# Patient Record
Sex: MALE | Race: WHITE | Employment: UNEMPLOYED | URBAN - METROPOLITAN AREA
[De-identification: names, ages, dates, MRNs, and addresses within clinical notes are randomized per-mention and may not be internally consistent; named-entity substitution may affect disease eponyms.]

---

## 2017-09-20 PROBLEM — E78.2 MIXED HYPERLIPIDEMIA: Status: ACTIVE | Noted: 2017-09-20

## 2017-09-20 PROBLEM — K21.9 GASTROESOPHAGEAL REFLUX DISEASE WITHOUT ESOPHAGITIS: Status: ACTIVE | Noted: 2017-09-20

## 2017-09-20 PROBLEM — M15.9 PRIMARY OSTEOARTHRITIS INVOLVING MULTIPLE JOINTS: Status: ACTIVE | Noted: 2017-09-20

## 2017-09-20 PROBLEM — E03.9 ACQUIRED HYPOTHYROIDISM: Status: ACTIVE | Noted: 2017-09-20

## 2017-09-21 PROBLEM — Z86.59 H/O: DEPRESSION: Status: ACTIVE | Noted: 2017-09-21

## 2017-09-21 PROBLEM — N18.9 CKD (CHRONIC KIDNEY DISEASE): Status: ACTIVE | Noted: 2017-09-21

## 2017-09-21 PROBLEM — K80.20 CHOLELITHIASIS: Status: ACTIVE | Noted: 2017-09-21

## 2017-09-21 PROBLEM — M19.90 DJD (DEGENERATIVE JOINT DISEASE): Status: ACTIVE | Noted: 2017-09-21

## 2017-09-21 PROBLEM — E78.5 HYPERLIPIDEMIA: Status: ACTIVE | Noted: 2017-09-21

## 2017-09-21 PROBLEM — E66.9 OBESITY: Status: ACTIVE | Noted: 2017-09-21

## 2017-09-21 PROBLEM — G47.30 SLEEP APNEA: Status: ACTIVE | Noted: 2017-09-21

## 2017-09-21 PROBLEM — Z79.899 ON STATIN THERAPY: Status: ACTIVE | Noted: 2017-09-21

## 2017-09-21 PROBLEM — I12.9 HYPERTENSION WITH RENAL DISEASE: Status: ACTIVE | Noted: 2017-09-21

## 2017-09-21 PROBLEM — K57.90 DIVERTICULOSIS: Status: ACTIVE | Noted: 2017-09-21

## 2017-09-21 PROBLEM — Z86.61 HISTORY OF VIRAL MENINGITIS: Status: ACTIVE | Noted: 2017-09-21

## 2017-10-06 RX ORDER — ROSUVASTATIN CALCIUM 10 MG/1
TABLET, COATED ORAL
Qty: 90 TAB | Refills: 3 | Status: SHIPPED | OUTPATIENT
Start: 2017-10-06 | End: 2018-11-28 | Stop reason: SDUPTHER

## 2017-10-06 RX ORDER — LEVOTHYROXINE SODIUM 50 UG/1
TABLET ORAL
Qty: 90 TAB | Refills: 3 | Status: SHIPPED | OUTPATIENT
Start: 2017-10-06 | End: 2018-11-28 | Stop reason: SDUPTHER

## 2017-10-06 RX ORDER — DICLOFENAC SODIUM 75 MG/1
TABLET, DELAYED RELEASE ORAL
Qty: 180 TAB | Refills: 3 | Status: SHIPPED | OUTPATIENT
Start: 2017-10-06 | End: 2018-11-28 | Stop reason: SDUPTHER

## 2017-10-06 RX ORDER — CARVEDILOL 12.5 MG/1
TABLET ORAL
Qty: 180 TAB | Refills: 3 | Status: SHIPPED | OUTPATIENT
Start: 2017-10-06 | End: 2018-11-28 | Stop reason: SDUPTHER

## 2017-10-06 RX ORDER — METFORMIN HYDROCHLORIDE 500 MG/1
TABLET, EXTENDED RELEASE ORAL
Qty: 270 TAB | Refills: 3 | Status: SHIPPED | OUTPATIENT
Start: 2017-10-06 | End: 2017-10-17 | Stop reason: ALTCHOICE

## 2017-10-06 RX ORDER — TRIAMTERENE/HYDROCHLOROTHIAZID 37.5-25 MG
TABLET ORAL
Qty: 90 TAB | Refills: 3 | Status: SHIPPED | OUTPATIENT
Start: 2017-10-06 | End: 2018-11-28 | Stop reason: SDUPTHER

## 2017-10-06 NOTE — TELEPHONE ENCOUNTER
Requested Prescriptions     Pending Prescriptions Disp Refills    metFORMIN ER (GLUCOPHAGE XR) 500 mg tablet [Pharmacy Med Name: METFORMIN ER 500MG TABLET] 270 Tab 3     Sig: TAKE 1 TABLET BY MOUTH IN  THE MORNING AND 2 TABLETS  AT DINNER    diclofenac EC (VOLTAREN) 75 mg EC tablet [Pharmacy Med Name: DICLOFENAC SODIUM  75MG  TAB  ENTERIC COATED] 180 Tab 3     Sig: TAKE 1 TABLET BY MOUTH TWO  TIMES DAILY    rosuvastatin (CRESTOR) 10 mg tablet [Pharmacy Med Name: ROSUVASTATIN  10MG  TAB] 90 Tab 3     Sig: TAKE 1 TABLET BY MOUTH  DAILY    carvedilol (COREG) 12.5 mg tablet [Pharmacy Med Name: CARVEDILOL  12.5MG  TAB] 180 Tab 3     Sig: TAKE 1 TABLET BY MOUTH TWO  TIMES DAILY    levothyroxine (SYNTHROID) 50 mcg tablet [Pharmacy Med Name: LEVOTHYROXINE  0.05MG  TAB] 90 Tab 3     Sig: TAKE 1 TABLET BY MOUTH  DAILY    triamterene-hydroCHLOROthiazide (MAXZIDE) 37.5-25 mg per tablet [Pharmacy Med Name: TRIAMT-HCTZ 37.5-25MG TABLET] 90 Tab 3     Sig: TAKE 1 TABLET BY MOUTH  DAILY

## 2017-10-17 ENCOUNTER — OFFICE VISIT (OUTPATIENT)
Dept: INTERNAL MEDICINE CLINIC | Age: 67
End: 2017-10-17

## 2017-10-17 VITALS
HEART RATE: 61 BPM | TEMPERATURE: 98.4 F | RESPIRATION RATE: 18 BRPM | BODY MASS INDEX: 33.77 KG/M2 | SYSTOLIC BLOOD PRESSURE: 124 MMHG | WEIGHT: 254.8 LBS | OXYGEN SATURATION: 97 % | DIASTOLIC BLOOD PRESSURE: 88 MMHG | HEIGHT: 73 IN

## 2017-10-17 DIAGNOSIS — I42.9 CARDIOMYOPATHY, UNSPECIFIED TYPE (HCC): ICD-10-CM

## 2017-10-17 DIAGNOSIS — Z12.5 PROSTATE CANCER SCREENING: ICD-10-CM

## 2017-10-17 DIAGNOSIS — E11.9 TYPE 2 DIABETES MELLITUS WITHOUT COMPLICATION, WITHOUT LONG-TERM CURRENT USE OF INSULIN (HCC): ICD-10-CM

## 2017-10-17 DIAGNOSIS — T75.89XA EFFECTS OF EXPOSURE TO EXTERNAL CAUSE, INITIAL ENCOUNTER: ICD-10-CM

## 2017-10-17 DIAGNOSIS — N18.30 STAGE 3 CHRONIC KIDNEY DISEASE (HCC): ICD-10-CM

## 2017-10-17 DIAGNOSIS — Z23 ENCOUNTER FOR IMMUNIZATION: ICD-10-CM

## 2017-10-17 DIAGNOSIS — M15.9 PRIMARY OSTEOARTHRITIS INVOLVING MULTIPLE JOINTS: ICD-10-CM

## 2017-10-17 DIAGNOSIS — K21.9 GASTROESOPHAGEAL REFLUX DISEASE WITHOUT ESOPHAGITIS: ICD-10-CM

## 2017-10-17 DIAGNOSIS — E66.09 CLASS 1 OBESITY DUE TO EXCESS CALORIES WITHOUT SERIOUS COMORBIDITY WITH BODY MASS INDEX (BMI) OF 33.0 TO 33.9 IN ADULT: ICD-10-CM

## 2017-10-17 DIAGNOSIS — I12.9 HYPERTENSION WITH RENAL DISEASE: Primary | ICD-10-CM

## 2017-10-17 DIAGNOSIS — Z00.00 WELCOME TO MEDICARE PREVENTIVE VISIT: ICD-10-CM

## 2017-10-17 DIAGNOSIS — E78.2 MIXED HYPERLIPIDEMIA: ICD-10-CM

## 2017-10-17 LAB
ALBUMIN SERPL-MCNC: 4.5 G/DL (ref 3.9–5.4)
ALKALINE PHOS POC: 85 U/L (ref 38–126)
ALT SERPL-CCNC: 60 U/L (ref 9–52)
AST SERPL-CCNC: 38 U/L (ref 14–36)
BACTERIA UA POCT, BACTPOCT: NORMAL
BILIRUB UR QL STRIP: NEGATIVE
BUN BLD-MCNC: 25 MG/DL (ref 9–20)
CALCIUM BLD-MCNC: 9.5 MG/DL (ref 8.4–10.2)
CASTS UA POCT: NORMAL
CHLORIDE BLD-SCNC: 105 MMOL/L (ref 98–107)
CHOLEST SERPL-MCNC: 121 MG/DL (ref 0–200)
CK (CPK) POC: 114 U/L (ref 30–135)
CLUE CELLS, CLUEPOCT: NEGATIVE
CO2 POC: 28 MMOL/L (ref 22–32)
CREAT BLD-MCNC: 1.6 MG/DL (ref 0.8–1.5)
CRYSTALS UA POCT, CRYSPOCT: NEGATIVE
EGFR (POC): 44.5
EPITHELIAL CELLS POCT: NORMAL
GLUCOSE POC: 124 MG/DL (ref 75–110)
GLUCOSE UR-MCNC: NEGATIVE MG/DL
GRAN# POC: 4.5 K/UL (ref 2–7.8)
GRAN% POC: 68.8 % (ref 37–92)
HBA1C MFR BLD HPLC: 7 % (ref 4.5–5.7)
HCT VFR BLD CALC: 36.1 % (ref 37–51)
HDLC SERPL-MCNC: 31 MG/DL (ref 35–130)
HGB BLD-MCNC: 12.3 G/DL (ref 12–18)
KETONES P FAST UR STRIP-MCNC: NEGATIVE MG/DL
LDL CHOLESTEROL POC: 48.6 MG/DL (ref 0–130)
LY# POC: 1.7 K/UL (ref 0.6–4.1)
LY% POC: 26.5 % (ref 10–58.5)
MCH RBC QN: 31.4 PG (ref 26–32)
MCHC RBC-ENTMCNC: 34.2 G/DL (ref 30–36)
MCV RBC: 92 FL (ref 80–97)
MICROALBUMIN UR TEST STR-MCNC: NEGATIVE MG/L (ref 0–20)
MID #, POC: 0.3 K/UL (ref 0–1.8)
MID% POC: 4.7 % (ref 0.1–24)
MUCUS UA POCT, MUCPOCT: NORMAL
PH UR STRIP: 5 [PH] (ref 5–7)
PLATELET # BLD: 211 K/UL (ref 140–440)
POTASSIUM SERPL-SCNC: 4.2 MMOL/L (ref 3.6–5)
PROT SERPL-MCNC: 7.2 G/DL (ref 6.3–8.2)
PROT UR QL STRIP: NEGATIVE MG/DL
PSA SERPL-MCNC: 2 NG/ML (ref 0–4)
RBC # BLD: 3.92 M/UL (ref 4.2–6.3)
RBC UA POCT, RBCPOCT: NORMAL
SODIUM SERPL-SCNC: 146 MMOL/L (ref 137–145)
SP GR UR STRIP: 1.01 (ref 1.01–1.02)
T4 FREE SERPL-MCNC: 1.03 NG/DL (ref 0.71–1.85)
TCHOL/HDL RATIO (POC): 3.9 (ref 0–4)
TOTAL BILIRUBIN POC: 0.7 MG/DL (ref 0.2–1.3)
TRICH UA POCT, TRICHPOC: NEGATIVE
TRIGL SERPL-MCNC: 207 MG/DL (ref 0–200)
TSH BLD-ACNC: 1.43 UIU/ML (ref 0.4–4.2)
UA UROBILINOGEN AMB POC: NORMAL (ref 0.2–1)
URINALYSIS CLARITY POC: CLEAR
URINALYSIS COLOR POC: NORMAL
URINE BLOOD POC: NEGATIVE
URINE CULT COMMENT, POCT: NORMAL
URINE LEUKOCYTES POC: NEGATIVE
URINE NITRITES POC: NEGATIVE
VLDLC SERPL CALC-MCNC: 41.4 MG/DL
WBC # BLD: 6.5 K/UL (ref 4.1–10.9)
WBC UA POCT, WBCPOCT: 0
YEAST UA POCT, YEASTPOC: NEGATIVE

## 2017-10-17 NOTE — MR AVS SNAPSHOT
Visit Information Date & Time Provider Department Dept. Phone Encounter #  
 10/17/2017  9:10 AM Augusto Rutherford MD Rudy Dupree  [phone] [id number] Follow-up Instructions Return in about 3 months (around 1/17/2018). Upcoming Health Maintenance Date Due  
 EYE EXAM RETINAL OR DILATED Q1 3/1/1962 DTaP/Tdap/Td series (1 - Tdap) 3/1/1973 FOBT Q 1 YEAR AGE 50-75 3/1/2002 ZOSTER VACCINE AGE 60> 1/1/2012 GLAUCOMA SCREENING Q2Y 3/1/2017 HEMOGLOBIN A1C Q6M 4/17/2018 FOOT EXAM Q1 10/17/2018 MICROALBUMIN Q1 10/17/2018 LIPID PANEL Q1 10/17/2018 MEDICARE YEARLY EXAM 10/18/2018 Pneumococcal 65+ Low/Medium Risk (2 of 2 - PPSV23) 11/16/2020 Allergies as of 10/17/2017  Review Complete On: 10/17/2017 By: Augusto Rutherford MD  
  
 Severity Noted Reaction Type Reactions Demerol [Meperidine]  03/11/2012    Nausea and Vomiting Phenergan [Promethazine]  09/21/2017    Unknown (comments) Current Immunizations  Never Reviewed Name Date Influenza High Dose Vaccine PF  Incomplete Influenza Vaccine 11/23/2016, 11/16/2015 Influenza Vaccine Split 10/1/2011 Pneumococcal Conjugate (PCV-13) 3/2/2017 Pneumococcal Vaccine (Unspecified Type) 11/16/2015 ZZZ-RETIRED (DO NOT USE) Pneumococcal Vaccine (Unspecified Type)  Deferred (Patient Refused), 1/1/2012 Not reviewed this visit You Were Diagnosed With   
  
 Codes Comments Hypertension with renal disease    -  Primary ICD-10-CM: I12.9 ICD-9-CM: 403.90 Cardiomy[facility] unspecified [facility]     ICD-10-CM: I42.9 ICD-9-CM: 425.4 Type 2 diabetes mellitus without complication, without long-term current use of insulin (HCC)     ICD-10-CM: E11.9 ICD-9-CM: 250.00 Mixed hyperlipidemia     ICD-10-CM: E78.2 ICD-9-CM: 272.2 Gastroesophageal reflux disease without esophagitis     ICD-10-CM: K21.9 ICD-9-CM: 530.81   
 Primary osteoarthritis involving multiple joints     ICD-10-CM: M15.0 ICD-9-CM: 715.09 Stage 3 chronic kidney disease     ICD-10-CM: N18.3 ICD-9-CM: 929. 3 Class 1 obesity due to excess calories without serious comorbidity with body mass index (BMI) of 33.0 to 33.9 in adult     ICD-10-CM: E66.09, Z68.33 
ICD-9-CM: 278.00, V85.33 Prostate cancer screening     ICD-10-CM: Z12.5 ICD-9-CM: V76.44 Effects of exposure to external cause, initial encounter     ICD-10-CM: T75.89XA ICD-9-CM: 994.9 Encounter for immunization     ICD-10-CM: J47 ICD-9-CM: V03.89 Welcome to Medicare preventive visit     ICD-10-CM: Z00.00 ICD-9-CM: V70.0 Vitals BP Pulse Temp Resp Height(growth percentile) Weight(growth percentile) 124/88 (BP 1 Location: Right arm, BP Patient Position: Sitting) 61 98.4 °F (36.9 °C) (Oral) 18 6' 1\" (1.854 m) 254 lb 12.8 oz (115.6 kg) SpO2 BMI Smoking Status 97% 33.62 kg/m2 Never Smoker Vitals History BMI and BSA Data Body Mass Index Body Surface Area  
 33.62 kg/m 2 2.44 m 2 Preferred Pharmacy Pharmacy Name Phone [facility], [address] [place] [address] [address] [place] [address] Your Updated Medication List  
  
   
This list is accurate as of: 10/17/17 10:43 AM.  Always use your most recent med list.  
  
  
  
  
 aspirin 81 mg tablet Take 81 mg by mouth daily. carvedilol 12.5 mg tablet Commonly known as:  COREG  
TAKE 1 TABLET BY MOUTH TWO  TIMES DAILY  
  
 diclofenac EC 75 mg EC tablet Commonly known as:  VOLTAREN  
TAKE 1 TABLET BY MOUTH TWO  TIMES DAILY  
  
 esomeprazole 40 mg capsule Commonly known as:  Bonnita Buerger Take  by mouth daily. famotidine 40 mg tablet Commonly known as:  PEPCID Take 40 mg by mouth daily. FISH OIL 1,000 mg Cap Generic drug:  omega-3 fatty acids-vitamin e Take 1 Cap by mouth.  
  
 levothyroxine 50 mcg tablet Commonly known as:  SYNTHROID  
 TAKE 1 TABLET BY MOUTH  DAILY  
  
 lisinopril 10 mg tablet Commonly known as:  Tosha Blue Take 10 mg by mouth daily. metFORMIN 500 mg tablet Commonly known as:  GLUCOPHAGE Take 500 mg by mouth two (2) times daily (with meals). niacin  mg Tb24 Commonly known as:  Dauna Jain Take 750 mg by mouth nightly. rosuvastatin 10 mg tablet Commonly known as:  CRESTOR  
TAKE 1 TABLET BY MOUTH  DAILY  
  
 triamterene-hydroCHLOROthiazide 37.5-25 mg per tablet Commonly known as:  Sherrie Moreno TAKE 1 TABLET BY MOUTH  DAILY We Performed the Following ADMIN INFLUENZA VIRUS VAC [ [facility]] AMB POC CK (CPK) [43978 CPT(R)] AMB POC COMPLETE CBC,AUTOMATED ENTER O2467826 CPT(R)] AMB POC COMPREHENSIVE METABOLIC PANEL [15089 CPT(R)] AMB POC EKG ROUTINE W/ 12 LEADS, INTER & REP [86197 CPT(R)] AMB POC HEMOGLOBIN A1C [79723 CPT(R)] AMB POC LIPID PROFILE [03795 CPT(R)] AMB POC PSA  (MEDICARE) [ [facility]] AMB POC T4, FREE [id number] CPT(R)] AMB POC TSH [21620 CPT(R)] AMB POC URINALYSIS DIP STICK AUTO W/ MICRO  [49778 CPT(R)] AMB POC URINE, MICROALBUMIN, SEMIQUANT (1 RESULT) [49407 CPT(R)] HEPATITIS C AB [23584 CPT(R)] INFLUENZA VIRUS VACCINE, HIGH DOSE SEASONAL, PRESERVATIVE FREE [64739 CPT(R)] Follow-up Instructions Return in about 3 months (around 1/17/2018). Patient Instructions Influenza (Flu) Vaccine (Inactivated or Recombinant): What You Need to Know Why get vaccinated? Influenza (\"flu\") is a contagious disease that spreads around the [place] every winter, usually between October and May. Flu is caused by influenza viruses and is spread mainly by coughing, sneezing, and close contact. Anyone can get flu. Flu strikes suddenly and can last several days. Symptoms vary by age, but can include: · Fever/chills. · Sore throat. · Muscle aches. · Fatigue. · Cough. · Headache. · Runny or stuffy nose. Flu can also lead to pneumonia and blood infections, and cause diarrhea and seizures in children. If you have a medical condition, such as heart or lung disease, flu can make it worse. Flu is more dangerous for some people. Infants and young children, people 72years of age and older, pregnant women, and people with certain health conditions or a weakened immune system are at greatest risk. Each year thousands of people in the [facility] die from flu, and many more are hospitalized. Flu vaccine can: · Keep you from getting flu. · Make flu less severe if you do get it. · Keep you from spreading flu to your family and other people. Inactivated and recombinant flu vaccines A dose of flu vaccine is recommended every flu season. Children 6 months through 6years of age may need two doses during the same flu season. Everyone else needs only one dose each flu season. Some inactivated flu vaccines contain a very small amount of a mercury-based preservative called thimerosal. Studies have not shown thimerosal in vaccines to be harmful, but flu vaccines that do not contain thimerosal are available. There is no live flu virus in flu shots. They cannot cause the flu. There are many flu viruses, and they are always changing. Each year a new flu vaccine is made to protect against three or four viruses that are likely to cause disease in the upcoming flu season. But even when the vaccine doesn't exactly match these viruses, it may still provide some protection. Flu vaccine cannot prevent: · Flu that is caused by a virus not covered by the vaccine. · Illnesses that look like flu but are not. Some people should not get this vaccine Tell the person who is giving you the vaccine: · If you have any severe (life-threatening) allergies.  If you ever had a life-threatening allergic reaction after a dose of flu vaccine, or have a severe allergy to any part of this vaccine, you may be advised not to get vaccinated. Most, but not all, types of flu vaccine contain a small amount of egg protein. · If you ever had Guillain-Barré syndrome (also called GBS) Some people with a history of GBS should not get this vaccine. This should be discussed with your doctor. · If you are not feeling well. It is usually okay to get flu vaccine when you have a mild illness, but you might be asked to come back when you feel better. Risks of a vaccine reaction With any medicine, including vaccines, there is a chance of reactions. These are usually mild and go away on their own, but serious reactions are also possible. Most people who get a flu shot do not have any problems with it. Minor problems following a flu shot include: · Soreness, redness, or swelling where the shot was given · Hoarseness · Sore, red or itchy eyes · Cough · Fever · Aches · Headache · Itching · Fatigue If these problems occur, they usually begin soon after the shot and last 1 or 2 days. More serious problems following a flu shot can include the following: · There may be a small increased risk of Guillain-Barré Syndrome (GBS) after inactivated flu vaccine. This risk has been estimated at 1 or 2 additional cases per million people vaccinated. This is much lower than the risk of severe complications from flu, which can be prevented by flu vaccine. · Ashley Houston children who get the flu shot along with pneumococcal vaccine (PCV13) and/or DTaP vaccine at the same time might be slightly more likely to have a seizure caused by fever. Ask your doctor for more information. Tell your doctor if a child who is getting flu vaccine has ever had a seizure Problems that could happen after any injected vaccine: · People sometimes faint after a medical procedure, including vaccination.  Sitting or lying down for about 15 minutes can help prevent fainting, and injuries caused by a fall. Tell your doctor if you feel dizzy, or have vision changes or ringing in the ears. · Some people get severe pain in the shoulder and have difficulty moving the arm where a shot was given. This happens very rarely. · Any medication can cause a severe allergic reaction. Such reactions from a vaccine are very rare, estimated at about 1 in a million doses, and would happen within a few minutes to a few hours after the vaccination. As with any medicine, there is a very remote chance of a vaccine causing a serious injury or death. The safety of vaccines is always being monitored. For more information, visit: www.cdc.gov/vaccinesafety/. What if there is a serious reaction? What should I look for? · Look for anything that concerns you, such as signs of a severe allergic reaction, very high fever, or unusual behavior. Signs of a severe allergic reaction can include hives, swelling of the face and throat, difficulty breathing, a fast heartbeat, dizziness, and weakness  usually within a few minutes to a few hours after the vaccination. What should I do? · If you think it is a severe allergic reaction or other emergency that can't wait, call 9-1-1 and get the person to the nearest hospital. Otherwise, call your doctor. · Reactions should be reported to the \"Vaccine Adverse Event Reporting System\" (VAERS). Your doctor should file this report, or you can do it yourself through the VAERS website at www.vaers. hhs.gov, or by calling 8-246.267.6459. VAERS does not give medical advice. The National Vaccine Injury Compensation Program 
The National Vaccine Injury Compensation Program (VICP) is a federal program that was created to compensate people who may have been injured by certain vaccines.  
Persons who believe they may have been injured by a vaccine can learn about the program and about filing a claim by calling 4-305.231.5900 or visiting the [employer] website at www.[facility].gov/vaccinecompensation. There is a time limit to file a claim for compensation. How can I learn more? · Ask your healthcare provider. He or she can give you the vaccine package insert or suggest other sources of information. · Call your local or state health department. · Contact the Centers for Disease Control and Prevention (CDC): 
¨ Call 0-979.515.1907 (1-800-CDC-INFO) or ¨ Visit CDC's website at www.cdc.gov/flu Vaccine Information Statement Inactivated Influenza Vaccine 8/7/2015) 42 KELSEY Blackwell 431LC-75 [facility] and [employer] Centers for Disease Control and Prevention Many Vaccine Information Statements are available in Wolof and other languages. See www.immunize.org/vis. Muchas hojas de información sobre vacunas están disponibles en español y en otros idiomas. Visite www.immunize.org/vis. Care instructions adapted under license by [employer] (which disclaims liability or warranty for this information). If you have questions about a medical condition or this instruction, always ask your healthcare professional. [address] any warranty or liability for your use of this information. Introducing [facility] HEALTH [facility]! Dear Tika Lance: 
Thank you for requesting a [employer] account. Our records indicate that you already have an active [employer] account. You can access your account anytime at [email or web address] Did you know that you can access your hospital and ER discharge instructions at any time in [employer]? You can also review all of your test results from your hospital stay or ER visit. Additional Information If you have questions, please visit the Frequently Asked Questions section of the [employer] website at [email or web address] Remember, [employer] is NOT to be used for urgent needs. For medical emergencies, dial 911. Now available from your iPhone and Android! Please provide this summary of care documentation to your next provider. Your primary care clinician is listed as Malika. If you have any questions after today's visit, please call [phone].

## 2017-10-17 NOTE — PROGRESS NOTES
Jazmin Gore III is a 74 y.o. male      Chief Complaint   Patient presents with    Follow-up     3 Month F/up       1. Have you been to the ER, urgent care clinic since your last visit? Hospitalized since your last visit? No    2. Have you seen or consulted any other health care providers outside of the [address] [place] [address] since your last visit? Include any pap smears or colon screening.  No

## 2017-10-17 NOTE — PROGRESS NOTES
PROGRESS NOTES    This is a \"Welcome to [employer]" Initial Preventive Physical Examination (IPPE)    I have reviewed the patient's medical history in detail and updated the computerized patient record. He presents today for his initial welcome to Medicare screening questionnaire and exam.  He is also here in follow-up of his medical problems to include hypertension, hyperlipidemia, diabetes, DJD, GERD, hypothyroidism, cardiomyopathy, diverticulosis, and obesity. He claims to be taking his medications and trying to follow his diet as well as getting some exercise but knows he could do better with diet and exercise. He denies any chest pain shortness of breath PND orthopnea or any other cardiorespiratory complaints. There are no GI/ complaints. He denies headaches or neurologic complaints. There are no other complaints on complete review of systems. Depression risk factor summary:      Patient Health Questionnaire (PHQ-9)   Over the last 2 weeks, how often have you been bothered by any of the following problems? · Little interest or pleasure in doing things? · Not at all. [0]  · Feeling down, depressed, or hopeless? · Not at all. [0]  · Trouble falling or staying asleep, or sleeping too much? · Not at all. [0]  · Feeling tired or having little energy? · Not at all. [0]  · Poor appetite or overeating? · Not at all. [0]  · Feeling bad about yourself - or that you are a failure or have let yourself or your family down? · Not at all. [0]  · Trouble concentrating on things, such as reading the newspaper or watching television? · Not at all. [0]  · Moving or speaking so slowly that other people could have noticed? Or the opposite - being so fidgety or restless that you have been moving around a lot more than usual?  · Not at all. [0]  · Thoughts that you would be better off dead, or of hurting yourself in some way?   · Not at all. [0]    Total Score: 0/27  Depression Severity:   0-4 None, 5-9 mild, 10-14 moderate, 15-19 moderately severe, 20-27 severe. Functional Ability and Level of Safety:    Hearing Loss    Hearing is good. Activities of Daily Living   Self-care. Requires assistance with: no ADLs    Fall Risk   No fall risk factors    Abuse Screen   None    Adult Nutrition Screen   No risk factors noted. Review of Systems   ROS:    Constitutional: He denies fevers, weight loss, sweats. Eyes: No blurred or double vision. ENT: No difficulty with swallowing, taste, speech or smell. Neck: no stiffness or swelling  Respiratory: No cough wheezing or shortness of breath. Cardiovascular: Denies chest pain, palpitations, unexplained indigestion or syncope. Gastrointestinal:  No changes in bowel movements, no abdominal pain, no bloating. Genitourinary:  He denies frequency, nocturia or stranguria. Extremities: No joint pain, stiffness or swelling. Neurological:  No numbness, tingling, burring paresthesias or loss of motor strength. No syncope, dizziness or frequent headache  Lymphatic: no adenopathy noted  Hematologic: no easy bruising or bleeding gums  Skin:  No recent rashes or mole changes. Psychiatric/Behavioral:  Negative for depression. Physical Exam     Visit Vitals    /88 (BP 1 Location: Right arm, BP Patient Position: Sitting)    Pulse 61    Temp 98.4 °F (36.9 °C) (Oral)    Resp 18    Ht 6' 1\" (1.854 m)    Wt 254 lb 12.8 oz (115.6 kg)    SpO2 97%    BMI 33.62 kg/m2      Body mass index is 33.62 kg/(m^2).   Visual Acuity: Corrected vision right eye 20/20 left eye 20/20  Vitals:    10/17/17 0948   BP: 124/88   Pulse: 61   Resp: 18   Temp: 98.4 °F (36.9 °C)   TempSrc: Oral   SpO2: 97%   Weight: 254 lb 12.8 oz (115.6 kg)   Height: 6' 1\" (1.854 m)   PainSc:   0 - No pain        PHYSICAL EXAM:    General appearance - alert, well appearing, and in no distress  Mental status - alert, oriented to person, place, and time  HEENT:  Ears - bilateral TM's and external ear canals clear  Eyes - pupillary responses were normal.  Extraocular muscle function intact. Lids and conjunctiva not injected. Fundoscopic exam revealed sharp disc margins. eye movements intact  Pharynx- clear with teeth in good repair. No masses were noted  Neck - supple without thyromegaly or burit. No JVD noted  Lungs - clear to auscultation and percussion  Cardiac- normal rate, regular rhythm without murmurs. PMI not displaced. No gallop, rub or click  Abdomen - flat, soft, non-tender without palpable organomegaly or mass. No pulsatile mass was felt, and not bruit was heard. Bowel sounds were active  : Circumcised, Testes descended w/o masses  Rectal: normal sphincter tone, prostate normal, no masses, stool brown and hemacult negative  Extremities -  no clubbing cyanosis or edema  Lymphatics - no palpable lymphadenopathy, no hepatosplenomegaly  Hematologic: no petechiae or purpura  Peripheral vascular -Femoral, Dorsalis pedis and posterior tibial pulses felt without difficulty  Skin - no rash or unusual mole change noted  Neurological - Cranial nerves II-XII grossly intact. Motor strength 5/5. DTR's 2+ and symmetric. Station and gait normal  Back exam - full range of motion, no tenderness, palpable spasm or pain on motion  Musculoskeletal - no joint tenderness, deformity or swelling    EKG: Sinus rhythm with some pacemaker spikes left bundle branch block pattern unchanged. Assessment/Plan:   Education and counseling provided:  Are appropriate based on today's review and evaluation  ASSESSMENT:   1. Hypertension with renal disease    2. Cardiomyopathy, unspecified type ([address]    3. Type 2 diabetes mellitus without complication, without long-term current use of insulin ([address]    4. Mixed hyperlipidemia    5. Gastroesophageal reflux disease without esophagitis    6. Primary osteoarthritis involving multiple joints    7. Stage 3 chronic kidney disease    8.  Class 1 obesity due to excess calories without serious comorbidity with body mass index (BMI) of 33.0 to 33.9 in adult    9. Prostate cancer screening    10. Effects of exposure to external cause, initial encounter    11. Encounter for immunization    12. Welcome to Medicare preventive visit      Impression  1. Hypertension that is adequately controlled we reviewed medicines will continue the same  2. Cardiomyopathy clinically stable no changes in treatment needed  3. Diabetes reviewed prior labs with him repeat status pending. Will make adjustments if necessary based on today's lab  3. Hyperlipidemia we will see what that status is and make adjustments if necessary prior labs reviewed  5. GERD currently stable on present treatment  6. DJD seems stable  7. Chronic kidney disease stage III repeat status pending prior labs reviewed  8. Obesity weight today certainly needs to come down to 54 we discussed diet exercise weight reduction  Welcome to Medicare annual screening questionnaire and examination completed today. The results were reviewed with him and his questions were answered. Lifestyle recommendations and modifications discussed and made. Flu shot given today. Labs are pending as noted will make further recommendations based upon those. Follow stable continue same and recheck in 3 months or sooner should there be a problem. PLAN:  .  Orders Placed This Encounter    Influenza virus vaccine (FLUZONE HIGH-DOSE) 65 years and older (60417)    HEPATITIS C AB    AMB POC PSA  (MEDICARE)    AMB POC URINE, MICROALBUMIN, SEMIQUANT (1 RESULT)    AMB POC URINALYSIS DIP STICK AUTO W/ MICRO     AMB POC TSH    AMB POC HEMOGLOBIN A1C    AMB POC LIPID PROFILE    AMB POC T4, FREE    AMB POC COMPREHENSIVE METABOLIC PANEL    AMB POC COMPLETE CBC,AUTOMATED ENTER    AMB POC CK (CPK)    AMB POC EKG ROUTINE W/ 12 LEADS, INTER & REP         ATTENTION:   This medical record was transcribed using an electronic medical records system.   Although proofread, it may and can contain electronic and spelling errors. Other human spelling and other errors may be present. Corrections may be executed at a later time. Please feel free to contact us for any clarifications as needed. Follow-up Disposition:  Return in about 3 months (around 1/17/2018). Jagdish Wallis MD.    The patient verbalized an understanding of the problems and plans as explained.

## 2017-10-17 NOTE — PATIENT INSTRUCTIONS

## 2017-10-18 LAB — HCV AB S/CO SERPL IA: <0.1 S/CO RATIO (ref 0–0.9)

## 2017-10-20 NOTE — PROGRESS NOTES
Kidney blood test is stable although mildly abnormal we will continue to follow that. All of the other blood tests are okay except for glycohemoglobin is 7 needs to be at 6.5 so watching diet would help that his HDL is still low so increase in facial to 3 a day would help that along with diet exercise and weight reduction.

## 2017-10-27 NOTE — PROGRESS NOTES
Kidney blood test is stable although mildly abnormal we will continue to follow that.  All of the other blood tests are okay except for glycohemoglobin is 7 needs to be at 6.5 so watching diet would help that his HDL is still low so increase in facial to 3 a day would help that along with diet exercise and weight reduction. Informed patient regarding his lab. Discussed working on diet and exercise. Also discussed low HDL. Patient states he is guilty of not taking his fish oil and regularly as he should. Discussed resuming and take 3 daily. Also needs to increase his aerobic exercise.

## 2017-11-27 ENCOUNTER — OFFICE VISIT (OUTPATIENT)
Dept: INTERNAL MEDICINE CLINIC | Age: 67
End: 2017-11-27

## 2017-11-27 VITALS
BODY MASS INDEX: 33.43 KG/M2 | HEIGHT: 73 IN | OXYGEN SATURATION: 97 % | TEMPERATURE: 98.5 F | HEART RATE: 67 BPM | WEIGHT: 252.2 LBS | DIASTOLIC BLOOD PRESSURE: 70 MMHG | SYSTOLIC BLOOD PRESSURE: 102 MMHG | RESPIRATION RATE: 20 BRPM

## 2017-11-27 DIAGNOSIS — J20.9 ACUTE BRONCHITIS, UNSPECIFIED ORGANISM: Primary | ICD-10-CM

## 2017-11-27 RX ORDER — CEFUROXIME AXETIL 250 MG/1
250 TABLET ORAL 2 TIMES DAILY
Qty: 20 TAB | Refills: 0 | Status: SHIPPED | OUTPATIENT
Start: 2017-11-27 | End: 2017-11-27 | Stop reason: SDUPTHER

## 2017-11-27 RX ORDER — BENZONATATE 200 MG/1
200 CAPSULE ORAL
Qty: 30 CAP | Refills: 0 | Status: SHIPPED | OUTPATIENT
Start: 2017-11-27 | End: 2017-11-27 | Stop reason: SDUPTHER

## 2017-11-27 RX ORDER — BENZONATATE 200 MG/1
200 CAPSULE ORAL
Qty: 30 CAP | Refills: 0 | Status: SHIPPED | OUTPATIENT
Start: 2017-11-27 | End: 2017-12-04

## 2017-11-27 RX ORDER — CEFUROXIME AXETIL 250 MG/1
250 TABLET ORAL 2 TIMES DAILY
Qty: 20 TAB | Refills: 0 | Status: SHIPPED | OUTPATIENT
Start: 2017-11-27 | End: 2018-01-31 | Stop reason: ALTCHOICE

## 2017-11-27 NOTE — PROGRESS NOTES
Subjective:   Tj Mcghee III is a 74 y.o. male      Chief Complaint   Patient presents with    Cold Symptoms     coughing, hoarseness, chills last night        History of present illness: He presents complaining a lot of coughing and hoarseness is been going on for the past 3 or 4 days he has had some chills but no fevers. He denies any shortness of breath or any sputum production. He does not really note any head congestion is more of a sore throat hoarseness and cough. His wife is recently been sick with respiratory infection.     Patient Active Problem List   Diagnosis Code    Cardiomyopathy ([facility] I42.9    Diabetes mellitus ([facility] E11.9    Mixed hyperlipidemia E78.2    Acquired hypothyroidism E03.9    Gastroesophageal reflux disease without esophagitis K21.9    Primary osteoarthritis involving multiple joints M15.0    Cholelithiasis K80.20    Diverticulosis K57.90    History of viral meningitis Z86.61    H/O: depression Z86.59    Sleep apnea G47.30    On statin therapy Z79.899    Obesity E66.9    Hypertension with renal disease I12.9    CKD (chronic kidney disease) N18.9    Prostate cancer screening Z12.5    Exposure T75.89XA    Welcome to Medicare preventive visit Z00.00    Acute bronchitis J20.9      Past Medical History:   Diagnosis Date    Arthritis     fingers    CAD (coronary artery disease)     Cardiomyopathy ([facility]     Cholelithiasis 9/21/2017    CKD (chronic kidney disease) 9/21/2017    Diabetes mellitus ([facility]     Diverticulosis 9/21/2017    DJD (degenerative joint disease) 9/21/2017    GERD (gastroesophageal reflux disease)     H/O: depression 9/21/2017    Hiatal hernia     Hypercholesteremia     Hypertension     Hypertension with renal disease 9/21/2017    Hypothyroidism     Kidney stones     Obesity 9/21/2017    On statin therapy 9/21/2017    Sleep apnea 9/21/2017    Thyroid disease     Viral meningitis 9/1990      Allergies   Allergen Reactions    (NIASPAN) 750 mg Tb24 Take 750 mg by mouth nightly. Yes Historical Provider   omega-3 fatty acids-vitamin e (FISH OIL) 1,000 mg Cap Take 1 Cap by mouth. Yes Historical Provider   lisinopril (PRINIVIL, ZESTRIL) 10 mg tablet Take 10 mg by mouth daily. Yes Liu Lyles MD   metFORMIN (GLUCOPHAGE) 500 mg tablet Take 500 mg by mouth two (2) times daily (with meals). Yes Liu Lyles MD   aspirin 81 mg tablet Take 81 mg by mouth daily as needed. Yes Historical Provider        Review of Systems              Constitutional:  He denies fever, weight loss, sweats or fatigue. EYES: No blurred or double vision,               ENT: no nasal congestion, no headache or dizziness. No difficulty with swallowing, taste, speech or smell. Positive for sore throat  Respiratory: Cough and hoarseness without wheezing or shortness of breath. No sputum production. Cardiac:  Denies chest pain, palpitations, unexplained indigestion, syncope, edema, PND or orthopnea. GI:  No changes in bowel movements, no abdominal pain, no bloating, anorexia, nausea, vomiting or heartburn. :  No frequency or dysuria. Denies incontinence or sexual dysfunction. Extremities:  No joint pain, stiffness or swelling  Back:.no pain or soreness  Skin:  No recent rashes or mole changes. Neurological:  No numbness, tingling, burning paresthesias or loss of motor strength. No syncope, dizziness, frequent headaches or memory loss. Hematologic:  No easy bruising  Lymphatic: No lymph node enlargement    Objective:     Vitals:    11/27/17 1614   BP: 102/70   Pulse: 67   Resp: 20   Temp: 98.5 °F (36.9 °C)   TempSrc: Oral   SpO2: 97%   Weight: 252 lb 3.2 oz (114.4 kg)   Height: 6' 1\" (1.854 m)   PainSc:   0 - No pain       Body mass index is 33.27 kg/(m^2). Physical Examination:              General Appearance:  Well-developed, well-nourished, no acute distress.              HEENT:      Ears:  The TMs and ear canals were clear.  Eyes:  The pupillary responses were normal.  Extraocular muscle function intact. Lids and conjunctiva not injected. Funduscopic exam revealed sharp disc margins. Nares: Clear w/o edema or erythema  Pharynx: Erythematous posterior pharynx without exudate with teeth in good repair. No masses were noted. Neck:  Supple without thyromegaly or adenopathy. No JVD noted. No carotid                bruits. Lungs:  Clear to auscultation and percussion. Cardiac:  Regular rate and rhythm without murmur. PMI not displaced. No gallop, rub or click. Abdominal: Soft, non-tender, no hepata-spleenomegally or masses  Extremities:  No clubbing, cyanosis or edema. Skin:  No rash or unusual mole changes noted. Lymph Nodes:  None felt in the cervical, supraclavicular, axillary or inguinal region. Neurological: . DTRs 2+ and symmetric. Station and gait normal.   Hematologic:   No purpura or petechiae        Assessment/Plan:         1. Acute bronchitis, unspecified organism        Impressions/Plan:  Acute bronchitis upper respiratory infection we will treat this with Ceftin twice a day for 10 days and Tessalon for cough and recheck if not resolved. Orders Placed This Encounter    cefUROXime (CEFTIN) 250 mg tablet    benzonatate (TESSALON) 200 mg capsule       Follow-up Disposition:  Return if symptoms worsen or fail to improve. No results found for any visits on 11/27/17. Reyes Frazier MD    The patient was given after the visit summary the patient verbalized an understanding of the plans and problems as explained.

## 2017-11-27 NOTE — PROGRESS NOTES
1. Have you been to the ER, urgent care clinic since your last visit? Hospitalized since your last visit? No    2. Have you seen or consulted any other health care providers outside of the [address] [place] [address] since your last visit? Include any pap smears or colon screening. No     Chief Complaint   Patient presents with    Cold Symptoms     coughing, hoarseness, chills last night     Not fasting. Eye exam not done this year. Needs to be scheduled.

## 2017-11-27 NOTE — MR AVS SNAPSHOT
Visit Information Date & Time Provider Department Dept. Phone Encounter #  
 11/27/2017  3:30 PM Meg MicheleSweta [phone] [id number] Your Appointments 1/31/2018  9:20 AM  
FOLLOW UP 10 with MD MOISÉS Chino [facility] ([address]) Appt Note: [address] [facility] [address] [address] [address] [facility] [address] Upcoming Health Maintenance Date Due  
 EYE EXAM RETINAL OR DILATED Q1 3/1/1962 DTaP/Tdap/Td series (1 - Tdap) 3/1/1973 ZOSTER VACCINE AGE 60> 1/1/2012 GLAUCOMA SCREENING Q2Y 3/1/2017 HEMOGLOBIN A1C Q6M 4/17/2018 FOOT EXAM Q1 10/17/2018 MICROALBUMIN Q1 10/17/2018 LIPID PANEL Q1 10/17/2018 MEDICARE YEARLY EXAM 10/18/2018 COLONOSCOPY 2/17/2019 Pneumococcal 65+ Low/Medium Risk (2 of 2 - PPSV23) 11/16/2020 Allergies as of 11/27/2017  Review Complete On: 11/27/2017 By: Yareli Fitzgerald LPN Severity Noted Reaction Type Reactions Demerol [Meperidine]  03/11/2012    Nausea and Vomiting Phenergan [Promethazine]  09/21/2017    Unknown (comments) Current Immunizations  Never Reviewed Name Date Influenza High Dose Vaccine PF 10/17/2017 Influenza Vaccine 11/23/2016, 11/16/2015 Influenza Vaccine Split 10/1/2011 Pneumococcal Conjugate (PCV-13) 3/2/2017 Pneumococcal Vaccine (Unspecified Type) 11/16/2015 ZZZ-RETIRED (DO NOT USE) Pneumococcal Vaccine (Unspecified Type)  Deferred (Patient Refused), 1/1/2012 Not reviewed this visit Vitals BP Pulse Temp Resp Height(growth percentile) Weight(growth percentile) 102/70 (BP 1 Location: Left arm, BP Patient Position: Sitting) 67 98.5 °F (36.9 °C) (Oral) 20 6' 1\" (1.854 m) 252 lb 3.2 oz (114.4 kg) SpO2 BMI Smoking Status 97% 33.27 kg/m2 Never Smoker Vitals History BMI and BSA Data Body Mass Index Body Surface Area  
 33.27 kg/m 2 2.43 m 2 Preferred Pharmacy Pharmacy Name Phone Keanu Florentino [address] [place] [address] Dr [place], [address] [place] [address]. [phone] Your Updated Medication List  
  
   
This list is accurate as of: 11/27/17  4:39 PM.  Always use your most recent med list.  
  
  
  
  
 aspirin 81 mg tablet Take 81 mg by mouth daily as needed. carvedilol 12.5 mg tablet Commonly known as:  COREG  
TAKE 1 TABLET BY MOUTH TWO  TIMES DAILY  
  
 diclofenac EC 75 mg EC tablet Commonly known as:  VOLTAREN  
TAKE 1 TABLET BY MOUTH TWO  TIMES DAILY  
  
 esomeprazole 40 mg capsule Commonly known as:  Roopa Job Take  by mouth daily. famotidine 40 mg tablet Commonly known as:  PEPCID Take 40 mg by mouth daily. FISH OIL 1,000 mg Cap Generic drug:  omega-3 fatty acids-vitamin e Take 1 Cap by mouth.  
  
 levothyroxine 50 mcg tablet Commonly known as:  SYNTHROID  
TAKE 1 TABLET BY MOUTH  DAILY  
  
 lisinopril 10 mg tablet Commonly known as:  Michaelyn Halethorpe Take 10 mg by mouth daily. metFORMIN 500 mg tablet Commonly known as:  GLUCOPHAGE Take 500 mg by mouth two (2) times daily (with meals). niacin  mg Tb24 Commonly known as:  Soundra Farrier Take 750 mg by mouth nightly. rosuvastatin 10 mg tablet Commonly known as:  CRESTOR  
TAKE 1 TABLET BY MOUTH  DAILY  
  
 triamterene-hydroCHLOROthiazide 37.5-25 mg per tablet Commonly known as:  Beronica Vazquez TAKE 1 TABLET BY MOUTH  DAILY Introducing hospitals & HEALTH SERVICES! Dear Vivienne Cushing: 
Thank you for requesting a [employer] account. Our records indicate that you already have an active [employer] account. You can access your account anytime at [email or web address] Did you know that you can access your hospital and ER discharge instructions at any time in [employer]?   You can also review all of your test results from your hospital stay or ER visit. Additional Information If you have questions, please visit the Frequently Asked Questions section of the codesy website at [email or web address] Remember, codesy is NOT to be used for urgent needs. For medical emergencies, dial 911. Now available from your iPhone and Android! Please provide this summary of care documentation to your next provider. Your primary care clinician is listed as Malika. If you have any questions after today's visit, please call [phone].

## 2018-01-30 PROBLEM — N18.30 STAGE 3 CHRONIC KIDNEY DISEASE (HCC): Status: ACTIVE | Noted: 2017-09-21

## 2018-01-30 PROBLEM — E66.09 CLASS 1 OBESITY DUE TO EXCESS CALORIES WITH SERIOUS COMORBIDITY AND BODY MASS INDEX (BMI) OF 33.0 TO 33.9 IN ADULT: Status: ACTIVE | Noted: 2017-09-21

## 2018-01-31 ENCOUNTER — OFFICE VISIT (OUTPATIENT)
Dept: INTERNAL MEDICINE CLINIC | Age: 68
End: 2018-01-31

## 2018-01-31 VITALS
RESPIRATION RATE: 20 BRPM | BODY MASS INDEX: 33.37 KG/M2 | DIASTOLIC BLOOD PRESSURE: 84 MMHG | HEART RATE: 66 BPM | WEIGHT: 251.8 LBS | OXYGEN SATURATION: 97 % | HEIGHT: 73 IN | TEMPERATURE: 97.7 F | SYSTOLIC BLOOD PRESSURE: 138 MMHG

## 2018-01-31 DIAGNOSIS — E78.2 MIXED HYPERLIPIDEMIA: ICD-10-CM

## 2018-01-31 DIAGNOSIS — E11.22 CONTROLLED TYPE 2 DIABETES MELLITUS WITH STAGE 3 CHRONIC KIDNEY DISEASE, WITHOUT LONG-TERM CURRENT USE OF INSULIN (HCC): ICD-10-CM

## 2018-01-31 DIAGNOSIS — N18.30 STAGE 3 CHRONIC KIDNEY DISEASE (HCC): ICD-10-CM

## 2018-01-31 DIAGNOSIS — N18.30 CONTROLLED TYPE 2 DIABETES MELLITUS WITH STAGE 3 CHRONIC KIDNEY DISEASE, WITHOUT LONG-TERM CURRENT USE OF INSULIN (HCC): ICD-10-CM

## 2018-01-31 DIAGNOSIS — K21.9 GASTROESOPHAGEAL REFLUX DISEASE WITHOUT ESOPHAGITIS: ICD-10-CM

## 2018-01-31 DIAGNOSIS — I12.9 HYPERTENSION WITH RENAL DISEASE: Primary | ICD-10-CM

## 2018-01-31 DIAGNOSIS — M15.9 PRIMARY OSTEOARTHRITIS INVOLVING MULTIPLE JOINTS: ICD-10-CM

## 2018-01-31 DIAGNOSIS — I42.9 CARDIOMYOPATHY, UNSPECIFIED TYPE (HCC): ICD-10-CM

## 2018-01-31 DIAGNOSIS — E66.09 CLASS 1 OBESITY DUE TO EXCESS CALORIES WITH SERIOUS COMORBIDITY AND BODY MASS INDEX (BMI) OF 33.0 TO 33.9 IN ADULT: ICD-10-CM

## 2018-01-31 LAB
ALBUMIN SERPL-MCNC: 4.5 G/DL (ref 3.9–5.4)
ALKALINE PHOS POC: 66 U/L (ref 38–126)
ALT SERPL-CCNC: 53 U/L (ref 9–52)
AST SERPL-CCNC: 40 U/L (ref 14–36)
BUN BLD-MCNC: 24 MG/DL (ref 9–20)
CALCIUM BLD-MCNC: 9.4 MG/DL (ref 8.4–10.2)
CHLORIDE BLD-SCNC: 104 MMOL/L (ref 98–107)
CHOLEST SERPL-MCNC: 119 MG/DL (ref 0–200)
CO2 POC: 29 MMOL/L (ref 22–32)
CREAT BLD-MCNC: 1.4 MG/DL (ref 0.8–1.5)
EGFR (POC): 52.4
GLUCOSE POC: 157 MG/DL (ref 75–110)
HBA1C MFR BLD HPLC: 6.5 % (ref 4.5–5.7)
HDLC SERPL-MCNC: 32 MG/DL (ref 35–130)
LDL CHOLESTEROL POC: 39.4 MG/DL (ref 0–130)
POTASSIUM SERPL-SCNC: 3.8 MMOL/L (ref 3.6–5)
PROT SERPL-MCNC: 7.3 G/DL (ref 6.3–8.2)
SODIUM SERPL-SCNC: 144 MMOL/L (ref 137–145)
TCHOL/HDL RATIO (POC): 3.7 (ref 0–4)
TOTAL BILIRUBIN POC: 1.2 MG/DL (ref 0.2–1.3)
TRIGL SERPL-MCNC: 238 MG/DL (ref 0–200)
VLDLC SERPL CALC-MCNC: 47.6 MG/DL

## 2018-01-31 RX ORDER — FISH OIL/DHA/EPA 1200-144MG
3 CAPSULE ORAL DAILY
COMMUNITY

## 2018-01-31 NOTE — PROGRESS NOTES
1. Have you been to the ER, urgent care clinic since your last visit? Hospitalized since your last visit? No    2. Have you seen or consulted any other health care providers outside of the [address] [place] [address] since your last visit? Include any pap smears or colon screening. Yes, [place], Dr. Myrtle Calderon, had echocardiogram done 3 weeks ago. Chief Complaint   Patient presents with    Hypertension     3 mo. f/u    Diabetes     3 mo. f/u    Chronic Kidney Disease     3 mo. f/u    Cholesterol Problem     3 mo. f/u       Fasting    Eye exam not done in the past year. Needs to schedule appointment.

## 2018-01-31 NOTE — MR AVS SNAPSHOT
[address] [address] [phone] Patient: Kimberly Celaya 
MRN: [id number]  Visit Information Date & Time Provider Department Dept. Phone Encounter #  
 2018  9:20 AM Kane Anne MD [facility] [id number] Upcoming Health Maintenance Date Due  
 EYE EXAM RETINAL OR DILATED Q1 3/1/1962 DTaP/Tdap/Td series (1 - Tdap) 3/1/1973 ZOSTER VACCINE AGE 60> 2012 GLAUCOMA SCREENING Q2Y 3/1/2017 HEMOGLOBIN A1C Q6M 2018 MICROALBUMIN Q1 10/17/2018 MEDICARE YEARLY EXAM 10/18/2018 FOOT EXAM Q1 2019 LIPID PANEL Q1 2019 COLONOSCOPY 2019 Pneumococcal 65+ Low/Medium Risk (2 of 2 - PPSV23) 2020 Allergies as of 2018  Review Complete On: 2018 By: Karl Thomas LPN Severity Noted Reaction Type Reactions Demerol [Meperidine]  2012    Nausea and Vomiting Phenergan [Promethazine]  2017    Unknown (comments) Current Immunizations  Never Reviewed Name Date Influenza High Dose Vaccine PF 10/17/2017 Influenza Vaccine 2016, 2015 Influenza Vaccine Split 10/1/2011 Pneumococcal Conjugate (PCV-13) 3/2/2017 Pneumococcal Vaccine (Unspecified Type) 2015 ZZZ-RETIRED (DO NOT USE) Pneumococcal Vaccine (Unspecified Type)  Deferred (Patient Refused), 2012 Not reviewed this visit You Were Diagnosed With   
  
 Codes Comments Hypertension with renal disease    -  Primary ICD-10-CM: I12.9 ICD-9-CM: 403.90 Controlled type 2 diabetes mellitus with stage 3 chronic kidney disease, without long-term current use of insulin (HCC)     ICD-10-CM: E11.22, N18.3 ICD-9-CM: 250.40, 585.3 Mixed hyperlipidemia     ICD-10-CM: E78.2 ICD-9-CM: 272.2 Cardiomyopathy, unspecified type ([facility]     ICD-10-CM: I42.9 ICD-9-CM: 425.4 Gastroesophageal reflux disease without esophagitis     ICD-10-CM: K21.9 ICD-9-CM: 530.81 Primary osteoarthritis involving multiple joints     ICD-10-CM: M15.0 ICD-9-CM: 715.09 Stage 3 chronic kidney disease     ICD-10-CM: N18.3 ICD-9-CM: 327. 3 Class 1 obesity due to excess calories with serious comorbidity and body mass index (BMI) of 33.0 to 33.9 in adult     ICD-10-CM: E66.09, Z68.33 
ICD-9-CM: 278.00, V85.33 Vitals BP Pulse Temp Resp Height(growth percentile) Weight(growth percentile) 138/84 (BP 1 Location: Left arm, BP Patient Position: Sitting) 66 97.7 °F (36.5 °C) (Oral) 20 6' 1\" (1.854 m) 251 lb 12.8 oz (114.2 kg) SpO2 BMI Smoking Status 97% 33.22 kg/m2 Never Smoker Vitals History BMI and BSA Data Body Mass Index Body Surface Area  
 33.22 kg/m 2 2.43 m 2 Preferred Pharmacy Pharmacy Name Phone Eddye Carlos [address] [phone] Your Updated Medication List  
  
   
This list is accurate as of: 1/31/18 10:14 AM.  Always use your most recent med list.  
  
  
  
  
 aspirin 81 mg tablet Take 81 mg by mouth daily as needed. carvedilol 12.5 mg tablet Commonly known as:  COREG  
TAKE 1 TABLET BY MOUTH TWO  TIMES DAILY  
  
 diclofenac EC 75 mg EC tablet Commonly known as:  VOLTAREN  
TAKE 1 TABLET BY MOUTH TWO  TIMES DAILY  
  
 esomeprazole 40 mg capsule Commonly known as:  Drinda Anastasia Take  by mouth daily. famotidine 40 mg tablet Commonly known as:  PEPCID Take 40 mg by mouth daily. fish oil-dha-epa 1,200-144-216 mg Cap Take  by mouth.  
  
 levothyroxine 50 mcg tablet Commonly known as:  SYNTHROID  
TAKE 1 TABLET BY MOUTH  DAILY  
  
 lisinopril 10 mg tablet Commonly known as:  Hildy Lovings Take 10 mg by mouth daily. metFORMIN 500 mg tablet Commonly known as:  GLUCOPHAGE  
 Take 500 mg by mouth two (2) times daily (with meals). Indications: Take 1 tablet in AM and 2 tablets in PM.  
  
 niacin  mg Tb24 Commonly known as:  Jimenez Finner Take 750 mg by mouth nightly. Indications: Takes 2 tablets at HS. rosuvastatin 10 mg tablet Commonly known as:  CRESTOR  
TAKE 1 TABLET BY MOUTH  DAILY  
  
 triamterene-hydroCHLOROthiazide 37.5-25 mg per tablet Commonly known as:  Macel Shy TAKE 1 TABLET BY MOUTH  DAILY We Performed the Following AMB POC COMPREHENSIVE METABOLIC PANEL [97138 CPT(R)] AMB POC HEMOGLOBIN A1C [76815 CPT(R)] AMB POC LIPID PROFILE [57932 CPT(R)] Introducing [facility] SERVICES! Dear Janie Swenson: 
Thank you for requesting a [employer] account. Our records indicate that you already have an active [employer] account. You can access your account anytime at [email or web address] Did you know that you can access your hospital and ER discharge instructions at any time in [employer]? You can also review all of your test results from your hospital stay or ER visit. Additional Information If you have questions, please visit the Frequently Asked Questions section of the [employer] website at [email or web address] Remember, [employer] is NOT to be used for urgent needs. For medical emergencies, dial 911. Now available from your iPhone and Android! Please provide this summary of care documentation to your next provider. Your primary care clinician is listed as Malika. If you have any questions after today's visit, please call [phone].

## 2018-01-31 NOTE — PROGRESS NOTES
Chief Complaint   Patient presents with    Hypertension     3 mo. f/u    Diabetes     3 mo. f/u    Chronic Kidney Disease     3 mo. f/u    Cholesterol Problem     3 mo. f/u       SUBJECTIVE:    Katlyn Emerson III is a 74 y.o. male who returns in follow-up of his medical problems include hypertension, diabetes, hyperlipidemia, CKD stage III, cardiomyopathy nonischemic, obesity, GERD, and DJD. He is taking his medications and trying to follow his diet and get some exercise. He denies any chest pain, palpitations, shortness of breath, PND, orthopnea or other cardiorespiratory complaints. There are no GI/ complaints. He has no headaches neurologic complaints. He has no arthritic complaints and there are no other complaints on complete review of systems. Current Outpatient Prescriptions   Medication Sig Dispense Refill    fish oil-dha-epa 1,200-144-216 mg cap Take  by mouth.  diclofenac EC (VOLTAREN) 75 mg EC tablet TAKE 1 TABLET BY MOUTH TWO  TIMES DAILY 180 Tab 3    rosuvastatin (CRESTOR) 10 mg tablet TAKE 1 TABLET BY MOUTH  DAILY 90 Tab 3    carvedilol (COREG) 12.5 mg tablet TAKE 1 TABLET BY MOUTH TWO  TIMES DAILY 180 Tab 3    levothyroxine (SYNTHROID) 50 mcg tablet TAKE 1 TABLET BY MOUTH  DAILY 90 Tab 3    triamterene-hydroCHLOROthiazide (MAXZIDE) 37.5-25 mg per tablet TAKE 1 TABLET BY MOUTH  DAILY 90 Tab 3    esomeprazole (NEXIUM) 40 mg capsule Take  by mouth daily.  famotidine (PEPCID) 40 mg tablet Take 40 mg by mouth daily.  niacin ER (NIASPAN) 750 mg Tb24 Take 750 mg by mouth nightly. Indications: Takes 2 tablets at HS.  lisinopril (PRINIVIL, ZESTRIL) 10 mg tablet Take 10 mg by mouth daily.  metFORMIN (GLUCOPHAGE) 500 mg tablet Take 500 mg by mouth two (2) times daily (with meals). Indications: Take 1 tablet in AM and 2 tablets in PM.      aspirin 81 mg tablet Take 81 mg by mouth daily as needed.        Past Medical History:   Diagnosis Date    Arthritis fingers    CAD (coronary artery disease)     Cardiomyopathy ([facility]     Cholelithiasis 9/21/2017    CKD (chronic kidney disease) 9/21/2017    Diabetes mellitus ([facility]     Diverticulosis 9/21/2017    DJD (degenerative joint disease) 9/21/2017    GERD (gastroesophageal reflux disease)     H/O: depression 9/21/2017    Hiatal hernia     Hypercholesteremia     Hypertension     Hypertension with renal disease 9/21/2017    Hypothyroidism     Kidney stones     Obesity 9/21/2017    On statin therapy 9/21/2017    Sleep apnea 9/21/2017    Thyroid disease     Viral meningitis 9/1990     Past Surgical History:   Procedure Laterality Date    HX HERNIA REPAIR      HX KNEE ARTHROSCOPY      both     Allergies   Allergen Reactions    Demerol [Meperidine] Nausea and Vomiting    Phenergan [Promethazine] Unknown (comments)       REVIEW OF SYSTEMS:  General: negative for - chills or fever, or weight loss or gain  ENT: negative for - headaches, nasal congestion or tinnitus  Eyes: no blurred or visual changes  Neck: No stiffness or swollen nodes  Respiratory: negative for - cough, hemoptysis, shortness of breath or wheezing  Cardiovascular : negative for - chest pain, edema, palpitations or shortness of breath  Gastrointestinal: negative for - abdominal pain, blood in stools, heartburn or nausea/vomiting  Genito-Urinary: no dysuria, trouble voiding, or hematuria  Musculoskeletal: negative for - gait disturbance, joint pain, joint stiffness or joint swelling  Neurological: no TIA or stroke symptoms  Hematologic: no bruises, no bleeding  Lymphatic: no swollen glands  Integument: no lumps, mole changes, nail changes or rash  Endocrine:no malaise/lethargy poly uria or polydipsia or unexpected weight changes        Social History     Social History    Marital status:      Spouse name: N/A    Number of children: N/A    Years of education: N/A     Social History Main Topics    Smoking status: Never Smoker    Smokeless tobacco: Never Used    Alcohol use No    Drug use: No    Sexual activity: Yes     Partners: Female     Other Topics Concern    None     Social History Narrative     Family History   Problem Relation Age of Onset    Heart Disease Mother      AICD    Thyroid Disease Mother     Heart Disease Father      pacemaker    Hypertension Father     Cancer Father      lymphoma    Thyroid Disease Sister     Heart Disease Paternal Grandmother      angina       OBJECTIVE:     Visit Vitals    /84 (BP 1 Location: Left arm, BP Patient Position: Sitting)    Pulse 66    Temp 97.7 °F (36.5 °C) (Oral)    Resp 20    Ht 6' 1\" (1.854 m)    Wt 251 lb 12.8 oz (114.2 kg)    SpO2 97%    BMI 33.22 kg/m2     CONSTITUTIONAL:   well nourished, appears age appropriate  EYES: sclera anicteric, PERRL, EOMI  ENMT:nars clear, moist mucous membranes, pharynx clear  NECK: supple. Thyroid normal, No JVD or bruits  RESPIRATORY: Chest: clear to ascultation and percussion, normal inspiratory effort  CARDIOVASCULAR: Heart: regular rate and rhythm no murmurs, rubs or gallops, PMI not displaced, No thrills  GASTROINTESTINAL: Abdomen: non distended, soft, non tender, bowel sounds normal  HEMATOLOGIC: no purpura, petechiae or bruising  LYMPHATIC: No lymph node enlargemant  MUSCULOSKELETAL: Extremities: no edema or active synovitis, pulse 1+. No diabetic foot changes noted  INTEGUMENT: No unusual rashes or suspicious skin lesions noted. Nails appear normal.  PERIPHERAL VASCULAR: normal pulses femoral, PT and DP  NEUROLOGIC: non-focal exam, A & O X 3. Normal distal sensation and proprioception all toes both feet. PSYCHIATRIC:, appropriate affect     ASSESSMENT:   1. Hypertension with renal disease    2. Controlled type 2 diabetes mellitus with stage 3 chronic kidney disease, without long-term current use of insulin ([address]    3. Mixed hyperlipidemia    4. Cardiomyopathy, unspecified type ([address]    5.  Gastroesophageal reflux disease without esophagitis    6. Primary osteoarthritis involving multiple joints    7. Stage 3 chronic kidney disease    8. Class 1 obesity due to excess calories with serious comorbidity and body mass index (BMI) of 33.0 to 33.9 in adult      Impression  1. Hypertension that is controlled continue current medicines reviewed with him  2. Diabetes repeat status pending I reviewed his prior labs and follow stable continue same will make adjustments if needed based on today's lab  3. Hyperlipidemia prior labs reviewed repeat status pending will adjust medications if needed based upon today's lab  4. Cardiomyopathy I reviewed his echocardiograms with him starting in 2012 through his current one done this month and EF is status essentially the same and 25-30% range  5. GERD that seems to be stable  6. DJD currently stable  7. CKD stage III repeat status pending  8. Obesity we discussed diet exercise weight reduction for [facility] and importance of getting that down. I will call the lab results today and make further recommendations and adjustments if necessary. Follow-up schedule III months or sooner should there be a problem. PLAN:  .  Orders Placed This Encounter    AMB POC HEMOGLOBIN A1C    AMB POC LIPID PROFILE    AMB POC COMPREHENSIVE METABOLIC PANEL    fish oil-dha-epa 1,200-144-216 mg cap         ATTENTION:   This medical record was transcribed using an electronic medical records system. Although proofread, it may and can contain electronic and spelling errors. Other human spelling and other errors may be present. Corrections may be executed at a later time. Please feel free to contact us for any clarifications as needed. Follow-up Disposition:  Return in about 3 months (around 4/30/2018). No results found for any visits on 01/31/18. Janet Good MD    The patient verbalized understanding of the problems and plans as explained.

## 2018-01-31 NOTE — PATIENT INSTRUCTIONS
Noninsulin Medicines for Type 2 Diabetes: Care Instructions  Your Care Instructions    There are different types of noninsulin medicines for diabetes. Each works in a different way. But they all help you control your blood sugar. Some types help your body make insulin to lower your blood sugar. Others lower how much insulin your body needs. Some can slow how fast your body digests sugars. And some can remove extra glucose through your urine. · Alpha-glucosidase inhibitors. These keep starches from breaking down. This means that they lower the amount of glucose absorbed when you eat. They don't help your body make more insulin. So they will not cause low blood sugar unless you use them with other medicines for diabetes. They include acarbose and miglitol. · DPP-4 inhibitors. These help your body raise the level of insulin after you eat. They also help your body make less of a hormone that raises blood sugar. They include linagliptin, saxagliptin, and sitagliptin. · Incretin hormones (GLP-1 receptor agonists). Your body makes a protein that can raise your insulin level. It also can lower your blood sugar and make you less hungry. You can get shots of hormones that work the same way. They include exenatide and liraglutide. · Meglitinides. These help your body release insulin. They also help slow how your body digests sugars. So they can keep your blood sugar from rising too fast after you eat. They include nateglinide and repaglinide. · Metformin. This lowers how much glucose your liver makes. And it helps you respond better to insulin. It also lowers the amount of stored sugar that your liver releases when you are not eating. · SGLT2 inhibitors. These help to remove extra glucose through your urine. They may also help some people lose weight. They include canagliflozin, dapagliflozin, and empagliflozin. · Sulfonylureas. These help your body release more insulin. Some work for many hours.  They can cause low blood sugar if you don't eat as you planned. They include glipizide and glyburide. · Thiazolidinediones. These reduce the amount of blood glucose. They also help you respond better to insulin. They include pioglitazone and rosiglitazone. You may need to take more than one medicine for diabetes. Two or more medicines may work better to lower your blood sugar level than just one does. Follow-up care is a key part of your treatment and safety. Be sure to make and go to all appointments, and call your doctor if you are having problems. It's also a good idea to know your test results and keep a list of the medicines you take. How can you care for yourself at home? · Eat a healthy diet. Get some exercise each day. This may help you to reduce how much medicine you need. · Do not take other prescription or over-the-counter medicines, vitamins, herbal products, or supplements without talking to your doctor first. Some medicines for type 2 diabetes can cause problems with other medicines or supplements. · Tell your doctor if you plan to get pregnant. Some of these drugs are not safe for pregnant women. · Be safe with medicines. Take your medicines exactly as prescribed. Meglitinides and sulfonylureas can cause your blood sugar to drop very low. Call your doctor if you think you are having a problem with your medicine. · Check your blood sugar often. You can use a glucose monitor. Keeping track can help you know how certain foods, activities, and medicines affect your blood sugar. And it can help you keep your blood sugar from getting so low that it's not safe. When should you call for help? Call 911 anytime you think you may need emergency care. For example, call if:  ? · You passed out (lost consciousness). ? · You are confused or cannot think clearly. ? · Your blood sugar is very high or very low. ? Watch closely for changes in your health, and be sure to contact your doctor if:  ? · Your blood sugar stays outside the level your doctor set for you. ? · You have any problems. Where can you learn more? Go to http://rosa-jaylene.info/. Enter H153 in the search box to learn more about \"Noninsulin Medicines for Type 2 Diabetes: Care Instructions. \"  Current as of: March 13, 2017  Content Version: 11.4  © 5979-9645 [employer]. Care instructions adapted under license by [employer] (which disclaims liability or warranty for this information). If you have questions about a medical condition or this instruction, always ask your healthcare professional. [address] any warranty or liability for your use of this information.

## 2018-02-05 NOTE — PROGRESS NOTES
Blood sugars a bit up however the glycohemoglobin which is overall long-term treatment is improved so continue to watch diet there. Still an elevated triglycerides related to blood sugar but a very low HDL with a known history of heart disease. Increased amount of fish oil he is taking about 2 tablets daily along with aerobic exercise and increasing the fiber in his diet and weight reduction.

## 2018-02-06 NOTE — PROGRESS NOTES
Blood sugars a bit up however the glycohemoglobin which is overall long-term treatment is improved so continue to watch diet there. Still an elevated triglycerides related to blood sugar but a very low HDL with a known history of heart disease. Increased amount of fish oil he is taking about 2 tablets daily along with aerobic exercise and increasing the fiber in his diet and weight reduction. Patient informed. Will his fish oil to 3 tablets daily. Also discussed working on diet and exercise.

## 2018-04-24 DIAGNOSIS — I10 ESSENTIAL HYPERTENSION: Primary | ICD-10-CM

## 2018-04-24 DIAGNOSIS — K21.9 GASTROESOPHAGEAL REFLUX DISEASE WITHOUT ESOPHAGITIS: ICD-10-CM

## 2018-04-25 RX ORDER — ESOMEPRAZOLE MAGNESIUM 40 MG/1
CAPSULE, DELAYED RELEASE ORAL
Qty: 90 CAP | Status: SHIPPED | OUTPATIENT
Start: 2018-04-25 | End: 2019-08-20 | Stop reason: SDUPTHER

## 2018-04-25 RX ORDER — LISINOPRIL 10 MG/1
TABLET ORAL
Qty: 90 TAB | Refills: 3 | Status: SHIPPED | OUTPATIENT
Start: 2018-04-25 | End: 2019-03-06 | Stop reason: SDUPTHER

## 2018-05-09 ENCOUNTER — OFFICE VISIT (OUTPATIENT)
Dept: INTERNAL MEDICINE CLINIC | Age: 68
End: 2018-05-09

## 2018-05-09 VITALS
SYSTOLIC BLOOD PRESSURE: 122 MMHG | DIASTOLIC BLOOD PRESSURE: 78 MMHG | OXYGEN SATURATION: 98 % | BODY MASS INDEX: 33.8 KG/M2 | HEART RATE: 63 BPM | TEMPERATURE: 98.4 F | HEIGHT: 73 IN | WEIGHT: 255 LBS | RESPIRATION RATE: 16 BRPM

## 2018-05-09 DIAGNOSIS — N18.30 STAGE 3 CHRONIC KIDNEY DISEASE (HCC): ICD-10-CM

## 2018-05-09 DIAGNOSIS — M15.9 PRIMARY OSTEOARTHRITIS INVOLVING MULTIPLE JOINTS: ICD-10-CM

## 2018-05-09 DIAGNOSIS — E11.22 CONTROLLED TYPE 2 DIABETES MELLITUS WITH STAGE 3 CHRONIC KIDNEY DISEASE, WITHOUT LONG-TERM CURRENT USE OF INSULIN (HCC): ICD-10-CM

## 2018-05-09 DIAGNOSIS — I12.9 HYPERTENSION WITH RENAL DISEASE: Primary | ICD-10-CM

## 2018-05-09 DIAGNOSIS — N18.30 CONTROLLED TYPE 2 DIABETES MELLITUS WITH STAGE 3 CHRONIC KIDNEY DISEASE, WITHOUT LONG-TERM CURRENT USE OF INSULIN (HCC): ICD-10-CM

## 2018-05-09 DIAGNOSIS — K21.9 GASTROESOPHAGEAL REFLUX DISEASE WITHOUT ESOPHAGITIS: ICD-10-CM

## 2018-05-09 DIAGNOSIS — E66.09 CLASS 1 OBESITY DUE TO EXCESS CALORIES WITH SERIOUS COMORBIDITY AND BODY MASS INDEX (BMI) OF 33.0 TO 33.9 IN ADULT: ICD-10-CM

## 2018-05-09 DIAGNOSIS — E78.2 MIXED HYPERLIPIDEMIA: ICD-10-CM

## 2018-05-09 DIAGNOSIS — I42.9 CARDIOMYOPATHY, UNSPECIFIED TYPE (HCC): ICD-10-CM

## 2018-05-09 LAB
ALBUMIN SERPL-MCNC: 4.6 G/DL (ref 3.9–5.4)
ALKALINE PHOS POC: 87 U/L (ref 38–126)
ALT SERPL-CCNC: 58 U/L (ref 9–52)
AST SERPL-CCNC: 40 U/L (ref 14–36)
BUN BLD-MCNC: 28 MG/DL (ref 9–20)
CALCIUM BLD-MCNC: 9.1 MG/DL (ref 8.4–10.2)
CHLORIDE BLD-SCNC: 106 MMOL/L (ref 98–107)
CHOLEST SERPL-MCNC: 124 MG/DL (ref 0–200)
CK (CPK) POC: 142 U/L (ref 30–135)
CO2 POC: 27 MMOL/L (ref 22–32)
CREAT BLD-MCNC: 1.7 MG/DL (ref 0.8–1.5)
EGFR (POC): 41.1
GLUCOSE POC: 143 MG/DL (ref 75–110)
HBA1C MFR BLD HPLC: 6.7 % (ref 4.5–5.7)
HDLC SERPL-MCNC: 38 MG/DL (ref 35–130)
LDL CHOLESTEROL POC: 58.6 MG/DL (ref 0–130)
POTASSIUM SERPL-SCNC: 4 MMOL/L (ref 3.6–5)
PROT SERPL-MCNC: 7.1 G/DL (ref 6.3–8.2)
SODIUM SERPL-SCNC: 143 MMOL/L (ref 137–145)
TCHOL/HDL RATIO (POC): 3.3 (ref 0–4)
TOTAL BILIRUBIN POC: 0.8 MG/DL (ref 0.2–1.3)
TRIGL SERPL-MCNC: 137 MG/DL (ref 0–200)
VLDLC SERPL CALC-MCNC: 27.4 MG/DL

## 2018-05-09 NOTE — PATIENT INSTRUCTIONS
Noninsulin Medicines for Type 2 Diabetes: Care Instructions  Your Care Instructions    There are different types of noninsulin medicines for diabetes. Each works in a different way. But they all help you control your blood sugar. Some types help your body make insulin to lower your blood sugar. Others lower how much insulin your body needs. Some can slow how fast your body digests sugars. And some can remove extra glucose through your urine. · Alpha-glucosidase inhibitors. These keep starches from breaking down. This means that they lower the amount of glucose absorbed when you eat. They don't help your body make more insulin. So they will not cause low blood sugar unless you use them with other medicines for diabetes. They include acarbose and miglitol. · DPP-4 inhibitors. These help your body raise the level of insulin after you eat. They also help your body make less of a hormone that raises blood sugar. They include linagliptin, saxagliptin, and sitagliptin. · Incretin hormones (GLP-1 receptor agonists). Your body makes a protein that can raise your insulin level. It also can lower your blood sugar and make you less hungry. You can get shots of hormones that work the same way. They include exenatide and liraglutide. · Meglitinides. These help your body release insulin. They also help slow how your body digests sugars. So they can keep your blood sugar from rising too fast after you eat. They include nateglinide and repaglinide. · Metformin. This lowers how much glucose your liver makes. And it helps you respond better to insulin. It also lowers the amount of stored sugar that your liver releases when you are not eating. · SGLT2 inhibitors. These help to remove extra glucose through your urine. They may also help some people lose weight. They include canagliflozin, dapagliflozin, and empagliflozin. · Sulfonylureas. These help your body release more insulin. Some work for many hours.  They can cause low blood sugar if you don't eat as you planned. They include glipizide and glyburide. · Thiazolidinediones. These reduce the amount of blood glucose. They also help you respond better to insulin. They include pioglitazone and rosiglitazone. You may need to take more than one medicine for diabetes. Two or more medicines may work better to lower your blood sugar level than just one does. Follow-up care is a key part of your treatment and safety. Be sure to make and go to all appointments, and call your doctor if you are having problems. It's also a good idea to know your test results and keep a list of the medicines you take. How can you care for yourself at home? · Eat a healthy diet. Get some exercise each day. This may help you to reduce how much medicine you need. · Do not take other prescription or over-the-counter medicines, vitamins, herbal products, or supplements without talking to your doctor first. Some medicines for type 2 diabetes can cause problems with other medicines or supplements. · Tell your doctor if you plan to get pregnant. Some of these drugs are not safe for pregnant women. · Be safe with medicines. Take your medicines exactly as prescribed. Meglitinides and sulfonylureas can cause your blood sugar to drop very low. Call your doctor if you think you are having a problem with your medicine. · Check your blood sugar often. You can use a glucose monitor. Keeping track can help you know how certain foods, activities, and medicines affect your blood sugar. And it can help you keep your blood sugar from getting so low that it's not safe. When should you call for help? Call 911 anytime you think you may need emergency care. For example, call if:  ? · You passed out (lost consciousness). ? · You are confused or cannot think clearly. ? · Your blood sugar is very high or very low. ? Watch closely for changes in your health, and be sure to contact your doctor if:  ? · Your blood sugar stays outside the level your doctor set for you. ? · You have any problems. Where can you learn more? Go to http://rosa-jaylene.info/. Enter H153 in the search box to learn more about \"Noninsulin Medicines for Type 2 Diabetes: Care Instructions. \"  Current as of: March 13, 2017  Content Version: 11.4  © 4953-0937 [email or web address]. Care instructions adapted under license by [employer] (which disclaims liability or warranty for this information). If you have questions about a medical condition or this instruction, always ask your healthcare professional. [address] any warranty or liability for your use of this information.

## 2018-05-09 NOTE — PROGRESS NOTES
Chief Complaint   Patient presents with    Hypertension     3 month follow up     Diabetes    Chronic Kidney Disease    Depression    Obesity       SUBJECTIVE:    Chikis Fernandes III is a 79 y.o. male who returns in follow-up of his medical problems include hypertension, diabetes, hyperlipidemia, DJD, GERD, CKD stage III, cardiomyopathy, obesity and other medical problems. He is taking his medications and trying to follow his diet and get some exercise. He denies any chest pain, shortness breath, palpitations or cardiorespiratory complaints. He denies any GI or  complaints. He denies any headaches,dizziness other neurologic complaints. He denies any current arthritic complaints except for his knees. He has no other complaints on complete review of systems. Current Outpatient Prescriptions   Medication Sig Dispense Refill    lisinopril (PRINIVIL, ZESTRIL) 10 mg tablet TAKE 1 TABLET BY MOUTH  DAILY 90 Tab 3    esomeprazole (NEXIUM) 40 mg capsule TAKE 1 CAPSULE BY MOUTH  DAILY 90 Cap prn    fish oil-dha-epa 1,200-144-216 mg cap Take  by mouth.  diclofenac EC (VOLTAREN) 75 mg EC tablet TAKE 1 TABLET BY MOUTH TWO  TIMES DAILY 180 Tab 3    rosuvastatin (CRESTOR) 10 mg tablet TAKE 1 TABLET BY MOUTH  DAILY 90 Tab 3    carvedilol (COREG) 12.5 mg tablet TAKE 1 TABLET BY MOUTH TWO  TIMES DAILY 180 Tab 3    levothyroxine (SYNTHROID) 50 mcg tablet TAKE 1 TABLET BY MOUTH  DAILY 90 Tab 3    triamterene-hydroCHLOROthiazide (MAXZIDE) 37.5-25 mg per tablet TAKE 1 TABLET BY MOUTH  DAILY 90 Tab 3    famotidine (PEPCID) 40 mg tablet Take 40 mg by mouth daily.  niacin ER (NIASPAN) 750 mg Tb24 Take 750 mg by mouth nightly. Indications: Takes 2 tablets at HS.  metFORMIN (GLUCOPHAGE) 500 mg tablet Take 500 mg by mouth two (2) times daily (with meals). Indications: Take 1 tablet in AM and 2 tablets in PM.      aspirin 81 mg tablet Take 81 mg by mouth daily as needed.        Past Medical History: Diagnosis Date    Arthritis     fingers    CAD (coronary artery disease)     [facility]     Cholelithiasis 9/21/2017    CKD (chronic kidney disease) 9/21/2017    Diabetes mellitus ([facility]     Diverticulosis 9/21/2017    DJD (degenerative joint disease) 9/21/2017    GERD (gastroesophageal reflux disease)     H/O: depression 9/21/2017    Hiatal hernia     Hypercholesteremia     Hypertension     Hypertension with renal disease 9/21/2017    Hypothyroidism     Kidney stones     Obesity 9/21/2017    On statin therapy 9/21/2017    Sleep apnea 9/21/2017    Thyroid disease     Viral meningitis 9/1990     Past Surgical History:   Procedure Laterality Date    HX HERNIA REPAIR      HX KNEE ARTHROSCOPY      both     Allergies   Allergen Reactions    Demerol [Meperidine] Nausea and Vomiting    Phenergan [Promethazine] Unknown (comments)       REVIEW OF SYSTEMS:  General: negative for - chills or fever, or weight loss or gain  ENT: negative for - headaches, nasal congestion or tinnitus  Eyes: no blurred or visual changes  Neck: No stiffness or swollen nodes  Respiratory: negative for - cough, hemoptysis, shortness of breath or wheezing  Cardiovascular : negative for - chest pain, edema, palpitations or shortness of breath  Gastrointestinal: negative for - abdominal pain, blood in stools, heartburn or nausea/vomiting  Genito-Urinary: no dysuria, trouble voiding, or hematuria  Musculoskeletal: negative for - gait disturbance, joint stiffness or joint swelling.   Bilateral knee pain  Neurological: no TIA or stroke symptoms  Hematologic: no bruises, no bleeding  Lymphatic: no swollen glands  Integument: no lumps, mole changes, nail changes or rash  Endocrine:no malaise/lethargy poly uria or polydipsia or unexpected weight changes        Social History     Social History    Marital status:      Spouse name: N/A    Number of children: N/A    Years of education: N/A     Social History Main Topics    Smoking status: Never Smoker    Smokeless tobacco: Never Used    Alcohol use No    Drug use: No    Sexual activity: Yes     Partners: Female     Other Topics Concern    None     Social History Narrative     Family History   Problem Relation Age of Onset    Heart Disease Mother      AICD    Thyroid Disease Mother     Heart Disease Father      pacemaker    Hypertension Father     Cancer Father      lymphoma    Thyroid Disease Sister     Heart Disease Paternal Grandmother      angina       OBJECTIVE:     Visit Vitals    /78 (BP 1 Location: Left arm, BP Patient Position: Sitting)    Pulse 63    Temp 98.4 °F (36.9 °C) (Oral)    Resp 16    Ht 6' 1\" (1.854 m)    Wt 255 lb (115.7 kg)    SpO2 98%    BMI 33.64 kg/m2     CONSTITUTIONAL:   well nourished, appears age appropriate  EYES: sclera anicteric, PERRL, EOMI  ENMT:nares clear, moist mucous membranes, pharynx clear  NECK: supple. Thyroid normal, No JVD or bruits  RESPIRATORY: Chest: clear to ascultation and percussion, normal inspiratory effort  CARDIOVASCULAR: Heart: regular rate and rhythm no murmurs, rubs or gallops, PMI not displaced, No thrills  GASTROINTESTINAL: Abdomen: non distended, soft, non tender, bowel sounds normal  HEMATOLOGIC: no purpura, petechiae or bruising  LYMPHATIC: No lymph node enlargemant  MUSCULOSKELETAL: Extremities: no edema or active synovitis, pulse 1+   INTEGUMENT: No unusual rashes or suspicious skin lesions noted. Nails appear normal.  PERIPHERAL VASCULAR: normal pulses femoral, PT and DP  NEUROLOGIC: non-focal exam, A & O X 3  PSYCHIATRIC:, appropriate affect     ASSESSMENT:   1. Hypertension with renal disease    2. Controlled type 2 diabetes mellitus with stage 3 chronic kidney disease, without long-term current use of insulin ([address]    3. Mixed hyperlipidemia    4. Gastroesophageal reflux disease without esophagitis    5. Primary osteoarthritis involving multiple joints    6.  Stage 3 chronic kidney disease    7. Cardiomyopathy, unspecified type ([facility]    8. Class 1 obesity due to excess calories with serious comorbidity and body mass index (BMI) of 33.0 to 33.9 in adult      Impression  1. Hypertension that is well controlled continue current therapy reviewed with him  2. Diabetes repeat status pending reviewed prior labs make adjustments if necessary  3. Hyperlipidemia prior labs reviewed repeat status pending will adjust medicines if needed  4. GERD that is stable  5. DJD that is stable  6. CKD stage III repeat status pending  7. Cardiomyopathy currently stable  8. Obesity discussed diet exercise weight reduction for [facility] and ways to work on getting his weight down. I will call the lab make further recommendations adjustments if necessary. Follow stable continue same and I will recheck a myself again in 3 months or sooner should be a problem. Advance care planning discussed with him he thinks he has substernal follow-up will try to get us a copy of that. PLAN:  .  Orders Placed This Encounter    AMB POC LIPID PROFILE    AMB POC HEMOGLOBIN A1C    AMB POC COMPREHENSIVE METABOLIC PANEL    AMB POC CK (CPK)         ATTENTION:   This medical record was transcribed using an electronic medical records system. Although proofread, it may and can contain electronic and spelling errors. Other human spelling and other errors may be present. Corrections may be executed at a later time. Please feel free to contact us for any clarifications as needed. Follow-up Disposition:  Return in about 3 months (around 8/9/2018).     Results for orders placed or performed in visit on 05/09/18   AMB POC LIPID PROFILE   Result Value Ref Range    Cholesterol (POC)  0 - 200 mg/dL    Triglycerides (POC)  0 - 200 mg/dL    HDL Cholesterol (POC)  35 - 130 mg/dL    VLDL (POC)  MG/DL    LDL Cholesterol (POC)  0.0 - 130.0 MG/DL    TChol/HDL Ratio (POC)  0.0 - 4.0   AMB POC HEMOGLOBIN A1C   Result Value Ref Range    Hemoglobin A1c (POC)  4.5 - 5.7 %   AMB POC COMPREHENSIVE METABOLIC PANEL   Result Value Ref Range    GLUCOSE  75 - 110 mg/dL    BUN  9 - 20 mg/dL    Creatinine (POC)  0.8 - 1.5 mg/dL    Sodium (POC)  137 - 145 MMOL/L    Potassium (POC)  3.6 - 5.0 MMOL/L    CHLORIDE  98 - 107 MMOL/L    CO2  22 - 32 MMOL/L    CALCIUM  8.4 - 10.2 mg/dL    TOTAL PROTEIN  6.3 - 8.2 g/dL    ALBUMIN  3.9 - 5.4 g/dL    AST (POC)  14 - 36 U/L    ALT (POC)  9 - 52 U/L    ALKALINE PHOS  38 - 126 U/L    TOTAL BILIRUBIN  0.2 - 1.3 mg/dL    eGFR (POC)     AMB POC CK (CPK)   Result Value Ref Range    CK (CPK) (POC)  30 - 135 U/L       Brenna Leonardo MD    The patient verbalized understanding of the problems and plans as explained.

## 2018-05-09 NOTE — MR AVS SNAPSHOT
[address] [facility] [address] [address] [phone] Patient: Ian Ram 
MRN: [id number]:9/5/[id number] Visit Information Date & Time Provider Department Dept. Phone Encounter #  
 5/9/2018  9:20 AM Gm Goodman MD 20 [facility] [phone] [id number] Follow-up Instructions Return in about 3 months (around 8/9/2018). Your Appointments 8/9/2018  9:20 AM  
FOLLOW UP 10 with MD ANNE MARIE Alvarado [facility] ([facility]) Appt Note: [address] [address] [address] [facility] [address] Upcoming Health Maintenance Date Due  
 EYE EXAM RETINAL OR DILATED Q1 3/1/1962 DTaP/Tdap/Td series (1 - Tdap) 3/1/1973 ZOSTER VACCINE AGE 60> 1/1/2012 GLAUCOMA SCREENING Q2Y 3/1/2017 HEMOGLOBIN A1C Q6M 7/31/2018 Influenza Age 5 to Adult 8/1/2018 MICROALBUMIN Q1 10/17/2018 MEDICARE YEARLY EXAM 10/18/2018 FOOT EXAM Q1 1/31/2019 LIPID PANEL Q1 1/31/2019 COLONOSCOPY 2/17/2019 Pneumococcal 65+ Low/Medium Risk (2 of 2 - PPSV23) 11/16/2020 Allergies as of 5/9/2018  Review Complete On: 5/9/2018 By: Gm Goodman MD  
  
 Severity Noted Reaction Type Reactions Demerol [Meperidine]  03/11/2012    Nausea and Vomiting Phenergan [Promethazine]  09/21/2017    Unknown (comments) Current Immunizations  Never Reviewed Name Date Influenza High Dose Vaccine PF 10/17/2017 Influenza Vaccine 11/23/2016, 11/16/2015 Influenza Vaccine Split 10/1/2011 Pneumococcal Conjugate (PCV-13) 3/2/2017 Pneumococcal Vaccine (Unspecified Type) 11/16/2015 ZZZ-RETIRED (DO NOT USE) Pneumococcal Vaccine (Unspecified Type)  Deferred (Patient Refused), 1/1/2012 Not reviewed this visit You Were Diagnosed With   
  
 Codes Comments Hypertension with renal disease    -  Primary ICD-10-CM: I12.9 ICD-9-CM: 403.90 Controlled type 2 diabetes mellitus with stage 3 chronic kidney disease, without long-term current use of insulin (HCC)     ICD-10-CM: E11.22, N18.3 ICD-9-CM: 250.40, 585.3 Mixed hyperlipidemia     ICD-10-CM: E78.2 ICD-9-CM: 272.2 Gastroesophageal reflux disease without esophagitis     ICD-10-CM: K21.9 ICD-9-CM: 530.81 Primary osteoarthritis involving multiple joints     ICD-10-CM: M15.0 ICD-9-CM: 715.09 Stage 3 chronic kidney disease     ICD-10-CM: N18.3 ICD-9-CM: 479. 3 Cardiomyopathy, unspecified type ([facility]     ICD-10-CM: I42.9 ICD-9-CM: 425.4 Class 1 obesity due to excess calories with serious comorbidity and body mass index (BMI) of 33.0 to 33.9 in adult     ICD-10-CM: E66.09, Z68.33 
ICD-9-CM: 278.00, V85.33 Vitals BP Pulse Temp Resp Height(growth percentile) Weight(growth percentile) 122/78 (BP 1 Location: Left arm, BP Patient Position: Sitting) 63 98.4 °F (36.9 °C) (Oral) 16 6' 1\" (1.854 m) 155 lb 3.2 oz (70.4 kg) SpO2 BMI Smoking Status 98% 20.48 kg/m2 Never Smoker Vitals History BMI and BSA Data Body Mass Index Body Surface Area  
 20.48 kg/m 2 1.9 m 2 Preferred Pharmacy Pharmacy Name Phone 305 [facility], [address] [address] [place] [address] [address] Molly Huffman [address] Your Updated Medication List  
  
   
This list is accurate as of 5/9/18 10:24 AM.  Always use your most recent med list.  
  
  
  
  
 aspirin 81 mg tablet Take 81 mg by mouth daily as needed. carvedilol 12.5 mg tablet Commonly known as:  COREG  
TAKE 1 TABLET BY MOUTH TWO  TIMES DAILY  
  
 diclofenac EC 75 mg EC tablet Commonly known as:  VOLTAREN  
TAKE 1 TABLET BY MOUTH TWO  TIMES DAILY  
  
 esomeprazole 40 mg capsule Commonly known as:  NEXIUM  
TAKE 1 CAPSULE BY MOUTH  DAILY  
  
 famotidine 40 mg tablet Commonly known as:  PEPCID  
 Take 40 mg by mouth daily. fish oil-dha-epa 1,200-144-216 mg Cap Take  by mouth.  
  
 levothyroxine 50 mcg tablet Commonly known as:  SYNTHROID  
TAKE 1 TABLET BY MOUTH  DAILY  
  
 lisinopril 10 mg tablet Commonly known as:  PRINIVIL, ZESTRIL  
TAKE 1 TABLET BY MOUTH  DAILY  
  
 metFORMIN 500 mg tablet Commonly known as:  GLUCOPHAGE Take 500 mg by mouth two (2) times daily (with meals). Indications: Take 1 tablet in AM and 2 tablets in PM.  
  
 niacin  mg Tb24 Commonly known as:  Sherrine Barter Take 750 mg by mouth nightly. Indications: Takes 2 tablets at HS. rosuvastatin 10 mg tablet Commonly known as:  CRESTOR  
TAKE 1 TABLET BY MOUTH  DAILY  
  
 triamterene-hydroCHLOROthiazide 37.5-25 mg per tablet Commonly known as:  [place] TAKE 1 TABLET BY MOUTH  DAILY We Performed the Following AMB POC CK (CPK) [67468 CPT(R)] AMB POC COMPREHENSIVE METABOLIC PANEL [46820 CPT(R)] AMB POC HEMOGLOBIN A1C [58724 CPT(R)] AMB POC LIPID PROFILE [55658 CPT(R)] Follow-up Instructions Return in about 3 months (around 8/9/2018). Patient Instructions Noninsulin Medicines for Type 2 Diabetes: Care Instructions Your Care Instructions There are different types of noninsulin medicines for diabetes. Each works in a different way. But they all help you control your blood sugar. Some types help your body make insulin to lower your blood sugar. Others lower how much insulin your body needs. Some can slow how fast your body digests sugars. And some can remove extra glucose through your urine. · Alpha-glucosidase inhibitors. These keep starches from breaking down. This means that they lower the amount of glucose absorbed when you eat. They don't help your body make more insulin. So they will not cause low blood sugar unless you use them with other medicines for diabetes. They include acarbose and miglitol. · DPP-4 inhibitors. These help your body raise the level of insulin after you eat. They also help your body make less of a hormone that raises blood sugar. They include linagliptin, saxagliptin, and sitagliptin. · Incretin hormones (GLP-1 receptor agonists). Your body makes a protein that can raise your insulin level. It also can lower your blood sugar and make you less hungry. You can get shots of hormones that work the same way. They include exenatide and liraglutide. · Meglitinides. These help your body release insulin. They also help slow how your body digests sugars. So they can keep your blood sugar from rising too fast after you eat. They include nateglinide and repaglinide. · Metformin. This lowers how much glucose your liver makes. And it helps you respond better to insulin. It also lowers the amount of stored sugar that your liver releases when you are not eating. · SGLT2 inhibitors. These help to remove extra glucose through your urine. They may also help some people lose weight. They include canagliflozin, dapagliflozin, and empagliflozin. · Sulfonylureas. These help your body release more insulin. Some work for many hours. They can cause low blood sugar if you don't eat as you planned. They include glipizide and glyburide. · Thiazolidinediones. These reduce the amount of blood glucose. They also help you respond better to insulin. They include pioglitazone and rosiglitazone. You may need to take more than one medicine for diabetes. Two or more medicines may work better to lower your blood sugar level than just one does. Follow-up care is a key part of your treatment and safety. Be sure to make and go to all appointments, and call your doctor if you are having problems. It's also a good idea to know your test results and keep a list of the medicines you take. How can you care for yourself at home? · Eat a healthy diet. Get some exercise each day.  This may help you to reduce how much medicine you need. · Do not take other prescription or over-the-counter medicines, vitamins, herbal products, or supplements without talking to your doctor first. Some medicines for type 2 diabetes can cause problems with other medicines or supplements. · Tell your doctor if you plan to get pregnant. Some of these drugs are not safe for pregnant women. · Be safe with medicines. Take your medicines exactly as prescribed. Meglitinides and sulfonylureas can cause your blood sugar to drop very low. Call your doctor if you think you are having a problem with your medicine. · Check your blood sugar often. You can use a glucose monitor. Keeping track can help you know how certain foods, activities, and medicines affect your blood sugar. And it can help you keep your blood sugar from getting so low that it's not safe. When should you call for help? Call 911 anytime you think you may need emergency care. For example, call if: 
? · You passed out (lost consciousness). ? · You are confused or cannot think clearly. ? · Your blood sugar is very high or very low. ? Watch closely for changes in your health, and be sure to contact your doctor if: 
? · Your blood sugar stays outside the level your doctor set for you. ? · You have any problems. Where can you learn more? Go to http://rosa-jaylene.info/. Enter H153 in the search box to learn more about \"Noninsulin Medicines for Type 2 Diabetes: Care Instructions. \" Current as of: March 13, 2017 Content Version: 11.4 © 9100-6136 [employer]. Care instructions adapted under license by [employer] (which disclaims liability or warranty for this information). If you have questions about a medical condition or this instruction, always ask your healthcare professional. [place] [address] any warranty or liability for your use of this information. Introducing [facility]!    
 Dear Kendall Narvaez: 
 Thank you for requesting a ShoutEm account. Our records indicate that you already have an active ShoutEm account. You can access your account anytime at [email or web address] Did you know that you can access your hospital and ER discharge instructions at any time in ShoutEm? You can also review all of your test results from your hospital stay or ER visit. Additional Information If you have questions, please visit the Frequently Asked Questions section of the ShoutEm website at [email or web address] Remember, ShoutEm is NOT to be used for urgent needs. For medical emergencies, dial 911. Now available from your iPhone and Android! Please provide this summary of care documentation to your next provider. Your primary care clinician is listed as Malika. If you have any questions after today's visit, please call [phone].

## 2018-05-09 NOTE — PROGRESS NOTES
Chief Complaint   Patient presents with    Hypertension     3 month follow up     Diabetes    Chronic Kidney Disease    Depression    Obesity     1. Have you been to the ER, urgent care clinic since your last visit? Hospitalized since your last visit? No    2. Have you seen or consulted any other health care providers outside of the [facility] since your last visit? Include any pap smears or colon screening. No     Fasting   Last eye exam- 2016 or 2017, needs to schedule appointment.

## 2018-05-15 NOTE — PROGRESS NOTES
Kidneys are stable but the blood sugar and glycohemoglobin are higher than they were before and the HDL he is lower than goal.  It is better than before before being 32 and now being 38 the goal is 40. At this point increase aerobic exercise along with increasing fiber in diet and weight reduction along with 2 additional fish oil all would help that.

## 2018-05-22 NOTE — PROGRESS NOTES
Kidneys are stable but the blood sugar and glycohemoglobin are higher than they were before and the HDL he is lower than goal.  It is better than before before being 32 and now being 38 the goal is 40. At this point increase aerobic exercise along with increasing fiber in diet and weight reduction along with 2 additional fish oil all would help that. Patient informed. States he takes fish oil 1500 mg 3 tabs daily but not every day. States he will try to do this more regularly as well as work on diet and exercise.

## 2018-06-12 RX ORDER — NIACIN 750 MG/1
TABLET, EXTENDED RELEASE ORAL
Qty: 180 TAB | Refills: 3 | Status: SHIPPED | OUTPATIENT
Start: 2018-06-12 | End: 2019-07-17 | Stop reason: SDUPTHER

## 2018-08-08 ENCOUNTER — OFFICE VISIT (OUTPATIENT)
Dept: INTERNAL MEDICINE CLINIC | Age: 68
End: 2018-08-08

## 2018-08-08 VITALS
SYSTOLIC BLOOD PRESSURE: 128 MMHG | OXYGEN SATURATION: 96 % | BODY MASS INDEX: 34.14 KG/M2 | DIASTOLIC BLOOD PRESSURE: 80 MMHG | RESPIRATION RATE: 16 BRPM | HEART RATE: 68 BPM | WEIGHT: 257.6 LBS | HEIGHT: 73 IN

## 2018-08-08 DIAGNOSIS — E11.22 CONTROLLED TYPE 2 DIABETES MELLITUS WITH STAGE 3 CHRONIC KIDNEY DISEASE, WITHOUT LONG-TERM CURRENT USE OF INSULIN (HCC): ICD-10-CM

## 2018-08-08 DIAGNOSIS — E66.09 CLASS 1 OBESITY DUE TO EXCESS CALORIES WITH SERIOUS COMORBIDITY AND BODY MASS INDEX (BMI) OF 33.0 TO 33.9 IN ADULT: ICD-10-CM

## 2018-08-08 DIAGNOSIS — I42.9 CARDIOMYOPATHY, UNSPECIFIED TYPE (HCC): ICD-10-CM

## 2018-08-08 DIAGNOSIS — N18.30 CONTROLLED TYPE 2 DIABETES MELLITUS WITH STAGE 3 CHRONIC KIDNEY DISEASE, WITHOUT LONG-TERM CURRENT USE OF INSULIN (HCC): ICD-10-CM

## 2018-08-08 DIAGNOSIS — I12.9 HYPERTENSION WITH RENAL DISEASE: Primary | ICD-10-CM

## 2018-08-08 DIAGNOSIS — M15.9 PRIMARY OSTEOARTHRITIS INVOLVING MULTIPLE JOINTS: ICD-10-CM

## 2018-08-08 DIAGNOSIS — K21.9 GASTROESOPHAGEAL REFLUX DISEASE WITHOUT ESOPHAGITIS: ICD-10-CM

## 2018-08-08 DIAGNOSIS — E78.2 MIXED HYPERLIPIDEMIA: ICD-10-CM

## 2018-08-08 RX ORDER — METFORMIN HYDROCHLORIDE 500 MG/1
TABLET, EXTENDED RELEASE ORAL
COMMUNITY
Start: 2018-07-28 | End: 2018-11-14 | Stop reason: SDUPTHER

## 2018-08-08 NOTE — PATIENT INSTRUCTIONS

## 2018-08-08 NOTE — PROGRESS NOTES
Chief Complaint   Patient presents with    Hypertension     3 month follow up    Diabetes    Chronic Kidney Disease    Obesity     1. Have you been to the ER, urgent care clinic since your last visit? Hospitalized since your last visit? No    2. Have you seen or consulted any other health care providers outside of the [address] [place] [address] since your last visit? Include any pap smears or colon screening.  No     Fasting

## 2018-08-08 NOTE — MR AVS SNAPSHOT
[address] [facility] [address] [address] [phone] Patient: Tere Raza 
MRN: [id number] VLU:[id number] Visit Information Date & Time Provider Department Dept. Phone Encounter #  
 8/8/2018 10:40 AM Yola Shah MD [place] [address] [phone] [id number] Upcoming Health Maintenance Date Due  
 EYE EXAM RETINAL OR DILATED Q1 3/1/1962 DTaP/Tdap/Td series (1 - Tdap) 3/1/1973 ZOSTER VACCINE AGE 60> 1/1/2012 GLAUCOMA SCREENING Q2Y 3/1/2017 Influenza Age 5 to Adult 8/1/2018 MICROALBUMIN Q1 10/17/2018 MEDICARE YEARLY EXAM 10/18/2018 HEMOGLOBIN A1C Q6M 11/9/2018 FOOT EXAM Q1 1/31/2019 COLONOSCOPY 2/17/2019 LIPID PANEL Q1 5/9/2019 Pneumococcal 65+ Low/Medium Risk (2 of 2 - PPSV23) 11/16/2020 Allergies as of 8/8/2018  Review Complete On: 8/8/2018 By: Yola Shah MD  
  
 Severity Noted Reaction Type Reactions Demerol [Meperidine]  03/11/2012    Nausea and Vomiting Phenergan [Promethazine]  09/21/2017    Unknown (comments) Current Immunizations  Never Reviewed Name Date Influenza High Dose Vaccine PF 10/17/2017 Influenza Vaccine 11/23/2016, 11/16/2015 Influenza Vaccine Split 10/1/2011 Pneumococcal Conjugate (PCV-13) 3/2/2017 Pneumococcal Vaccine (Unspecified Type) 11/16/2015 ZZZ-RETIRED (DO NOT USE) Pneumococcal Vaccine (Unspecified Type)  Deferred (Patient Refused), 1/1/2012 Not reviewed this visit You Were Diagnosed With   
  
 Codes Comments Hypertension with renal disease    -  Primary ICD-10-CM: I12.9 ICD-9-CM: 403.90 Controlled type 2 diabetes mellitus with stage 3 chronic kidney disease, without long-term current use of insulin (HCC)     ICD-10-CM: E11.22, N18.3 ICD-9-CM: 250.40, 585.3 Mixed hyperlipidemia     ICD-10-CM: E78.2 ICD-9-CM: 272.2 Gastroesophageal reflux disease without esophagitis     ICD-10-CM: K21.9 ICD-9-CM: 530.81 Primary osteoarthritis involving multiple joints     ICD-10-CM: M15.0 ICD-9-CM: 715.09 Vitals BP Pulse Resp Height(growth percentile) Weight(growth percentile) SpO2  
 128/80 (BP 1 Location: Left arm, BP Patient Position: Sitting) 68 16 6' 1\" (1.854 m) 257 lb 9.6 oz (116.8 kg) 96% BMI Smoking Status 33.99 kg/m2 Never Smoker Vitals History BMI and BSA Data Body Mass Index Body Surface Area  
 33.99 kg/m 2 2.45 m 2 Preferred Pharmacy Pharmacy Name Phone 305 [facility], [address] [address] [place] [address] [address] Molly Huffman 134 Your Updated Medication List  
  
   
This list is accurate as of 8/8/18 11:50 AM.  Always use your most recent med list.  
  
  
  
  
 aspirin 81 mg tablet Take 81 mg by mouth daily as needed. carvedilol 12.5 mg tablet Commonly known as:  COREG  
TAKE 1 TABLET BY MOUTH TWO  TIMES DAILY  
  
 diclofenac EC 75 mg EC tablet Commonly known as:  VOLTAREN  
TAKE 1 TABLET BY MOUTH TWO  TIMES DAILY  
  
 esomeprazole 40 mg capsule Commonly known as:  NEXIUM  
TAKE 1 CAPSULE BY MOUTH  DAILY  
  
 famotidine 40 mg tablet Commonly known as:  PEPCID Take 40 mg by mouth daily. fish oil-dha-epa 1,200-144-216 mg Cap Take  by mouth.  
  
 levothyroxine 50 mcg tablet Commonly known as:  SYNTHROID  
TAKE 1 TABLET BY MOUTH  DAILY  
  
 lisinopril 10 mg tablet Commonly known as:  PRINIVIL, ZESTRIL  
TAKE 1 TABLET BY MOUTH  DAILY  
  
 metFORMIN  mg tablet Commonly known as:  GLUCOPHAGE XR  
  
 niacin  mg Tb24 Commonly known as:  NIASPAN  
TAKE 2 TABLETS BY MOUTH AT  BEDTIME  
  
 rosuvastatin 10 mg tablet Commonly known as:  CRESTOR  
TAKE 1 TABLET BY MOUTH  DAILY  
  
 triamterene-hydroCHLOROthiazide 37.5-25 mg per tablet Commonly known as:  Jody Ansley TAKE 1 TABLET BY MOUTH  DAILY Introducing [facility]! Dear Winsome Garcia: 
Thank you for requesting a [employer]USA account. Our records indicate that you already have an active WaterplayUSA account. You can access your account anytime at [email or web address] Online Video Did you know that you can access your hospital and ER discharge instructions at any time in WaterplayUSA? You can also review all of your test results from your hospital stay or ER visit. Additional Information If you have questions, please visit the Frequently Asked Questions section of the [employer] website at [email or web address] Online [email or web address] Remember, Water[employer]USA is NOT to be used for urgent needs. For medical emergencies, dial 911. Now available from your iPhone and Android! Please provide this summary of care documentation to your next provider. Your primary care clinician is listed as Malika. If you have any questions after today's visit, please call [phone].

## 2018-08-08 NOTE — PROGRESS NOTES
Chief Complaint   Patient presents with    Hypertension     3 month follow up    Diabetes    Chronic Kidney Disease    Obesity       SUBJECTIVE:    Tayler Toscano III is a 79 y.o. male who returns in follow-up of his medical problems include hypertension, diabetes, hyperlipidemia, DJD, GERD, CKD stage III, cardiomyopathy, obesity and other medical problems. He is taking his medications and trying to follow his diet and get some exercise. He denies any chest pain, shortness breath, palpitations or cardiorespiratory complaints. He denies any GI or  complaints. He denies any headaches,dizziness other neurologic complaints. He denies any current arthritic complaints except for his knees. He has no other complaints on complete review of systems. Current Outpatient Prescriptions   Medication Sig Dispense Refill    metFORMIN ER (GLUCOPHAGE XR) 500 mg tablet       niacin ER (NIASPAN) 750 mg Tb24 TAKE 2 TABLETS BY MOUTH AT  BEDTIME 180 Tab 3    lisinopril (PRINIVIL, ZESTRIL) 10 mg tablet TAKE 1 TABLET BY MOUTH  DAILY 90 Tab 3    esomeprazole (NEXIUM) 40 mg capsule TAKE 1 CAPSULE BY MOUTH  DAILY 90 Cap prn    fish oil-dha-epa 1,200-144-216 mg cap Take  by mouth.  diclofenac EC (VOLTAREN) 75 mg EC tablet TAKE 1 TABLET BY MOUTH TWO  TIMES DAILY 180 Tab 3    rosuvastatin (CRESTOR) 10 mg tablet TAKE 1 TABLET BY MOUTH  DAILY 90 Tab 3    carvedilol (COREG) 12.5 mg tablet TAKE 1 TABLET BY MOUTH TWO  TIMES DAILY 180 Tab 3    levothyroxine (SYNTHROID) 50 mcg tablet TAKE 1 TABLET BY MOUTH  DAILY 90 Tab 3    triamterene-hydroCHLOROthiazide (MAXZIDE) 37.5-25 mg per tablet TAKE 1 TABLET BY MOUTH  DAILY 90 Tab 3    famotidine (PEPCID) 40 mg tablet Take 40 mg by mouth daily.  aspirin 81 mg tablet Take 81 mg by mouth daily as needed.        Past Medical History:   Diagnosis Date    Arthritis     fingers    CAD (coronary artery disease)     Cardiomy[facility] [address]     Cholelithiasis 9/21/2017    CKD (chronic kidney disease) 9/21/2017    Diabetes mellitus ([facility] [address]     Diverticulosis 9/21/2017    DJD (degenerative joint disease) 9/21/2017    GERD (gastroesophageal reflux disease)     H/O: depression 9/21/2017    Hiatal hernia     Hypercholesteremia     Hypertension     Hypertension with renal disease 9/21/2017    Hypothyroidism     Kidney stones     Obesity 9/21/2017    On statin therapy 9/21/2017    Sleep apnea 9/21/2017    Thyroid disease     Viral meningitis 9/1990     Past Surgical History:   Procedure Laterality Date    HX HERNIA REPAIR      HX KNEE ARTHROSCOPY      both     Allergies   Allergen Reactions    Demerol [Meperidine] Nausea and Vomiting    Phenergan [Promethazine] Unknown (comments)       REVIEW OF SYSTEMS:  General: negative for - chills or fever, or weight loss or gain  ENT: negative for - headaches, nasal congestion or tinnitus  Eyes: no blurred or visual changes  Neck: No stiffness or swollen nodes  Respiratory: negative for - cough, hemoptysis, shortness of breath or wheezing  Cardiovascular : negative for - chest pain, edema, palpitations or shortness of breath  Gastrointestinal: negative for - abdominal pain, blood in stools, heartburn or nausea/vomiting  Genito-Urinary: no dysuria, trouble voiding, or hematuria  Musculoskeletal: negative for - gait disturbance, joint stiffness or joint swelling.   Bilateral knee pain  Neurological: no TIA or stroke symptoms  Hematologic: no bruises, no bleeding  Lymphatic: no swollen glands  Integument: no lumps, mole changes, nail changes or rash  Endocrine:no malaise/lethargy poly uria or polydipsia or unexpected weight changes        Social History     Social History    Marital status:      Spouse name: N/A    Number of children: N/A    Years of education: N/A     Social History Main Topics    Smoking status: Never Smoker    Smokeless tobacco: Never Used    Alcohol use No    Drug use: No    Sexual activity: Yes Partners: Female     Other Topics Concern    None     Social History Narrative     Family History   Problem Relation Age of Onset    Heart Disease Mother      AICD    Thyroid Disease Mother     Heart Disease Father      pacemaker    Hypertension Father     Cancer Father      lymphoma    Thyroid Disease Sister     Heart Disease Paternal Grandmother      angina       OBJECTIVE:     Visit Vitals    /80 (BP 1 Location: Left arm, BP Patient Position: Sitting)    Pulse 68    Resp 16    Ht 6' 1\" (1.854 m)    Wt 257 lb 9.6 oz (116.8 kg)    SpO2 96%    BMI 33.99 kg/m2     CONSTITUTIONAL:   well nourished, appears age appropriate  EYES: sclera anicteric, PERRL, EOMI  ENMT:nares clear, moist mucous membranes, pharynx clear  NECK: supple. Thyroid normal, No JVD or bruits  RESPIRATORY: Chest: clear to ascultation and percussion, normal inspiratory effort  CARDIOVASCULAR: Heart: regular rate and rhythm no murmurs, rubs or gallops, PMI not displaced, No thrills  GASTROINTESTINAL: Abdomen: non distended, soft, non tender, bowel sounds normal  HEMATOLOGIC: no purpura, petechiae or bruising  LYMPHATIC: No lymph node enlargemant  MUSCULOSKELETAL: Extremities: no edema or active synovitis, pulse 1+   INTEGUMENT: No unusual rashes or suspicious skin lesions noted. Nails appear normal.  PERIPHERAL VASCULAR: normal pulses femoral, PT and DP  NEUROLOGIC: non-focal exam, A & O X 3  PSYCHIATRIC:, appropriate affect     ASSESSMENT:   1. Hypertension with renal disease    2. Controlled type 2 diabetes mellitus with stage 3 chronic kidney disease, without long-term current use of insulin ([address]    3. Mixed hyperlipidemia    4. Gastroesophageal reflux disease without esophagitis    5. Primary osteoarthritis involving multiple joints    6. Cardiomyopathy, unspecified type ([address]    7. Class 1 obesity due to excess calories with serious comorbidity and body mass index (BMI) of 33.0 to 33.9 in adult      Impression  1. Hypertension that is well controlled so continue current therapy reviewed with him  2. Diabetes repeat status pending and I reviewed prior labs and I willmake adjustments if necessary  3. Hyperlipidemia prior labs reviewed and repeat status pending and I will adjust medicines if needed  4. GERD that is stable  5. DJD that is stable and he did see a recent orthopedist who recommended knee replacement but is not ready for that. 6.  CKD stage III repeat status pending  7. Cardiomyopathy currently stable  8. Obesity discussed diet exercise weight reduction for [facility] benefit and ways to work on getting his weight down. I will call with the lab results and make further recommendations adjustments if necessary. Follow stable continue same and I will recheck a myself again in 3 months or sooner should there be a problem. PLAN:  .  Orders Placed This Encounter    METABOLIC PANEL, COMPREHENSIVE    LIPID PANEL    HEMOGLOBIN A1C WITH EAG    CK    metFORMIN ER (GLUCOPHAGE XR) 500 mg tablet         ATTENTION:   This medical record was transcribed using an electronic medical records system. Although proofread, it may and can contain electronic and spelling errors. Other human spelling and other errors may be present. Corrections may be executed at a later time. Please feel free to contact us for any clarifications as needed. Follow-up Disposition:  Return in about 3 months (around 11/8/2018). No results found for any visits on 08/08/18. Colten Serrano MD    The patient verbalized understanding of the problems and plans as explained.

## 2018-08-09 LAB
ALBUMIN SERPL-MCNC: 4.6 G/DL (ref 3.6–4.8)
ALBUMIN/GLOB SERPL: 1.9 {RATIO} (ref 1.2–2.2)
ALP SERPL-CCNC: 82 IU/L (ref 39–117)
ALT SERPL-CCNC: 44 IU/L (ref 0–44)
AST SERPL-CCNC: 39 IU/L (ref 0–40)
BILIRUB SERPL-MCNC: 0.4 MG/DL (ref 0–1.2)
BUN SERPL-MCNC: 21 MG/DL (ref 8–27)
BUN/CREAT SERPL: 17 (ref 10–24)
CALCIUM SERPL-MCNC: 9.1 MG/DL (ref 8.6–10.2)
CHLORIDE SERPL-SCNC: 104 MMOL/L (ref 96–106)
CHOLEST SERPL-MCNC: 134 MG/DL (ref 100–199)
CK SERPL-CCNC: 121 U/L (ref 24–204)
CO2 SERPL-SCNC: 23 MMOL/L (ref 20–29)
CREAT SERPL-MCNC: 1.27 MG/DL (ref 0.76–1.27)
EST. AVERAGE GLUCOSE BLD GHB EST-MCNC: 160 MG/DL
GLOBULIN SER CALC-MCNC: 2.4 G/DL (ref 1.5–4.5)
GLUCOSE SERPL-MCNC: 158 MG/DL (ref 65–99)
HBA1C MFR BLD: 7.2 % (ref 4.8–5.6)
HDLC SERPL-MCNC: 32 MG/DL
LDLC SERPL CALC-MCNC: 64 MG/DL (ref 0–99)
POTASSIUM SERPL-SCNC: 4.1 MMOL/L (ref 3.5–5.2)
PROT SERPL-MCNC: 7 G/DL (ref 6–8.5)
SODIUM SERPL-SCNC: 143 MMOL/L (ref 134–144)
TRIGL SERPL-MCNC: 192 MG/DL (ref 0–149)
VLDLC SERPL CALC-MCNC: 38 MG/DL (ref 5–40)

## 2018-11-14 ENCOUNTER — OFFICE VISIT (OUTPATIENT)
Dept: INTERNAL MEDICINE CLINIC | Age: 68
End: 2018-11-14

## 2018-11-14 VITALS
RESPIRATION RATE: 16 BRPM | DIASTOLIC BLOOD PRESSURE: 72 MMHG | SYSTOLIC BLOOD PRESSURE: 122 MMHG | OXYGEN SATURATION: 96 % | HEART RATE: 71 BPM | HEIGHT: 73 IN | WEIGHT: 256 LBS | BODY MASS INDEX: 33.93 KG/M2

## 2018-11-14 DIAGNOSIS — E78.2 MIXED HYPERLIPIDEMIA: ICD-10-CM

## 2018-11-14 DIAGNOSIS — E03.9 ACQUIRED HYPOTHYROIDISM: ICD-10-CM

## 2018-11-14 DIAGNOSIS — K57.30 DIVERTICULOSIS OF LARGE INTESTINE WITHOUT HEMORRHAGE: ICD-10-CM

## 2018-11-14 DIAGNOSIS — Z23 ENCOUNTER FOR IMMUNIZATION: ICD-10-CM

## 2018-11-14 DIAGNOSIS — M15.9 PRIMARY OSTEOARTHRITIS INVOLVING MULTIPLE JOINTS: ICD-10-CM

## 2018-11-14 DIAGNOSIS — I42.9 CARDIOMYOPATHY, UNSPECIFIED TYPE (HCC): ICD-10-CM

## 2018-11-14 DIAGNOSIS — K21.9 GASTROESOPHAGEAL REFLUX DISEASE WITHOUT ESOPHAGITIS: ICD-10-CM

## 2018-11-14 DIAGNOSIS — I12.9 HYPERTENSION WITH RENAL DISEASE: Primary | ICD-10-CM

## 2018-11-14 DIAGNOSIS — E66.09 CLASS 1 OBESITY DUE TO EXCESS CALORIES WITH SERIOUS COMORBIDITY AND BODY MASS INDEX (BMI) OF 33.0 TO 33.9 IN ADULT: ICD-10-CM

## 2018-11-14 DIAGNOSIS — Z00.00 MEDICARE ANNUAL WELLNESS VISIT, INITIAL: ICD-10-CM

## 2018-11-14 DIAGNOSIS — Z12.5 PROSTATE CANCER SCREENING: ICD-10-CM

## 2018-11-14 DIAGNOSIS — N18.30 STAGE 3 CHRONIC KIDNEY DISEASE (HCC): ICD-10-CM

## 2018-11-14 DIAGNOSIS — E11.22 CONTROLLED TYPE 2 DIABETES MELLITUS WITH STAGE 3 CHRONIC KIDNEY DISEASE, WITHOUT LONG-TERM CURRENT USE OF INSULIN (HCC): ICD-10-CM

## 2018-11-14 DIAGNOSIS — N18.30 CONTROLLED TYPE 2 DIABETES MELLITUS WITH STAGE 3 CHRONIC KIDNEY DISEASE, WITHOUT LONG-TERM CURRENT USE OF INSULIN (HCC): ICD-10-CM

## 2018-11-14 LAB
BACTERIA UA POCT, BACTPOCT: NORMAL
BILIRUB UR QL STRIP: NEGATIVE
CASTS UA POCT: NORMAL
CLUE CELLS, CLUEPOCT: NEGATIVE
CRYSTALS UA POCT, CRYSPOCT: NEGATIVE
EPITHELIAL CELLS POCT: NEGATIVE
GLUCOSE UR-MCNC: NEGATIVE MG/DL
GRAN# POC: 3.9 K/UL (ref 2–7.8)
GRAN% POC: 62.5 % (ref 37–92)
HCT VFR BLD CALC: 38.4 % (ref 37–51)
HGB BLD-MCNC: 13.2 G/DL (ref 12–18)
KETONES P FAST UR STRIP-MCNC: NEGATIVE MG/DL
LY# POC: 1.9 K/UL (ref 0.6–4.1)
LY% POC: 30.9 % (ref 10–58.5)
MCH RBC QN: 31.8 PG (ref 26–32)
MCHC RBC-ENTMCNC: 34.4 G/DL (ref 30–36)
MCV RBC: 93 FL (ref 80–97)
MICROALBUMIN UR TEST STR-MCNC: NEGATIVE MG/L (ref 0–20)
MID #, POC: 0.4 K/UL (ref 0–1.8)
MID% POC: 6.6 % (ref 0.1–24)
MUCUS UA POCT, MUCPOCT: NORMAL
PH UR STRIP: 5 [PH] (ref 5–7)
PLATELET # BLD: 177 K/UL (ref 140–440)
PROT UR QL STRIP: NORMAL
RBC # BLD: 4.15 M/UL (ref 4.2–6.3)
RBC UA POCT, RBCPOCT: NORMAL
SP GR UR STRIP: 1.02 (ref 1.01–1.02)
TRICH UA POCT, TRICHPOC: NEGATIVE
UA UROBILINOGEN AMB POC: NORMAL (ref 0.2–1)
URINALYSIS CLARITY POC: CLEAR
URINALYSIS COLOR POC: NORMAL
URINE BLOOD POC: NEGATIVE
URINE CULT COMMENT, POCT: NORMAL
URINE LEUKOCYTES POC: NEGATIVE
URINE NITRITES POC: NEGATIVE
WBC # BLD: 6.2 K/UL (ref 4.1–10.9)
WBC UA POCT, WBCPOCT: 0
YEAST UA POCT, YEASTPOC: NEGATIVE

## 2018-11-14 RX ORDER — METFORMIN HYDROCHLORIDE 500 MG/1
TABLET, EXTENDED RELEASE ORAL
Qty: 90 TAB | Refills: 0 | Status: SHIPPED | OUTPATIENT
Start: 2018-11-14 | End: 2018-11-28 | Stop reason: SDUPTHER

## 2018-11-14 NOTE — PROGRESS NOTES
Chief Complaint   Patient presents with   [facility] Annual Wellness Visit     1. Have you been to the ER, urgent care clinic since your last visit? Hospitalized since your last visit? No    2. Have you seen or consulted any other health care providers outside of the [address] [place] [address] since your last visit? Include any pap smears or colon screening.  No  Visit Vitals  /72 (BP 1 Location: Left arm, BP Patient Position: Sitting)   Pulse 71   Resp 16   Ht 6' 1\" (1.854 m)   Wt 256 lb (116.1 kg)   SpO2 96%   BMI 33.78 kg/m²

## 2018-11-14 NOTE — PROGRESS NOTES
This is an Initial Medicare Annual Wellness Exam (AWV) (Performed 12 months after IPPE or effective date of Medicare Part B enrollment, Once in a lifetime)    I have reviewed the patient's medical history in detail and updated the computerized patient record. He presents today for his initial annual Medicare wellness examination screening questionnaire. Is also here in follow-up of his multiple medical problems include hypertension, diabetes, hyperlipidemia, cardiomyopathy, GERD, DJD, CKD stage III, obesity and other medical problems. He currently denies any chest pain, shortness breath, palpitations, PND, orthopnea or cardiorespiratory complaints. He denies any GI or  complaints. He denies any headaches, dizziness or neurologic complaints. He denies any current arthritic complaints and then no other complaints on complete review of systems. He claims to be taking his medication but knows he could do a better job with diet and exercise.     History     Past Medical History:   Diagnosis Date    Arthritis     fingers    CAD (coronary artery disease)     Cardiomyopathy ([facility] [address]     Cholelithiasis 9/21/2017    CKD (chronic kidney disease) 9/21/2017    Diabetes mellitus ([facility] [address]     Diverticulosis 9/21/2017    DJD (degenerative joint disease) 9/21/2017    GERD (gastroesophageal reflux disease)     H/O: depression 9/21/2017    Hiatal hernia     Hypercholesteremia     Hypertension     Hypertension with renal disease 9/21/2017    Hypothyroidism     Kidney stones     Obesity 9/21/2017    On statin therapy 9/21/2017    Sleep apnea 9/21/2017    Thyroid disease     Viral meningitis 9/1990      Past Surgical History:   Procedure Laterality Date    HX HERNIA REPAIR      HX KNEE ARTHROSCOPY      both     Current Outpatient Medications   Medication Sig Dispense Refill    metFORMIN ER (GLUCOPHAGE XR) 500 mg tablet       niacin ER (NIASPAN) 750 mg Tb24 TAKE 2 TABLETS BY MOUTH AT  BEDTIME 180 Tab 3    lisinopril (PRINIVIL, ZESTRIL) 10 mg tablet TAKE 1 TABLET BY MOUTH  DAILY 90 Tab 3    esomeprazole (NEXIUM) 40 mg capsule TAKE 1 CAPSULE BY MOUTH  DAILY 90 Cap prn    fish oil-dha-epa 1,200-144-216 mg cap Take  by mouth.  diclofenac EC (VOLTAREN) 75 mg EC tablet TAKE 1 TABLET BY MOUTH TWO  TIMES DAILY 180 Tab 3    rosuvastatin (CRESTOR) 10 mg tablet TAKE 1 TABLET BY MOUTH  DAILY 90 Tab 3    carvedilol (COREG) 12.5 mg tablet TAKE 1 TABLET BY MOUTH TWO  TIMES DAILY 180 Tab 3    levothyroxine (SYNTHROID) 50 mcg tablet TAKE 1 TABLET BY MOUTH  DAILY 90 Tab 3    triamterene-hydroCHLOROthiazide (MAXZIDE) 37.5-25 mg per tablet TAKE 1 TABLET BY MOUTH  DAILY 90 Tab 3    famotidine (PEPCID) 40 mg tablet Take 40 mg by mouth daily.  aspirin 81 mg tablet Take 81 mg by mouth daily as needed.        Allergies   Allergen Reactions    Demerol [Meperidine] Nausea and Vomiting    Phenergan [Promethazine] Unknown (comments)     Family History   Problem Relation Age of Onset    Heart Disease Mother         AICD    Thyroid Disease Mother     Heart Disease Father         pacemaker    Hypertension Father     Cancer Father         lymphoma    Thyroid Disease Sister     Heart Disease Paternal Grandmother         angina     Social History     Tobacco Use    Smoking status: Never Smoker    Smokeless tobacco: Never Used   Substance Use Topics    Alcohol use: No     Patient Active Problem List   Diagnosis Code    Cardiomyopathy ([facility] [address] I42.9    Controlled type 2 diabetes mellitus with stage 3 chronic kidney disease, without long-term current use of insulin (HCC) E11.22, N18.3    Mixed hyperlipidemia E78.2    Acquired hypothyroidism E03.9    Gastroesophageal reflux disease without esophagitis K21.9    Primary osteoarthritis involving multiple joints M15.0    Cholelithiasis K80.20    Diverticulosis K57.90    History of viral meningitis Z86.61    H/O: depression Z86.59    Sleep apnea G47.30    Class 1 obesity due to excess calories with serious comorbidity and body mass index (BMI) of 33.0 to 33.9 in adult E66.09, Z68.33    Hypertension with renal disease I12.9    Stage 3 chronic kidney disease (HCC) N18.3    Prostate cancer screening Z12.5    Medicare annual wellness visit, initial Z00.00       Depression Risk Factor Screening:     PHQ over the last two weeks 11/14/2018   Little interest or pleasure in doing things Not at all   Feeling down, depressed, irritable, or hopeless Not at all   Total Score PHQ 2 0     Alcohol Risk Factor Screening: You do not drink alcohol or very rarely. Functional Ability and Level of Safety:     Hearing Loss  Hearing is good. Activities of Daily Living  The home contains: no safety equipment. Patient does total self care    Fall Risk  Fall Risk Assessment, last 12 mths 11/14/2018   Able to walk? Yes   Fall in past 12 months? No       Abuse Screen  Patient is not abused    Cognitive Screening   Evaluation of Cognitive Function:  Has your family/caregiver stated any concerns about your memory: no  Normal      ROS:    Constitutional: He denies fevers, weight loss, sweats. Eyes: No blurred or double vision. ENT: No difficulty with swallowing, taste, speech or smell. Neck: no stiffness or swelling  Respiratory: No cough wheezing or shortness of breath. Cardiovascular: Denies chest pain, palpitations, unexplained indigestion or syncope. Gastrointestinal:  No changes in bowel movements, no abdominal pain, no bloating. Genitourinary:  He denies frequency, nocturia or stranguria. Extremities: No joint pain, stiffness or swelling. Neurological:  No numbness, tingling, burring paresthesias or loss of motor strength. No syncope, dizziness or frequent headache  Lymphatic: no adenopathy noted  Hematologic: no easy bruising or bleeding gums  Skin:  No recent rashes or mole changes. Psychiatric/Behavioral:  Negative for depression.       Vitals:    11/14/18 1050   BP: 122/72   Pulse: 71   Resp: 16   SpO2: 96%   Weight: 256 lb (116.1 kg)   Height: 6' 1\" (1.854 m)   PainSc:   0 - No pain        PHYSICAL EXAM:    General appearance - alert, well appearing, and in no distress  Mental status - alert, oriented to person, place, and time  HEENT:  Ears - bilateral TM's and external ear canals clear  Eyes - pupillary responses were normal.  Extraocular muscle function intact. Lids and conjunctiva not injected. Fundoscopic exam revealed sharp disc margins. eye movements intact  Pharynx- clear with teeth in good repair. No masses were noted  Neck - supple without thyromegaly or burit. No JVD noted  Lungs - clear to auscultation and percussion  Cardiac- normal rate, regular rhythm without murmurs. PMI not displaced. No gallop, rub or click  Abdomen - flat, soft, non-tender without palpable organomegaly or mass. No pulsatile mass was felt, and not bruit was heard. Bowel sounds were active  : Circumcised, Testes descended w/o masses  Rectal: normal sphincter tone, prostate normal, no masses, stool brown and hemacult negative  Extremities -  no clubbing cyanosis or edema  Lymphatics - no palpable lymphadenopathy, no hepatosplenomegaly  Hematologic: no petechiae or purpura  Peripheral vascular -Femoral, Dorsalis pedis and posterior tibial pulses felt without difficulty  Skin - no rash or unusual mole change noted  Neurological - Cranial nerves II-XII grossly intact. Motor strength 5/5. DTR's 2+ and symmetric. Station and gait normal  Back exam - full range of motion, no tenderness, palpable spasm or pain on motion  Musculoskeletal - no joint tenderness, deformity or swelling      Patient Care Team   Patient Care Team:  Shelly Acevedo MD as PCP - General (Internal Medicine)    Assessment/Plan   Education and counseling provided:  Are appropriate based on today's review and evaluation    ASSESSMENT:   1. Hypertension with renal disease    2.  Controlled type 2 diabetes mellitus with stage 3 chronic kidney disease, without long-term current use of insulin ([facility] [address]    3. Mixed hyperlipidemia    4. Cardiomyopathy, unspecified type ([facility] [address]    5. Gastroesophageal reflux disease without esophagitis    6. Primary osteoarthritis involving multiple joints    7. Stage 3 chronic kidney disease ([facility] [address]    8. Acquired hypothyroidism    9. Class 1 obesity due to excess calories with serious comorbidity and body mass index (BMI) of 33.0 to 33.9 in adult    10. Diverticulosis of large intestine without hemorrhage    11. Prostate cancer screening    12. Medicare annual wellness visit, initial    15. Encounter for immunization      Impression  1. Hypertension that is controlled to continue current therapy reviewed with him  2. Diabetes repeat status pending and prior labs reviewed on make adjustments if necessary. 3.  Hyperlipidemia prior labs reviewed repeat status pending I will adjust if needed. 4.  Cardiomyopathy that seems to be stable EKG today reveals no acute process. 5 GERD that is stable  6. DJD currently stable  7. CKD stage III repeat status pending  8. Hypothyroidism repeat status pending  9. Obesity we discussed diet, exercise and weight reduction for overall health benefit. 10.  Diverticulosis currently no recent symptoms  11. Flu shot given today. Medicare subsequent annual wellness examination screening questionnaire is completed today. The results were reviewed with him and his questions were answered. Lifestyle recommendations and modifications discussed and made. I will call with lab results and make further recommendations or adjustments if necessary. Follow-up as scheduled for 3 months or sooner should to be a problem.     PLAN:  .  Orders Placed This Encounter    Influenza Vaccine Inactivated (IIV)(FLUAD), Subunit, Adjuvanted, IM, (95667)    T4, FREE    METABOLIC PANEL, COMPREHENSIVE    TSH 3RD GENERATION    LIPID PANEL    CK    HEMOGLOBIN A1C WITH EAG    PROSTATE SPECIFIC AG    AMB POC COMPLETE CBC,AUTOMATED ENTER    AMB POC URINALYSIS DIP STICK AUTO W/ MICRO     AMB POC URINE, MICROALBUMIN, SEMIQUANT (1 RESULT)    AMB POC EKG ROUTINE W/ 12 LEADS, INTER & REP         ATTENTION:   This medical record was transcribed using an electronic medical records system. Although proofread, it may and can contain electronic and spelling errors. Other human spelling and other errors may be present. Corrections may be executed at a later time. Please feel free to contact us for any clarifications as needed. Follow-up Disposition:  Return in about 3 months (around 2/14/2019).       Apryl Anderson MD     Health Maintenance Due   Topic Date Due    EYE EXAM RETINAL OR DILATED Q1  03/01/1962    DTaP/Tdap/Td series (1 - Tdap) 03/01/1973    Shingrix Vaccine Age 50> (1 of 2) 03/01/2002    GLAUCOMA SCREENING Q2Y  03/01/2017    Influenza Age 5 to Adult  08/01/2018    COLONOSCOPY  02/17/2019

## 2018-11-14 NOTE — PATIENT INSTRUCTIONS
Body Mass Index: Care Instructions  Your Care Instructions    Body mass index (BMI) can help you see if your weight is raising your risk for health problems. It uses a formula to compare how much you weigh with how tall you are. · A BMI lower than 18.5 is considered underweight. · A BMI between 18.5 and 24.9 is considered healthy. · A BMI between 25 and 29.9 is considered overweight. A BMI of 30 or higher is considered obese. If your BMI is in the normal range, it means that you have a lower risk for weight-related health problems. If your BMI is in the overweight or obese range, you may be at increased risk for weight-related health problems, such as high blood pressure, heart disease, stroke, arthritis or joint pain, and diabetes. If your BMI is in the underweight range, you may be at increased risk for health problems such as fatigue, lower protection (immunity) against illness, muscle loss, bone loss, hair loss, and hormone problems. BMI is just one measure of your risk for weight-related health problems. You may be at higher risk for health problems if you are not active, you eat an unhealthy diet, or you drink too much alcohol or use tobacco products. Follow-up care is a key part of your treatment and safety. Be sure to make and go to all appointments, and call your doctor if you are having problems. It's also a good idea to know your test results and keep a list of the medicines you take. How can you care for yourself at home? · Practice healthy eating habits. This includes eating plenty of fruits, vegetables, whole grains, lean protein, and low-fat dairy. · If your doctor recommends it, get more exercise. Walking is a good choice. Bit by bit, increase the amount you walk every day. Try for at least 30 minutes on most days of the week. · Do not smoke. Smoking can increase your risk for health problems. If you need help quitting, talk to your doctor about stop-smoking programs and medicines. These can increase your chances of quitting for good. · Limit alcohol to 2 drinks a day for men and 1 drink a day for women. Too much alcohol can cause health problems. If you have a BMI higher than 25  · Your doctor may do other tests to check your risk for weight-related health problems. This may include measuring the distance around your waist. A waist measurement of more than 40 inches in men or 35 inches in women can increase the risk of weight-related health problems. · Talk with your doctor about steps you can take to stay healthy or improve your health. You may need to make lifestyle changes to lose weight and stay healthy, such as changing your diet and getting regular exercise. If you have a BMI lower than 18.5  · Your doctor may do other tests to check your risk for health problems. · Talk with your doctor about steps you can take to stay healthy or improve your health. You may need to make lifestyle changes to gain or maintain weight and stay healthy, such as getting more healthy foods in your diet and doing exercises to build muscle. Where can you learn more? Go to http://rosa-jaylene.info/. Enter S176 in the search box to learn more about \"Body Mass Index: Care Instructions. \"  Current as of: June 26, 2018  Content Version: 11.8  © 4994-4089 [employer]. Care instructions adapted under license by [employer] (which disclaims liability or warranty for this information). If you have questions about a medical condition or this instruction, always ask your healthcare professional. [address] any warranty or liability for your use of this information.

## 2018-11-15 LAB
ALBUMIN SERPL-MCNC: 4.6 G/DL (ref 3.6–4.8)
ALBUMIN/GLOB SERPL: 1.9 {RATIO} (ref 1.2–2.2)
ALP SERPL-CCNC: 80 IU/L (ref 39–117)
ALT SERPL-CCNC: 42 IU/L (ref 0–44)
AST SERPL-CCNC: 29 IU/L (ref 0–40)
BILIRUB SERPL-MCNC: 0.4 MG/DL (ref 0–1.2)
BUN SERPL-MCNC: 26 MG/DL (ref 8–27)
BUN/CREAT SERPL: 15 (ref 10–24)
CALCIUM SERPL-MCNC: 9.5 MG/DL (ref 8.6–10.2)
CHLORIDE SERPL-SCNC: 104 MMOL/L (ref 96–106)
CHOLEST SERPL-MCNC: 114 MG/DL (ref 100–199)
CK SERPL-CCNC: 114 U/L (ref 24–204)
CO2 SERPL-SCNC: 24 MMOL/L (ref 20–29)
CREAT SERPL-MCNC: 1.74 MG/DL (ref 0.76–1.27)
EST. AVERAGE GLUCOSE BLD GHB EST-MCNC: 148 MG/DL
GLOBULIN SER CALC-MCNC: 2.4 G/DL (ref 1.5–4.5)
GLUCOSE SERPL-MCNC: 143 MG/DL (ref 65–99)
HBA1C MFR BLD: 6.8 % (ref 4.8–5.6)
HDLC SERPL-MCNC: 30 MG/DL
LDLC SERPL CALC-MCNC: 49 MG/DL (ref 0–99)
POTASSIUM SERPL-SCNC: 3.9 MMOL/L (ref 3.5–5.2)
PROT SERPL-MCNC: 7 G/DL (ref 6–8.5)
PSA SERPL-MCNC: 2.8 NG/ML (ref 0–4)
SODIUM SERPL-SCNC: 143 MMOL/L (ref 134–144)
T4 FREE SERPL-MCNC: 1.53 NG/DL (ref 0.82–1.77)
TRIGL SERPL-MCNC: 174 MG/DL (ref 0–149)
VLDLC SERPL CALC-MCNC: 35 MG/DL (ref 5–40)

## 2018-11-17 LAB
SPECIMEN STATUS REPORT, ROLRST: NORMAL
TSH SERPL DL<=0.005 MIU/L-ACNC: 1.03 UIU/ML (ref 0.45–4.5)

## 2018-11-17 NOTE — PROGRESS NOTES
HDL remains low and triglycerides were up some lets try adding TriCor 145 daily and continue to watch diet. Blood sugar and glycol little up so watch diet.

## 2018-11-19 ENCOUNTER — TELEPHONE (OUTPATIENT)
Dept: INTERNAL MEDICINE CLINIC | Age: 68
End: 2018-11-19

## 2018-11-19 RX ORDER — FENOFIBRATE 145 MG/1
145 TABLET, COATED ORAL DAILY
Qty: 90 TAB | Refills: 3 | Status: SHIPPED | OUTPATIENT
Start: 2018-11-19 | End: 2019-09-23 | Stop reason: SDUPTHER

## 2018-11-19 NOTE — TELEPHONE ENCOUNTER
----- Message from Josey Bear MD sent at 11/16/2018  7:29 PM EST -----  HDL remains low and triglycerides were up some lets try adding TriCor 145 daily and continue to watch diet. Blood sugar and glycol little up so watch diet.

## 2018-11-28 RX ORDER — LEVOTHYROXINE SODIUM 50 UG/1
TABLET ORAL
Qty: 90 TAB | Refills: 3 | Status: SHIPPED | OUTPATIENT
Start: 2018-11-28 | End: 2019-09-23 | Stop reason: SDUPTHER

## 2018-11-28 RX ORDER — METFORMIN HYDROCHLORIDE 500 MG/1
TABLET, EXTENDED RELEASE ORAL
Qty: 270 TAB | Status: SHIPPED | OUTPATIENT
Start: 2018-11-28 | End: 2020-01-06 | Stop reason: SDUPTHER

## 2018-11-28 RX ORDER — DICLOFENAC SODIUM 75 MG/1
TABLET, DELAYED RELEASE ORAL
Qty: 180 TAB | Refills: 3 | Status: SHIPPED | OUTPATIENT
Start: 2018-11-28 | End: 2019-09-23 | Stop reason: SDUPTHER

## 2018-11-28 RX ORDER — TRIAMTERENE/HYDROCHLOROTHIAZID 37.5-25 MG
TABLET ORAL
Qty: 90 TAB | Refills: 3 | Status: SHIPPED | OUTPATIENT
Start: 2018-11-28 | End: 2019-03-06 | Stop reason: ALTCHOICE

## 2018-11-28 RX ORDER — CARVEDILOL 12.5 MG/1
TABLET ORAL
Qty: 180 TAB | Refills: 3 | Status: SHIPPED | OUTPATIENT
Start: 2018-11-28 | End: 2019-09-23 | Stop reason: SDUPTHER

## 2018-11-28 RX ORDER — ROSUVASTATIN CALCIUM 10 MG/1
TABLET, COATED ORAL
Qty: 90 TAB | Refills: 3 | Status: SHIPPED | OUTPATIENT
Start: 2018-11-28 | End: 2019-09-23 | Stop reason: SDUPTHER

## 2018-11-28 NOTE — TELEPHONE ENCOUNTER
RX refill request from the patient/pharmacy. Patient last seen 11- with labs, and next appt. scheduled for 02-  Requested Prescriptions     Pending Prescriptions Disp Refills    triamterene-hydroCHLOROthiazide (MAXZIDE) 37.5-25 mg per tablet [Pharmacy Med Name: TRIAMT-HCTZ 37.5-25MG TABLET] 90 Tab 3     Sig: TAKE 1 TABLET BY MOUTH  DAILY    metFORMIN ER (GLUCOPHAGE XR) 500 mg tablet [Pharmacy Med Name: METFORMIN ER 500MG TABLET] 270 Tab      Sig: TAKE 1 TABLET BY MOUTH IN  THE MORNING AND 2 TABLETS  AT DINNER    rosuvastatin (CRESTOR) 10 mg tablet [Pharmacy Med Name: ROSUVASTATIN  10MG  TAB] 90 Tab 3     Sig: TAKE 1 TABLET BY MOUTH  DAILY    levothyroxine (SYNTHROID) 50 mcg tablet [Pharmacy Med Name: LEVOTHYROXINE  0.05MG  TAB] 90 Tab 3     Sig: TAKE 1 TABLET BY MOUTH  DAILY    carvedilol (COREG) 12.5 mg tablet [Pharmacy Med Name: CARVEDILOL  12.5MG  TAB] 180 Tab 3     Sig: TAKE 1 TABLET BY MOUTH TWO  TIMES DAILY    diclofenac EC (VOLTAREN) 75 mg EC tablet [Pharmacy Med Name: DICLOFENAC SODIUM  75MG  TAB  ENTERIC COATED] 180 Tab 3     Sig: TAKE 1 TABLET BY MOUTH TWO  TIMES DAILY   .

## 2019-02-14 NOTE — PROGRESS NOTES
Chief Complaint   Patient presents with    Hypertension     3 month follow up       SUBJECTIVE:    Leslie De Leon III is a 79 y.o. male who returns in follow-up of his medical problems include hypertension, diabetes, hyperlipidemia, GERD, cardiomyopathy, DJD, obesity, history depression and other medical problems. He is taking his medications and trying to follow his diet and get some exercise. He currently denies any chest pain, shortness of breath, palpitations, PND, orthopnea or other cardiorespiratory complaints. He denies any GI or  complaints. He denies any headaches, dizziness or neurologic complaints. There are no current arthritic complaints and he has no other complaints on complete review of systems. Current Outpatient Medications   Medication Sig Dispense Refill    lisinopril (PRINIVIL, ZESTRIL) 10 mg tablet Take 1 Tab by mouth daily. 14 Tab 0    triamterene-hydroCHLOROthiazide (MAXZIDE) 37.5-25 mg per tablet TAKE 1 TABLET BY MOUTH  DAILY 90 Tab 3    metFORMIN ER (GLUCOPHAGE XR) 500 mg tablet TAKE 1 TABLET BY MOUTH IN  THE MORNING AND 2 TABLETS  AT DINNER 270 Tab prn    rosuvastatin (CRESTOR) 10 mg tablet TAKE 1 TABLET BY MOUTH  DAILY 90 Tab 3    levothyroxine (SYNTHROID) 50 mcg tablet TAKE 1 TABLET BY MOUTH  DAILY 90 Tab 3    carvedilol (COREG) 12.5 mg tablet TAKE 1 TABLET BY MOUTH TWO  TIMES DAILY 180 Tab 3    diclofenac EC (VOLTAREN) 75 mg EC tablet TAKE 1 TABLET BY MOUTH TWO  TIMES DAILY 180 Tab 3    fenofibrate nanocrystallized (TRICOR) 145 mg tablet Take 1 Tab by mouth daily. 90 Tab 3    niacin ER (NIASPAN) 750 mg Tb24 TAKE 2 TABLETS BY MOUTH AT  BEDTIME 180 Tab 3    lisinopril (PRINIVIL, ZESTRIL) 10 mg tablet TAKE 1 TABLET BY MOUTH  DAILY 90 Tab 3    esomeprazole (NEXIUM) 40 mg capsule TAKE 1 CAPSULE BY MOUTH  DAILY 90 Cap prn    fish oil-dha-epa 1,200-144-216 mg cap Take  by mouth.  famotidine (PEPCID) 40 mg tablet Take 40 mg by mouth daily.       aspirin 81 mg tablet Take 81 mg by mouth daily as needed.        Past Medical History:   Diagnosis Date    Arthritis     fingers    CAD (coronary artery disease)     Cardiomyopathy ([facility]     Cholelithiasis 9/21/2017    CKD (chronic kidney disease) 9/21/2017    Diabetes mellitus ([facility]     Diverticulosis 9/21/2017    DJD (degenerative joint disease) 9/21/2017    GERD (gastroesophageal reflux disease)     H/O: depression 9/21/2017    Hiatal hernia     Hypercholesteremia     Hypertension     Hypertension with renal disease 9/21/2017    Hypothyroidism     Kidney stones     Obesity 9/21/2017    On statin therapy 9/21/2017    Sleep apnea 9/21/2017    Thyroid disease     Viral meningitis 9/1990     Past Surgical History:   Procedure Laterality Date    HX HERNIA REPAIR      HX KNEE ARTHROSCOPY      both     Allergies   Allergen Reactions    Demerol [Meperidine] Nausea and Vomiting    Phenergan [Promethazine] Unknown (comments)       REVIEW OF SYSTEMS:  General: negative for - chills or fever, or weight loss or gain  ENT: negative for - headaches, nasal congestion or tinnitus  Eyes: no blurred or visual changes  Neck: No stiffness or swollen nodes  Respiratory: negative for - cough, hemoptysis, shortness of breath or wheezing  Cardiovascular : negative for - chest pain, edema, palpitations or shortness of breath  Gastrointestinal: negative for - abdominal pain, blood in stools, heartburn or nausea/vomiting  Genito-Urinary: no dysuria, trouble voiding, or hematuria  Musculoskeletal: negative for - gait disturbance, joint pain, joint stiffness or joint swelling  Neurological: no TIA or stroke symptoms  Hematologic: no bruises, no bleeding  Lymphatic: no swollen glands  Integument: no lumps, mole changes, nail changes or rash  Endocrine:no malaise/lethargy poly uria or polydipsia or unexpected weight changes        Social History     Socioeconomic History    Marital status:      Spouse name: Not on file    Number of children: Not on file    Years of education: Not on file    Highest education level: Not on file   Tobacco Use    Smoking status: Never Smoker    Smokeless tobacco: Never Used   Substance and Sexual Activity    Alcohol use: No    Drug use: No    Sexual activity: Yes     Partners: Female     Family History   Problem Relation Age of Onset    Heart Disease Mother         AICD    Thyroid Disease Mother     Heart Disease Father         pacemaker    Hypertension Father     Cancer Father         lymphoma    Thyroid Disease Sister     Heart Disease Paternal Grandmother         angina       OBJECTIVE:     Visit Vitals  /78 (BP 1 Location: Left arm, BP Patient Position: Sitting)   Pulse 69   Temp 98.3 °F (36.8 °C) (Oral)   Resp 14   Ht 6' 1\" (1.854 m)   Wt 248 lb (112.5 kg)   SpO2 96%   BMI 32.72 kg/m²     CONSTITUTIONAL:   well nourished, appears age appropriate  EYES: sclera anicteric, PERRL, EOMI  ENMT:nares clear, moist mucous membranes, pharynx clear  NECK: supple. Thyroid normal, No JVD or bruits  RESPIRATORY: Chest: clear to ascultation and percussion, normal inspiratory effort  CARDIOVASCULAR: Heart: regular rate and rhythm no murmurs, rubs or gallops, PMI not displaced, No thrills  GASTROINTESTINAL: Abdomen: non distended, soft, non tender, bowel sounds normal  HEMATOLOGIC: no purpura, petechiae or bruising  LYMPHATIC: No lymph node enlargemant  MUSCULOSKELETAL: Extremities: no edema or active synovitis, pulse 1+   INTEGUMENT: No unusual rashes or suspicious skin lesions noted. Nails appear normal.  No diabetic foot changes noted. PERIPHERAL VASCULAR: normal pulses femoral, PT and DP  NEUROLOGIC: non-focal exam, A & O X 3. Normal distal sensation and proprioception all toes both feet. PSYCHIATRIC:, appropriate affect     ASSESSMENT:   1. Hypertension with renal disease    2.  Controlled type 2 diabetes mellitus with stage 3 chronic kidney disease, without long-term current use of insulin ([facility]    3. Mixed hyperlipidemia    4. Gastroesophageal reflux disease without esophagitis    5. Primary osteoarthritis involving multiple joints    6. Stage 3 chronic kidney disease (HCC)    7. Cardiomyopathy, unspecified type ([facility]    8. Class 1 obesity due to excess calories with serious comorbidity and body mass index (BMI) of 33.0 to 33.9 in adult      Pression  1. Hypertension that is controlled to continue current therapy reviewed with him. 2.  Diabetes repeat status is pending and prior labs reviewed no make adjustments if necessary. 3.  Hyperlipidemia prior lab reviewed repeat status pending I will adjust if needed. Next #4 GERD that is stable  5. DJD currently stable  6. CKD stage III repeat status pending  7. Cardiomyopathy stable  8. Obesity we did discuss diet, exercise and weight reduction for overall health benefit. 2-week prescription given for lisinopril since his meld is late get it to him. That was sent to local pharmacy. I will call the lab results and make further recommendations or adjustments if necessary. Follow-up as scheduled for 3 months or sooner should there be a problem. PLAN:  .  Orders Placed This Encounter    HEMOGLOBIN A1C WITH EAG    METABOLIC PANEL, COMPREHENSIVE ([facility])    LIPID PANEL ([facility])    [facility]    lisinopril (PRINIVIL, ZESTRIL) 10 mg tablet         ATTENTION:   This medical record was transcribed using an electronic medical records system. Although proofread, it may and can contain electronic and spelling errors. Other human spelling and other errors may be present. Corrections may be executed at a later time. Please feel free to contact us for any clarifications as needed. Follow-up Disposition:  Return in about 3 months (around 5/15/2019). No results found for any visits on 02/15/19. Ada Cowart MD    The patient verbalized understanding of the problems and plans as explained.

## 2019-02-15 ENCOUNTER — OFFICE VISIT (OUTPATIENT)
Dept: INTERNAL MEDICINE CLINIC | Age: 69
End: 2019-02-15

## 2019-02-15 VITALS
WEIGHT: 248 LBS | DIASTOLIC BLOOD PRESSURE: 78 MMHG | BODY MASS INDEX: 32.87 KG/M2 | HEART RATE: 69 BPM | RESPIRATION RATE: 14 BRPM | HEIGHT: 73 IN | OXYGEN SATURATION: 96 % | TEMPERATURE: 98.3 F | SYSTOLIC BLOOD PRESSURE: 122 MMHG

## 2019-02-15 DIAGNOSIS — E66.09 CLASS 1 OBESITY DUE TO EXCESS CALORIES WITH SERIOUS COMORBIDITY AND BODY MASS INDEX (BMI) OF 33.0 TO 33.9 IN ADULT: ICD-10-CM

## 2019-02-15 DIAGNOSIS — M15.9 PRIMARY OSTEOARTHRITIS INVOLVING MULTIPLE JOINTS: ICD-10-CM

## 2019-02-15 DIAGNOSIS — E11.22 CONTROLLED TYPE 2 DIABETES MELLITUS WITH STAGE 3 CHRONIC KIDNEY DISEASE, WITHOUT LONG-TERM CURRENT USE OF INSULIN (HCC): ICD-10-CM

## 2019-02-15 DIAGNOSIS — I42.9 CARDIOMYOPATHY, UNSPECIFIED TYPE (HCC): ICD-10-CM

## 2019-02-15 DIAGNOSIS — K21.9 GASTROESOPHAGEAL REFLUX DISEASE WITHOUT ESOPHAGITIS: ICD-10-CM

## 2019-02-15 DIAGNOSIS — E78.2 MIXED HYPERLIPIDEMIA: ICD-10-CM

## 2019-02-15 DIAGNOSIS — N18.30 STAGE 3 CHRONIC KIDNEY DISEASE (HCC): ICD-10-CM

## 2019-02-15 DIAGNOSIS — N18.30 CONTROLLED TYPE 2 DIABETES MELLITUS WITH STAGE 3 CHRONIC KIDNEY DISEASE, WITHOUT LONG-TERM CURRENT USE OF INSULIN (HCC): ICD-10-CM

## 2019-02-15 DIAGNOSIS — I12.9 HYPERTENSION WITH RENAL DISEASE: Primary | ICD-10-CM

## 2019-02-15 LAB
CHOL/HDL RATIO,CHHD: 3 RATIO (ref 0–4)
CHOLEST SERPL-MCNC: 120 MG/DL (ref 0–200)
CK SERPL-CCNC: 127 U/L (ref 30–135)
HDLC SERPL-MCNC: 36 MG/DL (ref 35–130)
LDL/HDL RATIO,LDHD: 1 RATIO
LDLC SERPL CALC-MCNC: 51 MG/DL (ref 0–130)
TRIGL SERPL-MCNC: 165 MG/DL (ref 0–200)
VLDLC SERPL CALC-MCNC: 33 MG/DL

## 2019-02-15 RX ORDER — LISINOPRIL 10 MG/1
10 TABLET ORAL DAILY
Qty: 14 TAB | Refills: 0 | Status: SHIPPED | OUTPATIENT
Start: 2019-02-15 | End: 2019-03-06 | Stop reason: SDUPTHER

## 2019-02-15 NOTE — PROGRESS NOTES
Reviewed record in preparation for visit and have obtained necessary documentation. Identified pt with two pt identifiers(name and ). Chief Complaint   Patient presents with    Hypertension     3 month follow up     Visit Vitals  /78 (BP 1 Location: Left arm, BP Patient Position: Sitting)   Pulse 69   Temp 98.3 °F (36.8 °C) (Oral)   Resp 14   Ht 6' 1\" (1.854 m)   Wt 248 lb (112.5 kg)   SpO2 96%   BMI 32.72 kg/m²        Health Maintenance Due   Topic Date Due    EYE EXAM RETINAL OR DILATED  1962    DTaP/Tdap/Td series (1 - Tdap) 1973    Shingrix Vaccine Age 50> (1 of 2) 2002    GLAUCOMA SCREENING Q2Y  2017    FOOT EXAM Q1  2019    COLONOSCOPY  2019     Fasting: Yes     Eye exam:  Last year     Colonoscopy: Has not had it done yet     Shingrix: Not sure     TDAP: Not sure       Mr. Toña Hampton has a reminder for a \"due or due soon\" health maintenance. I have asked that he discuss health maintenance topic(s) due with His  primary care provider. Coordination of Care Questionnaire:  :     1) Have you been to an emergency room, urgent care clinic since your last visit? no   Hospitalized since your last visit? no             2) Have you seen or consulted any other health care providers outside of [address] [place] [address] since your last visit? no  (Include any pap smears or colon screenings in this section.)    3) Do you have an Advance Directive on file? no    4) Are you interested in receiving information on Advance Directives? NO    Patient is accompanied by self I have received verbal consent [facility] to discuss any/all medical information while they are present in the room.

## 2019-02-15 NOTE — PATIENT INSTRUCTIONS
Body Mass Index: Care Instructions  Your Care Instructions    Body mass index (BMI) can help you see if your weight is raising your risk for health problems. It uses a formula to compare how much you weigh with how tall you are. · A BMI lower than 18.5 is considered underweight. · A BMI between 18.5 and 24.9 is considered healthy. · A BMI between 25 and 29.9 is considered overweight. A BMI of 30 or higher is considered obese. If your BMI is in the normal range, it means that you have a lower risk for weight-related health problems. If your BMI is in the overweight or obese range, you may be at increased risk for weight-related health problems, such as high blood pressure, heart disease, stroke, arthritis or joint pain, and diabetes. If your BMI is in the underweight range, you may be at increased risk for health problems such as fatigue, lower protection (immunity) against illness, muscle loss, bone loss, hair loss, and hormone problems. BMI is just one measure of your risk for weight-related health problems. You may be at higher risk for health problems if you are not active, you eat an unhealthy diet, or you drink too much alcohol or use tobacco products. Follow-up care is a key part of your treatment and safety. Be sure to make and go to all appointments, and call your doctor if you are having problems. It's also a good idea to know your test results and keep a list of the medicines you take. How can you care for yourself at home? · Practice healthy eating habits. This includes eating plenty of fruits, vegetables, whole grains, lean protein, and low-fat dairy. · If your doctor recommends it, get more exercise. Walking is a good choice. Bit by bit, increase the amount you walk every day. Try for at least 30 minutes on most days of the week. · Do not smoke. Smoking can increase your risk for health problems. If you need help quitting, talk to your doctor about stop-smoking programs and medicines. These can increase your chances of quitting for good. · Limit alcohol to 2 drinks a day for men and 1 drink a day for women. Too much alcohol can cause health problems. If you have a BMI higher than 25  · Your doctor may do other tests to check your risk for weight-related health problems. This may include measuring the distance around your waist. A waist measurement of more than 40 inches in men or 35 inches in women can increase the risk of weight-related health problems. · Talk with your doctor about steps you can take to stay healthy or improve your health. You may need to make lifestyle changes to lose weight and stay healthy, such as changing your diet and getting regular exercise. If you have a BMI lower than 18.5  · Your doctor may do other tests to check your risk for health problems. · Talk with your doctor about steps you can take to stay healthy or improve your health. You may need to make lifestyle changes to gain or maintain weight and stay healthy, such as getting more healthy foods in your diet and doing exercises to build muscle. Where can you learn more? Go to http://rosa-jaylene.info/. Enter S176 in the search box to learn more about \"Body Mass Index: Care Instructions. \"  Current as of: June 25, 2018  Content Version: 11.9  © 8657-2640 [employer]. Care instructions adapted under license by [employer] (which disclaims liability or warranty for this information). If you have questions about a medical condition or this instruction, always ask your healthcare professional. [address] any warranty or liability for your use of this information.

## 2019-02-16 LAB
EST. AVERAGE GLUCOSE BLD GHB EST-MCNC: 143 MG/DL
HBA1C MFR BLD: 6.6 % (ref 4.8–5.6)

## 2019-02-18 LAB
A-G RATIO,AGRAT: 1.9 RATIO
ALBUMIN SERPL-MCNC: 5 G/DL (ref 3.9–5.4)
ALP SERPL-CCNC: 81 U/L (ref 38–126)
ALT SERPL-CCNC: 42 U/L (ref 9–52)
ANION GAP SERPL CALC-SCNC: 17 MMOL/L
AST SERPL W P-5'-P-CCNC: 32 U/L (ref 14–36)
BILIRUB SERPL-MCNC: 0.3 MG/DL (ref 0.2–1.3)
BUN SERPL-MCNC: 34 MG/DL (ref 9–20)
BUN/CREATININE RATIO,BUCR: 16 RATIO
CALCIUM SERPL-MCNC: 10 MG/DL (ref 8.4–10.2)
CHLORIDE SERPL-SCNC: 105 MMOL/L (ref 98–107)
CO2 SERPL-SCNC: 23 MMOL/L (ref 22–32)
CREAT SERPL-MCNC: 2.1 MG/DL (ref 0.8–1.5)
GLOBULIN,GLOB: 2.6
GLUCOSE SERPL-MCNC: 127 MG/DL (ref 75–110)
POTASSIUM SERPL-SCNC: 4.4 MMOL/L (ref 3.6–5)
PROT SERPL-MCNC: 7.6 G/DL (ref 6.3–8.2)
SODIUM SERPL-SCNC: 145 MMOL/L (ref 137–145)

## 2019-02-19 NOTE — PROGRESS NOTES
Kidney blood test is not quite as good so discontinue Maxide and follow-up with me in 2 weeks at which time we will recheck the BMP. Blood sugar and glycohemoglobin are just a little above the goal so watch diet. HDL remains low at 36 but that is improved with a goal of 40 so watch diet with increased fiber and increasing aerobic exercise to help with that.

## 2019-02-20 NOTE — PROGRESS NOTES
Discussed lab with patient. Advised to d/c the Maxide due to change in kidney function. Discussed borderline FBS and Glyco and the need to continue to watch diet. HDL remains low although improved. Need to increase his dietary fiber and aerobic exercise.

## 2019-02-26 RX ORDER — FAMOTIDINE 40 MG/1
40 TABLET, FILM COATED ORAL DAILY
Qty: 90 TAB | Refills: 3 | Status: SHIPPED | OUTPATIENT
Start: 2019-02-26 | End: 2020-01-31

## 2019-02-26 NOTE — TELEPHONE ENCOUNTER
RX refill request from the patient/pharmacy. Patient last seen 02- with labs, and next appt. scheduled for 03-  Requested Prescriptions     Pending Prescriptions Disp Refills    famotidine (PEPCID) 40 mg tablet 90 Tab 3     Sig: Take 1 Tab by mouth daily. [place]

## 2019-03-05 NOTE — PROGRESS NOTES
Chief Complaint   Patient presents with    Flank Pain     abd kidney functions-2 week follow up       SUBJECTIVE:    Rony Beltran III is a 81 y.o. male who returns today in follow-up for his hypertension, CKD, edema, obesity, cardiomyopathy and other medical problems. He was recently seen here on February 15 at which time his renal function had deteriorated with his GFR decreasing from 40-32 with a creatinine increased from 1.74-2.1. His Maxzide was discontinued and he returns today noting no increase in the swelling in his ankles. He notes no headaches. He has passed his urine well. He denies any chest pain, shortness of breath, PND, orthopnea or other cardiorespiratory complaints. He denies any other complaints on complete review of systems other than some chronic arthritic complaints which are unchanged. Current Outpatient Medications   Medication Sig Dispense Refill    OMEGA-3-DHA-EPA-FISH OIL-COQ10 PO Take 100 mg by mouth.  lisinopril (PRINIVIL, ZESTRIL) 20 mg tablet Take 1 Tab by mouth daily. 90 Tab prn    famotidine (PEPCID) 40 mg tablet Take 1 Tab by mouth daily. 90 Tab 3    metFORMIN ER (GLUCOPHAGE XR) 500 mg tablet TAKE 1 TABLET BY MOUTH IN  THE MORNING AND 2 TABLETS  AT DINNER 270 Tab prn    rosuvastatin (CRESTOR) 10 mg tablet TAKE 1 TABLET BY MOUTH  DAILY 90 Tab 3    levothyroxine (SYNTHROID) 50 mcg tablet TAKE 1 TABLET BY MOUTH  DAILY 90 Tab 3    carvedilol (COREG) 12.5 mg tablet TAKE 1 TABLET BY MOUTH TWO  TIMES DAILY 180 Tab 3    diclofenac EC (VOLTAREN) 75 mg EC tablet TAKE 1 TABLET BY MOUTH TWO  TIMES DAILY 180 Tab 3    fenofibrate nanocrystallized (TRICOR) 145 mg tablet Take 1 Tab by mouth daily. 90 Tab 3    niacin ER (NIASPAN) 750 mg Tb24 TAKE 2 TABLETS BY MOUTH AT  BEDTIME 180 Tab 3    esomeprazole (NEXIUM) 40 mg capsule TAKE 1 CAPSULE BY MOUTH  DAILY 90 Cap prn    fish oil-dha-epa 1,200-144-216 mg cap Take  by mouth.       aspirin 81 mg tablet Take 81 mg by mouth daily as needed.        Past Medical History:   Diagnosis Date    Arthritis     fingers    CAD (coronary artery disease)     Cardiomyopathy ([facility]     Cholelithiasis 9/21/2017    CKD (chronic kidney disease) 9/21/2017    Diabetes mellitus ([facility]     Diverticulosis 9/21/2017    DJD (degenerative joint disease) 9/21/2017    GERD (gastroesophageal reflux disease)     H/O: depression 9/21/2017    Hiatal hernia     Hypercholesteremia     Hypertension     Hypertension with renal disease 9/21/2017    Hypothyroidism     Kidney stones     Obesity 9/21/2017    On statin therapy 9/21/2017    Sleep apnea 9/21/2017    Thyroid disease     Viral meningitis 9/1990     Past Surgical History:   Procedure Laterality Date    HX HERNIA REPAIR      HX KNEE ARTHROSCOPY      both     Allergies   Allergen Reactions    Demerol [Meperidine] Nausea and Vomiting    Phenergan [Promethazine] Unknown (comments)       REVIEW OF SYSTEMS:  General: negative for - chills or fever, or weight loss or gain  ENT: negative for - headaches, nasal congestion or tinnitus  Eyes: no blurred or visual changes  Neck: No stiffness or swollen nodes  Respiratory: negative for - cough, hemoptysis, shortness of breath or wheezing  Cardiovascular : negative for - chest pain, edema, palpitations or shortness of breath  Gastrointestinal: negative for - abdominal pain, blood in stools, heartburn or nausea/vomiting  Genito-Urinary: no dysuria, trouble voiding, or hematuria  Musculoskeletal: negative for - gait disturbance, joint pain, joint stiffness or joint swelling  Neurological: no TIA or stroke symptoms  Hematologic: no bruises, no bleeding  Lymphatic: no swollen glands  Integument: no lumps, mole changes, nail changes or rash  Endocrine:no malaise/lethargy poly uria or polydipsia or unexpected weight changes        Social History     Socioeconomic History    Marital status:      Spouse name: Not on file    Number of children: Not on file    Years of education: Not on file    Highest education level: Not on file   Tobacco Use    Smoking status: Never Smoker    Smokeless tobacco: Never Used   Substance and Sexual Activity    Alcohol use: No    Drug use: No    Sexual activity: Yes     Partners: Female     Family History   Problem Relation Age of Onset    Heart Disease Mother         AICD    Thyroid Disease Mother     Heart Disease Father         pacemaker    Hypertension Father     Cancer Father         lymphoma    Thyroid Disease Sister     Heart Disease Paternal Grandmother         angina       OBJECTIVE:     Visit Vitals  /84   Pulse 72   Temp 97.5 °F (36.4 °C) (Oral)   Resp 19   Ht 6' 1\" (1.854 m)   Wt 253 lb 6.4 oz (114.9 kg)   SpO2 97%   BMI 33.43 kg/m²     CONSTITUTIONAL:   well nourished, appears age appropriate  EYES: sclera anicteric, PERRL, EOMI  ENMT:nares clear, moist mucous membranes, pharynx clear  NECK: supple. Thyroid normal, No JVD or bruits  RESPIRATORY: Chest: clear to ascultation and percussion, normal inspiratory effort  CARDIOVASCULAR: Heart: regular rate and rhythm no murmurs, rubs or gallops, PMI not displaced, No thrills  GASTROINTESTINAL: Abdomen: non distended, soft, non tender, bowel sounds normal  HEMATOLOGIC: no purpura, petechiae or bruising  LYMPHATIC: No lymph node enlargemant  MUSCULOSKELETAL: Extremities: no edema or active synovitis, pulse 1+   INTEGUMENT: No unusual rashes or suspicious skin lesions noted. Nails appear normal.  PERIPHERAL VASCULAR: normal pulses femoral, PT and DP  NEUROLOGIC: non-focal exam, A & O X 3  PSYCHIATRIC:, appropriate affect     ASSESSMENT:   1. Stage 3 chronic kidney disease ([address]    2. Hypertension with renal disease    3. Cardiomyopathy, unspecified type ([address]    4. Essential hypertension      Impression  1. CKD stage III we will see what that status is a day and we have stopped his Maxzide his weight has gone up 5 pounds  2.   Hypertension that is not quite controlled so we will increase lisinopril to 20 daily and stay off Maxide  3. Cardiomyopathy clinically stable with no evidence of heart failure at  4. Obesity we did discuss diet, exercise and weight reduction  I will recheck him in 2 weeks to reassess and see how he is doing I will see him sooner if there is a problem. PLAN:  .  Orders Placed This Encounter    METABOLIC PANEL, BASIC ([facility])    OMEGA-3-DHA-EPA-FISH OIL-COQ10 PO    lisinopril (PRINIVIL, ZESTRIL) 20 mg tablet         ATTENTION:   This medical record was transcribed using an electronic medical records system. Although proofread, it may and can contain electronic and spelling errors. Other human spelling and other errors may be present. Corrections may be executed at a later time. Please feel free to contact us for any clarifications as needed. Follow-up Disposition:  Return in about 2 weeks (around 3/20/2019). No results found for any visits on 03/06/19. Lyndsey Downey MD    The patient verbalized understanding of the problems and plans as explained.

## 2019-03-06 ENCOUNTER — OFFICE VISIT (OUTPATIENT)
Dept: INTERNAL MEDICINE CLINIC | Age: 69
End: 2019-03-06

## 2019-03-06 VITALS
OXYGEN SATURATION: 97 % | RESPIRATION RATE: 19 BRPM | WEIGHT: 253.4 LBS | BODY MASS INDEX: 33.58 KG/M2 | HEART RATE: 72 BPM | DIASTOLIC BLOOD PRESSURE: 84 MMHG | HEIGHT: 73 IN | TEMPERATURE: 97.5 F | SYSTOLIC BLOOD PRESSURE: 142 MMHG

## 2019-03-06 DIAGNOSIS — N18.30 STAGE 3 CHRONIC KIDNEY DISEASE (HCC): Primary | ICD-10-CM

## 2019-03-06 DIAGNOSIS — I12.9 HYPERTENSION WITH RENAL DISEASE: ICD-10-CM

## 2019-03-06 DIAGNOSIS — I10 ESSENTIAL HYPERTENSION: ICD-10-CM

## 2019-03-06 DIAGNOSIS — I42.9 CARDIOMYOPATHY, UNSPECIFIED TYPE (HCC): ICD-10-CM

## 2019-03-06 LAB
ANION GAP SERPL CALC-SCNC: 9 MMOL/L
BUN SERPL-MCNC: 22 MG/DL (ref 9–20)
CALCIUM SERPL-MCNC: 9.4 MG/DL (ref 8.4–10.2)
CHLORIDE SERPL-SCNC: 107 MMOL/L (ref 98–107)
CO2 SERPL-SCNC: 26 MMOL/L (ref 22–32)
CREAT SERPL-MCNC: 1.4 MG/DL (ref 0.8–1.5)
GLUCOSE SERPL-MCNC: 129 MG/DL (ref 75–110)
POTASSIUM SERPL-SCNC: 4.2 MMOL/L (ref 3.6–5)
SODIUM SERPL-SCNC: 142 MMOL/L (ref 137–145)

## 2019-03-06 RX ORDER — LISINOPRIL 20 MG/1
20 TABLET ORAL DAILY
Qty: 90 TAB
Start: 2019-03-06 | End: 2019-03-21 | Stop reason: SDUPTHER

## 2019-03-06 NOTE — PATIENT INSTRUCTIONS
Body Mass Index: Care Instructions  Your Care Instructions    Body mass index (BMI) can help you see if your weight is raising your risk for health problems. It uses a formula to compare how much you weigh with how tall you are. · A BMI lower than 18.5 is considered underweight. · A BMI between 18.5 and 24.9 is considered healthy. · A BMI between 25 and 29.9 is considered overweight. A BMI of 30 or higher is considered obese. If your BMI is in the normal range, it means that you have a lower risk for weight-related health problems. If your BMI is in the overweight or obese range, you may be at increased risk for weight-related health problems, such as high blood pressure, heart disease, stroke, arthritis or joint pain, and diabetes. If your BMI is in the underweight range, you may be at increased risk for health problems such as fatigue, lower protection (immunity) against illness, muscle loss, bone loss, hair loss, and hormone problems. BMI is just one measure of your risk for weight-related health problems. You may be at higher risk for health problems if you are not active, you eat an unhealthy diet, or you drink too much alcohol or use tobacco products. Follow-up care is a key part of your treatment and safety. Be sure to make and go to all appointments, and call your doctor if you are having problems. It's also a good idea to know your test results and keep a list of the medicines you take. How can you care for yourself at home? · Practice healthy eating habits. This includes eating plenty of fruits, vegetables, whole grains, lean protein, and low-fat dairy. · If your doctor recommends it, get more exercise. Walking is a good choice. Bit by bit, increase the amount you walk every day. Try for at least 30 minutes on most days of the week. · Do not smoke. Smoking can increase your risk for health problems. If you need help quitting, talk to your doctor about stop-smoking programs and medicines. These can increase your chances of quitting for good. · Limit alcohol to 2 drinks a day for men and 1 drink a day for women. Too much alcohol can cause health problems. If you have a BMI higher than 25  · Your doctor may do other tests to check your risk for weight-related health problems. This may include measuring the distance around your waist. A waist measurement of more than 40 inches in men or 35 inches in women can increase the risk of weight-related health problems. · Talk with your doctor about steps you can take to stay healthy or improve your health. You may need to make lifestyle changes to lose weight and stay healthy, such as changing your diet and getting regular exercise. If you have a BMI lower than 18.5  · Your doctor may do other tests to check your risk for health problems. · Talk with your doctor about steps you can take to stay healthy or improve your health. You may need to make lifestyle changes to gain or maintain weight and stay healthy, such as getting more healthy foods in your diet and doing exercises to build muscle. Where can you learn more? Go to http://rosa-jaylene.info/. Enter S176 in the search box to learn more about \"Body Mass Index: Care Instructions. \"  Current as of: June 25, 2018  Content Version: 11.9  © 4916-7601 [employer]. Care instructions adapted under license by [employer] (which disclaims liability or warranty for this information). If you have questions about a medical condition or this instruction, always ask your healthcare professional. [address] any warranty or liability for your use of this information.

## 2019-03-06 NOTE — PROGRESS NOTES
Sachin Haw III  Identified pt with two pt identifiers(name and ). Chief Complaint   Patient presents with    Flank Pain     abd kidney functions-2 week follow up       1. Have you been to the ER, urgent care clinic since your last visit? Hospitalized since your last visit? No    2. Have you seen or consulted any other health care providers outside of the [address] [place] [address] since your last visit? Include any pap smears or colon screening. No      Health Maintenance Topics with due status: Overdue       Topic Date Due    EYE EXAM RETINAL OR DILATED 1962    DTaP/Tdap/Td series 1973    Shingrix Vaccine Age 50> 2002    GLAUCOMA SCREENING Q2Y 2017    COLONOSCOPY 2019     Health Maintenance Topics with due status: Not Due       Topic Last Completion Date    Pneumococcal 65+ Low/Medium Risk 2017    MEDICARE YEARLY EXAM 2018    MICROALBUMIN Q1 2018    FOOT EXAM Q1 02/15/2019    HEMOGLOBIN A1C Q6M 02/15/2019    LIPID PANEL Q1 02/15/2019     Health Maintenance Topics with due status: Completed       Topic Last Completion Date    Hepatitis C Screening 10/17/2017    Influenza Age 9 to Adult 2018           Medication reconciliation up to date and corrected with patient at this time. Today's provider has been notified of reason for visit, vitals and flowsheets obtained on patients. Reviewed record in preparation for visit, huddled with provider and have obtained necessary documentation.         Wt Readings from Last 3 Encounters:   19 253 lb 6.4 oz (114.9 kg)   02/15/19 248 lb (112.5 kg)   18 256 lb (116.1 kg)     Temp Readings from Last 3 Encounters:   19 97.5 °F (36.4 °C) (Oral)   02/15/19 98.3 °F (36.8 °C) (Oral)   18 98.4 °F (36.9 °C) (Oral)     BP Readings from Last 3 Encounters:   19 (!) 158/100   02/15/19 122/78   18 122/72     Pulse Readings from Last 3 Encounters:   19 72   02/15/19 69   18 71 Vitals:    03/06/19 1353   BP: (!) 158/100   Pulse: 72   Resp: 19   Temp: 97.5 °F (36.4 °C)   TempSrc: Oral   SpO2: 97%   Weight: 253 lb 6.4 oz (114.9 kg)   Height: 6' 1\" (1.854 m)   PainSc:   0 - No pain         Learning Assessment:  :     Learning Assessment 11/27/2017   PRIMARY LEARNER Patient   HIGHEST LEVEL OF EDUCATION - PRIMARY LEARNER  2 YEARS OF COLLEGE   BARRIERS PRIMARY LEARNER NONE   CO-LEARNER CAREGIVER No   PRIMARY LANGUAGE ENGLISH   LEARNER PREFERENCE PRIMARY LISTENING   ANSWERED BY patient   RELATIONSHIP SELF       Depression Screening:  :     3 most recent PHQ Screens 2/15/2019   Little interest or pleasure in doing things Not at all   Feeling down, depressed, irritable, or hopeless Not at all   Total Score PHQ 2 0       No flowsheet data found. Fall Risk Assessment:  :     Fall Risk Assessment, last 12 mths 2/15/2019   Able to walk? Yes   Fall in past 12 months? No       Abuse Screening:  :     Abuse Screening Questionnaire 5/9/2018 11/27/2017   Do you ever feel afraid of your partner? N N   Are you in a relationship with someone who physically or mentally threatens you? N N   Is it safe for you to go home?  Y Y       ADL Screening:  :     ADL Assessment 5/9/2018   Feeding yourself No Help Needed   Getting from bed to chair No Help Needed   Getting dressed No Help Needed   Bathing or showering No Help Needed   Walk across the room (includes cane/walker) No Help Needed   Using the telphone No Help Needed   Taking your medications No Help Needed   Preparing meals No Help Needed   Managing money (expenses/bills) No Help Needed   Moderately strenuous housework (laundry) No Help Needed   Shopping for personal items (toiletries/medicines) No Help Needed   Shopping for groceries No Help Needed   Driving No Help Needed   Climbing a flight of stairs No Help Needed   Getting to places beyond walking distances No Help Needed

## 2019-03-07 NOTE — PROGRESS NOTES
Kidney blood test is little better so continue with medication as discussed at visit.   Respond that received

## 2019-03-15 ENCOUNTER — TELEPHONE (OUTPATIENT)
Dept: INTERNAL MEDICINE CLINIC | Age: 69
End: 2019-03-15

## 2019-03-15 ENCOUNTER — HOSPITAL ENCOUNTER (EMERGENCY)
Age: 69
Discharge: HOME | End: 2019-03-15
Attending: EMERGENCY MEDICINE
Payer: MEDICARE

## 2019-03-15 VITALS
OXYGEN SATURATION: 96 % | BODY MASS INDEX: 32.87 KG/M2 | HEIGHT: 73 IN | DIASTOLIC BLOOD PRESSURE: 88 MMHG | HEART RATE: 67 BPM | WEIGHT: 248.02 LBS | RESPIRATION RATE: 19 BRPM | SYSTOLIC BLOOD PRESSURE: 157 MMHG | TEMPERATURE: 97.9 F

## 2019-03-15 DIAGNOSIS — K52.9 GASTROENTERITIS, ACUTE: Primary | ICD-10-CM

## 2019-03-15 LAB
ALBUMIN SERPL-MCNC: 4.1 G/DL (ref 3.5–5)
ALBUMIN/GLOB SERPL: 1.3 {RATIO} (ref 1.1–2.2)
ALP SERPL-CCNC: 61 U/L (ref 45–117)
ALT SERPL-CCNC: 31 U/L (ref 12–78)
AMORPH CRY URNS QL MICRO: ABNORMAL
ANION GAP SERPL CALC-SCNC: 8 MMOL/L (ref 5–15)
APPEARANCE UR: CLEAR
APTT PPP: 26.8 SEC (ref 22.1–32)
AST SERPL-CCNC: 30 U/L (ref 15–37)
BACTERIA URNS QL MICRO: NEGATIVE /HPF
BASOPHILS # BLD: 0.1 K/UL (ref 0–0.1)
BASOPHILS NFR BLD: 1 % (ref 0–1)
BILIRUB SERPL-MCNC: 0.7 MG/DL (ref 0.2–1)
BILIRUB UR QL CFM: NEGATIVE
BUN SERPL-MCNC: 19 MG/DL (ref 6–20)
BUN/CREAT SERPL: 12 (ref 12–20)
CALCIUM SERPL-MCNC: 8.7 MG/DL (ref 8.5–10.1)
CHLORIDE SERPL-SCNC: 109 MMOL/L (ref 97–108)
CO2 SERPL-SCNC: 24 MMOL/L (ref 21–32)
COLOR UR: ABNORMAL
CREAT SERPL-MCNC: 1.53 MG/DL (ref 0.7–1.3)
DIFFERENTIAL METHOD BLD: ABNORMAL
EOSINOPHIL # BLD: 0.2 K/UL (ref 0–0.4)
EOSINOPHIL NFR BLD: 3 % (ref 0–7)
EPITH CASTS URNS QL MICRO: ABNORMAL /LPF
ERYTHROCYTE [DISTWIDTH] IN BLOOD BY AUTOMATED COUNT: 14.1 % (ref 11.5–14.5)
GLOBULIN SER CALC-MCNC: 3.1 G/DL (ref 2–4)
GLUCOSE SERPL-MCNC: 122 MG/DL (ref 65–100)
GLUCOSE UR STRIP.AUTO-MCNC: 250 MG/DL
HCT VFR BLD AUTO: 34.5 % (ref 36.6–50.3)
HGB BLD-MCNC: 11.8 G/DL (ref 12.1–17)
HGB UR QL STRIP: NEGATIVE
IMM GRANULOCYTES # BLD AUTO: 0 K/UL (ref 0–0.04)
IMM GRANULOCYTES NFR BLD AUTO: 0 % (ref 0–0.5)
INR PPP: 1.1 (ref 0.9–1.1)
KETONES UR QL STRIP.AUTO: ABNORMAL MG/DL
LEUKOCYTE ESTERASE UR QL STRIP.AUTO: NEGATIVE
LYMPHOCYTES # BLD: 0.8 K/UL (ref 0.8–3.5)
LYMPHOCYTES NFR BLD: 10 % (ref 12–49)
MCH RBC QN AUTO: 31.6 PG (ref 26–34)
MCHC RBC AUTO-ENTMCNC: 34.2 G/DL (ref 30–36.5)
MCV RBC AUTO: 92.2 FL (ref 80–99)
MONOCYTES # BLD: 0.6 K/UL (ref 0–1)
MONOCYTES NFR BLD: 7 % (ref 5–13)
MUCOUS THREADS URNS QL MICRO: ABNORMAL /LPF
NEUTS SEG # BLD: 6.4 K/UL (ref 1.8–8)
NEUTS SEG NFR BLD: 79 % (ref 32–75)
NITRITE UR QL STRIP.AUTO: NEGATIVE
NRBC # BLD: 0 K/UL (ref 0–0.01)
NRBC BLD-RTO: 0 PER 100 WBC
PH UR STRIP: 5.5 [PH] (ref 5–8)
PLATELET # BLD AUTO: 187 K/UL (ref 150–400)
PMV BLD AUTO: 10.1 FL (ref 8.9–12.9)
POTASSIUM SERPL-SCNC: 3.6 MMOL/L (ref 3.5–5.1)
PROT SERPL-MCNC: 7.2 G/DL (ref 6.4–8.2)
PROT UR STRIP-MCNC: ABNORMAL MG/DL
PROTHROMBIN TIME: 11.9 SEC (ref 9–11.1)
RBC # BLD AUTO: 3.74 M/UL (ref 4.1–5.7)
RBC #/AREA URNS HPF: ABNORMAL /HPF (ref 0–5)
RBC MORPH BLD: ABNORMAL
SODIUM SERPL-SCNC: 141 MMOL/L (ref 136–145)
SP GR UR REFRACTOMETRY: 1.03 (ref 1–1.03)
THERAPEUTIC RANGE,PTTT: NORMAL SECS (ref 58–77)
UR CULT HOLD, URHOLD: NORMAL
UROBILINOGEN UR QL STRIP.AUTO: 0.2 EU/DL (ref 0.2–1)
WBC # BLD AUTO: 8.1 K/UL (ref 4.1–11.1)
WBC URNS QL MICRO: ABNORMAL /HPF (ref 0–4)

## 2019-03-15 PROCEDURE — 99283 EMERGENCY DEPT VISIT LOW MDM: CPT

## 2019-03-15 PROCEDURE — 96374 THER/PROPH/DIAG INJ IV PUSH: CPT

## 2019-03-15 PROCEDURE — 85610 PROTHROMBIN TIME: CPT

## 2019-03-15 PROCEDURE — 74011250636 HC RX REV CODE- 250/636: Performed by: PHYSICIAN ASSISTANT

## 2019-03-15 PROCEDURE — 85025 COMPLETE CBC W/AUTO DIFF WBC: CPT

## 2019-03-15 PROCEDURE — 81001 URINALYSIS AUTO W/SCOPE: CPT

## 2019-03-15 PROCEDURE — 85730 THROMBOPLASTIN TIME PARTIAL: CPT

## 2019-03-15 PROCEDURE — 36415 COLL VENOUS BLD VENIPUNCTURE: CPT

## 2019-03-15 PROCEDURE — 80053 COMPREHEN METABOLIC PANEL: CPT

## 2019-03-15 RX ORDER — ONDANSETRON 2 MG/ML
4 INJECTION INTRAMUSCULAR; INTRAVENOUS
Status: COMPLETED | OUTPATIENT
Start: 2019-03-15 | End: 2019-03-15

## 2019-03-15 RX ORDER — ONDANSETRON 4 MG/1
4 TABLET, ORALLY DISINTEGRATING ORAL
Qty: 10 TAB | Refills: 0 | Status: SHIPPED | OUTPATIENT
Start: 2019-03-15 | End: 2019-05-30 | Stop reason: ALTCHOICE

## 2019-03-15 RX ORDER — SODIUM CHLORIDE 0.9 % (FLUSH) 0.9 %
5-40 SYRINGE (ML) INJECTION EVERY 8 HOURS
Status: DISCONTINUED | OUTPATIENT
Start: 2019-03-15 | End: 2019-03-15 | Stop reason: HOSPADM

## 2019-03-15 RX ORDER — SODIUM CHLORIDE 0.9 % (FLUSH) 0.9 %
5-40 SYRINGE (ML) INJECTION AS NEEDED
Status: DISCONTINUED | OUTPATIENT
Start: 2019-03-15 | End: 2019-03-15 | Stop reason: HOSPADM

## 2019-03-15 RX ADMIN — ONDANSETRON 4 MG: 2 INJECTION INTRAMUSCULAR; INTRAVENOUS at 16:48

## 2019-03-15 NOTE — ED NOTES
Pt arrives to the ED c/o of vomiting than began this morning. Pt notes brown and red coloring in initial vomit, but denies any brown/ red coloring in 7 subsequent vomits. Pt reports eating 2 hotdogs from [employer] last night for dinner. Pt reports generalized weakness r/t persistent emesis. Pt reports diarrhea this morning. Upper abdomen distended and firm to palpation. Pt denies SOB and chest pain and notes hx of hypertension. Pt denies ETOH use. Pt denies recent changes in medication. Wife reports pt \"hydrates with soda versus water\" at baseline.

## 2019-03-15 NOTE — TELEPHONE ENCOUNTER
Patient called regarding symptom of vomiting last night and again this morning but this am there was visible red blood.   Advised patient to go to [id number] [facility].

## 2019-03-15 NOTE — ED PROVIDER NOTES
EXPRESS CARE NOTE:  4:19 PM  I have seen and evaluated this patient in the Express Care portion of triage for abrupt onset N/V with evidence of hematemesis this am. No h/o similar sx but has seen Dr. Troy Giles in the past.   The patients care will begin now and orders have been placed. This patient will be seen and provided further care in the Emergency Room. ELSA Elise    EMERGENCY DEPARTMENT HISTORY AND PHYSICAL EXAM      Date: 3/15/2019  Patient Name: Job Chowdhury III    History of Presenting Illness     Chief Complaint   Patient presents with    Vomiting     Patient states that he an episode of vomiting dark red and brown emesis        History Provided By: Patient and Patient's Wife    HPI: Job Chowdhury III, 81 y.o. male with PMHx significant for the patient is a 72-year-old with a history of hypertension cardiomyopathy reflux, presents with vomiting and diarrhea  to the ED. symptoms began this morning. Had one emesis was brown or reddish, but since then all subsequent episodes have been yellowish or greenish. He had a total of 8 episodes of vomiting and 2 episodes of diarrhea denies abdominal pain, his wife states that he had back pain during one episode of vomiting, denies fever or lightheadedness, chest pain, shortness of breath. There are no other complaints, changes, or physical findings at this time. PCP: Michelle Amato MD    No current facility-administered medications on file prior to encounter. Current Outpatient Medications on File Prior to Encounter   Medication Sig Dispense Refill    OMEGA-3-DHA-EPA-FISH OIL-COQ10 PO Take 100 mg by mouth.  lisinopril (PRINIVIL, ZESTRIL) 20 mg tablet Take 1 Tab by mouth daily. 90 Tab prn    famotidine (PEPCID) 40 mg tablet Take 1 Tab by mouth daily.  90 Tab 3    metFORMIN ER (GLUCOPHAGE XR) 500 mg tablet TAKE 1 TABLET BY MOUTH IN  THE MORNING AND 2 TABLETS  AT DINNER 270 Tab prn    rosuvastatin (CRESTOR) 10 mg tablet TAKE 1 TABLET BY MOUTH  DAILY 90 Tab 3    levothyroxine (SYNTHROID) 50 mcg tablet TAKE 1 TABLET BY MOUTH  DAILY 90 Tab 3    carvedilol (COREG) 12.5 mg tablet TAKE 1 TABLET BY MOUTH TWO  TIMES DAILY 180 Tab 3    diclofenac EC (VOLTAREN) 75 mg EC tablet TAKE 1 TABLET BY MOUTH TWO  TIMES DAILY 180 Tab 3    fenofibrate nanocrystallized (TRICOR) 145 mg tablet Take 1 Tab by mouth daily. 90 Tab 3    niacin ER (NIASPAN) 750 mg Tb24 TAKE 2 TABLETS BY MOUTH AT  BEDTIME 180 Tab 3    esomeprazole (NEXIUM) 40 mg capsule TAKE 1 CAPSULE BY MOUTH  DAILY 90 Cap prn    fish oil-dha-epa 1,200-144-216 mg cap Take  by mouth.  aspirin 81 mg tablet Take 81 mg by mouth daily as needed.          Past History     Past Medical History:  Past Medical History:   Diagnosis Date    Arthritis     fingers    CAD (coronary artery disease)     Cardiomyopathy ([facility] [address]     Cholelithiasis 9/21/2017    CKD (chronic kidney disease) 9/21/2017    Diabetes mellitus ([address]     Diverticulosis 9/21/2017    DJD (degenerative joint disease) 9/21/2017    GERD (gastroesophageal reflux disease)     H/O: depression 9/21/2017    Hiatal hernia     Hypercholesteremia     Hypertension     Hypertension with renal disease 9/21/2017    Hypothyroidism     Kidney stones     Obesity 9/21/2017    On statin therapy 9/21/2017    Sleep apnea 9/21/2017    Thyroid disease     Viral meningitis 9/1990       Past Surgical History:  Past Surgical History:   Procedure Laterality Date    HX HERNIA REPAIR      HX KNEE ARTHROSCOPY      both       Family History:  Family History   Problem Relation Age of Onset    Heart Disease Mother         AICD    Thyroid Disease Mother     Heart Disease Father         pacemaker    Hypertension Father     Cancer Father         lymphoma    Thyroid Disease Sister     Heart Disease Paternal Grandmother         angina       Social History:  Social History     Tobacco Use    Smoking status: Never Smoker    Smokeless tobacco: Never Used Substance Use Topics    Alcohol use: No    Drug use: No       Allergies: Allergies   Allergen Reactions    Demerol [Meperidine] Nausea and Vomiting    Phenergan [Promethazine] Unknown (comments)         Review of Systems   Review of Systems   Constitutional: Negative for fever. HENT: Negative for dental problem. Eyes: Negative. Respiratory: Negative for shortness of breath. Cardiovascular: Negative for chest pain. Gastrointestinal: Negative for abdominal pain. Endocrine: Negative for heat intolerance. Genitourinary: Negative. Negative for dysuria. Musculoskeletal: Positive for back pain. Skin: Negative for rash. Allergic/Immunologic: Negative for immunocompromised state. Neurological: Negative for light-headedness. Hematological: Does not bruise/bleed easily. Psychiatric/Behavioral: Negative. All other systems reviewed and are negative. Physical Exam   Physical Exam   Constitutional: He is oriented to person, place, and time. He appears well-developed and well-nourished. No distress. HENT:   Head: Normocephalic and atraumatic. Eyes: EOM are normal. Pupils are equal, round, and reactive to light. Neck: Normal range of motion. Neck supple. Cardiovascular: Normal rate, regular rhythm and normal heart sounds. Pulmonary/Chest: Effort normal and breath sounds normal. No respiratory distress. He has no wheezes. Abdominal: Soft. Bowel sounds are normal. There is tenderness. Epigastric tenderness   Musculoskeletal: He exhibits no edema. Neurological: He is alert and oriented to person, place, and time. Coordination normal.   Skin: Skin is warm and dry. Psychiatric: He has a normal mood and affect. His behavior is normal.   Nursing note and vitals reviewed.       Diagnostic Study Results     Labs -     Recent Results (from the past 12 hour(s))   CBC WITH AUTOMATED DIFF    Collection Time: 03/15/19  2:33 PM   Result Value Ref Range    WBC 8.1 4.1 - 11.1 K/uL    RBC 3.74 (L) 4.10 - 5.70 M/uL    HGB 11.8 (L) 12.1 - 17.0 g/dL    HCT 34.5 (L) 36.6 - 50.3 %    MCV 92.2 80.0 - 99.0 FL    MCH 31.6 26.0 - 34.0 PG    MCHC 34.2 30.0 - 36.5 g/dL    RDW 14.1 11.5 - 14.5 %    PLATELET 737 708 - 287 K/uL    MPV 10.1 8.9 - 12.9 FL    NRBC 0.0 0  WBC    ABSOLUTE NRBC 0.00 0.00 - 0.01 K/uL    NEUTROPHILS 79 (H) 32 - 75 %    LYMPHOCYTES 10 (L) 12 - 49 %    MONOCYTES 7 5 - 13 %    EOSINOPHILS 3 0 - 7 %    BASOPHILS 1 0 - 1 %    IMMATURE GRANULOCYTES 0 0.0 - 0.5 %    ABS. NEUTROPHILS 6.4 1.8 - 8.0 K/UL    ABS. LYMPHOCYTES 0.8 0.8 - 3.5 K/UL    ABS. MONOCYTES 0.6 0.0 - 1.0 K/UL    ABS. EOSINOPHILS 0.2 0.0 - 0.4 K/UL    ABS. BASOPHILS 0.1 0.0 - 0.1 K/UL    ABS. IMM. GRANS. 0.0 0.00 - 0.04 K/UL    DF AUTOMATED      RBC COMMENTS NORMOCYTIC, NORMOCHROMIC     METABOLIC PANEL, COMPREHENSIVE    Collection Time: 03/15/19  2:33 PM   Result Value Ref Range    Sodium 141 136 - 145 mmol/L    Potassium 3.6 3.5 - 5.1 mmol/L    Chloride 109 (H) 97 - 108 mmol/L    CO2 24 21 - 32 mmol/L    Anion gap 8 5 - 15 mmol/L    Glucose 122 (H) 65 - 100 mg/dL    BUN 19 6 - 20 MG/DL    Creatinine 1.53 (H) 0.70 - 1.30 MG/DL    BUN/Creatinine ratio 12 12 - 20      GFR est AA 55 (L) >60 ml/min/1.73m2    GFR est non-AA 46 (L) >60 ml/min/1.73m2    Calcium 8.7 8.5 - 10.1 MG/DL    Bilirubin, total 0.7 0.2 - 1.0 MG/DL    ALT (SGPT) 31 12 - 78 U/L    AST (SGOT) 30 15 - 37 U/L    Alk.  phosphatase 61 45 - 117 U/L    Protein, total 7.2 6.4 - 8.2 g/dL    Albumin 4.1 3.5 - 5.0 g/dL    Globulin 3.1 2.0 - 4.0 g/dL    A-G Ratio 1.3 1.1 - 2.2     PROTHROMBIN TIME + INR    Collection Time: 03/15/19  4:28 PM   Result Value Ref Range    INR 1.1 0.9 - 1.1      Prothrombin time 11.9 (H) 9.0 - 11.1 sec   PTT    Collection Time: 03/15/19  4:28 PM   Result Value Ref Range    aPTT 26.8 22.1 - 32.0 sec    aPTT, therapeutic range     58.0 - 77.0 SECS   URINALYSIS W/MICROSCOPIC    Collection Time: 03/15/19  5:04 PM   Result Value Ref Range    Color YELLOW/STRAW      Appearance CLEAR CLEAR      Specific gravity 1.029 1.003 - 1.030      pH (UA) 5.5 5.0 - 8.0      Protein TRACE (A) NEG mg/dL    Glucose 250 (A) NEG mg/dL    Ketone TRACE (A) NEG mg/dL    Blood NEGATIVE  NEG      Urobilinogen 0.2 0.2 - 1.0 EU/dL    Nitrites NEGATIVE  NEG      Leukocyte Esterase NEGATIVE  NEG      WBC 0-4 0 - 4 /hpf    RBC 0-5 0 - 5 /hpf    Epithelial cells FEW FEW /lpf    Bacteria NEGATIVE  NEG /hpf    Mucus 1+ (A) NEG /lpf    Amorphous Crystals FEW (A) NEG     URINE CULTURE HOLD SAMPLE    Collection Time: 03/15/19  5:04 PM   Result Value Ref Range    Urine culture hold        URINE ON HOLD IN MICROBIOLOGY DEPT FOR 3 DAYS. IF UNPRESERVED URINE IS SUBMITTED, IT CANNOT BE USED FOR ADDITIONAL TESTING AFTER 24 HRS, RECOLLECTION WILL BE REQUIRED. BILIRUBIN, CONFIRM    Collection Time: 03/15/19  5:04 PM   Result Value Ref Range    Bilirubin UA, confirm NEGATIVE  NEG         Radiologic Studies -   No orders to display     CT Results  (Last 48 hours)    None        CXR Results  (Last 48 hours)    None            Medical Decision Making   I am the first provider for this patient. I reviewed the vital signs, available nursing notes, past medical history, past surgical history, family history and social history. Vital Signs-Reviewed the patient's vital signs. Patient Vitals for the past 12 hrs:   Temp Pulse Resp BP SpO2   03/15/19 1345 97.9 °F (36.6 °C) 67 16 168/90 97 %       Pulse Oximetry Analysis - 97% on room air    Cardiac Monitor:   Rate: 67 bpm  Rhythm: Normal Sinus Rhythm        Records Reviewed: Nursing Notes, Old Medical Records, Previous Radiology Studies and Previous Laboratory Studies    Provider Notes (Medical Decision Making):   Gastroenteritis, dehydration, gastritis, reflux, hiatal hernia, electrolyte abnormality, UTI    ED Course:   Initial assessment performed.  The patients presenting problems have been discussed, and they are in agreement with the care plan formulated and outlined with them. I have encouraged them to ask questions as they arise throughout their visit. Critical Care Time:   0    Disposition:  home    PLAN:  1. Current Discharge Medication List      START taking these medications    Details   ondansetron (ZOFRAN ODT) 4 mg disintegrating tablet Take 1 Tab by mouth every eight (8) hours as needed for Nausea. Qty: 10 Tab, Refills: 0           2. Follow-up Information     Follow up With Specialties Details Why Contact Info    Jr Kern MD Gastroenterology Call in 3 days As needed [address] [place] [address] [address] [place] [address], MD Internal Medicine In 3 days As needed [address]  [address] [facility]  [phone]      [facility] EMERGENCY DEPT Emergency Medicine  If symptoms worsen [address]  [address] [facility]  [phone]        Return to ED if worse     Diagnosis     Clinical Impression:   1.  Gastroenteritis, acute        Attestations:

## 2019-03-15 NOTE — DISCHARGE INSTRUCTIONS
Patient Education        Gastroenteritis: Care Instructions  Your Care Instructions    Gastroenteritis is an illness that may cause nausea, vomiting, and diarrhea. It is sometimes called \"stomach flu. \" It can be caused by bacteria or a virus. You will probably begin to feel better in 1 to 2 days. In the meantime, get plenty of rest and make sure you do not become dehydrated. Dehydration occurs when your body loses too much fluid. Follow-up care is a key part of your treatment and safety. Be sure to make and go to all appointments, and call your doctor if you are having problems. It's also a good idea to know your test results and keep a list of the medicines you take. How can you care for yourself at home? · If your doctor prescribed antibiotics, take them as directed. Do not stop taking them just because you feel better. You need to take the full course of antibiotics. · Drink plenty of fluids to prevent dehydration, enough so that your urine is light yellow or clear like water. Choose water and other caffeine-free clear liquids until you feel better. If you have kidney, heart, or liver disease and have to limit fluids, talk with your doctor before you increase your fluid intake. · Drink fluids slowly, in frequent, small amounts, because drinking too much too fast can cause vomiting. · Begin eating mild foods, such as dry toast, yogurt, applesauce, bananas, and rice. Avoid spicy, hot, or high-fat foods, and do not drink alcohol or caffeine for a day or two. Do not drink milk or eat ice cream until you are feeling better. How to prevent gastroenteritis  · Keep hot foods hot and cold foods cold. · Do not eat meats, dressings, salads, or other foods that have been kept at room temperature for more than 2 hours. · Use a thermometer to check your refrigerator. It should be between 34°F and 40°F.  · Defrost meats in the refrigerator or microwave, not on the kitchen counter.   · Keep your hands and your kitchen clean. Wash your hands, cutting boards, and countertops with hot soapy water frequently. · Cook meat until it is well done. · Do not eat raw eggs or uncooked sauces made with raw eggs. · Do not take chances. If food looks or tastes spoiled, throw it out. When should you call for help? Call 911 anytime you think you may need emergency care. For example, call if:    · You vomit blood or what looks like coffee grounds.     · You passed out (lost consciousness).     · You pass maroon or very bloody stools.    Call your doctor now or seek immediate medical care if:    · You have severe belly pain.     · You have signs of needing more fluids. You have sunken eyes, a dry mouth, and pass only a little dark urine.     · You feel like you are going to faint.     · You have increased belly pain that does not go away in 1 to 2 days.     · You have new or increased nausea, or you are vomiting.     · You have a new or higher fever.     · Your stools are black and tarlike or have streaks of blood.    Watch closely for changes in your health, and be sure to contact your doctor if:    · You are dizzy or lightheaded.     · You urinate less than usual, or your urine is dark yellow or brown.     · You do not feel better with each day that goes by. Where can you learn more? Go to http://rosa-jaylene.info/. Enter N142 in the search box to learn more about \"Gastroenteritis: Care Instructions. \"  Current as of: July 30, 2018  Content Version: 11.9  © 5947-7900 [employer]. Care instructions adapted under license by [employer] (which disclaims liability or warranty for this information). If you have questions about a medical condition or this instruction, always ask your healthcare professional. [place] [address] any warranty or liability for your use of this information.

## 2019-03-20 NOTE — PROGRESS NOTES
Chief Complaint   Patient presents with    Hypertension     follow up from 3/6    Diabetes    Chronic Kidney Disease       SUBJECTIVE:    Melissa Peraza III is a 81 y.o. male who returns today in follow-up for his chronic problems to include hypertension, recent problem with acute on chronic renal failure which resolved by stopping Maxide, cardiomyopathy, compensated CHF and other chronic problems. In addition to his chronic problems he has some acute issues to be addressed one being some low back pain for which he thinks he may have picked up something heavy and irritated it and he has been taking some Advil 2 twice a day and that has helped quite a bit. It is no pain radiating down his leg. Another issue seems to be a visit to the emergency room that he had on the 15th when he had some problems with nausea and vomiting and he was treated there and that has now resolved. Another issue that his wife present with him is concerned with his hearing but he says he is having no problem other than selective hearing and does not want to get that examined. I did suggest Dr. Bianca Raygoza in case they wanted to pursue this. He currently denies any chest pain, shortness of breath, palpitations, PND, orthopnea or other cardiorespiratory complaints. He denies any GI or  complaints since he had the problem 7 days ago. He has no arthritic complaints other than the back pain and then no other complaints on complete review of systems. Current Outpatient Medications   Medication Sig Dispense Refill    lisinopril (PRINIVIL, ZESTRIL) 20 mg tablet Take 1 Tab by mouth daily. 90 Tab prn    ondansetron (ZOFRAN ODT) 4 mg disintegrating tablet Take 1 Tab by mouth every eight (8) hours as needed for Nausea. 10 Tab 0    OMEGA-3-DHA-EPA-FISH OIL-COQ10 PO Take 100 mg by mouth.  famotidine (PEPCID) 40 mg tablet Take 1 Tab by mouth daily.  90 Tab 3    metFORMIN ER (GLUCOPHAGE XR) 500 mg tablet TAKE 1 TABLET BY MOUTH IN THE MORNING AND 2 TABLETS  AT DINNER 270 Tab prn    rosuvastatin (CRESTOR) 10 mg tablet TAKE 1 TABLET BY MOUTH  DAILY 90 Tab 3    levothyroxine (SYNTHROID) 50 mcg tablet TAKE 1 TABLET BY MOUTH  DAILY 90 Tab 3    carvedilol (COREG) 12.5 mg tablet TAKE 1 TABLET BY MOUTH TWO  TIMES DAILY 180 Tab 3    diclofenac EC (VOLTAREN) 75 mg EC tablet TAKE 1 TABLET BY MOUTH TWO  TIMES DAILY 180 Tab 3    fenofibrate nanocrystallized (TRICOR) 145 mg tablet Take 1 Tab by mouth daily. 90 Tab 3    niacin ER (NIASPAN) 750 mg Tb24 TAKE 2 TABLETS BY MOUTH AT  BEDTIME 180 Tab 3    esomeprazole (NEXIUM) 40 mg capsule TAKE 1 CAPSULE BY MOUTH  DAILY 90 Cap prn    fish oil-dha-epa 1,200-144-216 mg cap Take  by mouth.  aspirin 81 mg tablet Take 81 mg by mouth daily as needed.        Past Medical History:   Diagnosis Date    Arthritis     fingers    CAD (coronary artery disease)     [facility]     Cholelithiasis 9/21/2017    CKD (chronic kidney disease) 9/21/2017    Diabetes mellitus [facility]     Diverticulosis 9/21/2017    DJD (degenerative joint disease) 9/21/2017    GERD (gastroesophageal reflux disease)     H/O: depression 9/21/2017    Hiatal hernia     Hypercholesteremia     Hypertension     Hypertension with renal disease 9/21/2017    Hypothyroidism     Kidney stones     Obesity 9/21/2017    On statin therapy 9/21/2017    Sleep apnea 9/21/2017    Thyroid disease     Viral meningitis 9/1990     Past Surgical History:   Procedure Laterality Date    HX HERNIA REPAIR      HX KNEE ARTHROSCOPY      both     Allergies   Allergen Reactions    Demerol [Meperidine] Nausea and Vomiting    Phenergan [Promethazine] Unknown (comments)       REVIEW OF SYSTEMS:  General: negative for - chills or fever, or weight loss or gain  ENT: negative for - headaches, nasal congestion or tinnitus  Eyes: no blurred or visual changes  Neck: No stiffness or swollen nodes  Respiratory: negative for - cough, hemoptysis, shortness of breath or wheezing  Cardiovascular : negative for - chest pain, edema, palpitations or shortness of breath  Gastrointestinal: negative for - abdominal pain, blood in stools, heartburn or nausea/vomiting  Genito-Urinary: no dysuria, trouble voiding, or hematuria  Musculoskeletal: negative for - gait disturbance, joint pain, joint stiffness or joint swelling. Positive for back pain but he says that is improving  Neurological: no TIA or stroke symptoms  Hematologic: no bruises, no bleeding  Lymphatic: no swollen glands  Integument: no lumps, mole changes, nail changes or rash  Endocrine:no malaise/lethargy poly uria or polydipsia or unexpected weight changes        Social History     Socioeconomic History    Marital status:      Spouse name: Not on file    Number of children: Not on file    Years of education: Not on file    Highest education level: Not on file   Tobacco Use    Smoking status: Never Smoker    Smokeless tobacco: Never Used   Substance and Sexual Activity    Alcohol use: No    Drug use: No    Sexual activity: Yes     Partners: Female     Family History   Problem Relation Age of Onset    Heart Disease Mother         AICD    Thyroid Disease Mother     Heart Disease Father         pacemaker    Hypertension Father     Cancer Father         lymphoma    Thyroid Disease Sister     Heart Disease Paternal Grandmother         angina       OBJECTIVE:     Visit Vitals  /88   Pulse 73   Temp 98.4 °F (36.9 °C) (Oral)   Resp 18   Ht 6' 1\" (1.854 m)   Wt 249 lb (112.9 kg)   SpO2 96%   BMI 32.85 kg/m²     CONSTITUTIONAL:   well nourished, appears age appropriate  EYES: sclera anicteric, PERRL, EOMI  ENMT:nares clear, moist mucous membranes, pharynx clear  NECK: supple.  Thyroid normal, No JVD or bruits  RESPIRATORY: Chest: clear to ascultation and percussion, normal inspiratory effort  CARDIOVASCULAR: Heart: regular rate and rhythm no murmurs, rubs or gallops, PMI not displaced, No thrills  GASTROINTESTINAL: Abdomen: non distended, soft, non tender, bowel sounds normal  HEMATOLOGIC: no purpura, petechiae or bruising  LYMPHATIC: No lymph node enlargemant  MUSCULOSKELETAL: Extremities: no edema or active synovitis, pulse 1+   INTEGUMENT: No unusual rashes or suspicious skin lesions noted. Nails appear normal.  PERIPHERAL VASCULAR: normal pulses femoral, PT and DP  NEUROLOGIC: non-focal exam, A & O X 3  PSYCHIATRIC:, appropriate affect     ASSESSMENT:   1. Hypertension with renal disease    2. Stage 3 chronic kidney disease (HCC)    3. Cardiomyopathy, unspecified type ([address]    4. Class 1 obesity due to excess calories with serious comorbidity and body mass index (BMI) of 33.0 to 33.9 in adult    5. Essential hypertension    6. Bilateral hearing loss, unspecified hearing loss type      Impression  1. Hypertension blood pressures adequately controlled today with the increased dose of lisinopril so a prescription for 90 tablets sent to his mail order pharmacy today. 2.  CKD stage III most recent creatinine was down to 1.4 from 2.1 after discontinuing Maxide so we will stay off of the Maxzide. 3.  Cardiomyopathy that is stable  4. Obesity we did discuss diet, exercise and weight reduction for overall health benefit and note his weight is down 4 pounds since he was seen here on March 6.  5.  Back strain since that is getting better with the Advil I would not pursue anything else but continue with the as needed Advil. If it does not continue to improve and resolve then we can evaluate further. 6.  Intermittent allergy symptoms that he did complain of the day and I recommend some Claritin or Zyrtec daily for that  7. Question hearing issue which as noted I did give him ENT appointment recommendation. At this point I will call with lab results and if all is stable I would not make any changes in medicine.   Follow-up as scheduled at his regular scheduled appointment or sooner should to be a problem. PLAN:  .  Orders Placed This Encounter    METABOLIC PANEL, BASIC ([facility])    REFERRAL TO ENT-OTOLARYNGOLOGY    lisinopril (PRINIVIL, ZESTRIL) 20 mg tablet         ATTENTION:   This medical record was transcribed using an electronic medical records system. Although proofread, it may and can contain electronic and spelling errors. Other human spelling and other errors may be present. Corrections may be executed at a later time. Please feel free to contact us for any clarifications as needed. No results found for any visits on 03/21/19. Xin Gonzalez MD    The patient verbalized understanding of the problems and plans as explained.

## 2019-03-21 ENCOUNTER — OFFICE VISIT (OUTPATIENT)
Dept: INTERNAL MEDICINE CLINIC | Age: 69
End: 2019-03-21

## 2019-03-21 VITALS
DIASTOLIC BLOOD PRESSURE: 88 MMHG | RESPIRATION RATE: 18 BRPM | WEIGHT: 249 LBS | HEIGHT: 73 IN | TEMPERATURE: 98.4 F | HEART RATE: 73 BPM | BODY MASS INDEX: 33 KG/M2 | OXYGEN SATURATION: 96 % | SYSTOLIC BLOOD PRESSURE: 138 MMHG

## 2019-03-21 DIAGNOSIS — E66.09 CLASS 1 OBESITY DUE TO EXCESS CALORIES WITH SERIOUS COMORBIDITY AND BODY MASS INDEX (BMI) OF 33.0 TO 33.9 IN ADULT: ICD-10-CM

## 2019-03-21 DIAGNOSIS — I10 ESSENTIAL HYPERTENSION: ICD-10-CM

## 2019-03-21 DIAGNOSIS — H91.93 BILATERAL HEARING LOSS, UNSPECIFIED HEARING LOSS TYPE: ICD-10-CM

## 2019-03-21 DIAGNOSIS — N18.30 STAGE 3 CHRONIC KIDNEY DISEASE (HCC): ICD-10-CM

## 2019-03-21 DIAGNOSIS — I42.9 CARDIOMYOPATHY, UNSPECIFIED TYPE (HCC): ICD-10-CM

## 2019-03-21 DIAGNOSIS — I12.9 HYPERTENSION WITH RENAL DISEASE: Primary | ICD-10-CM

## 2019-03-21 PROBLEM — H91.90 HEARING IMPAIRED: Status: ACTIVE | Noted: 2019-03-21

## 2019-03-21 LAB
ANION GAP SERPL CALC-SCNC: 16 MMOL/L
BUN SERPL-MCNC: 23 MG/DL (ref 9–20)
CALCIUM SERPL-MCNC: 9.6 MG/DL (ref 8.4–10.2)
CHLORIDE SERPL-SCNC: 105 MMOL/L (ref 98–107)
CO2 SERPL-SCNC: 25 MMOL/L (ref 22–32)
CREAT SERPL-MCNC: 1.3 MG/DL (ref 0.8–1.5)
GLUCOSE SERPL-MCNC: 102 MG/DL (ref 75–110)
POTASSIUM SERPL-SCNC: 4.4 MMOL/L (ref 3.6–5)
SODIUM SERPL-SCNC: 146 MMOL/L (ref 137–145)

## 2019-03-21 RX ORDER — LISINOPRIL 20 MG/1
20 TABLET ORAL DAILY
Qty: 90 TAB | Status: SHIPPED | OUTPATIENT
Start: 2019-03-21 | End: 2019-08-27 | Stop reason: SDUPTHER

## 2019-03-21 NOTE — PATIENT INSTRUCTIONS
Body Mass Index: Care Instructions  Your Care Instructions    Body mass index (BMI) can help you see if your weight is raising your risk for health problems. It uses a formula to compare how much you weigh with how tall you are. · A BMI lower than 18.5 is considered underweight. · A BMI between 18.5 and 24.9 is considered healthy. · A BMI between 25 and 29.9 is considered overweight. A BMI of 30 or higher is considered obese. If your BMI is in the normal range, it means that you have a lower risk for weight-related health problems. If your BMI is in the overweight or obese range, you may be at increased risk for weight-related health problems, such as high blood pressure, heart disease, stroke, arthritis or joint pain, and diabetes. If your BMI is in the underweight range, you may be at increased risk for health problems such as fatigue, lower protection (immunity) against illness, muscle loss, bone loss, hair loss, and hormone problems. BMI is just one measure of your risk for weight-related health problems. You may be at higher risk for health problems if you are not active, you eat an unhealthy diet, or you drink too much alcohol or use tobacco products. Follow-up care is a key part of your treatment and safety. Be sure to make and go to all appointments, and call your doctor if you are having problems. It's also a good idea to know your test results and keep a list of the medicines you take. How can you care for yourself at home? · Practice healthy eating habits. This includes eating plenty of fruits, vegetables, whole grains, lean protein, and low-fat dairy. · If your doctor recommends it, get more exercise. Walking is a good choice. Bit by bit, increase the amount you walk every day. Try for at least 30 minutes on most days of the week. · Do not smoke. Smoking can increase your risk for health problems. If you need help quitting, talk to your doctor about stop-smoking programs and medicines. These can increase your chances of quitting for good. · Limit alcohol to 2 drinks a day for men and 1 drink a day for women. Too much alcohol can cause health problems. If you have a BMI higher than 25  · Your doctor may do other tests to check your risk for weight-related health problems. This may include measuring the distance around your waist. A waist measurement of more than 40 inches in men or 35 inches in women can increase the risk of weight-related health problems. · Talk with your doctor about steps you can take to stay healthy or improve your health. You may need to make lifestyle changes to lose weight and stay healthy, such as changing your diet and getting regular exercise. If you have a BMI lower than 18.5  · Your doctor may do other tests to check your risk for health problems. · Talk with your doctor about steps you can take to stay healthy or improve your health. You may need to make lifestyle changes to gain or maintain weight and stay healthy, such as getting more healthy foods in your diet and doing exercises to build muscle. Where can you learn more? Go to http://rosa-jaylene.info/. Enter S176 in the search box to learn more about \"Body Mass Index: Care Instructions. \"  Current as of: June 25, 2018  Content Version: 11.9  © 6041-6633 [employer]. Care instructions adapted under license by [employer] (which disclaims liability or warranty for this information). If you have questions about a medical condition or this instruction, always ask your healthcare professional. [address] any warranty or liability for your use of this information.

## 2019-03-21 NOTE — PROGRESS NOTES
Edgar Mackay III  Identified pt with two pt identifiers(name and ). Chief Complaint   Patient presents with    Hypertension     follow up from 3/6    Diabetes    Chronic Kidney Disease       1. Have you been to the ER, urgent care clinic since your last visit? Hospitalized since your last visit? Yes, [facility] on 3/15/19 for stomach issues, throwing up and blood in his puke. 2. Have you seen or consulted any other health care providers outside of the [address] [place] [address] since your last visit? Include any pap smears or colon screening. No      Health Maintenance Topics with due status: Overdue       Topic Date Due    EYE EXAM RETINAL OR DILATED 1962    DTaP/Tdap/Td series 1973    Shingrix Vaccine Age 50> 2002    GLAUCOMA SCREENING Q2Y 2017    COLONOSCOPY 2019     Health Maintenance Topics with due status: Not Due       Topic Last Completion Date    Pneumococcal 65+ years 2017    MEDICARE YEARLY EXAM 2018    MICROALBUMIN Q1 2018    FOOT EXAM Q1 02/15/2019    HEMOGLOBIN A1C Q6M 02/15/2019    LIPID PANEL Q1 02/15/2019     Health Maintenance Topics with due status: Completed       Topic Last Completion Date    Hepatitis C Screening 10/17/2017    Influenza Age 9 to Adult 2018           Medication reconciliation up to date and corrected with patient at this time. Today's provider has been notified of reason for visit, vitals and flowsheets obtained on patients. Reviewed record in preparation for visit, huddled with provider and have obtained necessary documentation.         Wt Readings from Last 3 Encounters:   19 249 lb (112.9 kg)   03/15/19 248 lb 0.3 oz (112.5 kg)   19 253 lb 6.4 oz (114.9 kg)     Temp Readings from Last 3 Encounters:   19 98.4 °F (36.9 °C) (Oral)   03/15/19 97.9 °F (36.6 °C)   19 97.5 °F (36.4 °C) (Oral)     BP Readings from Last 3 Encounters:   19 140/70   03/15/19 157/88   19 142/84     Pulse Readings from Last 3 Encounters:   03/21/19 73   03/15/19 67   03/06/19 72     Vitals:    03/21/19 1103   BP: 140/70   Pulse: 73   Resp: 18   Temp: 98.4 °F (36.9 °C)   TempSrc: Oral   SpO2: 96%   Weight: 249 lb (112.9 kg)   Height: 6' 1\" (1.854 m)   PainSc:   0 - No pain         Learning Assessment:  :     Learning Assessment 11/27/2017   PRIMARY LEARNER Patient   HIGHEST LEVEL OF EDUCATION - PRIMARY LEARNER  2 YEARS OF COLLEGE   BARRIERS PRIMARY LEARNER NONE   CO-LEARNER CAREGIVER No   PRIMARY LANGUAGE ENGLISH   LEARNER PREFERENCE PRIMARY LISTENING   ANSWERED BY patient   RELATIONSHIP SELF       Depression Screening:  :     3 most recent PHQ Screens 3/6/2019   Little interest or pleasure in doing things Not at all   Feeling down, depressed, irritable, or hopeless Not at all   Total Score PHQ 2 0       No flowsheet data found. Fall Risk Assessment:  :     Fall Risk Assessment, last 12 mths 3/6/2019   Able to walk? Yes   Fall in past 12 months? No       Abuse Screening:  :     Abuse Screening Questionnaire 3/21/2019 5/9/2018 11/27/2017   Do you ever feel afraid of your partner? N N N   Are you in a relationship with someone who physically or mentally threatens you? N N N   Is it safe for you to go home?  Y Y Y       ADL Screening:  :     ADL Assessment 3/21/2019   Feeding yourself No Help Needed   Getting from bed to chair No Help Needed   Getting dressed No Help Needed   Bathing or showering No Help Needed   Walk across the room (includes cane/walker) No Help Needed   Using the telphone No Help Needed   Taking your medications No Help Needed   Preparing meals No Help Needed   Managing money (expenses/bills) No Help Needed   Moderately strenuous housework (laundry) No Help Needed   Shopping for personal items (toiletries/medicines) No Help Needed   Shopping for groceries No Help Needed   Driving No Help Needed   Climbing a flight of stairs No Help Needed   Getting to places beyond walking distances No Help Needed

## 2019-03-22 NOTE — PROGRESS NOTES
Kidney function is improved and at baseline so no treatment changes needed.   Please respond note received

## 2019-05-30 ENCOUNTER — OFFICE VISIT (OUTPATIENT)
Dept: INTERNAL MEDICINE CLINIC | Age: 69
End: 2019-05-30

## 2019-05-30 VITALS
HEIGHT: 73 IN | HEART RATE: 64 BPM | BODY MASS INDEX: 32.71 KG/M2 | DIASTOLIC BLOOD PRESSURE: 86 MMHG | TEMPERATURE: 98.6 F | WEIGHT: 246.8 LBS | OXYGEN SATURATION: 96 % | RESPIRATION RATE: 17 BRPM | SYSTOLIC BLOOD PRESSURE: 136 MMHG

## 2019-05-30 DIAGNOSIS — I12.9 HYPERTENSION WITH RENAL DISEASE: Primary | ICD-10-CM

## 2019-05-30 DIAGNOSIS — N18.30 STAGE 3 CHRONIC KIDNEY DISEASE (HCC): ICD-10-CM

## 2019-05-30 DIAGNOSIS — K21.9 GASTROESOPHAGEAL REFLUX DISEASE WITHOUT ESOPHAGITIS: ICD-10-CM

## 2019-05-30 DIAGNOSIS — E66.09 CLASS 1 OBESITY DUE TO EXCESS CALORIES WITH SERIOUS COMORBIDITY AND BODY MASS INDEX (BMI) OF 33.0 TO 33.9 IN ADULT: ICD-10-CM

## 2019-05-30 DIAGNOSIS — N18.30 CONTROLLED TYPE 2 DIABETES MELLITUS WITH STAGE 3 CHRONIC KIDNEY DISEASE, WITHOUT LONG-TERM CURRENT USE OF INSULIN (HCC): ICD-10-CM

## 2019-05-30 DIAGNOSIS — M15.9 PRIMARY OSTEOARTHRITIS INVOLVING MULTIPLE JOINTS: ICD-10-CM

## 2019-05-30 DIAGNOSIS — E78.2 MIXED HYPERLIPIDEMIA: ICD-10-CM

## 2019-05-30 DIAGNOSIS — E11.22 CONTROLLED TYPE 2 DIABETES MELLITUS WITH STAGE 3 CHRONIC KIDNEY DISEASE, WITHOUT LONG-TERM CURRENT USE OF INSULIN (HCC): ICD-10-CM

## 2019-05-30 DIAGNOSIS — I42.9 CARDIOMYOPATHY, UNSPECIFIED TYPE (HCC): ICD-10-CM

## 2019-05-30 NOTE — PATIENT INSTRUCTIONS
Body Mass Index: Care Instructions  Your Care Instructions    Body mass index (BMI) can help you see if your weight is raising your risk for health problems. It uses a formula to compare how much you weigh with how tall you are. · A BMI lower than 18.5 is considered underweight. · A BMI between 18.5 and 24.9 is considered healthy. · A BMI between 25 and 29.9 is considered overweight. A BMI of 30 or higher is considered obese. If your BMI is in the normal range, it means that you have a lower risk for weight-related health problems. If your BMI is in the overweight or obese range, you may be at increased risk for weight-related health problems, such as high blood pressure, heart disease, stroke, arthritis or joint pain, and diabetes. If your BMI is in the underweight range, you may be at increased risk for health problems such as fatigue, lower protection (immunity) against illness, muscle loss, bone loss, hair loss, and hormone problems. BMI is just one measure of your risk for weight-related health problems. You may be at higher risk for health problems if you are not active, you eat an unhealthy diet, or you drink too much alcohol or use tobacco products. Follow-up care is a key part of your treatment and safety. Be sure to make and go to all appointments, and call your doctor if you are having problems. It's also a good idea to know your test results and keep a list of the medicines you take. How can you care for yourself at home? · Practice healthy eating habits. This includes eating plenty of fruits, vegetables, whole grains, lean protein, and low-fat dairy. · If your doctor recommends it, get more exercise. Walking is a good choice. Bit by bit, increase the amount you walk every day. Try for at least 30 minutes on most days of the week. · Do not smoke. Smoking can increase your risk for health problems. If you need help quitting, talk to your doctor about stop-smoking programs and medicines. These can increase your chances of quitting for good. · Limit alcohol to 2 drinks a day for men and 1 drink a day for women. Too much alcohol can cause health problems. If you have a BMI higher than 25  · Your doctor may do other tests to check your risk for weight-related health problems. This may include measuring the distance around your waist. A waist measurement of more than 40 inches in men or 35 inches in women can increase the risk of weight-related health problems. · Talk with your doctor about steps you can take to stay healthy or improve your health. You may need to make lifestyle changes to lose weight and stay healthy, such as changing your diet and getting regular exercise. If you have a BMI lower than 18.5  · Your doctor may do other tests to check your risk for health problems. · Talk with your doctor about steps you can take to stay healthy or improve your health. You may need to make lifestyle changes to gain or maintain weight and stay healthy, such as getting more healthy foods in your diet and doing exercises to build muscle. Where can you learn more? Go to http://rosa-jaylene.info/. Enter S176 in the search box to learn more about \"Body Mass Index: Care Instructions. \"  Current as of: June 25, 2018  Content Version: 11.9  © 0130-0328 [employer]. Care instructions adapted under license by [employer] (which disclaims liability or warranty for this information). If you have questions about a medical condition or this instruction, always ask your healthcare professional. [address] any warranty or liability for your use of this information.

## 2019-05-30 NOTE — PROGRESS NOTES
Jazmin Gore III  Identified pt with two pt identifiers(name and ). Chief Complaint   Patient presents with    Hypertension     3 month follow up    Diabetes    Chronic Kidney Disease       1. Have you been to the ER, urgent care clinic since your last visit? Hospitalized since your last visit? No    2. Have you seen or consulted any other health care providers outside of the [address] [place] [address] since your last visit? Include any pap smears or colon screening. No      Health Maintenance Topics with due status: Overdue       Topic Date Due    EYE EXAM RETINAL OR DILATED 1962    DTaP/Tdap/Td series 1973    Shingrix Vaccine Age 50> 2002    GLAUCOMA SCREENING Q2Y 2017    COLONOSCOPY 2019     Health Maintenance Topics with due status: Not Due       Topic Last Completion Date    Pneumococcal 65+ years 2017    MEDICARE YEARLY EXAM 2018    MICROALBUMIN Q1 2018    Influenza Age 9 to Adult 2018    FOOT EXAM Q1 02/15/2019    HEMOGLOBIN A1C Q6M 02/15/2019    LIPID PANEL Q1 02/15/2019     Health Maintenance Topics with due status: Completed       Topic Last Completion Date    Hepatitis C Screening 10/17/2017           Medication reconciliation up to date and corrected with patient at this time. Today's provider has been notified of reason for visit, vitals and flowsheets obtained on patients. Reviewed record in preparation for visit, huddled with provider and have obtained necessary documentation.         Wt Readings from Last 3 Encounters:   19 246 lb 12.8 oz (111.9 kg)   19 249 lb (112.9 kg)   03/15/19 248 lb 0.3 oz (112.5 kg)     Temp Readings from Last 3 Encounters:   19 98.6 °F (37 °C) (Oral)   19 98.4 °F (36.9 °C) (Oral)   03/15/19 97.9 °F (36.6 °C)     BP Readings from Last 3 Encounters:   19 160/90   19 138/88   03/15/19 157/88     Pulse Readings from Last 3 Encounters:   19 64   19 73   03/15/19 67     Vitals:    05/30/19 0938   BP: 160/90   Pulse: 64   Resp: 17   Temp: 98.6 °F (37 °C)   TempSrc: Oral   SpO2: 96%   Weight: 246 lb 12.8 oz (111.9 kg)   Height: 6' 1\" (1.854 m)   PainSc:   0 - No pain         Learning Assessment:  :     Learning Assessment 11/27/2017   PRIMARY LEARNER Patient   HIGHEST LEVEL OF EDUCATION - PRIMARY LEARNER  2 YEARS OF COLLEGE   BARRIERS PRIMARY LEARNER NONE   CO-LEARNER CAREGIVER No   PRIMARY LANGUAGE ENGLISH   LEARNER PREFERENCE PRIMARY LISTENING   ANSWERED BY patient   RELATIONSHIP SELF       Depression Screening:  :     3 most recent PHQ Screens 3/21/2019   Little interest or pleasure in doing things Not at all   Feeling down, depressed, irritable, or hopeless Not at all   Total Score PHQ 2 0       No flowsheet data found. Fall Risk Assessment:  :     Fall Risk Assessment, last 12 mths 3/21/2019   Able to walk? Yes   Fall in past 12 months? No       Abuse Screening:  :     Abuse Screening Questionnaire 3/21/2019 5/9/2018 11/27/2017   Do you ever feel afraid of your partner? N N N   Are you in a relationship with someone who physically or mentally threatens you? N N N   Is it safe for you to go home?  Y Y Y       ADL Screening:  :     ADL Assessment 3/21/2019   Feeding yourself No Help Needed   Getting from bed to chair No Help Needed   Getting dressed No Help Needed   Bathing or showering No Help Needed   Walk across the room (includes cane/walker) No Help Needed   Using the telphone No Help Needed   Taking your medications No Help Needed   Preparing meals No Help Needed   Managing money (expenses/bills) No Help Needed   Moderately strenuous housework (laundry) No Help Needed   Shopping for personal items (toiletries/medicines) No Help Needed   Shopping for groceries No Help Needed   Driving No Help Needed   Climbing a flight of stairs No Help Needed   Getting to places beyond walking distances No Help Needed

## 2019-05-31 LAB
ALBUMIN SERPL-MCNC: 4.7 G/DL (ref 3.6–4.8)
ALBUMIN/GLOB SERPL: 2 {RATIO} (ref 1.2–2.2)
ALP SERPL-CCNC: 66 IU/L (ref 39–117)
ALT SERPL-CCNC: 16 IU/L (ref 0–44)
AST SERPL-CCNC: 23 IU/L (ref 0–40)
BILIRUB SERPL-MCNC: 0.5 MG/DL (ref 0–1.2)
BUN SERPL-MCNC: 19 MG/DL (ref 8–27)
BUN/CREAT SERPL: 15 (ref 10–24)
CALCIUM SERPL-MCNC: 9.3 MG/DL (ref 8.6–10.2)
CHLORIDE SERPL-SCNC: 108 MMOL/L (ref 96–106)
CHOLEST SERPL-MCNC: 110 MG/DL (ref 100–199)
CK SERPL-CCNC: 130 U/L (ref 24–204)
CO2 SERPL-SCNC: 23 MMOL/L (ref 20–29)
CREAT SERPL-MCNC: 1.31 MG/DL (ref 0.76–1.27)
EST. AVERAGE GLUCOSE BLD GHB EST-MCNC: 120 MG/DL
GLOBULIN SER CALC-MCNC: 2.3 G/DL (ref 1.5–4.5)
GLUCOSE SERPL-MCNC: 103 MG/DL (ref 65–99)
HBA1C MFR BLD: 5.8 % (ref 4.8–5.6)
HDLC SERPL-MCNC: 32 MG/DL
LDLC SERPL CALC-MCNC: 59 MG/DL (ref 0–99)
POTASSIUM SERPL-SCNC: 3.8 MMOL/L (ref 3.5–5.2)
PROT SERPL-MCNC: 7 G/DL (ref 6–8.5)
SODIUM SERPL-SCNC: 145 MMOL/L (ref 134–144)
TRIGL SERPL-MCNC: 94 MG/DL (ref 0–149)
VLDLC SERPL CALC-MCNC: 19 MG/DL (ref 5–40)

## 2019-06-02 NOTE — PROGRESS NOTES
Labs are stable except very low HDL. ? How many fish oil tablets taking. Increase by 2 a day and increase aerobic exercise and fiber in diet. Please respond that note is received.

## 2019-06-03 NOTE — PROGRESS NOTES
Discussed lab with patient. Informed lab work all okay except for low HDL level. Patient states he takes 3 a day when he remembers. Advised per Dr. Chino Leiva to increase this to 5 tabs daily. Continue work on diet and exercise.

## 2019-07-17 RX ORDER — NIACIN 750 MG/1
TABLET, EXTENDED RELEASE ORAL
Qty: 180 TAB | Refills: 3 | Status: SHIPPED | OUTPATIENT
Start: 2019-07-17 | End: 2020-08-04

## 2019-07-17 NOTE — TELEPHONE ENCOUNTER
RX refill request from the patient/pharmacy. Patient last seen 05- with labs, and next appt. scheduled for 08-  Requested Prescriptions     Pending Prescriptions Disp Refills    niacin ER (NIASPAN) 750 mg Tb24 [Pharmacy Med Name: NIACIN  750MG  TAB  EXTENDED RELEASE] 180 Tab 3     Sig: TAKE 2 TABLETS BY MOUTH AT  BEDTIME   .

## 2019-08-20 DIAGNOSIS — K21.9 GASTROESOPHAGEAL REFLUX DISEASE WITHOUT ESOPHAGITIS: ICD-10-CM

## 2019-08-20 RX ORDER — ESOMEPRAZOLE MAGNESIUM 40 MG/1
CAPSULE, DELAYED RELEASE ORAL
Qty: 90 CAP | Status: SHIPPED | OUTPATIENT
Start: 2019-08-20 | End: 2020-08-30

## 2019-08-20 NOTE — TELEPHONE ENCOUNTER
PCP: Kemi Salinas MD    Last appt: 5/30/2019  Future Appointments   Date Time Provider Pernell Barriosi   8/27/2019 10:20 AM Kemi Salinas MD [address]       Requested Prescriptions     Pending Prescriptions Disp Refills    esomeprazole (NEXIUM) 40 mg capsule [Pharmacy Med Name: ESOMEPRAZOL  40MG  CAP  DELAYED RELEASE MAG] 90 Cap prn     Sig: TAKE 1 CAPSULE BY MOUTH  DAILY

## 2019-08-27 ENCOUNTER — OFFICE VISIT (OUTPATIENT)
Dept: INTERNAL MEDICINE CLINIC | Age: 69
End: 2019-08-27

## 2019-08-27 VITALS
HEIGHT: 73 IN | TEMPERATURE: 98.4 F | RESPIRATION RATE: 18 BRPM | DIASTOLIC BLOOD PRESSURE: 92 MMHG | WEIGHT: 247.4 LBS | HEART RATE: 64 BPM | OXYGEN SATURATION: 97 % | BODY MASS INDEX: 32.79 KG/M2 | SYSTOLIC BLOOD PRESSURE: 154 MMHG

## 2019-08-27 DIAGNOSIS — K21.9 GASTROESOPHAGEAL REFLUX DISEASE WITHOUT ESOPHAGITIS: ICD-10-CM

## 2019-08-27 DIAGNOSIS — I12.9 HYPERTENSION WITH RENAL DISEASE: Primary | ICD-10-CM

## 2019-08-27 DIAGNOSIS — I42.9 CARDIOMYOPATHY, UNSPECIFIED TYPE (HCC): ICD-10-CM

## 2019-08-27 DIAGNOSIS — E78.2 MIXED HYPERLIPIDEMIA: ICD-10-CM

## 2019-08-27 DIAGNOSIS — N18.30 CONTROLLED TYPE 2 DIABETES MELLITUS WITH STAGE 3 CHRONIC KIDNEY DISEASE, WITHOUT LONG-TERM CURRENT USE OF INSULIN (HCC): ICD-10-CM

## 2019-08-27 DIAGNOSIS — E11.22 CONTROLLED TYPE 2 DIABETES MELLITUS WITH STAGE 3 CHRONIC KIDNEY DISEASE, WITHOUT LONG-TERM CURRENT USE OF INSULIN (HCC): ICD-10-CM

## 2019-08-27 DIAGNOSIS — E66.09 CLASS 1 OBESITY DUE TO EXCESS CALORIES WITH SERIOUS COMORBIDITY AND BODY MASS INDEX (BMI) OF 33.0 TO 33.9 IN ADULT: ICD-10-CM

## 2019-08-27 DIAGNOSIS — N18.30 STAGE 3 CHRONIC KIDNEY DISEASE (HCC): ICD-10-CM

## 2019-08-27 DIAGNOSIS — I10 ESSENTIAL HYPERTENSION: ICD-10-CM

## 2019-08-27 DIAGNOSIS — M15.9 PRIMARY OSTEOARTHRITIS INVOLVING MULTIPLE JOINTS: ICD-10-CM

## 2019-08-27 LAB
A-G RATIO,AGRAT: 1.6 RATIO
ALBUMIN SERPL-MCNC: 4.3 G/DL (ref 3.9–5.4)
ALP SERPL-CCNC: 81 U/L (ref 38–126)
ALT SERPL-CCNC: 23 U/L (ref 0–50)
ANION GAP SERPL CALC-SCNC: 10 MMOL/L
AST SERPL W P-5'-P-CCNC: 27 U/L (ref 14–36)
BILIRUB SERPL-MCNC: 0.6 MG/DL (ref 0.2–1.3)
BUN SERPL-MCNC: 17 MG/DL (ref 9–20)
BUN/CREATININE RATIO,BUCR: 14 RATIO
CALCIUM SERPL-MCNC: 9.3 MG/DL (ref 8.4–10.2)
CHLORIDE SERPL-SCNC: 103 MMOL/L (ref 98–107)
CHOL/HDL RATIO,CHHD: 2 RATIO (ref 0–4)
CHOLEST SERPL-MCNC: 99 MG/DL (ref 0–200)
CK SERPL-CCNC: 115 U/L (ref 30–135)
CO2 SERPL-SCNC: 28 MMOL/L (ref 22–32)
CREAT SERPL-MCNC: 1.2 MG/DL (ref 0.8–1.5)
GLOBULIN,GLOB: 2.7
GLUCOSE SERPL-MCNC: 107 MG/DL (ref 75–110)
HDLC SERPL-MCNC: 41 MG/DL (ref 35–130)
LDL/HDL RATIO,LDHD: 1 RATIO
LDLC SERPL CALC-MCNC: 41 MG/DL (ref 0–130)
POTASSIUM SERPL-SCNC: 3.8 MMOL/L (ref 3.6–5)
PROT SERPL-MCNC: 7 G/DL (ref 6.3–8.2)
SODIUM SERPL-SCNC: 141 MMOL/L (ref 137–145)
TRIGL SERPL-MCNC: 84 MG/DL (ref 0–200)
VLDLC SERPL CALC-MCNC: 17 MG/DL

## 2019-08-27 RX ORDER — LISINOPRIL 20 MG/1
40 TABLET ORAL DAILY
Qty: 90 TAB
Start: 2019-08-27 | End: 2019-09-30 | Stop reason: SDUPTHER

## 2019-08-27 NOTE — PATIENT INSTRUCTIONS
Body Mass Index: Care Instructions  Your Care Instructions    Body mass index (BMI) can help you see if your weight is raising your risk for health problems. It uses a formula to compare how much you weigh with how tall you are. · A BMI lower than 18.5 is considered underweight. · A BMI between 18.5 and 24.9 is considered healthy. · A BMI between 25 and 29.9 is considered overweight. A BMI of 30 or higher is considered obese. If your BMI is in the normal range, it means that you have a lower risk for weight-related health problems. If your BMI is in the overweight or obese range, you may be at increased risk for weight-related health problems, such as high blood pressure, heart disease, stroke, arthritis or joint pain, and diabetes. If your BMI is in the underweight range, you may be at increased risk for health problems such as fatigue, lower protection (immunity) against illness, muscle loss, bone loss, hair loss, and hormone problems. BMI is just one measure of your risk for weight-related health problems. You may be at higher risk for health problems if you are not active, you eat an unhealthy diet, or you drink too much alcohol or use tobacco products. Follow-up care is a key part of your treatment and safety. Be sure to make and go to all appointments, and call your doctor if you are having problems. It's also a good idea to know your test results and keep a list of the medicines you take. How can you care for yourself at home? · Practice healthy eating habits. This includes eating plenty of fruits, vegetables, whole grains, lean protein, and low-fat dairy. · If your doctor recommends it, get more exercise. Walking is a good choice. Bit by bit, increase the amount you walk every day. Try for at least 30 minutes on most days of the week. · Do not smoke. Smoking can increase your risk for health problems. If you need help quitting, talk to your doctor about stop-smoking programs and medicines. These can increase your chances of quitting for good. · Limit alcohol to 2 drinks a day for men and 1 drink a day for women. Too much alcohol can cause health problems. If you have a BMI higher than 25  · Your doctor may do other tests to check your risk for weight-related health problems. This may include measuring the distance around your waist. A waist measurement of more than 40 inches in men or 35 inches in women can increase the risk of weight-related health problems. · Talk with your doctor about steps you can take to stay healthy or improve your health. You may need to make lifestyle changes to lose weight and stay healthy, such as changing your diet and getting regular exercise. If you have a BMI lower than 18.5  · Your doctor may do other tests to check your risk for health problems. · Talk with your doctor about steps you can take to stay healthy or improve your health. You may need to make lifestyle changes to gain or maintain weight and stay healthy, such as getting more healthy foods in your diet and doing exercises to build muscle. Where can you learn more? Go to http://rosa-jaylene.info/. Enter S176 in the search box to learn more about \"Body Mass Index: Care Instructions. \"  Current as of: March 28, 2019  Content Version: 12.1  © 1163-8226 [employer]. Care instructions adapted under license by [employer] (which disclaims liability or warranty for this information). If you have questions about a medical condition or this instruction, always ask your healthcare professional. [address] any warranty or liability for your use of this information.

## 2019-08-27 NOTE — PROGRESS NOTES
Chief Complaint   Patient presents with    Hypertension     3 month follow up    Diabetes    Chronic Kidney Disease    Thyroid Problem     Visit Vitals  /84 (BP 1 Location: Right arm, BP Patient Position: Sitting)   Pulse 64   Temp 98.4 °F (36.9 °C) (Oral)   Resp 18   Ht 6' 1\" (1.854 m)   Wt 247 lb 6.4 oz (112.2 kg)   SpO2 97%   BMI 32.64 kg/m²     1. Have you been to the ER, urgent care clinic since your last visit? Hospitalized since your last visit? No    2. Have you seen or consulted any other health care providers outside of the [address] [place] [address] since your last visit? Include any pap smears or colon screening.  No

## 2019-08-27 NOTE — PROGRESS NOTES
Chief Complaint   Patient presents with    Hypertension     3 month follow up    Diabetes    Chronic Kidney Disease    Thyroid Problem       SUBJECTIVE:    Ever Ontiveros III is a 81 y.o. male who returns in follow-up of his medical problems include hypertension, diabetes, hyperlipidemia, GERD, DJD, cardiomyopathy, obesity and other medical problems. He is taking his medications and trying to follow his diet and trying to get some exercise. He just recently got back from vacation a couple weeks ago. He did see his cardiologist Dr. Terri Pulido yesterday and his blood pressure was a little up there. He currently denies any chest pain, shortness of breath, palpitations, PND, orthopnea or other cardiorespiratory complaints. He denies any GI or  complaints. He denies any headaches, dizziness or neurologic complaints. No current arthritic complaints and he has no other complaints on complete review of systems. Current Outpatient Medications   Medication Sig Dispense Refill    lisinopril (PRINIVIL, ZESTRIL) 20 mg tablet Take 2 Tabs by mouth daily. 90 Tab prn    esomeprazole (NEXIUM) 40 mg capsule TAKE 1 CAPSULE BY MOUTH  DAILY 90 Cap prn    niacin ER (NIASPAN) 750 mg Tb24 TAKE 2 TABLETS BY MOUTH AT  BEDTIME 180 Tab 3    OMEGA-3-DHA-EPA-FISH OIL-COQ10 PO Take 100 mg by mouth.  famotidine (PEPCID) 40 mg tablet Take 1 Tab by mouth daily. 90 Tab 3    metFORMIN ER (GLUCOPHAGE XR) 500 mg tablet TAKE 1 TABLET BY MOUTH IN  THE MORNING AND 2 TABLETS  AT DINNER 270 Tab prn    rosuvastatin (CRESTOR) 10 mg tablet TAKE 1 TABLET BY MOUTH  DAILY 90 Tab 3    levothyroxine (SYNTHROID) 50 mcg tablet TAKE 1 TABLET BY MOUTH  DAILY 90 Tab 3    carvedilol (COREG) 12.5 mg tablet TAKE 1 TABLET BY MOUTH TWO  TIMES DAILY 180 Tab 3    diclofenac EC (VOLTAREN) 75 mg EC tablet TAKE 1 TABLET BY MOUTH TWO  TIMES DAILY 180 Tab 3    fenofibrate nanocrystallized (TRICOR) 145 mg tablet Take 1 Tab by mouth daily.  90 Tab 3  fish oil-dha-epa 1,200-144-216 mg cap Take  by mouth.  aspirin 81 mg tablet Take 81 mg by mouth daily as needed.        Past Medical History:   Diagnosis Date    Arthritis     fingers    CAD (coronary artery disease)     Cardiomyopathy ([facility]     Cholelithiasis 9/21/2017    CKD (chronic kidney disease) 9/21/2017    Diabetes mellitus ([facility]     Diverticulosis 9/21/2017    DJD (degenerative joint disease) 9/21/2017    GERD (gastroesophageal reflux disease)     H/O: depression 9/21/2017    Hiatal hernia     Hypercholesteremia     Hypertension     Hypertension with renal disease 9/21/2017    Hypothyroidism     Kidney stones     Obesity 9/21/2017    On statin therapy 9/21/2017    Sleep apnea 9/21/2017    Thyroid disease     Viral meningitis 9/1990     Past Surgical History:   Procedure Laterality Date    HX HERNIA REPAIR      HX KNEE ARTHROSCOPY      both     Allergies   Allergen Reactions    Demerol [Meperidine] Nausea and Vomiting    Phenergan [Promethazine] Unknown (comments)       REVIEW OF SYSTEMS:  General: negative for - chills or fever, or weight loss or gain  ENT: negative for - headaches, nasal congestion or tinnitus  Eyes: no blurred or visual changes  Neck: No stiffness or swollen nodes  Respiratory: negative for - cough, hemoptysis, shortness of breath or wheezing  Cardiovascular : negative for - chest pain, edema, palpitations or shortness of breath  Gastrointestinal: negative for - abdominal pain, blood in stools, heartburn or nausea/vomiting  Genito-Urinary: no dysuria, trouble voiding, or hematuria  Musculoskeletal: negative for - gait disturbance, joint pain, joint stiffness or joint swelling  Neurological: no TIA or stroke symptoms  Hematologic: no bruises, no bleeding  Lymphatic: no swollen glands  Integument: no lumps, mole changes, nail changes or rash  Endocrine:no malaise/lethargy poly uria or polydipsia or unexpected weight changes        Social History Socioeconomic History    Marital status:      Spouse name: Not on file    Number of children: Not on file    Years of education: Not on file    Highest education level: Not on file   Tobacco Use    Smoking status: Never Smoker    Smokeless tobacco: Never Used   Substance and Sexual Activity    Alcohol use: No    Drug use: No    Sexual activity: Yes     Partners: Female     Family History   Problem Relation Age of Onset    Heart Disease Mother         AICD    Thyroid Disease Mother     Heart Disease Father         pacemaker    Hypertension Father     Cancer Father         lymphoma    Thyroid Disease Sister     Heart Disease Paternal Grandmother         angina       OBJECTIVE:     Visit Vitals  BP (!) 154/92   Pulse 64   Temp 98.4 °F (36.9 °C) (Oral)   Resp 18   Ht 6' 1\" (1.854 m)   Wt 247 lb 6.4 oz (112.2 kg)   SpO2 97%   BMI 32.64 kg/m²     CONSTITUTIONAL:   well nourished, appears age appropriate  EYES: sclera anicteric, PERRL, EOMI  ENMT:nares clear, moist mucous membranes, pharynx clear  NECK: supple. Thyroid normal, No JVD or bruits  RESPIRATORY: Chest: clear to ascultation and percussion, normal inspiratory effort  CARDIOVASCULAR: Heart: regular rate and rhythm no murmurs, rubs or gallops, PMI not displaced, No thrills  GASTROINTESTINAL: Abdomen: non distended, soft, non tender, bowel sounds normal  HEMATOLOGIC: no purpura, petechiae or bruising  LYMPHATIC: No lymph node enlargemant  MUSCULOSKELETAL: Extremities: no edema or active synovitis, pulse 1+   INTEGUMENT: No unusual rashes or suspicious skin lesions noted. Nails appear normal.  PERIPHERAL VASCULAR: normal pulses femoral, PT and DP  NEUROLOGIC: non-focal exam, A & O X 3  PSYCHIATRIC:, appropriate affect     ASSESSMENT:   1. Hypertension with renal disease    2. Controlled type 2 diabetes mellitus with stage 3 chronic kidney disease, without long-term current use of insulin ([address]    3. Mixed hyperlipidemia    4. Cardiomyopathy, unspecified type ([facility]    5. Gastroesophageal reflux disease without esophagitis    6. Primary osteoarthritis involving multiple joints    7. Stage 3 chronic kidney disease (HCC)    8. Class 1 obesity due to excess calories with serious comorbidity and body mass index (BMI) of 33.0 to 33.9 in adult    9. Essential hypertension      Impression  1. Hypertension that is not controlled so at this point will increase his lisinopril to 40 mg daily and recheck in a month. 2.  Diabetes repeat status pending a prior lab review no make adjustments if necessary. 3.  Hyperlipidemia prior lab reviewed and repeat status pending I will adjust if needed. 4   Cardiomyopathy that is currently stable  5. GERD that is stable  6. DJD stable  7. CKD stage III repeat status pending  8. Obesity I again discussed diet, exercise and weight reduction for overall health benefit. I will call with lab results and make further recommendations or adjustments if necessary. Follow-up scheduled for 1 month hypertension in 3 months regarding other medical problems. PLAN:  .  Orders Placed This Encounter    METABOLIC PANEL, COMPREHENSIVE ([facility])    LIPID PANEL ([facility])    CK ([facility])    HEMOGLOBIN A1C W/O EAG ([facility])    lisinopril (PRINIVIL, ZESTRIL) 20 mg tablet         ATTENTION:   This medical record was transcribed using an electronic medical records system. Although proofread, it may and can contain electronic and spelling errors. Other human spelling and other errors may be present. Corrections may be executed at a later time. Please feel free to contact us for any clarifications as needed. Follow-up and Dispositions    · Return in about 3 months (around 11/27/2019). No results found for any visits on 08/27/19. Padmini Salmon MD    The patient verbalized understanding of the problems and plans as explained.

## 2019-08-28 LAB — HBA1C MFR BLD HPLC: 6.1 % (ref 4–5.7)

## 2019-09-23 PROBLEM — Z00.00 MEDICARE ANNUAL WELLNESS VISIT, INITIAL: Status: RESOLVED | Noted: 2017-10-17 | Resolved: 2019-09-23

## 2019-09-23 RX ORDER — DICLOFENAC SODIUM 75 MG/1
TABLET, DELAYED RELEASE ORAL
Qty: 180 TAB | Refills: 3 | Status: SHIPPED | OUTPATIENT
Start: 2019-09-23 | End: 2020-10-21

## 2019-09-23 RX ORDER — LEVOTHYROXINE SODIUM 50 UG/1
TABLET ORAL
Qty: 90 TAB | Refills: 3 | Status: SHIPPED | OUTPATIENT
Start: 2019-09-23 | End: 2020-10-21

## 2019-09-23 RX ORDER — CARVEDILOL 12.5 MG/1
TABLET ORAL
Qty: 180 TAB | Refills: 3 | Status: SHIPPED | OUTPATIENT
Start: 2019-09-23 | End: 2020-03-09 | Stop reason: SDUPTHER

## 2019-09-23 RX ORDER — FENOFIBRATE 145 MG/1
TABLET, COATED ORAL
Qty: 90 TAB | Refills: 3 | Status: SHIPPED | OUTPATIENT
Start: 2019-09-23 | End: 2020-10-21

## 2019-09-23 RX ORDER — ROSUVASTATIN CALCIUM 10 MG/1
TABLET, COATED ORAL
Qty: 90 TAB | Refills: 3 | Status: SHIPPED | OUTPATIENT
Start: 2019-09-23 | End: 2020-10-21

## 2019-09-23 NOTE — TELEPHONE ENCOUNTER
PCP: Sheree Matos MD    Last appt: 8/27/2019  Future Appointments   Date Time Provider Pernell Courtney   9/30/2019  1:20 PM Sheree Matos MD [address]   11/21/2019 10:10 AM Sheree Matos MD [facility]       Requested Prescriptions     Pending Prescriptions Disp Refills    fenofibrate nanocrystallized (TRICOR) 145 mg tablet [Pharmacy Med Name: FENOFIBRATE  145MG  TAB] 90 Tab 3     Sig: TAKE 1 TABLET BY MOUTH  DAILY    carvedilol (COREG) 12.5 mg tablet [Pharmacy Med Name: CARVEDILOL  12.5MG  TAB] 180 Tab 3     Sig: TAKE 1 TABLET BY MOUTH TWO  TIMES DAILY    levothyroxine (SYNTHROID) 50 mcg tablet [Pharmacy Med Name: LEVOTHYROXINE  0.05MG  TAB] 90 Tab 3     Sig: TAKE 1 TABLET BY MOUTH  DAILY    rosuvastatin (CRESTOR) 10 mg tablet [Pharmacy Med Name: ROSUVASTATIN  10MG  TAB] 90 Tab 3     Sig: TAKE 1 TABLET BY MOUTH  DAILY    diclofenac EC (VOLTAREN) 75 mg EC tablet [Pharmacy Med Name: DICLOFENAC SODIUM  75MG  TAB  ENTERIC COATED] 180 Tab 3     Sig: TAKE 1 TABLET BY MOUTH TWO  TIMES DAILY       Prior labs and Blood pressures:  BP Readings from Last 3 Encounters:   08/27/19 (!) 154/92   05/30/19 136/86   03/21/19 138/88     Lab Results   Component Value Date/Time    Sodium 141 08/27/2019 11:06 AM    Potassium 3.8 08/27/2019 11:06 AM    Chloride 103 08/27/2019 11:06 AM    CO2 28.0 08/27/2019 11:06 AM    Anion gap 10 08/27/2019 11:06 AM    Glucose 107 08/27/2019 11:06 AM    BUN 17.0 08/27/2019 11:06 AM    Creatinine 1.2 08/27/2019 11:06 AM    BUN/Creatinine ratio 14 08/27/2019 11:06 AM    GFR est AA >60 08/27/2019 11:06 AM    GFR est non-AA >60 08/27/2019 11:06 AM    Calcium 9.3 08/27/2019 11:06 AM     Lab Results   Component Value Date/Time    Hemoglobin A1c 6.1 (H) 08/27/2019 11:06 AM    Hemoglobin A1c (POC) 6.7 (A) 05/09/2018 10:34 AM     Lab Results   Component Value Date/Time    Cholesterol, total 99 08/27/2019 11:06 AM    Cholesterol (POC) 124 05/09/2018 10:34 AM    HDL Cholesterol 41 08/27/2019 11:06 AM    HDL Cholesterol (POC) 38 05/09/2018 10:34 AM    LDL Cholesterol (POC) 58.6 05/09/2018 10:34 AM    LDL, calculated 41 08/27/2019 11:06 AM    VLDL, calculated 19 05/30/2019 10:29 AM    VLDL 17 08/27/2019 11:06 AM    Triglyceride 84 08/27/2019 11:06 AM    Triglycerides (POC) 137 05/09/2018 10:34 AM    CHOL/HDL Ratio 2 08/27/2019 11:06 AM     No results found for: TIARA Merida    Lab Results   Component Value Date/Time    TSH 1.030 11/14/2018 11:36 AM

## 2019-09-24 PROBLEM — Z12.5 PROSTATE CANCER SCREENING: Status: RESOLVED | Noted: 2017-10-17 | Resolved: 2019-09-24

## 2019-09-28 NOTE — PROGRESS NOTES
Chief Complaint   Patient presents with    Hypertension     1 month follow up       SUBJECTIVE:    Ana Go III is a 81 y.o. male who returns in follow-up for his hypertension after having been seen here a month ago which time he had a significant elevation of blood pressure and lisinopril was increased from 20 mg daily up to 40 mg daily. He has been taking a higher dose without any difficulty. He currently denies any chest pain, shortness of breath, palpitations, PND, orthopnea or any other cardiorespiratory complaints. He denies any GI or  complaints. He denies any headaches, dizziness or neurologic complaints. There are no current arthritic complaints and no other complaints on complete review of systems. Current Outpatient Medications   Medication Sig Dispense Refill    lisinopril (PRINIVIL, ZESTRIL) 40 mg tablet Take 1 Tab by mouth daily. 90 Tab prn    fenofibrate nanocrystallized (TRICOR) 145 mg tablet TAKE 1 TABLET BY MOUTH  DAILY 90 Tab 3    carvedilol (COREG) 12.5 mg tablet TAKE 1 TABLET BY MOUTH TWO  TIMES DAILY 180 Tab 3    levothyroxine (SYNTHROID) 50 mcg tablet TAKE 1 TABLET BY MOUTH  DAILY 90 Tab 3    rosuvastatin (CRESTOR) 10 mg tablet TAKE 1 TABLET BY MOUTH  DAILY 90 Tab 3    diclofenac EC (VOLTAREN) 75 mg EC tablet TAKE 1 TABLET BY MOUTH TWO  TIMES DAILY 180 Tab 3    esomeprazole (NEXIUM) 40 mg capsule TAKE 1 CAPSULE BY MOUTH  DAILY 90 Cap prn    niacin ER (NIASPAN) 750 mg Tb24 TAKE 2 TABLETS BY MOUTH AT  BEDTIME 180 Tab 3    OMEGA-3-DHA-EPA-FISH OIL-COQ10 PO Take 100 mg by mouth.  famotidine (PEPCID) 40 mg tablet Take 1 Tab by mouth daily. 90 Tab 3    metFORMIN ER (GLUCOPHAGE XR) 500 mg tablet TAKE 1 TABLET BY MOUTH IN  THE MORNING AND 2 TABLETS  AT DINNER 270 Tab prn    fish oil-dha-epa 1,200-144-216 mg cap Take  by mouth.  aspirin 81 mg tablet Take 81 mg by mouth daily as needed.        Past Medical History:   Diagnosis Date    Arthritis     fingers  CAD (coronary artery disease)     Cardiomyopathy ([facility]     Cholelithiasis 9/21/2017    CKD (chronic kidney disease) 9/21/2017    Diabetes mellitus ([facility]     Diverticulosis 9/21/2017    DJD (degenerative joint disease) 9/21/2017    GERD (gastroesophageal reflux disease)     H/O: depression 9/21/2017    Hiatal hernia     Hypercholesteremia     Hypertension     Hypertension with renal disease 9/21/2017    Hypothyroidism     Kidney stones     Obesity 9/21/2017    On statin therapy 9/21/2017    Sleep apnea 9/21/2017    Thyroid disease     Viral meningitis 9/1990     Past Surgical History:   Procedure Laterality Date    HX HERNIA REPAIR      HX KNEE ARTHROSCOPY      both     Allergies   Allergen Reactions    Demerol [Meperidine] Nausea and Vomiting    Phenergan [Promethazine] Unknown (comments)       REVIEW OF SYSTEMS:  General: negative for - chills or fever, or weight loss or gain  ENT: negative for - headaches, nasal congestion or tinnitus  Eyes: no blurred or visual changes  Neck: No stiffness or swollen nodes  Respiratory: negative for - cough, hemoptysis, shortness of breath or wheezing  Cardiovascular : negative for - chest pain, edema, palpitations or shortness of breath  Gastrointestinal: negative for - abdominal pain, blood in stools, heartburn or nausea/vomiting  Genito-Urinary: no dysuria, trouble voiding, or hematuria  Musculoskeletal: negative for - gait disturbance, joint pain, joint stiffness or joint swelling  Neurological: no TIA or stroke symptoms  Hematologic: no bruises, no bleeding  Lymphatic: no swollen glands  Integument: no lumps, mole changes, nail changes or rash  Endocrine:no malaise/lethargy poly uria or polydipsia or unexpected weight changes        Social History     Socioeconomic History    Marital status:      Spouse name: Not on file    Number of children: Not on file    Years of education: Not on file    Highest education level: Not on file   Tobacco Use    Smoking status: Never Smoker    Smokeless tobacco: Never Used   Substance and Sexual Activity    Alcohol use: No    Drug use: No    Sexual activity: Yes     Partners: Female     Family History   Problem Relation Age of Onset    Heart Disease Mother         AICD    Thyroid Disease Mother     Heart Disease Father         pacemaker    Hypertension Father     Cancer Father         lymphoma    Thyroid Disease Sister     Heart Disease Paternal Grandmother         angina       OBJECTIVE:     Visit Vitals  /86   Pulse 71   Temp 98.5 °F (36.9 °C) (Oral)   Resp 18   Ht 6' 1\" (1.854 m)   Wt 251 lb 6.4 oz (114 kg)   SpO2 97%   BMI 33.17 kg/m²     CONSTITUTIONAL:   well nourished, appears age appropriate  EYES: sclera anicteric, PERRL, EOMI  ENMT:nares clear, moist mucous membranes, pharynx clear  NECK: supple. Thyroid normal, No JVD or bruits  RESPIRATORY: Chest: clear to ascultation and percussion, normal inspiratory effort  CARDIOVASCULAR: Heart: regular rate and rhythm no murmurs, rubs or gallops, PMI not displaced, No thrills  GASTROINTESTINAL: Abdomen: non distended, soft, non tender, bowel sounds normal  HEMATOLOGIC: no purpura, petechiae or bruising  LYMPHATIC: No lymph node enlargemant  MUSCULOSKELETAL: Extremities: no edema or active synovitis, pulse 1+   INTEGUMENT: No unusual rashes or suspicious skin lesions noted. Nails appear normal.  PERIPHERAL VASCULAR: normal pulses femoral, PT and DP  NEUROLOGIC: non-focal exam, A & O X 3  PSYCHIATRIC:, appropriate affect     ASSESSMENT:   1. Hypertension with renal disease    2. Stage 3 chronic kidney disease (HCC)    3. Cardiomyopathy, unspecified type ([address]    4. Class 1 obesity due to excess calories with serious comorbidity and body mass index (BMI) of 33.0 to 33.9 in adult    5. Essential hypertension      Impression  1.   Hypertension that is improved although still not as good as I would like at this point we will not increase his medicine because he thinks the stress of taking care of his ill father is having some to do with his blood pressure being elevated  2. CKD stage III that was stable on last check  3. Cardiomyopathy stable  4. Obesity we did discuss diet, exercise and weight reduction for overall health benefit  I will call with lab results and make further recommendations or adjustments if necessary. Follow-up scheduled again for 2 months or sooner if there is a problem. PLAN:  .  Orders Placed This Encounter    lisinopril (PRINIVIL, ZESTRIL) 40 mg tablet         ATTENTION:   This medical record was transcribed using an electronic medical records system. Although proofread, it may and can contain electronic and spelling errors. Other human spelling and other errors may be present. Corrections may be executed at a later time. Please feel free to contact us for any clarifications as needed. Follow-up and Dispositions    · Return in about 2 months (around 11/30/2019). No results found for any visits on 09/30/19. Quang Valentin MD    The patient verbalized understanding of the problems and plans as explained.

## 2019-09-30 ENCOUNTER — OFFICE VISIT (OUTPATIENT)
Dept: INTERNAL MEDICINE CLINIC | Age: 69
End: 2019-09-30

## 2019-09-30 VITALS
OXYGEN SATURATION: 97 % | RESPIRATION RATE: 18 BRPM | TEMPERATURE: 98.5 F | HEIGHT: 73 IN | DIASTOLIC BLOOD PRESSURE: 86 MMHG | HEART RATE: 71 BPM | BODY MASS INDEX: 33.32 KG/M2 | WEIGHT: 251.4 LBS | SYSTOLIC BLOOD PRESSURE: 138 MMHG

## 2019-09-30 DIAGNOSIS — N18.30 STAGE 3 CHRONIC KIDNEY DISEASE (HCC): ICD-10-CM

## 2019-09-30 DIAGNOSIS — E66.09 CLASS 1 OBESITY DUE TO EXCESS CALORIES WITH SERIOUS COMORBIDITY AND BODY MASS INDEX (BMI) OF 33.0 TO 33.9 IN ADULT: ICD-10-CM

## 2019-09-30 DIAGNOSIS — I42.9 CARDIOMYOPATHY, UNSPECIFIED TYPE (HCC): ICD-10-CM

## 2019-09-30 DIAGNOSIS — I12.9 HYPERTENSION WITH RENAL DISEASE: Primary | ICD-10-CM

## 2019-09-30 DIAGNOSIS — I10 ESSENTIAL HYPERTENSION: ICD-10-CM

## 2019-09-30 RX ORDER — LISINOPRIL 40 MG/1
40 TABLET ORAL DAILY
Qty: 90 TAB | Status: SHIPPED | OUTPATIENT
Start: 2019-09-30 | End: 2019-11-21 | Stop reason: SDUPTHER

## 2019-09-30 NOTE — PATIENT INSTRUCTIONS
Body Mass Index: Care Instructions  Your Care Instructions    Body mass index (BMI) can help you see if your weight is raising your risk for health problems. It uses a formula to compare how much you weigh with how tall you are. · A BMI lower than 18.5 is considered underweight. · A BMI between 18.5 and 24.9 is considered healthy. · A BMI between 25 and 29.9 is considered overweight. A BMI of 30 or higher is considered obese. If your BMI is in the normal range, it means that you have a lower risk for weight-related health problems. If your BMI is in the overweight or obese range, you may be at increased risk for weight-related health problems, such as high blood pressure, heart disease, stroke, arthritis or joint pain, and diabetes. If your BMI is in the underweight range, you may be at increased risk for health problems such as fatigue, lower protection (immunity) against illness, muscle loss, bone loss, hair loss, and hormone problems. BMI is just one measure of your risk for weight-related health problems. You may be at higher risk for health problems if you are not active, you eat an unhealthy diet, or you drink too much alcohol or use tobacco products. Follow-up care is a key part of your treatment and safety. Be sure to make and go to all appointments, and call your doctor if you are having problems. It's also a good idea to know your test results and keep a list of the medicines you take. How can you care for yourself at home? · Practice healthy eating habits. This includes eating plenty of fruits, vegetables, whole grains, lean protein, and low-fat dairy. · If your doctor recommends it, get more exercise. Walking is a good choice. Bit by bit, increase the amount you walk every day. Try for at least 30 minutes on most days of the week. · Do not smoke. Smoking can increase your risk for health problems. If you need help quitting, talk to your doctor about stop-smoking programs and medicines. These can increase your chances of quitting for good. · Limit alcohol to 2 drinks a day for men and 1 drink a day for women. Too much alcohol can cause health problems. If you have a BMI higher than 25  · Your doctor may do other tests to check your risk for weight-related health problems. This may include measuring the distance around your waist. A waist measurement of more than 40 inches in men or 35 inches in women can increase the risk of weight-related health problems. · Talk with your doctor about steps you can take to stay healthy or improve your health. You may need to make lifestyle changes to lose weight and stay healthy, such as changing your diet and getting regular exercise. If you have a BMI lower than 18.5  · Your doctor may do other tests to check your risk for health problems. · Talk with your doctor about steps you can take to stay healthy or improve your health. You may need to make lifestyle changes to gain or maintain weight and stay healthy, such as getting more healthy foods in your diet and doing exercises to build muscle. Where can you learn more? Go to http://rosa-jaylene.info/. Enter S176 in the search box to learn more about \"Body Mass Index: Care Instructions. \"  Current as of: March 28, 2019  Content Version: 12.2  © 4107-3421 [employer]. Care instructions adapted under license by [employer] (which disclaims liability or warranty for this information). If you have questions about a medical condition or this instruction, always ask your healthcare professional. [address] any warranty or liability for your use of this information.

## 2019-09-30 NOTE — PROGRESS NOTES
Chief Complaint   Patient presents with    Hypertension     1 month follow up       Visit Vitals  /84 (BP 1 Location: Left arm, BP Patient Position: Sitting)   Pulse 71   Temp 98.5 °F (36.9 °C) (Oral)   Resp 18   Ht 6' 1\" (1.854 m)   Wt 251 lb 6.4 oz (114 kg)   SpO2 97%   BMI 33.17 kg/m²     1. Have you been to the ER, urgent care clinic since your last visit? Hospitalized since your last visit? No    2. Have you seen or consulted any other health care providers outside of the [address] [place] [address] since your last visit? Include any pap smears or colon screening.  No

## 2019-11-20 PROBLEM — Z13.39 ALCOHOL SCREENING: Status: ACTIVE | Noted: 2019-11-20

## 2019-11-20 PROBLEM — Z00.00 MEDICARE ANNUAL WELLNESS VISIT, SUBSEQUENT: Status: ACTIVE | Noted: 2019-11-20

## 2019-11-20 NOTE — PROGRESS NOTES
This is a Subsequent Medicare Annual Wellness Visit providing Personalized Prevention Plan Services (PPPS) (Performed 12 months after initial AWV and PPPS )    I have reviewed the patient's medical history in detail and updated the computerized patient record. He presents today for his Medicare subsequent annual wellness examination and screening questionnaire. He is also in follow-up of his multiple medical problems include hypertension, diabetes, hyperlipidemia, cardiomyopathy, GERD, DJD, CKD stage III, obesity, history depression and other multiple medical problems. He currently denies any chest pain, shortness of breath, palpitations, PND, orthopnea or other cardiovascular complaints. He notes no GI or  complaints. He notes no headaches, dizziness or neurologic complaints. There are no current changes of his chronic arthritic complaints and no other complaints on complete review of systems.     History     Past Medical History:   Diagnosis Date    Arthritis     fingers    CAD (coronary artery disease)     Cardiomyopathy ([facility] [address]     Cholelithiasis 9/21/2017    CKD (chronic kidney disease) 9/21/2017    Diabetes mellitus ([facility] [address]     Diverticulosis 9/21/2017    DJD (degenerative joint disease) 9/21/2017    GERD (gastroesophageal reflux disease)     H/O: depression 9/21/2017    Hiatal hernia     Hypercholesteremia     Hypertension     Hypertension with renal disease 9/21/2017    Hypothyroidism     Kidney stones     Obesity 9/21/2017    On statin therapy 9/21/2017    Sleep apnea 9/21/2017    Thyroid disease     Viral meningitis 9/1990      Past Surgical History:   Procedure Laterality Date    HX HERNIA REPAIR      HX KNEE ARTHROSCOPY      both     Social History     Tobacco Use    Smoking status: Never Smoker    Smokeless tobacco: Never Used   Substance Use Topics    Alcohol use: No    Drug use: No     Current Outpatient Medications   Medication Sig Dispense Refill    lisinopril (PRINIVIL, ZESTRIL) 40 mg tablet Take 1 Tab by mouth daily. 90 Tab 3    fenofibrate nanocrystallized (TRICOR) 145 mg tablet TAKE 1 TABLET BY MOUTH  DAILY 90 Tab 3    carvedilol (COREG) 12.5 mg tablet TAKE 1 TABLET BY MOUTH TWO  TIMES DAILY 180 Tab 3    levothyroxine (SYNTHROID) 50 mcg tablet TAKE 1 TABLET BY MOUTH  DAILY 90 Tab 3    rosuvastatin (CRESTOR) 10 mg tablet TAKE 1 TABLET BY MOUTH  DAILY 90 Tab 3    diclofenac EC (VOLTAREN) 75 mg EC tablet TAKE 1 TABLET BY MOUTH TWO  TIMES DAILY 180 Tab 3    esomeprazole (NEXIUM) 40 mg capsule TAKE 1 CAPSULE BY MOUTH  DAILY 90 Cap prn    niacin ER (NIASPAN) 750 mg Tb24 TAKE 2 TABLETS BY MOUTH AT  BEDTIME 180 Tab 3    OMEGA-3-DHA-EPA-FISH OIL-COQ10 PO Take 100 mg by mouth.  famotidine (PEPCID) 40 mg tablet Take 1 Tab by mouth daily. 90 Tab 3    metFORMIN ER (GLUCOPHAGE XR) 500 mg tablet TAKE 1 TABLET BY MOUTH IN  THE MORNING AND 2 TABLETS  AT DINNER 270 Tab prn    fish oil-dha-epa 1,200-144-216 mg cap Take  by mouth.  aspirin 81 mg tablet Take 81 mg by mouth daily as needed.        Allergies   Allergen Reactions    Demerol [Meperidine] Nausea and Vomiting    Phenergan [Promethazine] Unknown (comments)     Family History   Problem Relation Age of Onset    Heart Disease Mother         AICD    Thyroid Disease Mother     Heart Disease Father         pacemaker    Hypertension Father     Cancer Father         lymphoma    Thyroid Disease Sister     Heart Disease Paternal Grandmother         angina       Patient Active Problem List    Diagnosis    Hypertension with renal disease    Stage 3 chronic kidney disease ([address]    Mixed hyperlipidemia    Gastroesophageal reflux disease without esophagitis    Primary osteoarthritis involving multiple joints    Cardiomyopathy ([address]     Diagnosis 2012 EF at that time 25-30%, follow-up echo in 2014 EF of 30% and apparently had a recent echo showing EF of 25% done early this year 2017      Controlled type 2 diabetes mellitus with stage 3 chronic kidney disease, without long-term current use of insulin (HCC)    Diverticulosis    Class 1 obesity due to excess calories with serious comorbidity and body mass index (BMI) of 33.0 to 33.9 in adult    Acquired hypothyroidism    Alcohol screening    Medicare annual wellness visit, subsequent    Hearing impaired    Prostate cancer screening    Cholelithiasis    History of viral meningitis    H/O: depression    Sleep apnea       Patient Care Team:  Thai Lozano MD as PCP - General (Internal Medicine)  Thai Lozano MD as PCP - [facility] Empaneled Provider    Depression Risk Factor Screening:     3 most recent PHQ Screens 11/21/2019   Little interest or pleasure in doing things Not at all   Feeling down, depressed, irritable, or hopeless Not at all   Total Score PHQ 2 0     Alcohol Risk Factor Screening: You do not drink alcohol or very rarely. Functional Ability and Level of Safety:     Fall Risk     Fall Risk Assessment, last 12 mths 11/21/2019   Able to walk? Yes   Fall in past 12 months? No       Hearing Loss   mild    Activities of Daily Living   Self-care.    ADL Assessment 11/21/2019   Feeding yourself No Help Needed   Getting from bed to chair No Help Needed   Getting dressed No Help Needed   Bathing or showering No Help Needed   Walk across the room (includes cane/walker) No Help Needed   Using the telphone No Help Needed   Taking your medications No Help Needed   Preparing meals No Help Needed   Managing money (expenses/bills) No Help Needed   Moderately strenuous housework (laundry) No Help Needed   Shopping for personal items (toiletries/medicines) No Help Needed   Shopping for groceries No Help Needed   Driving No Help Needed   Climbing a flight of stairs No Help Needed   Getting to places beyond walking distances No Help Needed       Abuse Screen   Patient is not abused    Social History     Patient does not qualify to have social determinant information on file (likely too young). Social History Narrative    Not on file       Review of Systems      ROS:    Constitutional: He denies fevers, weight loss, sweats. Eyes: No blurred or double vision. ENT: No difficulty with swallowing, taste, speech or smell. Neck: no stiffness or swelling  Respiratory: No cough wheezing or shortness of breath. Cardiovascular: Denies chest pain, palpitations, unexplained indigestion or syncope. Gastrointestinal:  No changes in bowel movements, no abdominal pain, no bloating. Genitourinary:  He denies frequency, nocturia or stranguria. Extremities: No change of his chronic joint pain, stiffness or swelling. Neurological:  No numbness, tingling, burring paresthesias or loss of motor strength. No syncope, dizziness or frequent headache  Lymphatic: no adenopathy noted  Hematologic: no easy bruising or bleeding gums  Skin:  No recent rashes or mole changes. Psychiatric/Behavioral:  Negative for depression. Physical Examination     Evaluation of Cognitive Function:  Mood/affect:  happy  Appearance: age appropriate  Family member/caregiver input: none    Visit Vitals  /90 (BP 1 Location: Right arm, BP Patient Position: Sitting)   Pulse 66   Temp 98.3 °F (36.8 °C)   Resp 16   Ht 6' 1\" (1.854 m)   Wt 252 lb 14.4 oz (114.7 kg)   SpO2 (!) 66%   BMI 33.37 kg/m²     Vitals:    11/21/19 1023   BP: 160/90   Pulse: 66   Resp: 16   Temp: 98.3 °F (36.8 °C)   SpO2: (!) 66%   Weight: 252 lb 14.4 oz (114.7 kg)   Height: 6' 1\" (1.854 m)   PainSc:   2   PainLoc: Back        PHYSICAL EXAM:    General appearance - alert, well appearing, and in no distress  Mental status - alert, oriented to person, place, and time  HEENT:  Ears - bilateral TM's and external ear canals clear  Eyes - pupillary responses were normal.  Extraocular muscle function intact. Lids and conjunctiva not injected. Fundoscopic exam revealed sharp disc margins. eye movements intact  Pharynx- clear with teeth in good repair. No masses were noted  Neck - supple without thyromegaly or burit. No JVD noted  Lungs - clear to auscultation and percussion  Cardiac- normal rate, regular rhythm without murmurs. PMI not displaced. No gallop, rub or click  Abdomen - flat, soft, non-tender without palpable organomegaly or mass. No pulsatile mass was felt, and not bruit was heard. Bowel sounds were active  : Circumcised, Testes descended w/o masses  Rectal: normal sphincter tone, prostate normal, no masses, stool brown and hemacult negative  Extremities -  no clubbing cyanosis or edema  Lymphatics - no palpable lymphadenopathy, no hepatosplenomegaly  Hematologic: no petechiae or purpura  Peripheral vascular -Femoral, Dorsalis pedis and posterior tibial pulses felt without difficulty  Skin - no rash or unusual mole change noted  Neurological - Cranial nerves II-XII grossly intact. Motor strength 5/5. DTR's 2+ and symmetric. Station and gait normal  Back exam - full range of motion, no tenderness, palpable spasm or pain on motion  Musculoskeletal - no joint tenderness, deformity or swelling        Results for orders placed or performed in visit on 84/75/74   METABOLIC PANEL, COMPREHENSIVE   Result Value Ref Range    Glucose 107 75 - 110 mg/dL    BUN 17.0 9.0 - 20.0 mg/dL    Creatinine 1.2 0.8 - 1.5 mg/dL    Sodium 141 137 - 145 mmol/L    Potassium 3.8 3.6 - 5.0 mmol/L    Chloride 103 98 - 107 mmol/L    CO2 28.0 22.0 - 32.0 mmol/L    Calcium 9.3 8.4 - 10.2 mg/dl    Protein, total 7.0 6.3 - 8.2 g/dL    Albumin 4.3 3.9 - 5.4 g/dL    ALT (SGPT) 23 0 - 50 U/L    AST (SGOT) 27.0 14.0 - 36.0 U/L    Alk.  phosphatase 81 38 - 126 U/L    Bilirubin, total 0.6 0.2 - 1.3 mg/dL    BUN/Creatinine ratio 14 Ratio    GFR est AA >60 mL/min/1.73m2    GFR est non-AA >60 mL/min/1.73m2    Globulin 2.70     A-G Ratio 1.6 Ratio    Anion gap 10 mmol/L   LIPID PANEL   Result Value Ref Range    Cholesterol, total 99 0 - 200 mg/dL    Triglyceride 84 0 - 200 mg/dL    HDL Cholesterol 41 35 - 130 mg/dL    VLDL 17 mg/dL    LDL, calculated 41 0 - 130 mg/dL    CHOL/HDL Ratio 2 0 - 4 Ratio    LDL/HDL Ratio 1 Ratio   CK   Result Value Ref Range    .00 30.00 - 135.00 U/L   HEMOGLOBIN A1C W/O EAG   Result Value Ref Range    Hemoglobin A1c 6.1 (H) 4.0 - 5.7 %       Advice/Referrals/Counseling   Education and counseling provided:  Are appropriate based on today's review and evaluation  End-of-Life planning (with patient's consent)  Pneumococcal Vaccine  Influenza Vaccine  Colorectal cancer screening tests      Assessment/Plan     ASSESSMENT:   1. Hypertension with renal disease    2. Controlled type 2 diabetes mellitus with stage 3 chronic kidney disease, without long-term current use of insulin ([facility] [address]    3. Mixed hyperlipidemia    4. Cardiomyopathy, unspecified type ([facility] [address]    5. Gastroesophageal reflux disease without esophagitis    6. Primary osteoarthritis involving multiple joints    7. Stage 3 chronic kidney disease ([facility] [address]    8. Acquired hypothyroidism    9. Diverticulosis    10. Class 1 obesity due to excess calories with serious comorbidity and body mass index (BMI) of 33.0 to 33.9 in adult    11. Alcohol screening    12. Prostate cancer screening    13. Medicare annual wellness visit, subsequent    14. Essential hypertension    15. Encounter for immunization      Impression  1. Hypertension that is not controlled but he has been out of his lisinopril for a few days. I resumed his lisinopril and asked him to make sure he does not letter run out in the future no any of his other medicines. 2.  Diabetes repeat status pending a prior lab review no make adjustments if necessary. 3.  Hyperlipidemia prior lab reviewed and repeat status pending and I will adjust if needed. 4   Cardiomyopathy that is stable. EKG obtained today reveals a bundle branch block which is unchanged. 5.  GERD that is stable  6. DJD chronic but stable  7.   CKD stage III repeat status pending  8. Hypothyroidism repeat status pending  9. Diverticulosis without recent flares  10. Obesity we discussed diet, exercise and weight reduction for overall health benefit. 11.  Annual alcohol screening is done and he does not drink at the current time. I did caution regarding drinking more than 2 drinks per day in males with increased cardiovascular risk and increased GI risk as well as liver disease. Flu shot given today. Medicare subsequent annual wellness examination screening questionnaire is completed today. The results were reviewed with him and his questions were answered. Lifestyle recommendations and modifications discussed and made. I will call with lab results and make further recommendations or adjustments if necessary. Follow-up scheduled again for 3 months or sooner if there is a problem. PLAN:  .  Orders Placed This Encounter    Influenza Vaccine Inactivated (IIV)(FLUAD), Subunit, Adjuvanted, IM, (90217)    CBC WITH AUTOMATED DIFF    METABOLIC PANEL, COMPREHENSIVE ([facility])    LIPID PANEL ([facility])    CK ([facility])    HEMOGLOBIN A1C W/O EAG ([facility])    T4, FREE ([facility])    TSH 3RD GENERATION ([facility])    URINALYSIS W/O MICRO ([facility])    URINE, MICROALBUMIN, SEMIQUANTITATIVE ([facility])    PSA SCREENING (SCREENING) ([facility])    AMB POC EKG ROUTINE W/ 12 LEADS, INTER & REP    DISCONTD: lisinopril (PRINIVIL, ZESTRIL) 40 mg tablet    lisinopril (PRINIVIL, ZESTRIL) 40 mg tablet         ATTENTION:   This medical record was transcribed using an electronic medical records system. Although proofread, it may and can contain electronic and spelling errors. Other human spelling and other errors may be present. Corrections may be executed at a later time. Please feel free to contact us for any clarifications as needed.       Follow-up and Dispositions    · Return in about 3 months (around 2/21/2020). Jewel Angel MD    Recommended healthy diet low in carbohydrates, fats, sodium and cholesterol. Recommended regular cardiovascular exercise 3-6 times per week for 30-60 minutes daily. Current Outpatient Medications   Medication Sig Dispense Refill    lisinopril (PRINIVIL, ZESTRIL) 40 mg tablet Take 1 Tab by mouth daily. 90 Tab 3    fenofibrate nanocrystallized (TRICOR) 145 mg tablet TAKE 1 TABLET BY MOUTH  DAILY 90 Tab 3    carvedilol (COREG) 12.5 mg tablet TAKE 1 TABLET BY MOUTH TWO  TIMES DAILY 180 Tab 3    levothyroxine (SYNTHROID) 50 mcg tablet TAKE 1 TABLET BY MOUTH  DAILY 90 Tab 3    rosuvastatin (CRESTOR) 10 mg tablet TAKE 1 TABLET BY MOUTH  DAILY 90 Tab 3    diclofenac EC (VOLTAREN) 75 mg EC tablet TAKE 1 TABLET BY MOUTH TWO  TIMES DAILY 180 Tab 3    esomeprazole (NEXIUM) 40 mg capsule TAKE 1 CAPSULE BY MOUTH  DAILY 90 Cap prn    niacin ER (NIASPAN) 750 mg Tb24 TAKE 2 TABLETS BY MOUTH AT  BEDTIME 180 Tab 3    OMEGA-3-DHA-EPA-FISH OIL-COQ10 PO Take 100 mg by mouth.  famotidine (PEPCID) 40 mg tablet Take 1 Tab by mouth daily. 90 Tab 3    metFORMIN ER (GLUCOPHAGE XR) 500 mg tablet TAKE 1 TABLET BY MOUTH IN  THE MORNING AND 2 TABLETS  AT DINNER 270 Tab prn    fish oil-dha-epa 1,200-144-216 mg cap Take  by mouth.  aspirin 81 mg tablet Take 81 mg by mouth daily as needed. No results found for any visits on 11/21/19. Verbal and written instructions (see AVS) provided. Patient expresses understanding of diagnosis and treatment plan.     Jewel Angel MD

## 2019-11-21 ENCOUNTER — OFFICE VISIT (OUTPATIENT)
Dept: INTERNAL MEDICINE CLINIC | Age: 69
End: 2019-11-21

## 2019-11-21 VITALS
DIASTOLIC BLOOD PRESSURE: 90 MMHG | WEIGHT: 252.9 LBS | SYSTOLIC BLOOD PRESSURE: 160 MMHG | HEART RATE: 66 BPM | OXYGEN SATURATION: 66 % | RESPIRATION RATE: 16 BRPM | BODY MASS INDEX: 33.52 KG/M2 | TEMPERATURE: 98.3 F | HEIGHT: 73 IN

## 2019-11-21 DIAGNOSIS — I10 ESSENTIAL HYPERTENSION: ICD-10-CM

## 2019-11-21 DIAGNOSIS — Z23 ENCOUNTER FOR IMMUNIZATION: ICD-10-CM

## 2019-11-21 DIAGNOSIS — Z13.39 ALCOHOL SCREENING: ICD-10-CM

## 2019-11-21 DIAGNOSIS — K57.90 DIVERTICULOSIS: ICD-10-CM

## 2019-11-21 DIAGNOSIS — E03.9 ACQUIRED HYPOTHYROIDISM: ICD-10-CM

## 2019-11-21 DIAGNOSIS — E11.22 CONTROLLED TYPE 2 DIABETES MELLITUS WITH STAGE 3 CHRONIC KIDNEY DISEASE, WITHOUT LONG-TERM CURRENT USE OF INSULIN (HCC): ICD-10-CM

## 2019-11-21 DIAGNOSIS — E78.2 MIXED HYPERLIPIDEMIA: ICD-10-CM

## 2019-11-21 DIAGNOSIS — N18.30 STAGE 3 CHRONIC KIDNEY DISEASE (HCC): ICD-10-CM

## 2019-11-21 DIAGNOSIS — I12.9 HYPERTENSION WITH RENAL DISEASE: Primary | ICD-10-CM

## 2019-11-21 DIAGNOSIS — I42.9 CARDIOMYOPATHY, UNSPECIFIED TYPE (HCC): ICD-10-CM

## 2019-11-21 DIAGNOSIS — Z00.00 MEDICARE ANNUAL WELLNESS VISIT, SUBSEQUENT: ICD-10-CM

## 2019-11-21 DIAGNOSIS — Z12.5 PROSTATE CANCER SCREENING: ICD-10-CM

## 2019-11-21 DIAGNOSIS — E66.09 CLASS 1 OBESITY DUE TO EXCESS CALORIES WITH SERIOUS COMORBIDITY AND BODY MASS INDEX (BMI) OF 33.0 TO 33.9 IN ADULT: ICD-10-CM

## 2019-11-21 DIAGNOSIS — N18.30 CONTROLLED TYPE 2 DIABETES MELLITUS WITH STAGE 3 CHRONIC KIDNEY DISEASE, WITHOUT LONG-TERM CURRENT USE OF INSULIN (HCC): ICD-10-CM

## 2019-11-21 DIAGNOSIS — K21.9 GASTROESOPHAGEAL REFLUX DISEASE WITHOUT ESOPHAGITIS: ICD-10-CM

## 2019-11-21 DIAGNOSIS — M15.9 PRIMARY OSTEOARTHRITIS INVOLVING MULTIPLE JOINTS: ICD-10-CM

## 2019-11-21 DIAGNOSIS — N39.0 URINARY TRACT INFECTION WITHOUT HEMATURIA, SITE UNSPECIFIED: ICD-10-CM

## 2019-11-21 LAB
A-G RATIO,AGRAT: 1.5 RATIO
ALBUMIN SERPL-MCNC: 4.3 G/DL (ref 3.9–5.4)
ALP SERPL-CCNC: 73 U/L (ref 38–126)
ALT SERPL-CCNC: 29 U/L (ref 0–50)
ANION GAP SERPL CALC-SCNC: 7 MMOL/L
AST SERPL W P-5'-P-CCNC: 33 U/L (ref 14–36)
BACTERIA,BACTU: ABNORMAL
BILIRUB SERPL-MCNC: 1.1 MG/DL (ref 0.2–1.3)
BILIRUB UR QL: ABNORMAL
BUN SERPL-MCNC: 16 MG/DL (ref 9–20)
BUN/CREATININE RATIO,BUCR: 13 RATIO
CALCIUM SERPL-MCNC: 9.5 MG/DL (ref 8.4–10.2)
CHLORIDE SERPL-SCNC: 105 MMOL/L (ref 98–107)
CHOL/HDL RATIO,CHHD: 3 RATIO (ref 0–4)
CHOLEST SERPL-MCNC: 115 MG/DL (ref 0–200)
CK SERPL-CCNC: 152 U/L (ref 30–135)
CLARITY: CLEAR
CO2 SERPL-SCNC: 28 MMOL/L (ref 22–32)
COLOR UR: ABNORMAL
CREAT SERPL-MCNC: 1.2 MG/DL (ref 0.8–1.5)
GLOBULIN,GLOB: 2.8
GLUCOSE 24H UR-MRATE: ABNORMAL G/(24.H)
GLUCOSE SERPL-MCNC: 115 MG/DL (ref 75–110)
HBA1C MFR BLD HPLC: 5.6 % (ref 4–5.7)
HDLC SERPL-MCNC: 36 MG/DL (ref 35–130)
HGB UR QL STRIP: ABNORMAL
KETONES UR QL STRIP.AUTO: ABNORMAL
LDL/HDL RATIO,LDHD: 2 RATIO
LDLC SERPL CALC-MCNC: 59 MG/DL (ref 0–130)
LEUKOCYTE ESTERASE: ABNORMAL
MICROALBUMIN, URINE: 100 MG/L (ref 0–20)
NITRITE UR QL STRIP.AUTO: ABNORMAL
PH UR STRIP: 6.5 [PH] (ref 5–7)
POTASSIUM SERPL-SCNC: 4.2 MMOL/L (ref 3.6–5)
PROT SERPL-MCNC: 7.1 G/DL (ref 6.3–8.2)
PROT UR STRIP-MCNC: ABNORMAL MG/DL
PSA, TEST22: 2.1 NG/ML (ref 0–4)
RBC #/AREA URNS HPF: ABNORMAL #/HPF
SODIUM SERPL-SCNC: 140 MMOL/L (ref 137–145)
SP GR UR REFRACTOMETRY: 1.02 (ref 1–1.03)
T4 FREE SERPL-MCNC: 1.15 NG/DL (ref 0.58–2.3)
TRIGL SERPL-MCNC: 98 MG/DL (ref 0–200)
TSH SERPL DL<=0.05 MIU/L-ACNC: 0.84 UIU/ML (ref 0.34–5.6)
UROBILINOGEN UR QL STRIP.AUTO: ABNORMAL
VLDLC SERPL CALC-MCNC: 20 MG/DL
WBC URNS QL MICRO: ABNORMAL #/HPF

## 2019-11-21 RX ORDER — LISINOPRIL 40 MG/1
40 TABLET ORAL DAILY
Qty: 90 TAB | Refills: 3 | Status: SHIPPED | OUTPATIENT
Start: 2019-11-21 | End: 2022-09-30 | Stop reason: ALTCHOICE

## 2019-11-21 RX ORDER — LISINOPRIL 40 MG/1
40 TABLET ORAL DAILY
Qty: 30 TAB | Refills: 0 | Status: SHIPPED | OUTPATIENT
Start: 2019-11-21 | End: 2019-11-21 | Stop reason: SDUPTHER

## 2019-11-21 NOTE — PATIENT INSTRUCTIONS
Body Mass Index: Care Instructions  Your Care Instructions    Body mass index (BMI) can help you see if your weight is raising your risk for health problems. It uses a formula to compare how much you weigh with how tall you are. · A BMI lower than 18.5 is considered underweight. · A BMI between 18.5 and 24.9 is considered healthy. · A BMI between 25 and 29.9 is considered overweight. A BMI of 30 or higher is considered obese. If your BMI is in the normal range, it means that you have a lower risk for weight-related health problems. If your BMI is in the overweight or obese range, you may be at increased risk for weight-related health problems, such as high blood pressure, heart disease, stroke, arthritis or joint pain, and diabetes. If your BMI is in the underweight range, you may be at increased risk for health problems such as fatigue, lower protection (immunity) against illness, muscle loss, bone loss, hair loss, and hormone problems. BMI is just one measure of your risk for weight-related health problems. You may be at higher risk for health problems if you are not active, you eat an unhealthy diet, or you drink too much alcohol or use tobacco products. Follow-up care is a key part of your treatment and safety. Be sure to make and go to all appointments, and call your doctor if you are having problems. It's also a good idea to know your test results and keep a list of the medicines you take. How can you care for yourself at home? · Practice healthy eating habits. This includes eating plenty of fruits, vegetables, whole grains, lean protein, and low-fat dairy. · If your doctor recommends it, get more exercise. Walking is a good choice. Bit by bit, increase the amount you walk every day. Try for at least 30 minutes on most days of the week. · Do not smoke. Smoking can increase your risk for health problems. If you need help quitting, talk to your doctor about stop-smoking programs and medicines. These can increase your chances of quitting for good. · Limit alcohol to 2 drinks a day for men and 1 drink a day for women. Too much alcohol can cause health problems. If you have a BMI higher than 25  · Your doctor may do other tests to check your risk for weight-related health problems. This may include measuring the distance around your waist. A waist measurement of more than 40 inches in men or 35 inches in women can increase the risk of weight-related health problems. · Talk with your doctor about steps you can take to stay healthy or improve your health. You may need to make lifestyle changes to lose weight and stay healthy, such as changing your diet and getting regular exercise. If you have a BMI lower than 18.5  · Your doctor may do other tests to check your risk for health problems. · Talk with your doctor about steps you can take to stay healthy or improve your health. You may need to make lifestyle changes to gain or maintain weight and stay healthy, such as getting more healthy foods in your diet and doing exercises to build muscle. Where can you learn more? Go to http://rosa-jaylene.info/. Enter S176 in the search box to learn more about \"Body Mass Index: Care Instructions. \"  Current as of: March 28, 2019  Content Version: 12.2  © 3048-8629 [employer]. Care instructions adapted under license by [employer] (which disclaims liability or warranty for this information). If you have questions about a medical condition or this instruction, always ask your healthcare professional. [address] any warranty or liability for your use of this information.

## 2019-11-21 NOTE — PROGRESS NOTES
Girma Lange III  Identified pt with two pt identifiers(name and ). Chief Complaint   Patient presents with   [address] [place] [address] Annual Wellness Visit       1. Have you been to the ER, urgent care clinic since your last visit? Hospitalized since your last visit? no    2. Have you seen or consulted any other health care providers outside of the [address] [place] [address] since your last visit? Include any pap smears or colon screening. no      Health Maintenance Topics with due status: Overdue       Topic Date Due    EYE EXAM RETINAL OR DILATED 1962    DTaP/Tdap/Td series 1963    Shingrix Vaccine Age 50> 2002    GLAUCOMA SCREENING Q2Y 2017    COLONOSCOPY 2019    Influenza Age 5 to Adult 2019    MICROALBUMIN Q1 2019     Health Maintenance Topics with due status: Due On       Topic Date Due    MEDICARE YEARLY EXAM 11/15/2019     Health Maintenance Topics with due status: Not Due       Topic Last Completion Date    Pneumococcal 65+ years 2017    FOOT EXAM Q1 02/15/2019    HEMOGLOBIN A1C Q6M 2019    LIPID PANEL Q1 2019     Health Maintenance Topics with due status: Completed       Topic Last Completion Date    Hepatitis C Screening 10/17/2017           Medication reconciliation up to date and corrected with patient at this time. Today's provider has been notified of reason for visit, vitals and flowsheets obtained on patients. Reviewed record in preparation for visit, huddled with provider and have obtained necessary documentation.         Wt Readings from Last 3 Encounters:   19 251 lb 6.4 oz (114 kg)   19 247 lb 6.4 oz (112.2 kg)   19 246 lb 12.8 oz (111.9 kg)     Temp Readings from Last 3 Encounters:   19 98.5 °F (36.9 °C) (Oral)   19 98.4 °F (36.9 °C) (Oral)   19 98.6 °F (37 °C) (Oral)     BP Readings from Last 3 Encounters:   19 138/86   19 (!) 154/92   19 136/86     Pulse Readings from Last 3 Encounters: 09/30/19 71   08/27/19 64   05/30/19 64     There were no vitals filed for this visit. Learning Assessment:  :     Learning Assessment 11/27/2017   PRIMARY LEARNER Patient   HIGHEST LEVEL OF EDUCATION - PRIMARY LEARNER  2 YEARS OF COLLEGE   BARRIERS PRIMARY LEARNER NONE   CO-LEARNER CAREGIVER No   PRIMARY LANGUAGE ENGLISH   LEARNER PREFERENCE PRIMARY LISTENING   ANSWERED BY patient   RELATIONSHIP SELF       Depression Screening:  :     3 most recent PHQ Screens 9/30/2019   Little interest or pleasure in doing things Not at all   Feeling down, depressed, irritable, or hopeless Not at all   Total Score PHQ 2 0       No flowsheet data found. Fall Risk Assessment:  :     Fall Risk Assessment, last 12 mths 9/30/2019   Able to walk? Yes   Fall in past 12 months? No       Abuse Screening:  :     Abuse Screening Questionnaire 3/21/2019 5/9/2018 11/27/2017   Do you ever feel afraid of your partner? N N N   Are you in a relationship with someone who physically or mentally threatens you? N N N   Is it safe for you to go home?  Y Y Y       ADL Screening:  :     ADL Assessment 11/21/2019   Feeding yourself No Help Needed   Getting from bed to chair No Help Needed   Getting dressed No Help Needed   Bathing or showering No Help Needed   Walk across the room (includes cane/walker) No Help Needed   Using the telphone No Help Needed   Taking your medications No Help Needed   Preparing meals No Help Needed   Managing money (expenses/bills) No Help Needed   Moderately strenuous housework (laundry) No Help Needed   Shopping for personal items (toiletries/medicines) No Help Needed   Shopping for groceries No Help Needed   Driving No Help Needed   Climbing a flight of stairs No Help Needed   Getting to places beyond walking distances No Help Needed

## 2019-11-21 NOTE — PROGRESS NOTES
After obtaining consent and per verbal order from Dr. Leena Tavera, patient received influenza vaccine given by Sammi Riggins and verified by Kofi Preston. Fluad Influenza 0.5ml was given IM in left deltoid. Patient tolerated injection and was observed for 10 minutes post injection. VIS was given.

## 2019-11-22 LAB
BASOPHILS # BLD AUTO: 0.1 X10E3/UL (ref 0–0.2)
BASOPHILS NFR BLD AUTO: 1 %
EOSINOPHIL # BLD AUTO: 0.2 X10E3/UL (ref 0–0.4)
EOSINOPHIL NFR BLD AUTO: 4 %
ERYTHROCYTE [DISTWIDTH] IN BLOOD BY AUTOMATED COUNT: 12.4 % (ref 12.3–15.4)
HCT VFR BLD AUTO: 40.3 % (ref 37.5–51)
HGB BLD-MCNC: 13.5 G/DL (ref 13–17.7)
IMM GRANULOCYTES # BLD AUTO: 0 X10E3/UL (ref 0–0.1)
IMM GRANULOCYTES NFR BLD AUTO: 0 %
LYMPHOCYTES # BLD AUTO: 1.6 X10E3/UL (ref 0.7–3.1)
LYMPHOCYTES NFR BLD AUTO: 34 %
MCH RBC QN AUTO: 31.2 PG (ref 26.6–33)
MCHC RBC AUTO-ENTMCNC: 33.5 G/DL (ref 31.5–35.7)
MCV RBC AUTO: 93 FL (ref 79–97)
MONOCYTES # BLD AUTO: 0.5 X10E3/UL (ref 0.1–0.9)
MONOCYTES NFR BLD AUTO: 11 %
NEUTROPHILS # BLD AUTO: 2.3 X10E3/UL (ref 1.4–7)
NEUTROPHILS NFR BLD AUTO: 50 %
PLATELET # BLD AUTO: 178 X10E3/UL (ref 150–450)
RBC # BLD AUTO: 4.33 X10E6/UL (ref 4.14–5.8)
WBC # BLD AUTO: 4.7 X10E3/UL (ref 3.4–10.8)

## 2019-11-23 LAB — BACTERIA UR CULT: NO GROWTH

## 2019-12-20 PROBLEM — Z00.00 MEDICARE ANNUAL WELLNESS VISIT, SUBSEQUENT: Status: RESOLVED | Noted: 2019-11-20 | Resolved: 2019-12-20

## 2019-12-20 PROBLEM — Z12.5 PROSTATE CANCER SCREENING: Status: RESOLVED | Noted: 2017-10-17 | Resolved: 2019-12-20

## 2020-01-06 RX ORDER — METFORMIN HYDROCHLORIDE 500 MG/1
TABLET, EXTENDED RELEASE ORAL
Qty: 90 TAB | Refills: 0 | Status: SHIPPED | OUTPATIENT
Start: 2020-01-06 | End: 2020-03-09 | Stop reason: ALTCHOICE

## 2020-01-06 RX ORDER — METFORMIN HYDROCHLORIDE 500 MG/1
TABLET, EXTENDED RELEASE ORAL
Qty: 270 TAB | Refills: 3 | Status: SHIPPED | OUTPATIENT
Start: 2020-01-06 | End: 2020-10-21

## 2020-01-06 NOTE — TELEPHONE ENCOUNTER
RX refill request from the patient/pharmacy. Patient last seen 11- with labs, and next appt. scheduled for 03-  Requested Prescriptions     Pending Prescriptions Disp Refills    metFORMIN ER (GLUCOPHAGE XR) 500 mg tablet 90 Tab 0     Sig: TAKE 1 TABLET BY MOUTH IN  THE MORNING AND 2 TABLETS  AT DINNER   .

## 2020-01-06 NOTE — TELEPHONE ENCOUNTER
RX refill request from the patient/pharmacy. Patient last seen 11- with labs, and next appt. scheduled for -2020  Requested Prescriptions     Pending Prescriptions Disp Refills    metFORMIN ER (GLUCOPHAGE XR) 500 mg tablet 270 Tab 3     Sig: TAKE 1 TABLET BY MOUTH IN  THE MORNING AND 2 TABLETS  AT DINNER   .

## 2020-01-31 RX ORDER — FAMOTIDINE 40 MG/1
TABLET, FILM COATED ORAL
Qty: 90 TAB | Refills: 3 | Status: SHIPPED | OUTPATIENT
Start: 2020-01-31 | End: 2020-12-16 | Stop reason: ALTCHOICE

## 2020-01-31 NOTE — TELEPHONE ENCOUNTER
PCP: Braden Galvan MD    Last appt: 11/21/2019  Future Appointments   Date Time Provider Pernell Courtney   3/9/2020  8:20 AM Braden Galvan MD [facility]       Requested Prescriptions     Pending Prescriptions Disp Refills    famotidine (PEPCID) 40 mg tablet [Pharmacy Med Name: FAMOTIDINE  40MG  TAB] 90 Tab 3     Sig: TAKE 1 TABLET BY MOUTH  DAILY

## 2020-03-06 NOTE — PROGRESS NOTES
Chief Complaint   Patient presents with    Hypertension     3 month f/up    Chronic Kidney Disease    Diabetes    GERD    Cholesterol Problem       SUBJECTIVE:    Madhuri Hameed III is a 78 y.o. male who returns in follow-up for his hypertension, chronic kidney disease, diabetes, hyperlipidemia, GERD, cardiomyopathy, compensated CHF, DJD and other medical problems. He was recently seen by Dr. Gardner Fee he released him for a year but he did note his blood pressure was up there as well. He currently denies any chest pain, shortness of breath, palpitations, PND, orthopnea or other cardiovascular complaints. He has no GI or  complaints. He has no headaches, dizziness or neurologic complaints. There are no current arthritic complaints and no other complaints on complete review of systems. Current Outpatient Medications   Medication Sig Dispense Refill    carvediloL (COREG) 25 mg tablet Take 1 Tab by mouth two (2) times daily (with meals). 60 Tab prn    famotidine (PEPCID) 40 mg tablet TAKE 1 TABLET BY MOUTH  DAILY 90 Tab 3    metFORMIN ER (GLUCOPHAGE XR) 500 mg tablet TAKE 1 TABLET BY MOUTH IN  THE MORNING AND 2 TABLETS  AT DINNER 270 Tab 3    lisinopril (PRINIVIL, ZESTRIL) 40 mg tablet Take 1 Tab by mouth daily. 90 Tab 3    fenofibrate nanocrystallized (TRICOR) 145 mg tablet TAKE 1 TABLET BY MOUTH  DAILY 90 Tab 3    levothyroxine (SYNTHROID) 50 mcg tablet TAKE 1 TABLET BY MOUTH  DAILY 90 Tab 3    rosuvastatin (CRESTOR) 10 mg tablet TAKE 1 TABLET BY MOUTH  DAILY 90 Tab 3    diclofenac EC (VOLTAREN) 75 mg EC tablet TAKE 1 TABLET BY MOUTH TWO  TIMES DAILY 180 Tab 3    esomeprazole (NEXIUM) 40 mg capsule TAKE 1 CAPSULE BY MOUTH  DAILY 90 Cap prn    niacin ER (NIASPAN) 750 mg Tb24 TAKE 2 TABLETS BY MOUTH AT  BEDTIME 180 Tab 3    OMEGA-3-DHA-EPA-FISH OIL-COQ10 PO Take 100 mg by mouth.  fish oil-dha-epa 1,200-144-216 mg cap Take  by mouth.       aspirin 81 mg tablet Take 81 mg by mouth daily as needed.        Past Medical History:   Diagnosis Date    Arthritis     fingers    CAD (coronary artery disease)     Cardiomy[facility] ([facility]     Cholelithiasis 9/21/2017    CKD (chronic kidney disease) 9/21/2017    Diabetes mellitus ([facility]     Diverticulosis 9/21/2017    DJD (degenerative joint disease) 9/21/2017    GERD (gastroesophageal reflux disease)     H/O: depression 9/21/2017    Hiatal hernia     Hypercholesteremia     Hypertension     Hypertension with renal disease 9/21/2017    Hypothyroidism     Kidney stones     Obesity 9/21/2017    On statin therapy 9/21/2017    Sleep apnea 9/21/2017    Thyroid disease     Viral meningitis 9/1990     Past Surgical History:   Procedure Laterality Date    HX HERNIA REPAIR      HX KNEE ARTHROSCOPY      both     Allergies   Allergen Reactions    Demerol [Meperidine] Nausea and Vomiting    Phenergan [Promethazine] Unknown (comments)       REVIEW OF SYSTEMS:  General: negative for - chills or fever, or weight loss or gain  ENT: negative for - headaches, nasal congestion or tinnitus  Eyes: no blurred or visual changes  Neck: No stiffness or swollen nodes  Respiratory: negative for - cough, hemoptysis, shortness of breath or wheezing  Cardiovascular : negative for - chest pain, edema, palpitations or shortness of breath  Gastrointestinal: negative for - abdominal pain, blood in stools, heartburn or nausea/vomiting  Genito-Urinary: no dysuria, trouble voiding, or hematuria  Musculoskeletal: negative for - gait disturbance, joint pain, joint stiffness or joint swelling  Neurological: no TIA or stroke symptoms  Hematologic: no bruises, no bleeding  Lymphatic: no swollen glands  Integument: no lumps, mole changes, nail changes or rash  Endocrine:no malaise/lethargy poly uria or polydipsia or unexpected weight changes        Social History     Socioeconomic History    Marital status:      Spouse name: Not on file    Number of children: Not on file    Years of education: Not on file    Highest education level: Not on file   Tobacco Use    Smoking status: Never Smoker    Smokeless tobacco: Never Used   Substance and Sexual Activity    Alcohol use: No    Drug use: No    Sexual activity: Yes     Partners: Female     Family History   Problem Relation Age of Onset    Heart Disease Mother         AICD    Thyroid Disease Mother     Heart Disease Father         pacemaker    Hypertension Father     Cancer Father         lymphoma    Thyroid Disease Sister     Heart Disease Paternal Grandmother         angina       OBJECTIVE:     Visit Vitals  /86 (BP 1 Location: Right arm, BP Patient Position: Sitting)   Pulse 62   Temp 97.9 °F (36.6 °C)   Resp 16   Wt 254 lb 8 oz (115.4 kg)   SpO2 95%   BMI 33.58 kg/m²     CONSTITUTIONAL:   well nourished, appears age appropriate  EYES: sclera anicteric, PERRL, EOMI  ENMT:nares clear, moist mucous membranes, pharynx clear  NECK: supple. Thyroid normal, No JVD or bruits  RESPIRATORY: Chest: clear to ascultation and percussion, normal inspiratory effort  CARDIOVASCULAR: Heart: regular rate and rhythm no murmurs, rubs or gallops, PMI not displaced, No thrills  GASTROINTESTINAL: Abdomen: non distended, soft, non tender, bowel sounds normal  HEMATOLOGIC: no purpura, petechiae or bruising  LYMPHATIC: No lymph node enlargemant  MUSCULOSKELETAL: Extremities: no edema or active synovitis, pulse 1+. No diabetic foot changes noted. INTEGUMENT: No unusual rashes or suspicious skin lesions noted. Nails appear normal.  PERIPHERAL VASCULAR: normal pulses femoral, PT and DP  NEUROLOGIC: non-focal exam, A & O X 3. Normal distal sensation and proprioception all toes both feet. PSYCHIATRIC:, appropriate affect     ASSESSMENT:   1. Hypertension with renal disease    2. Controlled type 2 diabetes mellitus with stage 3 chronic kidney disease, without long-term current use of insulin ([address]    3. Mixed hyperlipidemia    4. Gastroesophageal reflux disease without esophagitis    5. Primary osteoarthritis involving multiple joints    6. Stage 3 chronic kidney disease (HCC)    7. Cardiomyopathy, unspecified type ([facility]    8. Class 1 obesity due to excess calories with serious comorbidity and body mass index (BMI) of 33.0 to 33.9 in adult      Impression  1. Hypertension that is not controlled so increase Coreg to 50 twice daily and continue lisinopril 40 daily  2. Diabetes repeat status pending a prior lab review not make adjustments if necessary. 3.  Hyperlipidemia prior lab reviewed and repeat status pending I will adjust if needed. 4.  GERD that is stable  5. DJD currently stable  6. CKD stage III repeat status pending  7. Cardiomyopathy stable  8. Obesity I did discuss diet, exercise and weight reduction for overall health benefit. I will call with lab results and recheck check him again as scheduled in 3 months for his general medical problems but I will see him in 1 month for his hypertension as I am adjusting his medicines. PLAN:  .  Orders Placed This Encounter    HEMOGLOBIN A1C WITH EAG    METABOLIC PANEL, COMPREHENSIVE ([facility])    LIPID PANEL ([facility])    CK ([facility])    carvediloL (COREG) 25 mg tablet         ATTENTION:   This medical record was transcribed using an electronic medical records system. Although proofread, it may and can contain electronic and spelling errors. Other human spelling and other errors may be present. Corrections may be executed at a later time. Please feel free to contact us for any clarifications as needed. Follow-up and Dispositions    · Return in about 3 months (around 6/9/2020). No results found for any visits on 03/09/20. Anh West MD    The patient verbalized understanding of the problems and plans as explained.

## 2020-03-09 ENCOUNTER — OFFICE VISIT (OUTPATIENT)
Dept: INTERNAL MEDICINE CLINIC | Age: 70
End: 2020-03-09

## 2020-03-09 VITALS
SYSTOLIC BLOOD PRESSURE: 144 MMHG | DIASTOLIC BLOOD PRESSURE: 86 MMHG | RESPIRATION RATE: 16 BRPM | OXYGEN SATURATION: 95 % | BODY MASS INDEX: 33.58 KG/M2 | TEMPERATURE: 97.9 F | WEIGHT: 254.5 LBS | HEART RATE: 62 BPM

## 2020-03-09 DIAGNOSIS — N18.30 CONTROLLED TYPE 2 DIABETES MELLITUS WITH STAGE 3 CHRONIC KIDNEY DISEASE, WITHOUT LONG-TERM CURRENT USE OF INSULIN (HCC): ICD-10-CM

## 2020-03-09 DIAGNOSIS — E11.22 CONTROLLED TYPE 2 DIABETES MELLITUS WITH STAGE 3 CHRONIC KIDNEY DISEASE, WITHOUT LONG-TERM CURRENT USE OF INSULIN (HCC): ICD-10-CM

## 2020-03-09 DIAGNOSIS — K21.9 GASTROESOPHAGEAL REFLUX DISEASE WITHOUT ESOPHAGITIS: ICD-10-CM

## 2020-03-09 DIAGNOSIS — N18.30 STAGE 3 CHRONIC KIDNEY DISEASE (HCC): ICD-10-CM

## 2020-03-09 DIAGNOSIS — I42.9 CARDIOMYOPATHY, UNSPECIFIED TYPE (HCC): ICD-10-CM

## 2020-03-09 DIAGNOSIS — I12.9 HYPERTENSION WITH RENAL DISEASE: Primary | ICD-10-CM

## 2020-03-09 DIAGNOSIS — E78.2 MIXED HYPERLIPIDEMIA: ICD-10-CM

## 2020-03-09 DIAGNOSIS — M15.9 PRIMARY OSTEOARTHRITIS INVOLVING MULTIPLE JOINTS: ICD-10-CM

## 2020-03-09 DIAGNOSIS — E66.09 CLASS 1 OBESITY DUE TO EXCESS CALORIES WITH SERIOUS COMORBIDITY AND BODY MASS INDEX (BMI) OF 33.0 TO 33.9 IN ADULT: ICD-10-CM

## 2020-03-09 LAB
A-G RATIO,AGRAT: 1.6 RATIO
ALBUMIN SERPL-MCNC: 4.1 G/DL (ref 3.9–5.4)
ALP SERPL-CCNC: 70 U/L (ref 38–126)
ALT SERPL-CCNC: 25 U/L (ref 0–50)
ANION GAP SERPL CALC-SCNC: 10 MMOL/L
AST SERPL W P-5'-P-CCNC: 31 U/L (ref 14–36)
BILIRUB SERPL-MCNC: 0.7 MG/DL (ref 0.2–1.3)
BUN SERPL-MCNC: 16 MG/DL (ref 9–20)
BUN/CREATININE RATIO,BUCR: 15 RATIO
CALCIUM SERPL-MCNC: 9.1 MG/DL (ref 8.4–10.2)
CHLORIDE SERPL-SCNC: 107 MMOL/L (ref 98–107)
CHOL/HDL RATIO,CHHD: 3 RATIO (ref 0–4)
CHOLEST SERPL-MCNC: 111 MG/DL (ref 0–200)
CK SERPL-CCNC: 152 U/L (ref 30–135)
CO2 SERPL-SCNC: 28 MMOL/L (ref 22–32)
CREAT SERPL-MCNC: 1.1 MG/DL (ref 0.8–1.5)
GLOBULIN,GLOB: 2.6
GLUCOSE SERPL-MCNC: 108 MG/DL (ref 75–110)
HDLC SERPL-MCNC: 40 MG/DL (ref 35–130)
LDL/HDL RATIO,LDHD: 1 RATIO
LDLC SERPL CALC-MCNC: 51 MG/DL (ref 0–130)
POTASSIUM SERPL-SCNC: 4 MMOL/L (ref 3.6–5)
PROT SERPL-MCNC: 6.7 G/DL (ref 6.3–8.2)
SODIUM SERPL-SCNC: 145 MMOL/L (ref 137–145)
TRIGL SERPL-MCNC: 99 MG/DL (ref 0–200)
VLDLC SERPL CALC-MCNC: 20 MG/DL

## 2020-03-09 RX ORDER — CARVEDILOL 25 MG/1
25 TABLET ORAL 2 TIMES DAILY WITH MEALS
Qty: 60 TAB | Status: SHIPPED | OUTPATIENT
Start: 2020-03-09 | End: 2020-04-10 | Stop reason: SDUPTHER

## 2020-03-09 NOTE — PROGRESS NOTES
Chief Complaint   Patient presents with    Hypertension     3 month f/up    Chronic Kidney Disease    Diabetes    GERD    Cholesterol Problem     1. Have you been to the ER, urgent care clinic since your last visit? Hospitalized since your last visit? No    2. Have you seen or consulted any other health care providers outside of the [address] [place] [address] since your last visit? Include any pap smears or colon screening. Saw Cardiologist Dr. Mary Reese last week.

## 2020-03-09 NOTE — PATIENT INSTRUCTIONS
Arthritis: Care Instructions  Your Care Instructions  Arthritis, also called osteoarthritis, is a breakdown of the cartilage that cushions your joints. When the cartilage wears down, your bones rub against each other. This causes pain and stiffness. Many people have some arthritis as they age. Arthritis most often affects the joints of the spine, hands, hips, knees, or feet. You can take simple measures to protect your joints, ease your pain, and help you stay active. Follow-up care is a key part of your treatment and safety. Be sure to make and go to all appointments, and call your doctor if you are having problems. It's also a good idea to know your test results and keep a list of the medicines you take. How can you care for yourself at home? · Stay at a healthy weight. Being overweight puts extra strain on your joints. · Talk to your doctor or physical therapist about exercises that will help ease joint pain. ? Stretch. You may enjoy gentle forms of yoga to help keep your joints and muscles flexible. ? Walk instead of jog. Other types of exercise that are less stressful on the joints include riding a bicycle, swimming, flip chi, or water exercise. ? Lift weights. Strong muscles help reduce stress on your joints. Stronger thigh muscles, for example, take some of the stress off of the knees and hips. Learn the right way to lift weights so you do not make joint pain worse. · Take your medicines exactly as prescribed. Call your doctor if you think you are having a problem with your medicine. · Take pain medicines exactly as directed. ? If the doctor gave you a prescription medicine for pain, take it as prescribed. ? If you are not taking a prescription pain medicine, ask your doctor if you can take an over-the-counter medicine. · Use a cane, crutch, walker, or another device if you need help to get around. These can help rest your joints.  You also can use other things to make life easier, such as a higher toilet seat and padded handles on kitchen utensils. · Do not sit in low chairs, which can make it hard to get up. · Put heat or cold on your sore joints as needed. Use whichever helps you most. You also can take turns with hot and cold packs. ? Apply heat 2 or 3 times a day for 20 to 30 minutes--using a heating pad, hot shower, or hot pack--to relieve pain and stiffness. ? Put ice or a cold pack on your sore joint for 10 to 20 minutes at a time. Put a thin cloth between the ice and your skin. When should you call for help? Call your doctor now or seek immediate medical care if:    · You have sudden swelling, warmth, or pain in any joint.     · You have joint pain and a fever or rash.     · You have such bad pain that you cannot use a joint.    Watch closely for changes in your health, and be sure to contact your doctor if:    · You have mild joint symptoms that continue even with more than 6 weeks of care at home.     · You have stomach pain or other problems with your medicine. Where can you learn more? Go to http://rosa-jaylene.info/. Enter B259 in the search box to learn more about \"Arthritis: Care Instructions. \"  Current as of: April 1, 2019  Content Version: 12.2  © 4328-4459 [employer]. Care instructions adapted under license by [employer] (which disclaims liability or warranty for this information). If you have questions about a medical condition or this instruction, always ask your healthcare professional. [address] any warranty or liability for your use of this information.

## 2020-03-10 LAB
EST. AVERAGE GLUCOSE BLD GHB EST-MCNC: 128 MG/DL
HBA1C MFR BLD: 6.1 % (ref 4.8–5.6)

## 2020-04-09 NOTE — PROGRESS NOTES
Chief Complaint   Patient presents with    Hypertension     follow-up    Cholesterol Problem       SUBJECTIVE:    Russ Gomes III is a 78 y.o. male who returns in follow-up for his hypertension after having been seen here on March 9 which time his blood pressure was 144/86 so we increased his Coreg to 25 mg twice daily and he returns today. He has been taking that as directed. He denies any chest pain, shortness of breath, palpitations, PND, orthopnea or other cardiorespiratory complaints. There are no GI or  complaints. He notes no headaches, dizziness or neurologic complaints. There are no other complaints noted. Current Outpatient Medications   Medication Sig Dispense Refill    olmesartan (BENICAR) 20 mg tablet Take 1 Tab by mouth daily. 30 Tab 3    carvediloL (COREG) 25 mg tablet Take 1 Tab by mouth two (2) times daily (with meals). 180 Tab 3    famotidine (PEPCID) 40 mg tablet TAKE 1 TABLET BY MOUTH  DAILY 90 Tab 3    metFORMIN ER (GLUCOPHAGE XR) 500 mg tablet TAKE 1 TABLET BY MOUTH IN  THE MORNING AND 2 TABLETS  AT DINNER 270 Tab 3    lisinopril (PRINIVIL, ZESTRIL) 40 mg tablet Take 1 Tab by mouth daily. 90 Tab 3    fenofibrate nanocrystallized (TRICOR) 145 mg tablet TAKE 1 TABLET BY MOUTH  DAILY 90 Tab 3    levothyroxine (SYNTHROID) 50 mcg tablet TAKE 1 TABLET BY MOUTH  DAILY 90 Tab 3    rosuvastatin (CRESTOR) 10 mg tablet TAKE 1 TABLET BY MOUTH  DAILY 90 Tab 3    diclofenac EC (VOLTAREN) 75 mg EC tablet TAKE 1 TABLET BY MOUTH TWO  TIMES DAILY 180 Tab 3    esomeprazole (NEXIUM) 40 mg capsule TAKE 1 CAPSULE BY MOUTH  DAILY 90 Cap prn    niacin ER (NIASPAN) 750 mg Tb24 TAKE 2 TABLETS BY MOUTH AT  BEDTIME 180 Tab 3    OMEGA-3-DHA-EPA-FISH OIL-COQ10 PO Take 100 mg by mouth.  fish oil-dha-epa 1,200-144-216 mg cap Take  by mouth.  aspirin 81 mg tablet Take 81 mg by mouth daily as needed.        Past Medical History:   Diagnosis Date    Arthritis     fingers    CAD (coronary artery disease)     Cardiomyopathy ([facility]     Cholelithiasis 9/21/2017    CKD (chronic kidney disease) 9/21/2017    Diabetes mellitus ([facility]     Diverticulosis 9/21/2017    DJD (degenerative joint disease) 9/21/2017    GERD (gastroesophageal reflux disease)     H/O: depression 9/21/2017    Hiatal hernia     Hypercholesteremia     Hypertension     Hypertension with renal disease 9/21/2017    Hypothyroidism     Kidney stones     Obesity 9/21/2017    On statin therapy 9/21/2017    Sleep apnea 9/21/2017    Thyroid disease     Viral meningitis 9/1990     Past Surgical History:   Procedure Laterality Date    HX HERNIA REPAIR      HX KNEE ARTHROSCOPY      both     Allergies   Allergen Reactions    Demerol [Meperidine] Nausea and Vomiting    Phenergan [Promethazine] Unknown (comments)       REVIEW OF SYSTEMS:  General: negative for - chills or fever, or weight loss or gain  ENT: negative for - headaches, nasal congestion or tinnitus  Eyes: no blurred or visual changes  Neck: No stiffness or swollen nodes  Respiratory: negative for - cough, hemoptysis, shortness of breath or wheezing  Cardiovascular : negative for - chest pain, edema, palpitations or shortness of breath  Gastrointestinal: negative for - abdominal pain, blood in stools, heartburn or nausea/vomiting  Genito-Urinary: no dysuria, trouble voiding, or hematuria  Musculoskeletal: negative for - gait disturbance, joint pain, joint stiffness or joint swelling  Neurological: no TIA or stroke symptoms  Hematologic: no bruises, no bleeding  Lymphatic: no swollen glands  Integument: no lumps, mole changes, nail changes or rash  Endocrine:no malaise/lethargy poly uria or polydipsia or unexpected weight changes        Social History     Socioeconomic History    Marital status:      Spouse name: Not on file    Number of children: Not on file    Years of education: Not on file    Highest education level: Not on file   Tobacco Use    Smoking status: Never Smoker    Smokeless tobacco: Never Used   Substance and Sexual Activity    Alcohol use: No    Drug use: No    Sexual activity: Yes     Partners: Female     Family History   Problem Relation Age of Onset    Heart Disease Mother         AICD    Thyroid Disease Mother     Heart Disease Father         pacemaker    Hypertension Father     Cancer Father         lymphoma    Thyroid Disease Sister     Heart Disease Paternal Grandmother         angina       OBJECTIVE:     Visit Vitals  BP (!) 168/92   Pulse 69   Temp 98 °F (36.7 °C) (Oral)   Ht 6' 1\" (1.854 m)   Wt 255 lb 3.2 oz (115.8 kg)   SpO2 97%   BMI 33.67 kg/m²     CONSTITUTIONAL:   well nourished, appears age appropriate  EYES: sclera anicteric, PERRL, EOMI  ENMT:nares clear, moist mucous membranes, pharynx clear  NECK: supple. Thyroid normal, No JVD or bruits  RESPIRATORY: Chest: clear to ascultation and percussion, normal inspiratory effort  CARDIOVASCULAR: Heart: regular rate and rhythm no murmurs, rubs or gallops, PMI not displaced, No thrills  GASTROINTESTINAL: Abdomen: non distended, soft, non tender, bowel sounds normal  HEMATOLOGIC: no purpura, petechiae or bruising  LYMPHATIC: No lymph node enlargemant  MUSCULOSKELETAL: Extremities: no edema or active synovitis, pulse 1+   INTEGUMENT: No unusual rashes or suspicious skin lesions noted. Nails appear normal.  PERIPHERAL VASCULAR: normal pulses femoral, PT and DP  NEUROLOGIC: non-focal exam, A & O X 3  PSYCHIATRIC:, appropriate affect     ASSESSMENT:   1. Hypertension with renal disease    2. Cardiomyopathy, unspecified type ([address]    3. Class 1 obesity due to excess calories with serious comorbidity and body mass index (BMI) of 33.0 to 33.9 in adult      Impression  1.   Hypertension that is still not controlled so at this point we will increase his blood pressure treatment by adding Benicar 20 mg daily and explained to him this is different than lisinopril and he will continue his Coreg  2. Cardiomyopathy stable  3. Obesity we did discuss diet, exercise and weight reduction for overall health benefit. I will recheck a myself again and 1 month or sooner should it be a problem. PLAN:  .  Orders Placed This Encounter    olmesartan (BENICAR) 20 mg tablet    carvediloL (COREG) 25 mg tablet         ATTENTION:   This medical record was transcribed using an electronic medical records system. Although proofread, it may and can contain electronic and spelling errors. Other human spelling and other errors may be present. Corrections may be executed at a later time. Please feel free to contact us for any clarifications as needed. Follow-up and Dispositions    · Return in about 4 weeks (around 5/8/2020). No results found for any visits on 04/10/20. Arely Verma MD    The patient verbalized understanding of the problems and plans as explained.

## 2020-04-10 ENCOUNTER — OFFICE VISIT (OUTPATIENT)
Dept: INTERNAL MEDICINE CLINIC | Age: 70
End: 2020-04-10

## 2020-04-10 VITALS
WEIGHT: 255.2 LBS | DIASTOLIC BLOOD PRESSURE: 92 MMHG | TEMPERATURE: 98 F | HEART RATE: 69 BPM | SYSTOLIC BLOOD PRESSURE: 168 MMHG | OXYGEN SATURATION: 97 % | HEIGHT: 73 IN | BODY MASS INDEX: 33.82 KG/M2

## 2020-04-10 DIAGNOSIS — I42.9 CARDIOMYOPATHY, UNSPECIFIED TYPE (HCC): ICD-10-CM

## 2020-04-10 DIAGNOSIS — E66.09 CLASS 1 OBESITY DUE TO EXCESS CALORIES WITH SERIOUS COMORBIDITY AND BODY MASS INDEX (BMI) OF 33.0 TO 33.9 IN ADULT: ICD-10-CM

## 2020-04-10 DIAGNOSIS — I12.9 HYPERTENSION WITH RENAL DISEASE: Primary | ICD-10-CM

## 2020-04-10 RX ORDER — OLMESARTAN MEDOXOMIL 20 MG/1
20 TABLET ORAL DAILY
Qty: 30 TAB | Refills: 3 | Status: SHIPPED | OUTPATIENT
Start: 2020-04-10 | End: 2020-05-11 | Stop reason: ALTCHOICE

## 2020-04-10 RX ORDER — CARVEDILOL 25 MG/1
25 TABLET ORAL 2 TIMES DAILY WITH MEALS
Qty: 180 TAB | Refills: 3 | Status: SHIPPED | OUTPATIENT
Start: 2020-04-10 | End: 2021-01-11

## 2020-04-10 NOTE — PATIENT INSTRUCTIONS
Body Mass Index: Care Instructions  Your Care Instructions    Body mass index (BMI) can help you see if your weight is raising your risk for health problems. It uses a formula to compare how much you weigh with how tall you are. · A BMI lower than 18.5 is considered underweight. · A BMI between 18.5 and 24.9 is considered healthy. · A BMI between 25 and 29.9 is considered overweight. A BMI of 30 or higher is considered obese. If your BMI is in the normal range, it means that you have a lower risk for weight-related health problems. If your BMI is in the overweight or obese range, you may be at increased risk for weight-related health problems, such as high blood pressure, heart disease, stroke, arthritis or joint pain, and diabetes. If your BMI is in the underweight range, you may be at increased risk for health problems such as fatigue, lower protection (immunity) against illness, muscle loss, bone loss, hair loss, and hormone problems. BMI is just one measure of your risk for weight-related health problems. You may be at higher risk for health problems if you are not active, you eat an unhealthy diet, or you drink too much alcohol or use tobacco products. Follow-up care is a key part of your treatment and safety. Be sure to make and go to all appointments, and call your doctor if you are having problems. It's also a good idea to know your test results and keep a list of the medicines you take. How can you care for yourself at home? · Practice healthy eating habits. This includes eating plenty of fruits, vegetables, whole grains, lean protein, and low-fat dairy. · If your doctor recommends it, get more exercise. Walking is a good choice. Bit by bit, increase the amount you walk every day. Try for at least 30 minutes on most days of the week. · Do not smoke. Smoking can increase your risk for health problems. If you need help quitting, talk to your doctor about stop-smoking programs and medicines. These can increase your chances of quitting for good. · Limit alcohol to 2 drinks a day for men and 1 drink a day for women. Too much alcohol can cause health problems. If you have a BMI higher than 25  · Your doctor may do other tests to check your risk for weight-related health problems. This may include measuring the distance around your waist. A waist measurement of more than 40 inches in men or 35 inches in women can increase the risk of weight-related health problems. · Talk with your doctor about steps you can take to stay healthy or improve your health. You may need to make lifestyle changes to lose weight and stay healthy, such as changing your diet and getting regular exercise. If you have a BMI lower than 18.5  · Your doctor may do other tests to check your risk for health problems. · Talk with your doctor about steps you can take to stay healthy or improve your health. You may need to make lifestyle changes to gain or maintain weight and stay healthy, such as getting more healthy foods in your diet and doing exercises to build muscle. Where can you learn more? Go to http://rosa-jaylene.info/  Enter S176 in the search box to learn more about \"Body Mass Index: Care Instructions. \"  Current as of: December 10, 2019Content Version: 12.4  © 5940-1970 Healthwise, Incorporated. Care instructions adapted under license by [employer] (which disclaims liability or warranty for this information). If you have questions about a medical condition or this instruction, always ask your healthcare professional. [address] any warranty or liability for your use of this information.

## 2020-04-10 NOTE — PROGRESS NOTES
Chief Complaint   Patient presents with    Hypertension     follow-up    Cholesterol Problem     1. Have you been to the ER, urgent care clinic since your last visit? Hospitalized since your last visit? No    2. Have you seen or consulted any other health care providers outside of the [address] [place] [address] since your last visit? Include any pap smears or colon screening.  No

## 2020-05-08 NOTE — PROGRESS NOTES
Chief Complaint   Patient presents with    Hypertension       SUBJECTIVE:    Maria D Keen III is a 78 y.o. male who returns in follow-up regarding his hypertension and his cardiomyopathy after having recently been seen at which time his blood pressure was significant up to 168/92 and Benicar 20 mg was added along with his lisinopril 40 mg daily Coreg 25 mg twice daily, and other medications. He is tolerated the additional medication without any difficulty. He currently denies any chest pain, shortness of breath, palpitations, PND, orthopnea or other cardiorespiratory complaints. He notes no GI or  complaints. He notes no headaches, dizziness or neurologic complaints. No other complaints noted. Current Outpatient Medications   Medication Sig Dispense Refill    olmesartan (BENICAR) 40 mg tablet Take 1 Tab by mouth daily. 30 Tab prn    carvediloL (COREG) 25 mg tablet Take 1 Tab by mouth two (2) times daily (with meals). 180 Tab 3    famotidine (PEPCID) 40 mg tablet TAKE 1 TABLET BY MOUTH  DAILY 90 Tab 3    metFORMIN ER (GLUCOPHAGE XR) 500 mg tablet TAKE 1 TABLET BY MOUTH IN  THE MORNING AND 2 TABLETS  AT DINNER 270 Tab 3    lisinopril (PRINIVIL, ZESTRIL) 40 mg tablet Take 1 Tab by mouth daily. 90 Tab 3    fenofibrate nanocrystallized (TRICOR) 145 mg tablet TAKE 1 TABLET BY MOUTH  DAILY 90 Tab 3    levothyroxine (SYNTHROID) 50 mcg tablet TAKE 1 TABLET BY MOUTH  DAILY 90 Tab 3    rosuvastatin (CRESTOR) 10 mg tablet TAKE 1 TABLET BY MOUTH  DAILY 90 Tab 3    diclofenac EC (VOLTAREN) 75 mg EC tablet TAKE 1 TABLET BY MOUTH TWO  TIMES DAILY 180 Tab 3    esomeprazole (NEXIUM) 40 mg capsule TAKE 1 CAPSULE BY MOUTH  DAILY 90 Cap prn    niacin ER (NIASPAN) 750 mg Tb24 TAKE 2 TABLETS BY MOUTH AT  BEDTIME 180 Tab 3    OMEGA-3-DHA-EPA-FISH OIL-COQ10 PO Take 100 mg by mouth.  fish oil-dha-epa 1,200-144-216 mg cap Take  by mouth.  aspirin 81 mg tablet Take 81 mg by mouth daily as needed. Past Medical History:   Diagnosis Date    Arthritis     fingers    CAD (coronary artery disease)     [facility]omyopathy ([facility]     Cholelithiasis 9/21/2017    CKD (chronic kidney disease) 9/21/2017    Diabetes mellitus ([facility]     Diverticulosis 9/21/2017    DJD (degenerative joint disease) 9/21/2017    GERD (gastroesophageal reflux disease)     H/O: depression 9/21/2017    Hiatal hernia     Hypercholesteremia     Hypertension     Hypertension with renal disease 9/21/2017    Hypothyroidism     Kidney stones     Obesity 9/21/2017    On statin therapy 9/21/2017    Sleep apnea 9/21/2017    Thyroid disease     Viral meningitis 9/1990     Past Surgical History:   Procedure Laterality Date    HX HERNIA REPAIR      HX KNEE ARTHROSCOPY      both     Allergies   Allergen Reactions    Demerol [Meperidine] Nausea and Vomiting    Phenergan [Promethazine] Unknown (comments)       REVIEW OF SYSTEMS:  General: negative for - chills or fever, or weight loss or gain  ENT: negative for - headaches, nasal congestion or tinnitus  Eyes: no blurred or visual changes  Neck: No stiffness or swollen nodes  Respiratory: negative for - cough, hemoptysis, shortness of breath or wheezing  Cardiovascular : negative for - chest pain, edema, palpitations or shortness of breath  Gastrointestinal: negative for - abdominal pain, blood in stools, heartburn or nausea/vomiting  Genito-Urinary: no dysuria, trouble voiding, or hematuria  Musculoskeletal: negative for - gait disturbance, joint pain, joint stiffness or joint swelling  Neurological: no TIA or stroke symptoms  Hematologic: no bruises, no bleeding  Lymphatic: no swollen glands  Integument: no lumps, mole changes, nail changes or rash  Endocrine:no malaise/lethargy poly uria or polydipsia or unexpected weight changes        Social History     Socioeconomic History    Marital status:      Spouse name: Not on file    Number of children: Not on file    Years of education: Not on file    Highest education level: Not on file   Tobacco Use    Smoking status: Never Smoker    Smokeless tobacco: Never Used   Substance and Sexual Activity    Alcohol use: No    Drug use: No    Sexual activity: Yes     Partners: Female     Family History   Problem Relation Age of Onset    Heart Disease Mother         AICD    Thyroid Disease Mother     Heart Disease Father         pacemaker    Hypertension Father     Cancer Father         lymphoma    Thyroid Disease Sister     Heart Disease Paternal Grandmother         angina       OBJECTIVE:     Visit Vitals  /86   Pulse 65   Temp 97.5 °F (36.4 °C) (Oral)   Ht 6' 1\" (1.854 m)   Wt 252 lb 9.6 oz (114.6 kg)   SpO2 97%   BMI 33.33 kg/m²     CONSTITUTIONAL:   well nourished, appears age appropriate  EYES: sclera anicteric, PERRL, EOMI  ENMT:nares clear, moist mucous membranes, pharynx clear  NECK: supple. Thyroid normal, No JVD or bruits  RESPIRATORY: Chest: clear to ascultation and percussion, normal inspiratory effort  CARDIOVASCULAR: Heart: regular rate and rhythm no murmurs, rubs or gallops, PMI not displaced, No thrills, no peripheral edema  GASTROINTESTINAL: Abdomen: non distended, soft, non tender, bowel sounds normal  HEMATOLOGIC: no purpura, petechiae or bruising  LYMPHATIC: No lymph node enlargemant  MUSCULOSKELETAL: Extremities: no active synovitis, pulse 1+   INTEGUMENT: No unusual rashes or suspicious skin lesions noted. Nails appear normal.  PERIPHERAL VASCULAR: normal pulses femoral, PT and DP  NEUROLOGIC: non-focal exam, A & O X 3  PSYCHIATRIC:, appropriate affect     ASSESSMENT:   1. Hypertension with renal disease    2. Cardiomyopathy, unspecified type ([address]      Impression  1. Hypertension that is not yet controlled although it is improved so increase Benicar to 40 mg daily  2.   Cardiomyopathy that stable  I will recheck a myself again and 1 month for his hypertension as well as his other medical problems at that time.    PLAN:  .  Orders Placed This Encounter    olmesartan (BENICAR) 40 mg tablet         ATTENTION:   This medical record was transcribed using an electronic medical records system. Although proofread, it may and can contain electronic and spelling errors. Other human spelling and other errors may be present. Corrections may be executed at a later time. Please feel free to contact us for any clarifications as needed. Follow-up and Dispositions    · Return in about 4 weeks (around 6/8/2020). No results found for any visits on 05/11/20. Emily Duron MD    The patient verbalized understanding of the problems and plans as explained.

## 2020-05-11 ENCOUNTER — OFFICE VISIT (OUTPATIENT)
Dept: INTERNAL MEDICINE CLINIC | Age: 70
End: 2020-05-11

## 2020-05-11 VITALS
HEIGHT: 73 IN | BODY MASS INDEX: 33.48 KG/M2 | OXYGEN SATURATION: 97 % | DIASTOLIC BLOOD PRESSURE: 86 MMHG | SYSTOLIC BLOOD PRESSURE: 150 MMHG | TEMPERATURE: 97.5 F | WEIGHT: 252.6 LBS | HEART RATE: 65 BPM

## 2020-05-11 DIAGNOSIS — I42.9 CARDIOMYOPATHY, UNSPECIFIED TYPE (HCC): ICD-10-CM

## 2020-05-11 DIAGNOSIS — I12.9 HYPERTENSION WITH RENAL DISEASE: Primary | ICD-10-CM

## 2020-05-11 RX ORDER — OLMESARTAN MEDOXOMIL 40 MG/1
40 TABLET ORAL DAILY
Qty: 30 TAB | Status: SHIPPED | OUTPATIENT
Start: 2020-05-11 | End: 2020-08-31 | Stop reason: SDUPTHER

## 2020-05-11 NOTE — PATIENT INSTRUCTIONS
Body Mass Index: Care Instructions  Your Care Instructions    Body mass index (BMI) can help you see if your weight is raising your risk for health problems. It uses a formula to compare how much you weigh with how tall you are. · A BMI lower than 18.5 is considered underweight. · A BMI between 18.5 and 24.9 is considered healthy. · A BMI between 25 and 29.9 is considered overweight. A BMI of 30 or higher is considered obese. If your BMI is in the normal range, it means that you have a lower risk for weight-related health problems. If your BMI is in the overweight or obese range, you may be at increased risk for weight-related health problems, such as high blood pressure, heart disease, stroke, arthritis or joint pain, and diabetes. If your BMI is in the underweight range, you may be at increased risk for health problems such as fatigue, lower protection (immunity) against illness, muscle loss, bone loss, hair loss, and hormone problems. BMI is just one measure of your risk for weight-related health problems. You may be at higher risk for health problems if you are not active, you eat an unhealthy diet, or you drink too much alcohol or use tobacco products. Follow-up care is a key part of your treatment and safety. Be sure to make and go to all appointments, and call your doctor if you are having problems. It's also a good idea to know your test results and keep a list of the medicines you take. How can you care for yourself at home? · Practice healthy eating habits. This includes eating plenty of fruits, vegetables, whole grains, lean protein, and low-fat dairy. · If your doctor recommends it, get more exercise. Walking is a good choice. Bit by bit, increase the amount you walk every day. Try for at least 30 minutes on most days of the week. · Do not smoke. Smoking can increase your risk for health problems. If you need help quitting, talk to your doctor about stop-smoking programs and medicines. These can increase your chances of quitting for good. · Limit alcohol to 2 drinks a day for men and 1 drink a day for women. Too much alcohol can cause health problems. If you have a BMI higher than 25  · Your doctor may do other tests to check your risk for weight-related health problems. This may include measuring the distance around your waist. A waist measurement of more than 40 inches in men or 35 inches in women can increase the risk of weight-related health problems. · Talk with your doctor about steps you can take to stay healthy or improve your health. You may need to make lifestyle changes to lose weight and stay healthy, such as changing your diet and getting regular exercise. If you have a BMI lower than 18.5  · Your doctor may do other tests to check your risk for health problems. · Talk with your doctor about steps you can take to stay healthy or improve your health. You may need to make lifestyle changes to gain or maintain weight and stay healthy, such as getting more healthy foods in your diet and doing exercises to build muscle. Where can you learn more? Go to http://rosa-jaylene.info/  Enter S176 in the search box to learn more about \"Body Mass Index: Care Instructions. \"  Current as of: December 10, 2019Content Version: 12.4  © 8449-7222 Healthwise, Incorporated. Care instructions adapted under license by [employer] (which disclaims liability or warranty for this information). If you have questions about a medical condition or this instruction, always ask your healthcare professional. [address] any warranty or liability for your use of this information.

## 2020-05-11 NOTE — PROGRESS NOTES
Leydi Vera III is a 78 y.o. male    HIPAA verified by two patient identifiers. Health Maintenance Due   Topic Date Due    Eye Exam Retinal or Dilated  03/01/1962    DTaP/Tdap/Td series (1 - Tdap) 03/01/1973    Shingrix Vaccine Age 50> (1 of 2) 03/01/2002    GLAUCOMA SCREENING Q2Y  03/01/2017    Colonoscopy  02/17/2019       Chief Complaint   Patient presents with    Hypertension         Visit Vitals  BP (!) 165/97 (BP 1 Location: Left arm, BP Patient Position: Sitting)   Pulse 65   Temp 97.5 °F (36.4 °C) (Oral)   Ht 6' 1\" (1.854 m)   Wt 252 lb 9.6 oz (114.6 kg)   SpO2 97%   BMI 33.33 kg/m²       Pain Scale: 0 - No pain/10  Pain Location:     1. Have you been to the ER, urgent care clinic since your last visit? Hospitalized since your last visit? No    2. Have you seen or consulted any other health care providers outside of the [address] [place] [address] since your last visit? Include any pap smears or colon screening.  No      .FRANKIE

## 2020-06-08 NOTE — PROGRESS NOTES
Chief Complaint   Patient presents with    Hypertension     3 month f/up    Chronic Kidney Disease    Diabetes    Cholesterol Problem    Cardiomyopathy    Thyroid Problem       SUBJECTIVE:    Adebayo Fraser III is a 78 y.o. male who returns in follow-up for his medical problems include hypertension, diabetes, hyperlipidemia, CKD stage III, cardiomyopathy, GERD, DJD, obesity and other medical problems. He is taking his medications and trying to follow his diet and remain physically active. He currently denies any chest pain, shortness of breath, palpitations, PND, orthopnea or other cardiorespiratory complaints. He notes no GI or  complaints. He notes no headaches, dizziness or neurologic complaints. There are no current active arthritic complaints although does have some chronic joint aches and pains. There are no other complaints on complete review of systems. Current Outpatient Medications   Medication Sig Dispense Refill    olmesartan (BENICAR) 40 mg tablet Take 1 Tab by mouth daily. 30 Tab prn    carvediloL (COREG) 25 mg tablet Take 1 Tab by mouth two (2) times daily (with meals). 180 Tab 3    famotidine (PEPCID) 40 mg tablet TAKE 1 TABLET BY MOUTH  DAILY 90 Tab 3    metFORMIN ER (GLUCOPHAGE XR) 500 mg tablet TAKE 1 TABLET BY MOUTH IN  THE MORNING AND 2 TABLETS  AT DINNER 270 Tab 3    lisinopril (PRINIVIL, ZESTRIL) 40 mg tablet Take 1 Tab by mouth daily.  90 Tab 3    fenofibrate nanocrystallized (TRICOR) 145 mg tablet TAKE 1 TABLET BY MOUTH  DAILY 90 Tab 3    levothyroxine (SYNTHROID) 50 mcg tablet TAKE 1 TABLET BY MOUTH  DAILY 90 Tab 3    rosuvastatin (CRESTOR) 10 mg tablet TAKE 1 TABLET BY MOUTH  DAILY 90 Tab 3    diclofenac EC (VOLTAREN) 75 mg EC tablet TAKE 1 TABLET BY MOUTH TWO  TIMES DAILY 180 Tab 3    esomeprazole (NEXIUM) 40 mg capsule TAKE 1 CAPSULE BY MOUTH  DAILY 90 Cap prn    niacin ER (NIASPAN) 750 mg Tb24 TAKE 2 TABLETS BY MOUTH AT  BEDTIME 180 Tab 3    OMEGA-3-DHA-EPA-FISH OIL-COQ10 PO Take 100 mg by mouth.  fish oil-dha-epa 1,200-144-216 mg cap Take  by mouth.  aspirin 81 mg tablet Take 81 mg by mouth daily as needed.        Past Medical History:   Diagnosis Date    Arthritis     fingers    CAD (coronary artery disease)     Cardiomyopathy ([facility]     Cholelithiasis 9/21/2017    CKD (chronic kidney disease) 9/21/2017    Diabetes mellitus ([facility]     Diverticulosis 9/21/2017    DJD (degenerative joint disease) 9/21/2017    GERD (gastroesophageal reflux disease)     H/O: depression 9/21/2017    Hiatal hernia     Hypercholesteremia     Hypertension     Hypertension with renal disease 9/21/2017    Hypothyroidism     Kidney stones     Obesity 9/21/2017    On statin therapy 9/21/2017    Sleep apnea 9/21/2017    Thyroid disease     Viral meningitis 9/1990     Past Surgical History:   Procedure Laterality Date    HX HERNIA REPAIR      HX KNEE ARTHROSCOPY      both     Allergies   Allergen Reactions    Demerol [Meperidine] Nausea and Vomiting    Phenergan [Promethazine] Unknown (comments)       REVIEW OF SYSTEMS:  General: negative for - chills or fever, or weight loss or gain  ENT: negative for - headaches, nasal congestion or tinnitus  Eyes: no blurred or visual changes  Neck: No stiffness or swollen nodes  Respiratory: negative for - cough, hemoptysis, shortness of breath or wheezing  Cardiovascular : negative for - chest pain, edema, palpitations or shortness of breath  Gastrointestinal: negative for - abdominal pain, blood in stools, heartburn or nausea/vomiting  Genito-Urinary: no dysuria, trouble voiding, or hematuria  Musculoskeletal: negative for - gait disturbance, joint pain, joint stiffness or joint swelling  Neurological: no TIA or stroke symptoms  Hematologic: no bruises, no bleeding  Lymphatic: no swollen glands  Integument: no lumps, mole changes, nail changes or rash  Endocrine:no malaise/lethargy poly uria or polydipsia or unexpected weight changes        Social History     Socioeconomic History    Marital status:      Spouse name: Not on file    Number of children: Not on file    Years of education: Not on file    Highest education level: Not on file   Tobacco Use    Smoking status: Never Smoker    Smokeless tobacco: Never Used   Substance and Sexual Activity    Alcohol use: No    Drug use: No    Sexual activity: Yes     Partners: Female     Family History   Problem Relation Age of Onset    Heart Disease Mother         AICD    Thyroid Disease Mother     Heart Disease Father         pacemaker    Hypertension Father     Cancer Father         lymphoma    Thyroid Disease Sister     Heart Disease Paternal Grandmother         angina       OBJECTIVE:     Visit Vitals  /82   Pulse 66   Temp 97.4 °F (36.3 °C)   Resp 18   Ht 6' 1\" (1.854 m)   Wt 251 lb 6.4 oz (114 kg)   SpO2 97%   BMI 33.17 kg/m²     CONSTITUTIONAL:   well nourished, appears age appropriate  EYES: sclera anicteric, PERRL, EOMI  ENMT:nares clear, moist mucous membranes, pharynx clear  NECK: supple. Thyroid normal, No JVD or bruits  RESPIRATORY: Chest: clear to ascultation and percussion, normal inspiratory effort  CARDIOVASCULAR: Heart: regular rate and rhythm no murmurs, rubs or gallops, PMI not displaced, No thrills, no peripheral edema  GASTROINTESTINAL: Abdomen: non distended, soft, non tender, bowel sounds normal  HEMATOLOGIC: no purpura, petechiae or bruising  LYMPHATIC: No lymph node enlargemant  MUSCULOSKELETAL: Extremities: no active synovitis, pulse 1+   INTEGUMENT: No unusual rashes or suspicious skin lesions noted. Nails appear normal.  PERIPHERAL VASCULAR: normal pulses femoral, PT and DP  NEUROLOGIC: non-focal exam, A & O X 3  PSYCHIATRIC:, appropriate affect     ASSESSMENT:   1. Hypertension with renal disease    2.  Controlled type 2 diabetes mellitus with stage 3 chronic kidney disease, without long-term current use of insulin ([address] 3. Mixed hyperlipidemia    4. Primary osteoarthritis involving multiple joints    5. Gastroesophageal reflux disease without esophagitis    6. Stage 3 chronic kidney disease (HCC)    7. Cardiomyopathy, unspecified type ([facility]    8. Class 1 obesity due to excess calories with serious comorbidity and body mass index (BMI) of 33.0 to 33.9 in adult      Impression  1. Hypertension control not adequate and he thinks is stress related so no change in medicines today those are reviewed. Follow-up scheduled for 1 month for hypertension. 2.  Diabetes repeat status pending a prior lab review not make adjustments if necessary. 3   Hyperlipidemia prior lab reviewed and repeat status pending and I will adjust if needed. 4   DJD that is stable  5   GERD that is stable  6. CKD stage III repeat status pending  7. Cardiomyopathy stable  8. Obesity we did discuss diet, exercise and weight reduction for overall health benefit. I will call with lab and make further recommendations or adjustments if necessary. Follow-up in 3 months or sooner if there is a problem. PLAN:  .  Orders Placed This Encounter    METABOLIC PANEL, COMPREHENSIVE ([facility])    LIPID PANEL ([facility])    CK ([facility])    HEMOGLOBIN A1C W/O EAG ([facility])         ATTENTION:   This medical record was transcribed using an electronic medical records system. Although proofread, it may and can contain electronic and spelling errors. Other human spelling and other errors may be present. Corrections may be executed at a later time. Please feel free to contact us for any clarifications as needed. Follow-up and Dispositions    · Return in about 3 months (around 9/9/2020). No results found for any visits on 06/09/20. Emily Duron MD    The patient verbalized understanding of the problems and plans as explained.

## 2020-06-09 ENCOUNTER — OFFICE VISIT (OUTPATIENT)
Dept: INTERNAL MEDICINE CLINIC | Age: 70
End: 2020-06-09

## 2020-06-09 VITALS
RESPIRATION RATE: 18 BRPM | HEIGHT: 73 IN | BODY MASS INDEX: 33.32 KG/M2 | DIASTOLIC BLOOD PRESSURE: 82 MMHG | SYSTOLIC BLOOD PRESSURE: 160 MMHG | TEMPERATURE: 97.4 F | HEART RATE: 66 BPM | WEIGHT: 251.4 LBS | OXYGEN SATURATION: 97 %

## 2020-06-09 DIAGNOSIS — N18.30 STAGE 3 CHRONIC KIDNEY DISEASE (HCC): ICD-10-CM

## 2020-06-09 DIAGNOSIS — E78.2 MIXED HYPERLIPIDEMIA: ICD-10-CM

## 2020-06-09 DIAGNOSIS — K21.9 GASTROESOPHAGEAL REFLUX DISEASE WITHOUT ESOPHAGITIS: ICD-10-CM

## 2020-06-09 DIAGNOSIS — I42.9 CARDIOMYOPATHY, UNSPECIFIED TYPE (HCC): ICD-10-CM

## 2020-06-09 DIAGNOSIS — I12.9 HYPERTENSION WITH RENAL DISEASE: Primary | ICD-10-CM

## 2020-06-09 DIAGNOSIS — E66.09 CLASS 1 OBESITY DUE TO EXCESS CALORIES WITH SERIOUS COMORBIDITY AND BODY MASS INDEX (BMI) OF 33.0 TO 33.9 IN ADULT: ICD-10-CM

## 2020-06-09 DIAGNOSIS — M15.9 PRIMARY OSTEOARTHRITIS INVOLVING MULTIPLE JOINTS: ICD-10-CM

## 2020-06-09 DIAGNOSIS — E11.22 CONTROLLED TYPE 2 DIABETES MELLITUS WITH STAGE 3 CHRONIC KIDNEY DISEASE, WITHOUT LONG-TERM CURRENT USE OF INSULIN (HCC): ICD-10-CM

## 2020-06-09 DIAGNOSIS — N18.30 CONTROLLED TYPE 2 DIABETES MELLITUS WITH STAGE 3 CHRONIC KIDNEY DISEASE, WITHOUT LONG-TERM CURRENT USE OF INSULIN (HCC): ICD-10-CM

## 2020-06-09 LAB
A-G RATIO,AGRAT: 1.4 RATIO
ALBUMIN SERPL-MCNC: 4.4 G/DL (ref 3.9–5.4)
ALP SERPL-CCNC: 80 U/L (ref 38–126)
ALT SERPL-CCNC: 20 U/L (ref 0–50)
ANION GAP SERPL CALC-SCNC: 8 MMOL/L
AST SERPL W P-5'-P-CCNC: 25 U/L (ref 14–36)
BILIRUB SERPL-MCNC: 0.9 MG/DL (ref 0.2–1.3)
BUN SERPL-MCNC: 20 MG/DL (ref 9–20)
BUN/CREATININE RATIO,BUCR: 15 RATIO
CALCIUM SERPL-MCNC: 9.5 MG/DL (ref 8.4–10.2)
CHLORIDE SERPL-SCNC: 107 MMOL/L (ref 98–107)
CHOL/HDL RATIO,CHHD: 3 RATIO (ref 0–4)
CHOLEST SERPL-MCNC: 116 MG/DL (ref 0–200)
CK SERPL-CCNC: 145 U/L (ref 30–135)
CO2 SERPL-SCNC: 27 MMOL/L (ref 22–32)
CREAT SERPL-MCNC: 1.3 MG/DL (ref 0.8–1.5)
GLOBULIN,GLOB: 3.2
GLUCOSE SERPL-MCNC: 101 MG/DL (ref 75–110)
HBA1C MFR BLD HPLC: 6 % (ref 4–5.7)
HDLC SERPL-MCNC: 36 MG/DL (ref 35–130)
LDL/HDL RATIO,LDHD: 2 RATIO
LDLC SERPL CALC-MCNC: 56 MG/DL (ref 0–130)
POTASSIUM SERPL-SCNC: 3.8 MMOL/L (ref 3.6–5)
PROT SERPL-MCNC: 7.6 G/DL (ref 6.3–8.2)
SODIUM SERPL-SCNC: 142 MMOL/L (ref 137–145)
TRIGL SERPL-MCNC: 118 MG/DL (ref 0–200)
VLDLC SERPL CALC-MCNC: 24 MG/DL

## 2020-06-09 NOTE — PROGRESS NOTES
Chief Complaint   Patient presents with    Hypertension     3 month f/up    Chronic Kidney Disease    Diabetes    Cholesterol Problem    Cardiomyopathy    Thyroid Problem     1. Have you been to the ER, urgent care clinic since your last visit? Hospitalized since your last visit? No    2. Have you seen or consulted any other health care providers outside of the [address] [place] [address] since your last visit? Include any pap smears or colon screening.  No

## 2020-06-09 NOTE — PATIENT INSTRUCTIONS
Body Mass Index: Care Instructions  Your Care Instructions     Body mass index (BMI) can help you see if your weight is raising your risk for health problems. It uses a formula to compare how much you weigh with how tall you are. · A BMI lower than 18.5 is considered underweight. · A BMI between 18.5 and 24.9 is considered healthy. · A BMI between 25 and 29.9 is considered overweight. A BMI of 30 or higher is considered obese. If your BMI is in the normal range, it means that you have a lower risk for weight-related health problems. If your BMI is in the overweight or obese range, you may be at increased risk for weight-related health problems, such as high blood pressure, heart disease, stroke, arthritis or joint pain, and diabetes. If your BMI is in the underweight range, you may be at increased risk for health problems such as fatigue, lower protection (immunity) against illness, muscle loss, bone loss, hair loss, and hormone problems. BMI is just one measure of your risk for weight-related health problems. You may be at higher risk for health problems if you are not active, you eat an unhealthy diet, or you drink too much alcohol or use tobacco products. Follow-up care is a key part of your treatment and safety. Be sure to make and go to all appointments, and call your doctor if you are having problems. It's also a good idea to know your test results and keep a list of the medicines you take. How can you care for yourself at home? · Practice healthy eating habits. This includes eating plenty of fruits, vegetables, whole grains, lean protein, and low-fat dairy. · If your doctor recommends it, get more exercise. Walking is a good choice. Bit by bit, increase the amount you walk every day. Try for at least 30 minutes on most days of the week. · Do not smoke. Smoking can increase your risk for health problems. If you need help quitting, talk to your doctor about stop-smoking programs and medicines. These can increase your chances of quitting for good. · Limit alcohol to 2 drinks a day for men and 1 drink a day for women. Too much alcohol can cause health problems. If you have a BMI higher than 25  · Your doctor may do other tests to check your risk for weight-related health problems. This may include measuring the distance around your waist. A waist measurement of more than 40 inches in men or 35 inches in women can increase the risk of weight-related health problems. · Talk with your doctor about steps you can take to stay healthy or improve your health. You may need to make lifestyle changes to lose weight and stay healthy, such as changing your diet and getting regular exercise. If you have a BMI lower than 18.5  · Your doctor may do other tests to check your risk for health problems. · Talk with your doctor about steps you can take to stay healthy or improve your health. You may need to make lifestyle changes to gain or maintain weight and stay healthy, such as getting more healthy foods in your diet and doing exercises to build muscle. Where can you learn more? Go to http://rosa-jaylene.info/  Enter S176 in the search box to learn more about \"Body Mass Index: Care Instructions. \"  Current as of: December 11, 2019               Content Version: 12.5  © 2088-7942 Healthwise, Incorporated. Care instructions adapted under license by [employer] (which disclaims liability or warranty for this information). If you have questions about a medical condition or this instruction, always ask your healthcare professional. [address] any warranty or liability for your use of this information.

## 2020-07-12 NOTE — PROGRESS NOTES
Chief Complaint   Patient presents with    Hypertension     follow- up       SUBJECTIVE:    Cam Turner III is a 78 y.o. male who returns in follow-up after having been recently evaluated and found to have significant elevation of his hypertension with blood pressure 160/82. It has been elevated the last 2 times prior to that however he thought stress was playing a role and thus no medicine was added on the last visit although he continued on his Coreg 25 twice daily, Benicar 40 daily and lisinopril 40 daily. He has been working on his weight. He currently denies any chest pain, shortness of breath, palpitations, PND, orthopnea or other cardiorespiratory complaints. He notes no GI or  complaints. He notes no headaches, dizziness or neurologic complaints. There are no current active arthritic complaints and no other complaints on complete review of systems. Current Outpatient Medications   Medication Sig Dispense Refill    amLODIPine (NORVASC) 5 mg tablet Take 1 Tab by mouth daily. 30 Tab prn    olmesartan (BENICAR) 40 mg tablet Take 1 Tab by mouth daily. 30 Tab prn    carvediloL (COREG) 25 mg tablet Take 1 Tab by mouth two (2) times daily (with meals). 180 Tab 3    famotidine (PEPCID) 40 mg tablet TAKE 1 TABLET BY MOUTH  DAILY 90 Tab 3    metFORMIN ER (GLUCOPHAGE XR) 500 mg tablet TAKE 1 TABLET BY MOUTH IN  THE MORNING AND 2 TABLETS  AT DINNER 270 Tab 3    lisinopril (PRINIVIL, ZESTRIL) 40 mg tablet Take 1 Tab by mouth daily.  90 Tab 3    fenofibrate nanocrystallized (TRICOR) 145 mg tablet TAKE 1 TABLET BY MOUTH  DAILY 90 Tab 3    levothyroxine (SYNTHROID) 50 mcg tablet TAKE 1 TABLET BY MOUTH  DAILY 90 Tab 3    rosuvastatin (CRESTOR) 10 mg tablet TAKE 1 TABLET BY MOUTH  DAILY 90 Tab 3    diclofenac EC (VOLTAREN) 75 mg EC tablet TAKE 1 TABLET BY MOUTH TWO  TIMES DAILY 180 Tab 3    esomeprazole (NEXIUM) 40 mg capsule TAKE 1 CAPSULE BY MOUTH  DAILY 90 Cap prn    niacin ER (NIASPAN) 750 mg Tb24 TAKE 2 TABLETS BY MOUTH AT  BEDTIME 180 Tab 3    OMEGA-3-DHA-EPA-FISH OIL-COQ10 PO Take 100 mg by mouth.  fish oil-dha-epa 1,200-144-216 mg cap Take  by mouth.  aspirin 81 mg tablet Take 81 mg by mouth daily as needed.        Past Medical History:   Diagnosis Date    Arthritis     fingers    CAD (coronary artery disease)     Cardiomyopathy ([facility]     Cholelithiasis 9/21/2017    CKD (chronic kidney disease) 9/21/2017    Diabetes mellitus ([facility]     Diverticulosis 9/21/2017    DJD (degenerative joint disease) 9/21/2017    GERD (gastroesophageal reflux disease)     H/O: depression 9/21/2017    Hiatal hernia     Hypercholesteremia     Hypertension     Hypertension with renal disease 9/21/2017    Hypothyroidism     Kidney stones     Obesity 9/21/2017    On statin therapy 9/21/2017    Sleep apnea 9/21/2017    Thyroid disease     Viral meningitis 9/1990     Past Surgical History:   Procedure Laterality Date    HX HERNIA REPAIR      HX KNEE ARTHROSCOPY      both     Allergies   Allergen Reactions    Demerol [Meperidine] Nausea and Vomiting    Phenergan [Promethazine] Unknown (comments)       REVIEW OF SYSTEMS:  General: negative for - chills or fever, or weight loss or gain  ENT: negative for - headaches, nasal congestion or tinnitus  Eyes: no blurred or visual changes  Neck: No stiffness or swollen nodes  Respiratory: negative for - cough, hemoptysis, shortness of breath or wheezing  Cardiovascular : negative for - chest pain, edema, palpitations or shortness of breath  Gastrointestinal: negative for - abdominal pain, blood in stools, heartburn or nausea/vomiting  Genito-Urinary: no dysuria, trouble voiding, or hematuria  Musculoskeletal: negative for - gait disturbance, joint pain, joint stiffness or joint swelling  Neurological: no TIA or stroke symptoms  Hematologic: no bruises, no bleeding  Lymphatic: no swollen glands  Integument: no lumps, mole changes, nail changes or rash  Endocrine:no malaise/lethargy poly uria or polydipsia or unexpected weight changes        Social History     Socioeconomic History    Marital status:      Spouse name: Not on file    Number of children: Not on file    Years of education: Not on file    Highest education level: Not on file   Tobacco Use    Smoking status: Never Smoker    Smokeless tobacco: Never Used   Substance and Sexual Activity    Alcohol use: No    Drug use: No    Sexual activity: Yes     Partners: Female     Family History   Problem Relation Age of Onset    Heart Disease Mother         AICD    Thyroid Disease Mother     Heart Disease Father         pacemaker    Hypertension Father     Cancer Father         lymphoma    Thyroid Disease Sister     Heart Disease Paternal Grandmother         angina       OBJECTIVE:     Visit Vitals  /84   Pulse 69   Temp 97.9 °F (36.6 °C) (Oral)   Ht 6' 1\" (1.854 m)   Wt 248 lb 9.6 oz (112.8 kg)   SpO2 97%   BMI 32.80 kg/m²     CONSTITUTIONAL:   well nourished, appears age appropriate  EYES: sclera anicteric, PERRL, EOMI  ENMT:nares clear, moist mucous membranes, pharynx clear  NECK: supple. Thyroid normal, No JVD or bruits  RESPIRATORY: Chest: clear to ascultation and percussion, normal inspiratory effort  CARDIOVASCULAR: Heart: regular rate and rhythm no murmurs, rubs or gallops, PMI not displaced, No thrills, no peripheral edema  GASTROINTESTINAL: Abdomen: non distended, soft, non tender, bowel sounds normal  HEMATOLOGIC: no purpura, petechiae or bruising  LYMPHATIC: No lymph node enlargemant  MUSCULOSKELETAL: Extremities: no active synovitis, pulse 1+   INTEGUMENT: No unusual rashes or suspicious skin lesions noted. Nails appear normal.  PERIPHERAL VASCULAR: normal pulses femoral, PT and DP  NEUROLOGIC: non-focal exam, A & O X 3  PSYCHIATRIC:, appropriate affect     ASSESSMENT:   1. Hypertension with renal disease    2.  Cardiomyopathy, unspecified type ([address]    3. Class 1 obesity due to excess calories with serious comorbidity and body mass index (BMI) of 33.0 to 33.9 in adult      Impression  1. Hypertension that is still not controlled so at this point add Norvasc 5 mg daily  2. Cardiomyopathy seems to be stable  3. Obesity weight is down 3 pounds and I encouraged him to continue to work on diet, exercise and weight reduction  I will recheck him in a month or sooner should there be a problem. PLAN:  .  Orders Placed This Encounter    amLODIPine (NORVASC) 5 mg tablet         ATTENTION:   This medical record was transcribed using an electronic medical records system. Although proofread, it may and can contain electronic and spelling errors. Other human spelling and other errors may be present. Corrections may be executed at a later time. Please feel free to contact us for any clarifications as needed. Follow-up and Dispositions    · Return in about 4 weeks (around 8/10/2020). No results found for any visits on 07/13/20. Lou Patel MD    The patient verbalized understanding of the problems and plans as explained.

## 2020-07-13 ENCOUNTER — OFFICE VISIT (OUTPATIENT)
Dept: INTERNAL MEDICINE CLINIC | Age: 70
End: 2020-07-13

## 2020-07-13 VITALS
TEMPERATURE: 97.9 F | BODY MASS INDEX: 32.95 KG/M2 | SYSTOLIC BLOOD PRESSURE: 154 MMHG | HEART RATE: 69 BPM | DIASTOLIC BLOOD PRESSURE: 84 MMHG | OXYGEN SATURATION: 97 % | WEIGHT: 248.6 LBS | HEIGHT: 73 IN

## 2020-07-13 DIAGNOSIS — I12.9 HYPERTENSION WITH RENAL DISEASE: Primary | ICD-10-CM

## 2020-07-13 DIAGNOSIS — I42.9 CARDIOMYOPATHY, UNSPECIFIED TYPE (HCC): ICD-10-CM

## 2020-07-13 DIAGNOSIS — E66.09 CLASS 1 OBESITY DUE TO EXCESS CALORIES WITH SERIOUS COMORBIDITY AND BODY MASS INDEX (BMI) OF 33.0 TO 33.9 IN ADULT: ICD-10-CM

## 2020-07-13 RX ORDER — AMLODIPINE BESYLATE 5 MG/1
5 TABLET ORAL DAILY
Qty: 30 TAB | Status: SHIPPED | OUTPATIENT
Start: 2020-07-13 | End: 2020-08-18 | Stop reason: SDUPTHER

## 2020-07-13 NOTE — PROGRESS NOTES
Chief Complaint   Patient presents with    Hypertension     follow- up     1. Have you been to the ER, urgent care clinic since your last visit? Hospitalized since your last visit? No    2. Have you seen or consulted any other health care providers outside of the [address] [place] [address] since your last visit? Include any pap smears or colon screening.  No

## 2020-07-13 NOTE — PATIENT INSTRUCTIONS
Body Mass Index: Care Instructions  Your Care Instructions     Body mass index (BMI) can help you see if your weight is raising your risk for health problems. It uses a formula to compare how much you weigh with how tall you are. · A BMI lower than 18.5 is considered underweight. · A BMI between 18.5 and 24.9 is considered healthy. · A BMI between 25 and 29.9 is considered overweight. A BMI of 30 or higher is considered obese. If your BMI is in the normal range, it means that you have a lower risk for weight-related health problems. If your BMI is in the overweight or obese range, you may be at increased risk for weight-related health problems, such as high blood pressure, heart disease, stroke, arthritis or joint pain, and diabetes. If your BMI is in the underweight range, you may be at increased risk for health problems such as fatigue, lower protection (immunity) against illness, muscle loss, bone loss, hair loss, and hormone problems. BMI is just one measure of your risk for weight-related health problems. You may be at higher risk for health problems if you are not active, you eat an unhealthy diet, or you drink too much alcohol or use tobacco products. Follow-up care is a key part of your treatment and safety. Be sure to make and go to all appointments, and call your doctor if you are having problems. It's also a good idea to know your test results and keep a list of the medicines you take. How can you care for yourself at home? · Practice healthy eating habits. This includes eating plenty of fruits, vegetables, whole grains, lean protein, and low-fat dairy. · If your doctor recommends it, get more exercise. Walking is a good choice. Bit by bit, increase the amount you walk every day. Try for at least 30 minutes on most days of the week. · Do not smoke. Smoking can increase your risk for health problems. If you need help quitting, talk to your doctor about stop-smoking programs and medicines. These can increase your chances of quitting for good. · Limit alcohol to 2 drinks a day for men and 1 drink a day for women. Too much alcohol can cause health problems. If you have a BMI higher than 25  · Your doctor may do other tests to check your risk for weight-related health problems. This may include measuring the distance around your waist. A waist measurement of more than 40 inches in men or 35 inches in women can increase the risk of weight-related health problems. · Talk with your doctor about steps you can take to stay healthy or improve your health. You may need to make lifestyle changes to lose weight and stay healthy, such as changing your diet and getting regular exercise. If you have a BMI lower than 18.5  · Your doctor may do other tests to check your risk for health problems. · Talk with your doctor about steps you can take to stay healthy or improve your health. You may need to make lifestyle changes to gain or maintain weight and stay healthy, such as getting more healthy foods in your diet and doing exercises to build muscle. Where can you learn more? Go to http://rosa-jaylene.info/  Enter S176 in the search box to learn more about \"Body Mass Index: Care Instructions. \"  Current as of: December 11, 2019               Content Version: 12.5  © 9250-4688 Healthwise, Incorporated. Care instructions adapted under license by [employer] (which disclaims liability or warranty for this information). If you have questions about a medical condition or this instruction, always ask your healthcare professional. [address] any warranty or liability for your use of this information.

## 2020-08-04 RX ORDER — NIACIN 750 MG/1
TABLET, EXTENDED RELEASE ORAL
Qty: 180 TAB | Refills: 3 | Status: SHIPPED | OUTPATIENT
Start: 2020-08-04 | End: 2021-06-08

## 2020-08-04 NOTE — TELEPHONE ENCOUNTER
PCP: Shahab Foremna MD    Last appt: Visit date not found  Future Appointments   Date Time Provider Pernell Courtney   8/18/2020  1:00 PM MD ELIZABETH Art   9/11/2020  9:40 AM MD ELIZABETH Art BS AMB       Requested Prescriptions     Pending Prescriptions Disp Refills    niacin ER (NIASPAN) 750 mg Tb24 [Pharmacy Med Name: NIACIN  750MG  TAB  EXTENDED RELEASE] 180 Tab 3     Sig: TAKE 2 TABLETS BY MOUTH AT  BEDTIME

## 2020-08-18 ENCOUNTER — OFFICE VISIT (OUTPATIENT)
Dept: INTERNAL MEDICINE CLINIC | Age: 70
End: 2020-08-18
Payer: MEDICARE

## 2020-08-18 VITALS
TEMPERATURE: 98.4 F | DIASTOLIC BLOOD PRESSURE: 80 MMHG | WEIGHT: 247.8 LBS | HEIGHT: 73 IN | BODY MASS INDEX: 32.84 KG/M2 | RESPIRATION RATE: 18 BRPM | SYSTOLIC BLOOD PRESSURE: 130 MMHG | OXYGEN SATURATION: 96 % | HEART RATE: 62 BPM

## 2020-08-18 DIAGNOSIS — E11.22 CONTROLLED TYPE 2 DIABETES MELLITUS WITH STAGE 3 CHRONIC KIDNEY DISEASE, WITHOUT LONG-TERM CURRENT USE OF INSULIN (HCC): ICD-10-CM

## 2020-08-18 DIAGNOSIS — I42.9 CARDIOMYOPATHY, UNSPECIFIED TYPE (HCC): ICD-10-CM

## 2020-08-18 DIAGNOSIS — I12.9 HYPERTENSION WITH RENAL DISEASE: Primary | ICD-10-CM

## 2020-08-18 DIAGNOSIS — N18.30 CONTROLLED TYPE 2 DIABETES MELLITUS WITH STAGE 3 CHRONIC KIDNEY DISEASE, WITHOUT LONG-TERM CURRENT USE OF INSULIN (HCC): ICD-10-CM

## 2020-08-18 DIAGNOSIS — N18.30 STAGE 3 CHRONIC KIDNEY DISEASE (HCC): ICD-10-CM

## 2020-08-18 PROCEDURE — 2022F DILAT RTA XM EVC RTNOPTHY: CPT | Performed by: INTERNAL MEDICINE

## 2020-08-18 PROCEDURE — G8417 CALC BMI ABV UP PARAM F/U: HCPCS | Performed by: INTERNAL MEDICINE

## 2020-08-18 PROCEDURE — G8427 DOCREV CUR MEDS BY ELIG CLIN: HCPCS | Performed by: INTERNAL MEDICINE

## 2020-08-18 PROCEDURE — G8536 NO DOC ELDER MAL SCRN: HCPCS | Performed by: INTERNAL MEDICINE

## 2020-08-18 PROCEDURE — 99213 OFFICE O/P EST LOW 20 MIN: CPT | Performed by: INTERNAL MEDICINE

## 2020-08-18 PROCEDURE — G8510 SCR DEP NEG, NO PLAN REQD: HCPCS | Performed by: INTERNAL MEDICINE

## 2020-08-18 PROCEDURE — 1101F PT FALLS ASSESS-DOCD LE1/YR: CPT | Performed by: INTERNAL MEDICINE

## 2020-08-18 PROCEDURE — 3044F HG A1C LEVEL LT 7.0%: CPT | Performed by: INTERNAL MEDICINE

## 2020-08-18 PROCEDURE — 3017F COLORECTAL CA SCREEN DOC REV: CPT | Performed by: INTERNAL MEDICINE

## 2020-08-18 RX ORDER — AMLODIPINE BESYLATE 5 MG/1
5 TABLET ORAL DAILY
Qty: 90 TAB | Refills: 3 | Status: SHIPPED | OUTPATIENT
Start: 2020-08-18 | End: 2021-03-25 | Stop reason: SDUPTHER

## 2020-08-18 NOTE — PROGRESS NOTES
HIPAA verified by two patient identifiers. Alexia Jj III is a 78 y.o. male    Chief Complaint   Patient presents with    Hypertension    Diabetes    GERD       There were no vitals taken for this visit. Pain Scale: /10  Pain Location:       Health Maintenance Due   Topic Date Due    Eye Exam Retinal or Dilated  03/01/1962    DTaP/Tdap/Td series (1 - Tdap) 03/01/1973    Shingrix Vaccine Age 50> (1 of 2) 03/01/2002    GLAUCOMA SCREENING Q2Y  03/01/2017    Colonoscopy  02/17/2019    Influenza Age 9 to Adult  08/01/2020         Coordination of Care Questionnaire:  :   1) Have you been to an emergency room, urgent care, or hospitalized since your last visit? If yes, where when, and reason for visit? no       2. Have seen or consulted any other health care provider since your last visit? If yes, where when, and reason for visit? NO      Patient is accompanied by self I have received verbal consent from Alexia Jj III to discuss any/all medical information while they are present in the room.

## 2020-08-18 NOTE — PROGRESS NOTES
Chief Complaint   Patient presents with    Hypertension    Diabetes    GERD       SUBJECTIVE:    Jae Mcmanus III is a 78 y.o. male he presents today in follow-up for hypertension as his last blood pressure was elevated 154/84 and we added Norvasc 5 mg daily. He has been taking the medication returns and seems to be doing quite well. He currently denies any chest pain, shortness of breath, palpitations, PND, orthopnea or cardiorespiratory complaints. There are no GI or  complaints. He notes no other complaints. Current Outpatient Medications   Medication Sig Dispense Refill    amLODIPine (NORVASC) 5 mg tablet Take 1 Tab by mouth daily. 90 Tab 3    niacin ER (NIASPAN) 750 mg Tb24 TAKE 2 TABLETS BY MOUTH AT  BEDTIME 180 Tab 3    olmesartan (BENICAR) 40 mg tablet Take 1 Tab by mouth daily. 30 Tab prn    carvediloL (COREG) 25 mg tablet Take 1 Tab by mouth two (2) times daily (with meals). 180 Tab 3    famotidine (PEPCID) 40 mg tablet TAKE 1 TABLET BY MOUTH  DAILY 90 Tab 3    metFORMIN ER (GLUCOPHAGE XR) 500 mg tablet TAKE 1 TABLET BY MOUTH IN  THE MORNING AND 2 TABLETS  AT DINNER 270 Tab 3    lisinopril (PRINIVIL, ZESTRIL) 40 mg tablet Take 1 Tab by mouth daily. 90 Tab 3    fenofibrate nanocrystallized (TRICOR) 145 mg tablet TAKE 1 TABLET BY MOUTH  DAILY 90 Tab 3    levothyroxine (SYNTHROID) 50 mcg tablet TAKE 1 TABLET BY MOUTH  DAILY 90 Tab 3    rosuvastatin (CRESTOR) 10 mg tablet TAKE 1 TABLET BY MOUTH  DAILY 90 Tab 3    diclofenac EC (VOLTAREN) 75 mg EC tablet TAKE 1 TABLET BY MOUTH TWO  TIMES DAILY 180 Tab 3    esomeprazole (NEXIUM) 40 mg capsule TAKE 1 CAPSULE BY MOUTH  DAILY 90 Cap prn    OMEGA-3-DHA-EPA-FISH OIL-COQ10 PO Take 100 mg by mouth.  fish oil-dha-epa 1,200-144-216 mg cap Take  by mouth.  aspirin 81 mg tablet Take 81 mg by mouth daily as needed.        Past Medical History:   Diagnosis Date    Arthritis     fingers    CAD (coronary artery disease)     Cardiomyopathy ([facility]     Cholelithiasis 9/21/2017    CKD (chronic kidney disease) 9/21/2017    Diabetes mellitus ([facility]     Diverticulosis 9/21/2017    DJD (degenerative joint disease) 9/21/2017    GERD (gastroesophageal reflux disease)     H/O: depression 9/21/2017    Hiatal hernia     Hypercholesteremia     Hypertension     Hypertension with renal disease 9/21/2017    Hypothyroidism     Kidney stones     Obesity 9/21/2017    On statin therapy 9/21/2017    Sleep apnea 9/21/2017    Thyroid disease     Viral meningitis 9/1990     Past Surgical History:   Procedure Laterality Date    HX HERNIA REPAIR      HX KNEE ARTHROSCOPY      both     Allergies   Allergen Reactions    Demerol [Meperidine] Nausea and Vomiting    Phenergan [Promethazine] Unknown (comments)       REVIEW OF SYSTEMS:  General: negative for - chills or fever, or weight loss or gain  ENT: negative for - headaches, nasal congestion or tinnitus  Eyes: no blurred or visual changes  Neck: No stiffness or swollen nodes  Respiratory: negative for - cough, hemoptysis, shortness of breath or wheezing  Cardiovascular : negative for - chest pain, edema, palpitations or shortness of breath  Gastrointestinal: negative for - abdominal pain, blood in stools, heartburn or nausea/vomiting  Genito-Urinary: no dysuria, trouble voiding, or hematuria  Musculoskeletal: negative for - gait disturbance, joint pain, joint stiffness or joint swelling  Neurological: no TIA or stroke symptoms  Hematologic: no bruises, no bleeding  Lymphatic: no swollen glands  Integument: no lumps, mole changes, nail changes or rash  Endocrine:no malaise/lethargy poly uria or polydipsia or unexpected weight changes        Social History     Socioeconomic History    Marital status:      Spouse name: Not on file    Number of children: Not on file    Years of education: Not on file    Highest education level: Not on file   Tobacco Use    Smoking status: Never Smoker  Smokeless tobacco: Never Used   Substance and Sexual Activity    Alcohol use: No    Drug use: No    Sexual activity: Yes     Partners: Female     Family History   Problem Relation Age of Onset    Heart Disease Mother         AICD    Thyroid Disease Mother     Heart Disease Father         pacemaker    Hypertension Father     Cancer Father         lymphoma    Thyroid Disease Sister     Heart Disease Paternal Grandmother         angina       OBJECTIVE:     Visit Vitals  /80 (BP 1 Location: Left arm, BP Patient Position: Sitting)   Pulse 62   Temp 98.4 °F (36.9 °C) (Oral)   Resp 18   Ht 6' 1\" (1.854 m)   Wt 247 lb 12.8 oz (112.4 kg)   SpO2 96%   BMI 32.69 kg/m²     CONSTITUTIONAL:   well nourished, appears age appropriate  EYES: sclera anicteric, PERRL, EOMI  ENMT:nares clear, moist mucous membranes, pharynx clear  NECK: supple. Thyroid normal, No JVD or bruits  RESPIRATORY: Chest: clear to ascultation and percussion, normal inspiratory effort  CARDIOVASCULAR: Heart: regular rate and rhythm no murmurs, rubs or gallops, PMI not displaced, No thrills, no peripheral edema  GASTROINTESTINAL: Abdomen: non distended, soft, non tender, bowel sounds normal  HEMATOLOGIC: no purpura, petechiae or bruising  LYMPHATIC: No lymph node enlargemant  MUSCULOSKELETAL: Extremities: no active synovitis, pulse 1+   INTEGUMENT: No unusual rashes or suspicious skin lesions noted. Nails appear normal.  PERIPHERAL VASCULAR: normal pulses femoral, PT and DP  NEUROLOGIC: non-focal exam, A & O X 3  PSYCHIATRIC:, appropriate affect     ASSESSMENT:   1. Hypertension with renal disease    2. Controlled type 2 diabetes mellitus with stage 3 chronic kidney disease, without long-term current use of insulin (HCC)    3. Stage 3 chronic kidney disease (HCC)    4. Cardiomyopathy, unspecified type ([facility]      Impression  1. Hypertension well-controlled continue current medications  2.   Diabetes we will recheck those numbers when he returns next visit  3. CKD stage III we will recheck those numbers when he returns  4   cardiomyopathy stable  Follow-up as previously scheduled in September or sooner if there is a problem. PLAN:  .  Orders Placed This Encounter    amLODIPine (NORVASC) 5 mg tablet         ATTENTION:   This medical record was transcribed using an electronic medical records system. Although proofread, it may and can contain electronic and spelling errors. Other human spelling and other errors may be present. Corrections may be executed at a later time. Please feel free to contact us for any clarifications as needed. Follow-up and Dispositions    · Return At prior scheduled appointment. No results found for any visits on 08/18/20. Angel Monroe MD    The patient verbalized understanding of the problems and plans as explained.

## 2020-08-18 NOTE — PATIENT INSTRUCTIONS
Body Mass Index: Care Instructions  Your Care Instructions     Body mass index (BMI) can help you see if your weight is raising your risk for health problems. It uses a formula to compare how much you weigh with how tall you are. · A BMI lower than 18.5 is considered underweight. · A BMI between 18.5 and 24.9 is considered healthy. · A BMI between 25 and 29.9 is considered overweight. A BMI of 30 or higher is considered obese. If your BMI is in the normal range, it means that you have a lower risk for weight-related health problems. If your BMI is in the overweight or obese range, you may be at increased risk for weight-related health problems, such as high blood pressure, heart disease, stroke, arthritis or joint pain, and diabetes. If your BMI is in the underweight range, you may be at increased risk for health problems such as fatigue, lower protection (immunity) against illness, muscle loss, bone loss, hair loss, and hormone problems. BMI is just one measure of your risk for weight-related health problems. You may be at higher risk for health problems if you are not active, you eat an unhealthy diet, or you drink too much alcohol or use tobacco products. Follow-up care is a key part of your treatment and safety. Be sure to make and go to all appointments, and call your doctor if you are having problems. It's also a good idea to know your test results and keep a list of the medicines you take. How can you care for yourself at home? · Practice healthy eating habits. This includes eating plenty of fruits, vegetables, whole grains, lean protein, and low-fat dairy. · If your doctor recommends it, get more exercise. Walking is a good choice. Bit by bit, increase the amount you walk every day. Try for at least 30 minutes on most days of the week. · Do not smoke. Smoking can increase your risk for health problems. If you need help quitting, talk to your doctor about stop-smoking programs and medicines. These can increase your chances of quitting for good. · Limit alcohol to 2 drinks a day for men and 1 drink a day for women. Too much alcohol can cause health problems. If you have a BMI higher than 25  · Your doctor may do other tests to check your risk for weight-related health problems. This may include measuring the distance around your waist. A waist measurement of more than 40 inches in men or 35 inches in women can increase the risk of weight-related health problems. · Talk with your doctor about steps you can take to stay healthy or improve your health. You may need to make lifestyle changes to lose weight and stay healthy, such as changing your diet and getting regular exercise. If you have a BMI lower than 18.5  · Your doctor may do other tests to check your risk for health problems. · Talk with your doctor about steps you can take to stay healthy or improve your health. You may need to make lifestyle changes to gain or maintain weight and stay healthy, such as getting more healthy foods in your diet and doing exercises to build muscle. Where can you learn more? Go to http://www.fang.com/  Enter S176 in the search box to learn more about \"Body Mass Index: Care Instructions. \"  Current as of: December 11, 2019               Content Version: 12.5  © 2387-1735 [employer]. Care instructions adapted under license by [employer] (which disclaims liability or warranty for this information). If you have questions about a medical condition or this instruction, always ask your healthcare professional. [address] any warranty or liability for your use of this information.

## 2020-08-29 DIAGNOSIS — K21.9 GASTROESOPHAGEAL REFLUX DISEASE WITHOUT ESOPHAGITIS: ICD-10-CM

## 2020-08-30 RX ORDER — ESOMEPRAZOLE MAGNESIUM 40 MG/1
CAPSULE, DELAYED RELEASE ORAL
Qty: 90 CAP | Refills: 3 | Status: SHIPPED | OUTPATIENT
Start: 2020-08-30 | End: 2021-06-08

## 2020-08-31 RX ORDER — OLMESARTAN MEDOXOMIL 40 MG/1
40 TABLET ORAL DAILY
Qty: 90 TAB | Refills: 3 | Status: SHIPPED | OUTPATIENT
Start: 2020-08-31 | End: 2021-09-13

## 2020-08-31 NOTE — TELEPHONE ENCOUNTER
RX refill request from the patient/pharmacy. Patient last seen 08- with labs, and next appt. scheduled for 09-  Requested Prescriptions     Pending Prescriptions Disp Refills    olmesartan (BENICAR) 40 mg tablet 90 Tab 3     Sig: Take 1 Tab by mouth daily. Elizabeth Frizzle

## 2020-09-15 NOTE — PROGRESS NOTES
Chief Complaint   Patient presents with    Hypertension     3 month f/up    Chronic Kidney Disease    Diabetes    Thyroid Problem    Cholesterol Problem       SUBJECTIVE:    Bruce Hale III is a 78 y.o. male who returns in follow-up of his medical problems to include hypertension, diabetes, hyperlipidemia, GERD, cardiomyopathy, CKD stage III, DJD, obesity and other medical problems. He is taking his medications and trying to follow his diet and try and remain physically active. He currently denies any chest pain, shortness of breath, palpitations, PND, orthopnea or other cardiac or respiratory complaints. He notes no GI or  complaints. He notes no headaches, dizziness or neurologic complaints. He has no change of his chronic arthritic complaints and and no other complaints on complete review systems. Current Outpatient Medications   Medication Sig Dispense Refill    olmesartan (BENICAR) 40 mg tablet Take 1 Tab by mouth daily. 90 Tab 3    esomeprazole (NEXIUM) 40 mg capsule TAKE 1 CAPSULE BY MOUTH  DAILY 90 Cap 3    amLODIPine (NORVASC) 5 mg tablet Take 1 Tab by mouth daily. 90 Tab 3    niacin ER (NIASPAN) 750 mg Tb24 TAKE 2 TABLETS BY MOUTH AT  BEDTIME 180 Tab 3    carvediloL (COREG) 25 mg tablet Take 1 Tab by mouth two (2) times daily (with meals). 180 Tab 3    famotidine (PEPCID) 40 mg tablet TAKE 1 TABLET BY MOUTH  DAILY 90 Tab 3    metFORMIN ER (GLUCOPHAGE XR) 500 mg tablet TAKE 1 TABLET BY MOUTH IN  THE MORNING AND 2 TABLETS  AT DINNER 270 Tab 3    lisinopril (PRINIVIL, ZESTRIL) 40 mg tablet Take 1 Tab by mouth daily.  90 Tab 3    fenofibrate nanocrystallized (TRICOR) 145 mg tablet TAKE 1 TABLET BY MOUTH  DAILY 90 Tab 3    levothyroxine (SYNTHROID) 50 mcg tablet TAKE 1 TABLET BY MOUTH  DAILY 90 Tab 3    rosuvastatin (CRESTOR) 10 mg tablet TAKE 1 TABLET BY MOUTH  DAILY 90 Tab 3    diclofenac EC (VOLTAREN) 75 mg EC tablet TAKE 1 TABLET BY MOUTH TWO  TIMES DAILY 180 Tab 3    OMEGA-3-DHA-EPA-FISH OIL-COQ10 PO Take 100 mg by mouth.  fish oil-dha-epa 1,200-144-216 mg cap Take  by mouth.  aspirin 81 mg tablet Take 81 mg by mouth daily as needed.        Past Medical History:   Diagnosis Date    Arthritis     fingers    CAD (coronary artery disease)     Cardiomyopathy ([facility]     Cholelithiasis 9/21/2017    CKD (chronic kidney disease) 9/21/2017    Diabetes mellitus ([facility]     Diverticulosis 9/21/2017    DJD (degenerative joint disease) 9/21/2017    GERD (gastroesophageal reflux disease)     H/O: depression 9/21/2017    Hiatal hernia     Hypercholesteremia     Hypertension     Hypertension with renal disease 9/21/2017    Hypothyroidism     Kidney stones     Obesity 9/21/2017    On statin therapy 9/21/2017    Sleep apnea 9/21/2017    Thyroid disease     Viral meningitis 9/1990     Past Surgical History:   Procedure Laterality Date    HX HERNIA REPAIR      HX KNEE ARTHROSCOPY      both     Allergies   Allergen Reactions    Demerol [Meperidine] Nausea and Vomiting    Phenergan [Promethazine] Unknown (comments)       REVIEW OF SYSTEMS:  General: negative for - chills or fever, or weight loss or gain  ENT: negative for - headaches, nasal congestion or tinnitus  Eyes: no blurred or visual changes  Neck: No stiffness or swollen nodes  Respiratory: negative for - cough, hemoptysis, shortness of breath or wheezing  Cardiovascular : negative for - chest pain, edema, palpitations or shortness of breath  Gastrointestinal: negative for - abdominal pain, blood in stools, heartburn or nausea/vomiting  Genito-Urinary: no dysuria, trouble voiding, or hematuria  Musculoskeletal: negative for - gait disturbance, joint pain, joint stiffness or joint swelling  Neurological: no TIA or stroke symptoms  Hematologic: no bruises, no bleeding  Lymphatic: no swollen glands  Integument: no lumps, mole changes, nail changes or rash  Endocrine:no malaise/lethargy poly uria or polydipsia or unexpected weight changes        Social History     Socioeconomic History    Marital status:      Spouse name: Not on file    Number of children: Not on file    Years of education: Not on file    Highest education level: Not on file   Tobacco Use    Smoking status: Never Smoker    Smokeless tobacco: Never Used   Substance and Sexual Activity    Alcohol use: No    Drug use: No    Sexual activity: Yes     Partners: Female     Family History   Problem Relation Age of Onset    Heart Disease Mother         AICD    Thyroid Disease Mother     Heart Disease Father         pacemaker    Hypertension Father     Cancer Father         lymphoma    Thyroid Disease Sister     Heart Disease Paternal Grandmother         angina       OBJECTIVE:     Visit Vitals  /76 (BP 1 Location: Right arm, BP Patient Position: Sitting)   Pulse 70   Temp 97.8 °F (36.6 °C)   Resp 16   Ht 6' 1\" (1.854 m)   Wt 243 lb 3.2 oz (110.3 kg)   SpO2 96%   BMI 32.09 kg/m²     CONSTITUTIONAL:   well nourished, appears age appropriate  EYES: sclera anicteric, PERRL, EOMI  ENMT:nares clear, moist mucous membranes, pharynx clear  NECK: supple. Thyroid normal, No JVD or bruits  RESPIRATORY: Chest: clear to ascultation and percussion, normal inspiratory effort  CARDIOVASCULAR: Heart: regular rate and rhythm no murmurs, rubs or gallops, PMI not displaced, No thrills, no peripheral edema  GASTROINTESTINAL: Abdomen: non distended, soft, non tender, bowel sounds normal  HEMATOLOGIC: no purpura, petechiae or bruising  LYMPHATIC: No lymph node enlargemant  MUSCULOSKELETAL: Extremities: no active synovitis, pulse 1+   INTEGUMENT: No unusual rashes or suspicious skin lesions noted. Nails appear normal.  PERIPHERAL VASCULAR: normal pulses femoral, PT and DP  NEUROLOGIC: non-focal exam, A & O X 3  PSYCHIATRIC:, appropriate affect     ASSESSMENT:   1. Hypertension with renal disease    2.  Controlled type 2 diabetes mellitus with stage 3 chronic kidney disease, without long-term current use of insulin ([facility]    3. Mixed hyperlipidemia    4. Gastroesophageal reflux disease without esophagitis    5. Primary osteoarthritis involving multiple joints    6. Stage 3 chronic kidney disease (HCC)    7. Cardiomyopathy, unspecified type ([facility]    8. Class 1 obesity due to excess calories with serious comorbidity and body mass index (BMI) of 33.0 to 33.9 in adult    9. Encounter for immunization      Impression  1. Hypertension that is controlled so continue current therapy reviewed with him. 2.  Diabetes mellitus repeat status pending and prior lab reviewed now make adjustments if necessary. 3.  Hyperlipidemia prior lab reviewed and repeat status pending and I will adjust if needed. 4   GERD that is stable  5   DJD currently stable  6. CKD stage III repeat status pending  7. Cardiomyopathy currently stable  8. Obesity we did discuss diet, exercise and weight reduction for overall health benefit and I note his weight is slightly improved  Flu shot given today. I will call with lab results and make further recommendations or adjustments if necessary. Follow-up in 3 months or sooner if there is a problem. PLAN:  .  Orders Placed This Encounter    Influenza Vaccine Inactivated (IIV)(FLUAD), Subunit, Adjuvanted, IM, (48724)    METABOLIC PANEL, COMPREHENSIVE ([facility])    LIPID PANEL ([facility])    CK ([facility])    HEMOGLOBIN A1C W/O EAG ([facility]         ATTENTION:   This medical record was transcribed using an electronic medical records system. Although proofread, it may and can contain electronic and spelling errors. Other human spelling and other errors may be present. Corrections may be executed at a later time. Please feel free to contact us for any clarifications as needed. Follow-up and Dispositions    · Return in about 3 months (around 12/16/2020).          No results found for any visits on 09/16/20. Gelacio Watts MD    The patient verbalized understanding of the problems and plans as explained.

## 2020-09-16 ENCOUNTER — OFFICE VISIT (OUTPATIENT)
Dept: INTERNAL MEDICINE CLINIC | Age: 70
End: 2020-09-16
Payer: MEDICARE

## 2020-09-16 VITALS
OXYGEN SATURATION: 96 % | DIASTOLIC BLOOD PRESSURE: 76 MMHG | BODY MASS INDEX: 32.23 KG/M2 | HEIGHT: 73 IN | SYSTOLIC BLOOD PRESSURE: 132 MMHG | RESPIRATION RATE: 16 BRPM | WEIGHT: 243.2 LBS | TEMPERATURE: 97.8 F | HEART RATE: 70 BPM

## 2020-09-16 DIAGNOSIS — E66.09 CLASS 1 OBESITY DUE TO EXCESS CALORIES WITH SERIOUS COMORBIDITY AND BODY MASS INDEX (BMI) OF 33.0 TO 33.9 IN ADULT: ICD-10-CM

## 2020-09-16 DIAGNOSIS — N18.30 CONTROLLED TYPE 2 DIABETES MELLITUS WITH STAGE 3 CHRONIC KIDNEY DISEASE, WITHOUT LONG-TERM CURRENT USE OF INSULIN (HCC): ICD-10-CM

## 2020-09-16 DIAGNOSIS — M15.9 PRIMARY OSTEOARTHRITIS INVOLVING MULTIPLE JOINTS: ICD-10-CM

## 2020-09-16 DIAGNOSIS — E11.22 CONTROLLED TYPE 2 DIABETES MELLITUS WITH STAGE 3 CHRONIC KIDNEY DISEASE, WITHOUT LONG-TERM CURRENT USE OF INSULIN (HCC): ICD-10-CM

## 2020-09-16 DIAGNOSIS — Z23 ENCOUNTER FOR IMMUNIZATION: ICD-10-CM

## 2020-09-16 DIAGNOSIS — N18.30 STAGE 3 CHRONIC KIDNEY DISEASE (HCC): ICD-10-CM

## 2020-09-16 DIAGNOSIS — K21.9 GASTROESOPHAGEAL REFLUX DISEASE WITHOUT ESOPHAGITIS: ICD-10-CM

## 2020-09-16 DIAGNOSIS — E78.2 MIXED HYPERLIPIDEMIA: ICD-10-CM

## 2020-09-16 DIAGNOSIS — I42.9 CARDIOMYOPATHY, UNSPECIFIED TYPE (HCC): ICD-10-CM

## 2020-09-16 DIAGNOSIS — I12.9 HYPERTENSION WITH RENAL DISEASE: Primary | ICD-10-CM

## 2020-09-16 LAB
A-G RATIO,AGRAT: 1.8 RATIO
ALBUMIN SERPL-MCNC: 4.6 G/DL (ref 3.9–5.4)
ALP SERPL-CCNC: 75 U/L (ref 38–126)
ALT SERPL-CCNC: 25 U/L (ref 0–50)
ANION GAP SERPL CALC-SCNC: 12 MMOL/L
AST SERPL W P-5'-P-CCNC: 32 U/L (ref 14–36)
BILIRUB SERPL-MCNC: 0.9 MG/DL (ref 0.2–1.3)
BUN SERPL-MCNC: 16 MG/DL (ref 9–20)
BUN/CREATININE RATIO,BUCR: 11 RATIO
CALCIUM SERPL-MCNC: 9.4 MG/DL (ref 8.4–10.2)
CHLORIDE SERPL-SCNC: 105 MMOL/L (ref 98–107)
CHOL/HDL RATIO,CHHD: 3 RATIO (ref 0–4)
CHOLEST SERPL-MCNC: 109 MG/DL (ref 0–200)
CK SERPL-CCNC: 276 U/L (ref 30–135)
CO2 SERPL-SCNC: 28 MMOL/L (ref 22–32)
CREAT SERPL-MCNC: 1.5 MG/DL (ref 0.8–1.5)
GLOBULIN,GLOB: 2.5
GLUCOSE SERPL-MCNC: 111 MG/DL (ref 75–110)
HBA1C MFR BLD HPLC: 6 % (ref 4–5.7)
HDLC SERPL-MCNC: 38 MG/DL (ref 35–130)
LDL/HDL RATIO,LDHD: 1 RATIO
LDLC SERPL CALC-MCNC: 49 MG/DL (ref 0–130)
POTASSIUM SERPL-SCNC: 4 MMOL/L (ref 3.6–5)
PROT SERPL-MCNC: 7.1 G/DL (ref 6.3–8.2)
SODIUM SERPL-SCNC: 145 MMOL/L (ref 137–145)
TRIGL SERPL-MCNC: 109 MG/DL (ref 0–200)
VLDLC SERPL CALC-MCNC: 22 MG/DL

## 2020-09-16 PROCEDURE — G8510 SCR DEP NEG, NO PLAN REQD: HCPCS | Performed by: INTERNAL MEDICINE

## 2020-09-16 PROCEDURE — G8417 CALC BMI ABV UP PARAM F/U: HCPCS | Performed by: INTERNAL MEDICINE

## 2020-09-16 PROCEDURE — G8536 NO DOC ELDER MAL SCRN: HCPCS | Performed by: INTERNAL MEDICINE

## 2020-09-16 PROCEDURE — 1101F PT FALLS ASSESS-DOCD LE1/YR: CPT | Performed by: INTERNAL MEDICINE

## 2020-09-16 PROCEDURE — 80053 COMPREHEN METABOLIC PANEL: CPT | Performed by: INTERNAL MEDICINE

## 2020-09-16 PROCEDURE — 80061 LIPID PANEL: CPT | Performed by: INTERNAL MEDICINE

## 2020-09-16 PROCEDURE — 3044F HG A1C LEVEL LT 7.0%: CPT | Performed by: INTERNAL MEDICINE

## 2020-09-16 PROCEDURE — 83036 HEMOGLOBIN GLYCOSYLATED A1C: CPT | Performed by: INTERNAL MEDICINE

## 2020-09-16 PROCEDURE — 3017F COLORECTAL CA SCREEN DOC REV: CPT | Performed by: INTERNAL MEDICINE

## 2020-09-16 PROCEDURE — 99214 OFFICE O/P EST MOD 30 MIN: CPT | Performed by: INTERNAL MEDICINE

## 2020-09-16 PROCEDURE — G0008 ADMIN INFLUENZA VIRUS VAC: HCPCS | Performed by: INTERNAL MEDICINE

## 2020-09-16 PROCEDURE — G8427 DOCREV CUR MEDS BY ELIG CLIN: HCPCS | Performed by: INTERNAL MEDICINE

## 2020-09-16 PROCEDURE — 82550 ASSAY OF CK (CPK): CPT | Performed by: INTERNAL MEDICINE

## 2020-09-16 PROCEDURE — 90653 IIV ADJUVANT VACCINE IM: CPT

## 2020-09-16 PROCEDURE — 2022F DILAT RTA XM EVC RTNOPTHY: CPT | Performed by: INTERNAL MEDICINE

## 2020-09-16 NOTE — PROGRESS NOTES
Chief Complaint   Patient presents with    Hypertension     3 month f/up    Chronic Kidney Disease    Diabetes    Thyroid Problem    Cholesterol Problem     1. Have you been to the ER, urgent care clinic since your last visit? Hospitalized since your last visit? No    2. Have you seen or consulted any other health care providers outside of the [address] [place] [address] since your last visit? Include any pap smears or colon screening.  No

## 2020-09-16 NOTE — PROGRESS NOTES
After obtaining consent and per verbal order from Dr. Lan العلي, patient received influenza vaccine given by Saira Bee and verified by Chago Marion. Fluad Influenza 0.5ml was given IM in right deltoid. Patient tolerated injection and was observed for 10 minutes post injection. VIS was given.

## 2020-09-16 NOTE — PATIENT INSTRUCTIONS
Body Mass Index: Care Instructions  Your Care Instructions     Body mass index (BMI) can help you see if your weight is raising your risk for health problems. It uses a formula to compare how much you weigh with how tall you are. · A BMI lower than 18.5 is considered underweight. · A BMI between 18.5 and 24.9 is considered healthy. · A BMI between 25 and 29.9 is considered overweight. A BMI of 30 or higher is considered obese. If your BMI is in the normal range, it means that you have a lower risk for weight-related health problems. If your BMI is in the overweight or obese range, you may be at increased risk for weight-related health problems, such as high blood pressure, heart disease, stroke, arthritis or joint pain, and diabetes. If your BMI is in the underweight range, you may be at increased risk for health problems such as fatigue, lower protection (immunity) against illness, muscle loss, bone loss, hair loss, and hormone problems. BMI is just one measure of your risk for weight-related health problems. You may be at higher risk for health problems if you are not active, you eat an unhealthy diet, or you drink too much alcohol or use tobacco products. Follow-up care is a key part of your treatment and safety. Be sure to make and go to all appointments, and call your doctor if you are having problems. It's also a good idea to know your test results and keep a list of the medicines you take. How can you care for yourself at home? · Practice healthy eating habits. This includes eating plenty of fruits, vegetables, whole grains, lean protein, and low-fat dairy. · If your doctor recommends it, get more exercise. Walking is a good choice. Bit by bit, increase the amount you walk every day. Try for at least 30 minutes on most days of the week. · Do not smoke. Smoking can increase your risk for health problems. If you need help quitting, talk to your doctor about stop-smoking programs and medicines. These can increase your chances of quitting for good. · Limit alcohol to 2 drinks a day for men and 1 drink a day for women. Too much alcohol can cause health problems. If you have a BMI higher than 25  · Your doctor may do other tests to check your risk for weight-related health problems. This may include measuring the distance around your waist. A waist measurement of more than 40 inches in men or 35 inches in women can increase the risk of weight-related health problems. · Talk with your doctor about steps you can take to stay healthy or improve your health. You may need to make lifestyle changes to lose weight and stay healthy, such as changing your diet and getting regular exercise. If you have a BMI lower than 18.5  · Your doctor may do other tests to check your risk for health problems. · Talk with your doctor about steps you can take to stay healthy or improve your health. You may need to make lifestyle changes to gain or maintain weight and stay healthy, such as getting more healthy foods in your diet and doing exercises to build muscle. Where can you learn more? Go to http://rosa-jaylene.info/  Enter S176 in the search box to learn more about \"Body Mass Index: Care Instructions. \"  Current as of: December 11, 2019               Content Version: 12.6  © 2658-2304 [employer]. Care instructions adapted under license by [employer] (which disclaims liability or warranty for this information). If you have questions about a medical condition or this instruction, always ask your healthcare professional. [address] any warranty or liability for your use of this information.

## 2020-10-21 RX ORDER — LEVOTHYROXINE SODIUM 50 UG/1
TABLET ORAL
Qty: 90 TAB | Refills: 3 | Status: SHIPPED | OUTPATIENT
Start: 2020-10-21 | End: 2021-11-05 | Stop reason: SDUPTHER

## 2020-10-21 RX ORDER — FENOFIBRATE 145 MG/1
TABLET, COATED ORAL
Qty: 90 TAB | Refills: 3 | Status: SHIPPED | OUTPATIENT
Start: 2020-10-21 | End: 2021-11-05 | Stop reason: SDUPTHER

## 2020-10-21 RX ORDER — ROSUVASTATIN CALCIUM 10 MG/1
TABLET, COATED ORAL
Qty: 90 TAB | Refills: 3 | Status: SHIPPED | OUTPATIENT
Start: 2020-10-21 | End: 2021-11-05 | Stop reason: SDUPTHER

## 2020-10-21 RX ORDER — DICLOFENAC SODIUM 75 MG/1
TABLET, DELAYED RELEASE ORAL
Qty: 180 TAB | Refills: 3 | Status: SHIPPED | OUTPATIENT
Start: 2020-10-21 | End: 2021-11-05 | Stop reason: SDUPTHER

## 2020-10-21 RX ORDER — METFORMIN HYDROCHLORIDE 500 MG/1
TABLET, EXTENDED RELEASE ORAL
Qty: 270 TAB | Refills: 3 | Status: SHIPPED | OUTPATIENT
Start: 2020-10-21 | End: 2021-11-05 | Stop reason: SDUPTHER

## 2020-10-21 NOTE — TELEPHONE ENCOUNTER
RX refill request from the patient/pharmacy. Patient last seen 09- with labs, and next appt. scheduled for 12-  Requested Prescriptions     Pending Prescriptions Disp Refills    rosuvastatin (CRESTOR) 10 mg tablet [Pharmacy Med Name: ROSUVASTATIN  10MG  TAB] 90 Tab 3     Sig: TAKE 1 TABLET BY MOUTH  DAILY    levothyroxine (SYNTHROID) 50 mcg tablet [Pharmacy Med Name: LEVOTHYROXINE  50MCG  TAB] 90 Tab 3     Sig: TAKE 1 TABLET BY MOUTH  DAILY    fenofibrate nanocrystallized (TRICOR) 145 mg tablet [Pharmacy Med Name: FENOFIBRATE  145MG  TAB] 90 Tab 3     Sig: TAKE 1 TABLET BY MOUTH  DAILY    metFORMIN ER (GLUCOPHAGE XR) 500 mg tablet [Pharmacy Med Name: METFORMIN ER 500MG TABLET] 270 Tab 3     Sig: TAKE 1 TABLET BY MOUTH IN  THE MORNING AND 2 TABLETS  AT DINNER    diclofenac EC (VOLTAREN) 75 mg EC tablet [Pharmacy Med Name: DICLOFENAC SODIUM  75MG  TAB  ENTERIC COATED] 180 Tab 3     Sig: TAKE 1 TABLET BY MOUTH TWO  TIMES DAILY   .

## 2020-12-15 NOTE — PROGRESS NOTES
This is a Subsequent Medicare Annual Wellness Visit providing Personalized Prevention Plan Services (PPPS) (Performed 12 months after initial AWV and PPPS )    I have reviewed the patient's medical history in detail and updated the computerized patient record. He presents today for his Medicare subsequent annual wellness examination and screening questionnaire. He is also in follow-up of his multiple medical problems include hypertension, diabetes, hyperlipidemia, cardiomyopathy, GERD, diverticulosis, hypothyroidism, DJD, CKD stage III, obesity, history of depression and other multiple medical problems. He is taking his medications and trying to follow his diet and remain physically active. He currently denies any chest pain, shortness of breath, palpitations, PND, orthopnea or other cardiac or respiratory complaints. He notes no GI or  complaints except he has been having progressive problems with ED over the last couple years. He has no headaches, dizziness or neurologic problems he has no other problems on complete review of systems as his arthritis is chronic and unchanged.     History     Past Medical History:   Diagnosis Date    Arthritis     fingers    CAD (coronary artery disease)     Cardiomyopathy ([facility] [address]     Cholelithiasis 9/21/2017    CKD (chronic kidney disease) 9/21/2017    Diabetes mellitus ([address]     Diverticulosis 9/21/2017    DJD (degenerative joint disease) 9/21/2017    GERD (gastroesophageal reflux disease)     H/O: depression 9/21/2017    Hiatal hernia     Hypercholesteremia     Hypertension     Hypertension with renal disease 9/21/2017    Hypothyroidism     Kidney stones     Obesity 9/21/2017    On statin therapy 9/21/2017    Sleep apnea 9/21/2017    Thyroid disease     Viral meningitis 9/1990      Past Surgical History:   Procedure Laterality Date    HX HERNIA REPAIR      HX KNEE ARTHROSCOPY      both     Social History     Tobacco Use    Smoking status: Never Smoker    Smokeless tobacco: Never Used   Substance Use Topics    Alcohol use: No    Drug use: No     Current Outpatient Medications   Medication Sig Dispense Refill    tadalafiL (Cialis) 20 mg tablet Take 1 Tab by mouth as needed for Erectile Dysfunction. 10 Tab 3    rosuvastatin (CRESTOR) 10 mg tablet TAKE 1 TABLET BY MOUTH  DAILY 90 Tab 3    levothyroxine (SYNTHROID) 50 mcg tablet TAKE 1 TABLET BY MOUTH  DAILY 90 Tab 3    fenofibrate nanocrystallized (TRICOR) 145 mg tablet TAKE 1 TABLET BY MOUTH  DAILY 90 Tab 3    metFORMIN ER (GLUCOPHAGE XR) 500 mg tablet TAKE 1 TABLET BY MOUTH IN  THE MORNING AND 2 TABLETS  AT DINNER 270 Tab 3    diclofenac EC (VOLTAREN) 75 mg EC tablet TAKE 1 TABLET BY MOUTH TWO  TIMES DAILY 180 Tab 3    olmesartan (BENICAR) 40 mg tablet Take 1 Tab by mouth daily. 90 Tab 3    esomeprazole (NEXIUM) 40 mg capsule TAKE 1 CAPSULE BY MOUTH  DAILY 90 Cap 3    amLODIPine (NORVASC) 5 mg tablet Take 1 Tab by mouth daily. 90 Tab 3    niacin ER (NIASPAN) 750 mg Tb24 TAKE 2 TABLETS BY MOUTH AT  BEDTIME 180 Tab 3    carvediloL (COREG) 25 mg tablet Take 1 Tab by mouth two (2) times daily (with meals). 180 Tab 3    lisinopril (PRINIVIL, ZESTRIL) 40 mg tablet Take 1 Tab by mouth daily. 90 Tab 3    fish oil-dha-epa 1,200-144-216 mg cap Take  by mouth.  aspirin 81 mg tablet Take 81 mg by mouth daily as needed.        Allergies   Allergen Reactions    Demerol [Meperidine] Nausea and Vomiting    Phenergan [Promethazine] Unknown (comments)     Family History   Problem Relation Age of Onset    Heart Disease Mother         AICD    Thyroid Disease Mother     Heart Disease Father         pacemaker    Hypertension Father     Cancer Father         lymphoma    Thyroid Disease Sister     Heart Disease Paternal Grandmother         angina       Patient Active Problem List    Diagnosis    Hypertension with renal disease    Stage 3 chronic kidney disease    Mixed hyperlipidemia    Gastroesophageal reflux disease without esophagitis    Primary osteoarthritis involving multiple joints    Cardiomyopathy ([facility] [address]     Diagnosis 2012 EF at that time 25-30%, follow-up echo in 2014 EF of 30% and apparently had a recent echo showing EF of 25% done early this year 2017      Controlled type 2 diabetes mellitus with stage 3 chronic kidney disease, without long-term current use of insulin (HCC)    Diverticulosis    Class 1 obesity due to excess calories with serious comorbidity and body mass index (BMI) of 33.0 to 33.9 in adult    Acquired hypothyroidism    Alcohol screening    Medicare annual wellness visit, subsequent    Hearing impaired    Prostate cancer screening    Cholelithiasis    History of viral meningitis    H/O: depression    Sleep apnea       Patient Care Team:  Thor Gustafson MD as PCP - General (Internal Medicine)  Thor Gustafson MD as PCP - [facility] Provider    Depression Risk Factor Screening:     3 most recent PHQ Screens 12/16/2020   Little interest or pleasure in doing things Not at all   Feeling down, depressed, irritable, or hopeless Not at all   Total Score PHQ 2 0     Alcohol Risk Factor Screening:   Do you average more than 1 drink per night or more than 7 drinks a week:  No    On any one occasion in the past three months have you have had more than 3 drinks containing alcohol:  No    Functional Ability and Level of Safety:     Fall Risk     Fall Risk Assessment, last 12 mths 12/16/2020   Able to walk? Yes   Fall in past 12 months? No       Hearing Loss   mild    Activities of Daily Living   Self-care.    ADL Assessment 12/16/2020   Feeding yourself No Help Needed   Getting from bed to chair No Help Needed   Getting dressed No Help Needed   Bathing or showering No Help Needed   Walk across the room (includes cane/walker) No Help Needed   Using the telphone No Help Needed   Taking your medications No Help Needed   Preparing meals No Help Needed   Managing money (expenses/bills) No Help Needed   Moderately strenuous housework (laundry) No Help Needed   Shopping for personal items (toiletries/medicines) No Help Needed   Shopping for groceries No Help Needed   Driving No Help Needed   Climbing a flight of stairs No Help Needed   Getting to places beyond walking distances No Help Needed       Abuse Screen   Patient is not abused    Social History     Social History Narrative    Not on file       Review of Systems      ROS:    Constitutional: He denies fevers, weight loss, sweats. Eyes: No blurred or double vision. ENT: No difficulty with swallowing, taste, speech or smell. Neck: no stiffness or swelling  Respiratory: No cough wheezing or shortness of breath. Cardiovascular: Denies chest pain, palpitations, unexplained indigestion or syncope. Gastrointestinal:  No changes in bowel movements, no abdominal pain, no bloating. Genitourinary:  He denies frequency, nocturia or stranguria. Positive for progressive ED over the last couple of years  Extremities: No change of his chronic joint pain, stiffness or swelling. Neurological:  No numbness, tingling, burring paresthesias or loss of motor strength. No syncope, dizziness or frequent headache  Lymphatic: no adenopathy noted  Hematologic: no easy bruising or bleeding gums  Skin:  No recent rashes or mole changes. Psychiatric/Behavioral:  Negative for depression.       Physical Examination     Evaluation of Cognitive Function:  Mood/affect:  happy  Appearance: age appropriate  Family member/caregiver input: none    Visit Vitals  /80   Pulse 63   Temp 98.7 °F (37.1 °C) (Oral)   Resp 17   Ht 6' 1\" (1.854 m)   Wt 252 lb 12.8 oz (114.7 kg)   SpO2 96%   BMI 33.35 kg/m²     Vitals:    12/16/20 1045 12/16/20 1202   BP: (!) 156/84 138/80   Pulse: 63    Resp: 17    Temp: 98.7 °F (37.1 °C)    TempSrc: Oral    SpO2: 96%    Weight: 252 lb 12.8 oz (114.7 kg)    Height: 6' 1\" (1.854 m)    PainSc:   0 - No pain PHYSICAL EXAM:    General appearance - alert, well appearing, and in no distress  Mental status - alert, oriented to person, place, and time  HEENT:  Ears - bilateral TM's and external ear canals clear  Eyes - pupillary responses were normal.  Extraocular muscle function intact. Lids and conjunctiva not injected. Fundoscopic exam revealed sharp disc margins. eye movements intact  Pharynx- clear with teeth in good repair. No masses were noted  Neck - supple without thyromegaly or burit. No JVD noted  Lungs - clear to auscultation and percussion  Cardiac- normal rate, regular rhythm without murmurs. PMI not displaced. No gallop, rub or click  Abdomen - flat, soft, non-tender without palpable organomegaly or mass. No pulsatile mass was felt, and not bruit was heard. Bowel sounds were active  : Circumcised, Testes descended w/o masses  Rectal: normal sphincter tone, prostate normal, no masses, stool brown and hemacult negative  Extremities -  no clubbing cyanosis or edema  Lymphatics - no palpable lymphadenopathy, no hepatosplenomegaly  Hematologic: no petechiae or purpura  Peripheral vascular -Femoral, Dorsalis pedis and posterior tibial pulses felt without difficulty  Skin - no rash or unusual mole change noted  Neurological - Cranial nerves II-XII grossly intact. Motor strength 5/5. DTR's 2+ and symmetric.   Station and gait normal  Back exam - full range of motion, no tenderness, palpable spasm or pain on motion  Musculoskeletal - no joint tenderness, deformity or swelling        Results for orders placed or performed in visit on 39/25/68   METABOLIC PANEL, COMPREHENSIVE   Result Value Ref Range    Glucose 111 (H) 75 - 110 mg/dL    BUN 16.0 9.0 - 20.0 mg/dL    Creatinine 1.5 0.8 - 1.5 mg/dL    Sodium 145 137 - 145 mmol/L    Potassium 4.0 3.6 - 5.0 mmol/L    Chloride 105 98 - 107 mmol/L    CO2 28.0 22.0 - 32.0 mmol/L    Calcium 9.4 8.4 - 10.2 mg/dl    Protein, total 7.1 6.3 - 8.2 g/dL    Albumin 4.6 3.9 - 5.4 g/dL    ALT (SGPT) 25 0 - 50 U/L    AST (SGOT) 32.0 14.0 - 36.0 U/L    Alk. phosphatase 75 38 - 126 U/L    Bilirubin, total 0.9 0.2 - 1.3 mg/dL    BUN/Creatinine ratio 11 Ratio    GFR est AA 56 <60 mL/min/1.73m2    GFR est non-AA 47 <60 mL/min/1.73m2    Globulin 2.50     A-G Ratio 1.8 Ratio    Anion gap 12 mmol/L   LIPID PANEL   Result Value Ref Range    Cholesterol, total 109 0 - 200 mg/dL    Triglyceride 109 0 - 200 mg/dL    HDL Cholesterol 38 35 - 130 mg/dL    VLDL 22 mg/dL    LDL, calculated 49 0 - 130 mg/dL    CHOL/HDL Ratio 3 0 - 4 Ratio    LDL/HDL Ratio 1 Ratio   CK   Result Value Ref Range    .00 (H) 30.00 - 135.00 U/L   HEMOGLOBIN A1C W/O EAG   Result Value Ref Range    Hemoglobin A1c 6.0 (H) 4.0 - 5.7 %       Advice/Referrals/Counseling   Education and counseling provided:  Are appropriate based on today's review and evaluation  End-of-Life planning (with patient's consent)  Pneumococcal Vaccine  Influenza Vaccine  Colorectal cancer screening tests      Assessment/Plan     ASSESSMENT:   1. Hypertension with renal disease    2. Controlled type 2 diabetes mellitus with stage 3 chronic kidney disease, without long-term current use of insulin ([facility] [address]    3. Mixed hyperlipidemia    4. Cardiomyopathy, unspecified type ([facility] [address]    5. Gastroesophageal reflux disease without esophagitis    6. Primary osteoarthritis involving multiple joints    7. Stage 3 chronic kidney disease, unspecified whether stage 3a or 3b CKD    8. Class 1 obesity due to excess calories with serious comorbidity and body mass index (BMI) of 33.0 to 33.9 in adult    9. Acquired hypothyroidism    10. Diverticulosis    11. Prostate cancer screening    12. Alcohol screening    13. Medicare annual wellness visit, subsequent      Impression  1. Hypertension that is controlled so continue current therapy reviewed with him. 2.  Diabetes repeat status pending a prior lab reviewed now make adjustments if necessary.   3.  Hyperlipidemia prior lab reviewed and repeat status pending and I will adjust if needed. 4.  Cardiomyopathy that is stable and EKG obtained today reveals a bundle branch block without change. 5.  GERD that is stable  6. DJD chronic but stable  7. CKD stage III repeat status pending prior labs reviewed  8. Obesity we did discuss diet, exercise and weight reduction for overall health benefit and I note his weight is up 9 pounds stressed importance of getting that down. 9.  Hypothyroidism repeat status is pending  10. Diverticulosis no recent symptoms  11. Prostate cancer screening done today with PSA pending  12. Annual alcohol screening is done we discussed that males more than 2 drinks per day with increased cardiovascular risk and increased risk of liver disease and other GI problems. He denies any alcohol intake. 13.  ED we will try Cialis 20 mg until he can take a half or whole tablet on a as needed basis  Medicare subsequent annual wellness examination screening questionnaire was completed today. The results were reviewed with him and his questions were answered. Lifestyle recommendations and modifications discussed and made. I will call with lab results and make further recommendations or adjustments if necessary. Follow-up in 3 months or sooner if there is a problem. PLAN:  .  Orders Placed This Encounter    CBC WITH AUTOMATED DIFF ([facility])    METABOLIC PANEL, COMPREHENSIVE ([facility])    LIPID PANEL ([facility])    CK ([facility])    HEMOGLOBIN A1C W/O EAG ([facility])    T4, FREE ([facility])    TSH 3RD GENERATION ([facility])    URINALYSIS W/O MICRO ([facility])    URINE, MICROALBUMIN, SEMIQUANTITATIVE ([facility])    PSA SCREENING (SCREENING) ([facility])    AMB POC EKG ROUTINE W/ 12 LEADS, INTER & REP    tadalafiL (Cialis) 20 mg tablet         ATTENTION:   This medical record was transcribed using an electronic medical records system. Although proofread, it may and can contain electronic and spelling errors. Other human spelling and other errors may be present. Corrections may be executed at a later time. Please feel free to contact us for any clarifications as needed. Follow-up and Dispositions    · Return in about 3 months (around 3/16/2021). Raghav Yuen MD    Recommended healthy diet low in carbohydrates, fats, sodium and cholesterol. Recommended regular cardiovascular exercise 3-6 times per week for 30-60 minutes daily. Current Outpatient Medications   Medication Sig Dispense Refill    tadalafiL (Cialis) 20 mg tablet Take 1 Tab by mouth as needed for Erectile Dysfunction. 10 Tab 3    rosuvastatin (CRESTOR) 10 mg tablet TAKE 1 TABLET BY MOUTH  DAILY 90 Tab 3    levothyroxine (SYNTHROID) 50 mcg tablet TAKE 1 TABLET BY MOUTH  DAILY 90 Tab 3    fenofibrate nanocrystallized (TRICOR) 145 mg tablet TAKE 1 TABLET BY MOUTH  DAILY 90 Tab 3    metFORMIN ER (GLUCOPHAGE XR) 500 mg tablet TAKE 1 TABLET BY MOUTH IN  THE MORNING AND 2 TABLETS  AT DINNER 270 Tab 3    diclofenac EC (VOLTAREN) 75 mg EC tablet TAKE 1 TABLET BY MOUTH TWO  TIMES DAILY 180 Tab 3    olmesartan (BENICAR) 40 mg tablet Take 1 Tab by mouth daily. 90 Tab 3    esomeprazole (NEXIUM) 40 mg capsule TAKE 1 CAPSULE BY MOUTH  DAILY 90 Cap 3    amLODIPine (NORVASC) 5 mg tablet Take 1 Tab by mouth daily. 90 Tab 3    niacin ER (NIASPAN) 750 mg Tb24 TAKE 2 TABLETS BY MOUTH AT  BEDTIME 180 Tab 3    carvediloL (COREG) 25 mg tablet Take 1 Tab by mouth two (2) times daily (with meals). 180 Tab 3    lisinopril (PRINIVIL, ZESTRIL) 40 mg tablet Take 1 Tab by mouth daily. 90 Tab 3    fish oil-dha-epa 1,200-144-216 mg cap Take  by mouth.  aspirin 81 mg tablet Take 81 mg by mouth daily as needed. No results found for any visits on 12/16/20. Verbal and written instructions (see AVS) provided.   Patient expresses understanding of diagnosis and treatment plan.     Mohini Agustin MD

## 2020-12-16 ENCOUNTER — OFFICE VISIT (OUTPATIENT)
Dept: INTERNAL MEDICINE CLINIC | Age: 70
End: 2020-12-16
Payer: MEDICARE

## 2020-12-16 VITALS
DIASTOLIC BLOOD PRESSURE: 80 MMHG | TEMPERATURE: 98.7 F | HEIGHT: 73 IN | OXYGEN SATURATION: 96 % | BODY MASS INDEX: 33.5 KG/M2 | HEART RATE: 63 BPM | RESPIRATION RATE: 17 BRPM | SYSTOLIC BLOOD PRESSURE: 138 MMHG | WEIGHT: 252.8 LBS

## 2020-12-16 DIAGNOSIS — Z13.39 ALCOHOL SCREENING: ICD-10-CM

## 2020-12-16 DIAGNOSIS — K57.90 DIVERTICULOSIS: ICD-10-CM

## 2020-12-16 DIAGNOSIS — E11.22 CONTROLLED TYPE 2 DIABETES MELLITUS WITH STAGE 3 CHRONIC KIDNEY DISEASE, WITHOUT LONG-TERM CURRENT USE OF INSULIN (HCC): ICD-10-CM

## 2020-12-16 DIAGNOSIS — I12.9 HYPERTENSION WITH RENAL DISEASE: Primary | ICD-10-CM

## 2020-12-16 DIAGNOSIS — E03.9 ACQUIRED HYPOTHYROIDISM: ICD-10-CM

## 2020-12-16 DIAGNOSIS — Z00.00 MEDICARE ANNUAL WELLNESS VISIT, SUBSEQUENT: ICD-10-CM

## 2020-12-16 DIAGNOSIS — E78.2 MIXED HYPERLIPIDEMIA: ICD-10-CM

## 2020-12-16 DIAGNOSIS — N18.30 CONTROLLED TYPE 2 DIABETES MELLITUS WITH STAGE 3 CHRONIC KIDNEY DISEASE, WITHOUT LONG-TERM CURRENT USE OF INSULIN (HCC): ICD-10-CM

## 2020-12-16 DIAGNOSIS — N18.30 STAGE 3 CHRONIC KIDNEY DISEASE, UNSPECIFIED WHETHER STAGE 3A OR 3B CKD (HCC): ICD-10-CM

## 2020-12-16 DIAGNOSIS — I42.9 CARDIOMYOPATHY, UNSPECIFIED TYPE (HCC): ICD-10-CM

## 2020-12-16 DIAGNOSIS — Z12.5 PROSTATE CANCER SCREENING: ICD-10-CM

## 2020-12-16 DIAGNOSIS — M15.9 PRIMARY OSTEOARTHRITIS INVOLVING MULTIPLE JOINTS: ICD-10-CM

## 2020-12-16 DIAGNOSIS — E66.09 CLASS 1 OBESITY DUE TO EXCESS CALORIES WITH SERIOUS COMORBIDITY AND BODY MASS INDEX (BMI) OF 33.0 TO 33.9 IN ADULT: ICD-10-CM

## 2020-12-16 DIAGNOSIS — K21.9 GASTROESOPHAGEAL REFLUX DISEASE WITHOUT ESOPHAGITIS: ICD-10-CM

## 2020-12-16 LAB
A-G RATIO,AGRAT: 1.6 RATIO
ALBUMIN SERPL-MCNC: 4.6 G/DL (ref 3.9–5.4)
ALP SERPL-CCNC: 76 U/L (ref 38–126)
ALT SERPL-CCNC: 28 U/L (ref 0–50)
ANION GAP SERPL CALC-SCNC: 9 MMOL/L
AST SERPL W P-5'-P-CCNC: 33 U/L (ref 14–36)
BILIRUB SERPL-MCNC: 0.7 MG/DL (ref 0.2–1.3)
BILIRUB UR QL: NEGATIVE
BUN SERPL-MCNC: 19 MG/DL (ref 9–20)
BUN/CREATININE RATIO,BUCR: 16 RATIO
CALCIUM SERPL-MCNC: 9.6 MG/DL (ref 8.4–10.2)
CHLORIDE SERPL-SCNC: 106 MMOL/L (ref 98–107)
CHOL/HDL RATIO,CHHD: 4 RATIO (ref 0–4)
CHOLEST SERPL-MCNC: 130 MG/DL (ref 0–200)
CK SERPL-CCNC: 111 U/L (ref 30–135)
CLARITY: CLEAR
CO2 SERPL-SCNC: 29 MMOL/L (ref 22–32)
COLOR UR: ABNORMAL
CREAT SERPL-MCNC: 1.2 MG/DL (ref 0.8–1.5)
ERYTHROCYTE [DISTWIDTH] IN BLOOD BY AUTOMATED COUNT: 12.7 %
GLOBULIN,GLOB: 2.8
GLUCOSE 24H UR-MRATE: ABNORMAL G/(24.H)
GLUCOSE SERPL-MCNC: 117 MG/DL (ref 75–110)
HBA1C MFR BLD HPLC: 5.8 % (ref 4–5.7)
HCT VFR BLD AUTO: 42.8 % (ref 37–51)
HDLC SERPL-MCNC: 35 MG/DL (ref 35–130)
HGB BLD-MCNC: 14 G/DL (ref 12–18)
HGB UR QL STRIP: NEGATIVE
KETONES UR QL STRIP.AUTO: NEGATIVE
LDL/HDL RATIO,LDHD: 2 RATIO
LDLC SERPL CALC-MCNC: 71 MG/DL (ref 0–130)
LEUKOCYTE ESTERASE: NEGATIVE
LYMPHOCYTES ABSOLUTE: 1.9 K/UL (ref 0.6–4.1)
LYMPHOCYTES NFR BLD: 32.3 % (ref 10–58.5)
MCH RBC QN AUTO: 31.3 PG (ref 26–32)
MCHC RBC AUTO-ENTMCNC: 32.7 G/DL (ref 30–36)
MCV RBC AUTO: 95.8 FL (ref 80–97)
MICROALBUMIN, URINE: 100 MG/L (ref 0–20)
MONOCYTES ABS-DIF,2141: 0.5 K/UL (ref 0–1.8)
MONOCYTES NFR BLD: 8.7 % (ref 0.1–24)
NEUTROPHILS # BLD: 59 % (ref 37–92)
NEUTROPHILS ABS,2156: 3.5 K/UL (ref 2–7.8)
NITRITE UR QL STRIP.AUTO: NEGATIVE
PH UR STRIP: 6 [PH] (ref 5–7)
PLATELET # BLD AUTO: 204 K/UL (ref 140–440)
POTASSIUM SERPL-SCNC: 4.5 MMOL/L (ref 3.6–5)
PROT SERPL-MCNC: 7.4 G/DL (ref 6.3–8.2)
PROT UR STRIP-MCNC: ABNORMAL MG/DL
PSA, TEST22: 1.8 NG/ML (ref 0–4)
RBC # BLD AUTO: 4.47 M/UL (ref 4.2–6.3)
RBC #/AREA URNS HPF: ABNORMAL #/HPF
SODIUM SERPL-SCNC: 144 MMOL/L (ref 137–145)
SP GR UR REFRACTOMETRY: 1.02 (ref 1–1.03)
T4 FREE SERPL-MCNC: 1.09 NG/DL (ref 0.58–2.3)
TRIGL SERPL-MCNC: 118 MG/DL (ref 0–200)
TSH SERPL DL<=0.05 MIU/L-ACNC: 0.78 UIU/ML (ref 0.34–5.6)
UROBILINOGEN UR QL STRIP.AUTO: NEGATIVE
VLDLC SERPL CALC-MCNC: 24 MG/DL
WBC # BLD AUTO: 5.9 K/UL (ref 4.1–10.9)
WBC URNS QL MICRO: ABNORMAL #/HPF

## 2020-12-16 PROCEDURE — G8510 SCR DEP NEG, NO PLAN REQD: HCPCS | Performed by: INTERNAL MEDICINE

## 2020-12-16 PROCEDURE — 85025 COMPLETE CBC W/AUTO DIFF WBC: CPT | Performed by: INTERNAL MEDICINE

## 2020-12-16 PROCEDURE — 82550 ASSAY OF CK (CPK): CPT | Performed by: INTERNAL MEDICINE

## 2020-12-16 PROCEDURE — 93000 ELECTROCARDIOGRAM COMPLETE: CPT | Performed by: INTERNAL MEDICINE

## 2020-12-16 PROCEDURE — G0103 PSA SCREENING: HCPCS | Performed by: INTERNAL MEDICINE

## 2020-12-16 PROCEDURE — G8417 CALC BMI ABV UP PARAM F/U: HCPCS | Performed by: INTERNAL MEDICINE

## 2020-12-16 PROCEDURE — 82044 UR ALBUMIN SEMIQUANTITATIVE: CPT | Performed by: INTERNAL MEDICINE

## 2020-12-16 PROCEDURE — 80061 LIPID PANEL: CPT | Performed by: INTERNAL MEDICINE

## 2020-12-16 PROCEDURE — G8536 NO DOC ELDER MAL SCRN: HCPCS | Performed by: INTERNAL MEDICINE

## 2020-12-16 PROCEDURE — G0439 PPPS, SUBSEQ VISIT: HCPCS | Performed by: INTERNAL MEDICINE

## 2020-12-16 PROCEDURE — G8427 DOCREV CUR MEDS BY ELIG CLIN: HCPCS | Performed by: INTERNAL MEDICINE

## 2020-12-16 PROCEDURE — 2022F DILAT RTA XM EVC RTNOPTHY: CPT | Performed by: INTERNAL MEDICINE

## 2020-12-16 PROCEDURE — 84439 ASSAY OF FREE THYROXINE: CPT | Performed by: INTERNAL MEDICINE

## 2020-12-16 PROCEDURE — 84443 ASSAY THYROID STIM HORMONE: CPT | Performed by: INTERNAL MEDICINE

## 2020-12-16 PROCEDURE — 83036 HEMOGLOBIN GLYCOSYLATED A1C: CPT | Performed by: INTERNAL MEDICINE

## 2020-12-16 PROCEDURE — 99214 OFFICE O/P EST MOD 30 MIN: CPT | Performed by: INTERNAL MEDICINE

## 2020-12-16 PROCEDURE — 81003 URINALYSIS AUTO W/O SCOPE: CPT | Performed by: INTERNAL MEDICINE

## 2020-12-16 PROCEDURE — 80053 COMPREHEN METABOLIC PANEL: CPT | Performed by: INTERNAL MEDICINE

## 2020-12-16 PROCEDURE — 1101F PT FALLS ASSESS-DOCD LE1/YR: CPT | Performed by: INTERNAL MEDICINE

## 2020-12-16 PROCEDURE — 3044F HG A1C LEVEL LT 7.0%: CPT | Performed by: INTERNAL MEDICINE

## 2020-12-16 PROCEDURE — 3017F COLORECTAL CA SCREEN DOC REV: CPT | Performed by: INTERNAL MEDICINE

## 2020-12-16 RX ORDER — TADALAFIL 20 MG/1
20 TABLET ORAL AS NEEDED
Qty: 10 TAB | Refills: 3 | Status: SHIPPED | OUTPATIENT
Start: 2020-12-16 | End: 2021-04-27 | Stop reason: ALTCHOICE

## 2020-12-16 NOTE — PROGRESS NOTES
Chief Complaint   Patient presents with   Yovani Beltran Annual Wellness Visit     Visit Vitals  BP (!) 156/84 (BP 1 Location: Left arm, BP Patient Position: Sitting)   Pulse 63   Temp 98.7 °F (37.1 °C) (Oral)   Resp 17   Ht 6' 1\" (1.854 m)   Wt 252 lb 12.8 oz (114.7 kg)   SpO2 96%   BMI 33.35 kg/m²     1. Have you been to the ER, urgent care clinic since your last visit? Hospitalized since your last visit? No    2. Have you seen or consulted any other health care providers outside of the [address] [place] [address] since your last visit? Include any pap smears or colon screening.  No

## 2020-12-16 NOTE — PATIENT INSTRUCTIONS
Body Mass Index: Care Instructions  Your Care Instructions     Body mass index (BMI) can help you see if your weight is raising your risk for health problems. It uses a formula to compare how much you weigh with how tall you are. · A BMI lower than 18.5 is considered underweight. · A BMI between 18.5 and 24.9 is considered healthy. · A BMI between 25 and 29.9 is considered overweight. A BMI of 30 or higher is considered obese. If your BMI is in the normal range, it means that you have a lower risk for weight-related health problems. If your BMI is in the overweight or obese range, you may be at increased risk for weight-related health problems, such as high blood pressure, heart disease, stroke, arthritis or joint pain, and diabetes. If your BMI is in the underweight range, you may be at increased risk for health problems such as fatigue, lower protection (immunity) against illness, muscle loss, bone loss, hair loss, and hormone problems. BMI is just one measure of your risk for weight-related health problems. You may be at higher risk for health problems if you are not active, you eat an unhealthy diet, or you drink too much alcohol or use tobacco products. Follow-up care is a key part of your treatment and safety. Be sure to make and go to all appointments, and call your doctor if you are having problems. It's also a good idea to know your test results and keep a list of the medicines you take. How can you care for yourself at home? · Practice healthy eating habits. This includes eating plenty of fruits, vegetables, whole grains, lean protein, and low-fat dairy. · If your doctor recommends it, get more exercise. Walking is a good choice. Bit by bit, increase the amount you walk every day. Try for at least 30 minutes on most days of the week. · Do not smoke. Smoking can increase your risk for health problems. If you need help quitting, talk to your doctor about stop-smoking programs and medicines. These can increase your chances of quitting for good. · Limit alcohol to 2 drinks a day for men and 1 drink a day for women. Too much alcohol can cause health problems. If you have a BMI higher than 25  · Your doctor may do other tests to check your risk for weight-related health problems. This may include measuring the distance around your waist. A waist measurement of more than 40 inches in men or 35 inches in women can increase the risk of weight-related health problems. · Talk with your doctor about steps you can take to stay healthy or improve your health. You may need to make lifestyle changes to lose weight and stay healthy, such as changing your diet and getting regular exercise. If you have a BMI lower than 18.5  · Your doctor may do other tests to check your risk for health problems. · Talk with your doctor about steps you can take to stay healthy or improve your health. You may need to make lifestyle changes to gain or maintain weight and stay healthy, such as getting more healthy foods in your diet and doing exercises to build muscle. Where can you learn more? Go to http://www.fang.com/  Enter S176 in the search box to learn more about \"Body Mass Index: Care Instructions. \"  Current as of: December 11, 2019               Content Version: 12.6  © 2019-3992 [employer]. Care instructions adapted under license by [employer] (which disclaims liability or warranty for this information). If you have questions about a medical condition or this instruction, always ask your healthcare professional. [address] any warranty or liability for your use of this information.

## 2021-01-11 RX ORDER — FAMOTIDINE 40 MG/1
TABLET, FILM COATED ORAL
Qty: 90 TAB | Refills: 3 | Status: SHIPPED | OUTPATIENT
Start: 2021-01-11 | End: 2022-04-15

## 2021-01-11 RX ORDER — CARVEDILOL 25 MG/1
TABLET ORAL
Qty: 180 TAB | Refills: 3 | Status: SHIPPED | OUTPATIENT
Start: 2021-01-11 | End: 2022-04-15

## 2021-01-11 NOTE — TELEPHONE ENCOUNTER
RX refill request from the patient/pharmacy. Patient last seen 12- with labs, and next appt. scheduled for 03-  Requested Prescriptions     Pending Prescriptions Disp Refills    carvediloL (COREG) 25 mg tablet [Pharmacy Med Name: CARVEDILOL  25MG  TAB] 180 Tab 3     Sig: TAKE 1 TABLET BY MOUTH  TWICE DAILY WITH MEALS    famotidine (PEPCID) 40 mg tablet [Pharmacy Med Name: FAMOTIDINE  40MG  TAB] 90 Tab 3     Sig: TAKE 1 TABLET BY MOUTH  DAILY   .

## 2021-01-14 PROBLEM — Z00.00 MEDICARE ANNUAL WELLNESS VISIT, SUBSEQUENT: Status: RESOLVED | Noted: 2019-11-20 | Resolved: 2021-01-14

## 2021-01-14 PROBLEM — Z12.5 PROSTATE CANCER SCREENING: Status: RESOLVED | Noted: 2017-10-17 | Resolved: 2021-01-14

## 2021-03-09 ENCOUNTER — IMMUNIZATION (OUTPATIENT)
Dept: INTERNAL MEDICINE CLINIC | Age: 71
End: 2021-03-09
Payer: MEDICARE

## 2021-03-09 DIAGNOSIS — Z23 ENCOUNTER FOR IMMUNIZATION: Primary | ICD-10-CM

## 2021-03-09 PROCEDURE — 0001A COVID-19, MRNA, LNP-S, PF, 30MCG/0.3ML DOSE(PFIZER): CPT | Performed by: FAMILY MEDICINE

## 2021-03-09 PROCEDURE — 91300 COVID-19, MRNA, LNP-S, PF, 30MCG/0.3ML DOSE(PFIZER): CPT | Performed by: FAMILY MEDICINE

## 2021-03-24 NOTE — PROGRESS NOTES
Chief Complaint   Patient presents with    Hypertension     3 mon f/u    Diabetes    Cholesterol Problem       SUBJECTIVE:    Leydi Vera III is a 73 y.o. male who returns in follow-up of his medical problems include hypertension, diabetes, hyperlipidemia, cardiomyopathy, DJD, obesity and other medical problems. He is taking his medications and trying to follow his diet but knows he could probably do a little better. He currently denies any chest pain, shortness of breath, palpitations, PND, orthopnea or other cardiac or respiratory complaints. He notes no current GI or  complaints. He notes no headaches, dizziness or neurologic complaints. There are no current active arthritic complaints and he has no other complaints on complete review of systems. Current Outpatient Medications   Medication Sig Dispense Refill    amLODIPine (NORVASC) 10 mg tablet Take 1 Tab by mouth daily. 30 Tab prn    carvediloL (COREG) 25 mg tablet TAKE 1 TABLET BY MOUTH  TWICE DAILY WITH MEALS 180 Tab 3    famotidine (PEPCID) 40 mg tablet TAKE 1 TABLET BY MOUTH  DAILY 90 Tab 3    tadalafiL (Cialis) 20 mg tablet Take 1 Tab by mouth as needed for Erectile Dysfunction. 10 Tab 3    rosuvastatin (CRESTOR) 10 mg tablet TAKE 1 TABLET BY MOUTH  DAILY 90 Tab 3    levothyroxine (SYNTHROID) 50 mcg tablet TAKE 1 TABLET BY MOUTH  DAILY 90 Tab 3    fenofibrate nanocrystallized (TRICOR) 145 mg tablet TAKE 1 TABLET BY MOUTH  DAILY 90 Tab 3    metFORMIN ER (GLUCOPHAGE XR) 500 mg tablet TAKE 1 TABLET BY MOUTH IN  THE MORNING AND 2 TABLETS  AT DINNER 270 Tab 3    diclofenac EC (VOLTAREN) 75 mg EC tablet TAKE 1 TABLET BY MOUTH TWO  TIMES DAILY 180 Tab 3    olmesartan (BENICAR) 40 mg tablet Take 1 Tab by mouth daily.  90 Tab 3    esomeprazole (NEXIUM) 40 mg capsule TAKE 1 CAPSULE BY MOUTH  DAILY 90 Cap 3    niacin ER (NIASPAN) 750 mg Tb24 TAKE 2 TABLETS BY MOUTH AT  BEDTIME 180 Tab 3    lisinopril (PRINIVIL, ZESTRIL) 40 mg tablet Take 1 Tab by mouth daily. 90 Tab 3    fish oil-dha-epa 1,200-144-216 mg cap Take  by mouth.  aspirin 81 mg tablet Take 81 mg by mouth daily as needed.        Past Medical History:   Diagnosis Date    Arthritis     fingers    CAD (coronary artery disease)     Cardiomy[facility] ([facility]     Cholelithiasis 9/21/2017    CKD (chronic kidney disease) 9/21/2017    Diabetes mellitus ([facility]     Diverticulosis 9/21/2017    DJD (degenerative joint disease) 9/21/2017    GERD (gastroesophageal reflux disease)     H/O: depression 9/21/2017    Hiatal hernia     Hypercholesteremia     Hypertension     Hypertension with renal disease 9/21/2017    Hypothyroidism     Kidney stones     Obesity 9/21/2017    On statin therapy 9/21/2017    Sleep apnea 9/21/2017    Thyroid disease     Viral meningitis 9/1990     Past Surgical History:   Procedure Laterality Date    HX HERNIA REPAIR      HX KNEE ARTHROSCOPY      both     Allergies   Allergen Reactions    Demerol [Meperidine] Nausea and Vomiting    Phenergan [Promethazine] Unknown (comments)       REVIEW OF SYSTEMS:  General: negative for - chills or fever, or weight loss or gain  ENT: negative for - headaches, nasal congestion or tinnitus  Eyes: no blurred or visual changes  Neck: No stiffness or swollen nodes  Respiratory: negative for - cough, hemoptysis, shortness of breath or wheezing  Cardiovascular : negative for - chest pain, edema, palpitations or shortness of breath  Gastrointestinal: negative for - abdominal pain, blood in stools, heartburn or nausea/vomiting  Genito-Urinary: no dysuria, trouble voiding, or hematuria  Musculoskeletal: negative for - gait disturbance, joint pain, joint stiffness or joint swelling  Neurological: no TIA or stroke symptoms  Hematologic: no bruises, no bleeding  Lymphatic: no swollen glands  Integument: no lumps, mole changes, nail changes or rash  Endocrine:no malaise/lethargy poly uria or polydipsia or unexpected weight changes        Social History     Socioeconomic History    Marital status:      Spouse name: Not on file    Number of children: Not on file    Years of education: Not on file    Highest education level: Not on file   Tobacco Use    Smoking status: Never Smoker    Smokeless tobacco: Never Used   Substance and Sexual Activity    Alcohol use: No    Drug use: No    Sexual activity: Yes     Partners: Female     Family History   Problem Relation Age of Onset    Heart Disease Mother         AICD    Thyroid Disease Mother     Heart Disease Father         pacemaker    Hypertension Father     Cancer Father         lymphoma    Thyroid Disease Sister     Heart Disease Paternal Grandmother         angina       OBJECTIVE:     Visit Vitals  BP (!) 160/86   Pulse 70   Temp 98.2 °F (36.8 °C) (Oral)   Resp 16   Ht 6' 1\" (1.854 m)   Wt 248 lb 1.6 oz (112.5 kg)   SpO2 97%   BMI 32.73 kg/m²     CONSTITUTIONAL:   well nourished, appears age appropriate  EYES: sclera anicteric, PERRL, EOMI  ENMT:nares clear, moist mucous membranes, pharynx clear  NECK: supple. Thyroid normal, No JVD or bruits  RESPIRATORY: Chest: clear to ascultation and percussion, normal inspiratory effort  CARDIOVASCULAR: Heart: regular rate and rhythm no murmurs, rubs or gallops, PMI not displaced, No thrills, no peripheral edema  GASTROINTESTINAL: Abdomen: non distended, soft, non tender, bowel sounds normal  HEMATOLOGIC: no purpura, petechiae or bruising  LYMPHATIC: No lymph node enlargemant  MUSCULOSKELETAL: Extremities: no active synovitis, pulse 1+. No diabetic foot changes noted. INTEGUMENT: No unusual rashes or suspicious skin lesions noted. Nails appear normal.  PERIPHERAL VASCULAR: normal pulses femoral, PT and DP  NEUROLOGIC: non-focal exam, A & O X 3. Normal distal sensation and proprioception all toes both feet. PSYCHIATRIC:, appropriate affect     ASSESSMENT:   1. Hypertension with renal disease    2.  Controlled type 2 diabetes mellitus with stage 3 chronic kidney disease, without long-term current use of insulin ([facility]    3. Mixed hyperlipidemia    4. Gastroesophageal reflux disease without esophagitis    5. Primary osteoarthritis involving multiple joints    6. Stage 3 chronic kidney disease, unspecified whether stage 3a or 3b CKD    7. Cardiomyopathy, unspecified type ([facility]    8. Class 1 obesity due to excess calories with serious comorbidity and body mass index (BMI) of 33.0 to 33.9 in adult      Impression  1. Hypertension still not controlled at this point at this point I am increasing the amlodipine to 10 mg daily and I will recheck him again in a month. 2.  Diabetes repeat status is pending and prior lab reviewed now make adjustments if necessary. 3.  Hyperlipidemia prior lab reviewed repeat status pending I will make adjustments if necessary. 4.  GERD that is stable  5. DJD that is stable  6. CKD stage III repeat status pending  7. Cardiomyopathy that is stable  8. Obesity we did discuss diet, exercise and weight reduction for overall health benefit. I will call with lab and make further recommendations or adjustments if necessary. Follow-up scheduled for 3 months or sooner if there is a problem. PLAN:  .  Orders Placed This Encounter    METABOLIC PANEL, COMPREHENSIVE ([facility])    LIPID PANEL ([facility])    CK ([facility])    HEMOGLOBIN A1C W/O EAG ([facility])    amLODIPine (NORVASC) 10 mg tablet         ATTENTION:   This medical record was transcribed using an electronic medical records system. Although proofread, it may and can contain electronic and spelling errors. Other human spelling and other errors may be present. Corrections may be executed at a later time. Please feel free to contact us for any clarifications as needed. Follow-up and Dispositions    · Return in about 4 weeks (around 4/22/2021). No results found for any visits on 03/25/21.     Lm Espinal Claudene Providence, MD    The patient verbalized understanding of the problems and plans as explained.

## 2021-03-25 ENCOUNTER — OFFICE VISIT (OUTPATIENT)
Dept: INTERNAL MEDICINE CLINIC | Age: 71
End: 2021-03-25
Payer: COMMERCIAL

## 2021-03-25 VITALS
BODY MASS INDEX: 32.88 KG/M2 | RESPIRATION RATE: 16 BRPM | HEART RATE: 70 BPM | WEIGHT: 248.1 LBS | OXYGEN SATURATION: 97 % | HEIGHT: 73 IN | DIASTOLIC BLOOD PRESSURE: 86 MMHG | TEMPERATURE: 98.2 F | SYSTOLIC BLOOD PRESSURE: 160 MMHG

## 2021-03-25 DIAGNOSIS — N18.30 CONTROLLED TYPE 2 DIABETES MELLITUS WITH STAGE 3 CHRONIC KIDNEY DISEASE, WITHOUT LONG-TERM CURRENT USE OF INSULIN (HCC): ICD-10-CM

## 2021-03-25 DIAGNOSIS — E78.2 MIXED HYPERLIPIDEMIA: ICD-10-CM

## 2021-03-25 DIAGNOSIS — I42.9 CARDIOMYOPATHY, UNSPECIFIED TYPE (HCC): ICD-10-CM

## 2021-03-25 DIAGNOSIS — E66.09 CLASS 1 OBESITY DUE TO EXCESS CALORIES WITH SERIOUS COMORBIDITY AND BODY MASS INDEX (BMI) OF 33.0 TO 33.9 IN ADULT: ICD-10-CM

## 2021-03-25 DIAGNOSIS — M15.9 PRIMARY OSTEOARTHRITIS INVOLVING MULTIPLE JOINTS: ICD-10-CM

## 2021-03-25 DIAGNOSIS — E11.22 CONTROLLED TYPE 2 DIABETES MELLITUS WITH STAGE 3 CHRONIC KIDNEY DISEASE, WITHOUT LONG-TERM CURRENT USE OF INSULIN (HCC): ICD-10-CM

## 2021-03-25 DIAGNOSIS — N18.30 STAGE 3 CHRONIC KIDNEY DISEASE, UNSPECIFIED WHETHER STAGE 3A OR 3B CKD (HCC): ICD-10-CM

## 2021-03-25 DIAGNOSIS — I12.9 HYPERTENSION WITH RENAL DISEASE: Primary | ICD-10-CM

## 2021-03-25 DIAGNOSIS — K21.9 GASTROESOPHAGEAL REFLUX DISEASE WITHOUT ESOPHAGITIS: ICD-10-CM

## 2021-03-25 LAB
A-G RATIO,AGRAT: 1.7 RATIO
ALBUMIN SERPL-MCNC: 4.6 G/DL (ref 3.9–5.4)
ALP SERPL-CCNC: 88 U/L (ref 38–126)
ALT SERPL-CCNC: 26 U/L (ref 0–50)
ANION GAP SERPL CALC-SCNC: 12 MMOL/L
AST SERPL W P-5'-P-CCNC: 31 U/L (ref 14–36)
BILIRUB SERPL-MCNC: 0.9 MG/DL (ref 0.2–1.3)
BUN SERPL-MCNC: 16 MG/DL (ref 9–20)
BUN/CREATININE RATIO,BUCR: 13 RATIO
CALCIUM SERPL-MCNC: 9.7 MG/DL (ref 8.4–10.2)
CHLORIDE SERPL-SCNC: 101 MMOL/L (ref 98–107)
CHOL/HDL RATIO,CHHD: 3 RATIO (ref 0–4)
CHOLEST SERPL-MCNC: 126 MG/DL (ref 0–200)
CK SERPL-CCNC: 109 U/L (ref 30–135)
CO2 SERPL-SCNC: 29 MMOL/L (ref 22–32)
CREAT SERPL-MCNC: 1.2 MG/DL (ref 0.8–1.5)
GLOBULIN,GLOB: 2.7
GLUCOSE SERPL-MCNC: 116 MG/DL (ref 75–110)
HBA1C MFR BLD HPLC: 6.2 % (ref 4–5.7)
HDLC SERPL-MCNC: 44 MG/DL (ref 35–130)
LDL/HDL RATIO,LDHD: 1 RATIO
LDLC SERPL CALC-MCNC: 59 MG/DL (ref 0–130)
POTASSIUM SERPL-SCNC: 4.1 MMOL/L (ref 3.6–5)
PROT SERPL-MCNC: 7.3 G/DL (ref 6.3–8.2)
SODIUM SERPL-SCNC: 142 MMOL/L (ref 137–145)
TRIGL SERPL-MCNC: 117 MG/DL (ref 0–200)
VLDLC SERPL CALC-MCNC: 23 MG/DL

## 2021-03-25 PROCEDURE — 99214 OFFICE O/P EST MOD 30 MIN: CPT | Performed by: INTERNAL MEDICINE

## 2021-03-25 PROCEDURE — 82550 ASSAY OF CK (CPK): CPT | Performed by: INTERNAL MEDICINE

## 2021-03-25 PROCEDURE — 83036 HEMOGLOBIN GLYCOSYLATED A1C: CPT | Performed by: INTERNAL MEDICINE

## 2021-03-25 PROCEDURE — 80061 LIPID PANEL: CPT | Performed by: INTERNAL MEDICINE

## 2021-03-25 PROCEDURE — 80053 COMPREHEN METABOLIC PANEL: CPT | Performed by: INTERNAL MEDICINE

## 2021-03-25 RX ORDER — AMLODIPINE BESYLATE 10 MG/1
10 TABLET ORAL DAILY
Qty: 30 TAB | Status: SHIPPED | OUTPATIENT
Start: 2021-03-25 | End: 2021-04-27 | Stop reason: SDUPTHER

## 2021-03-25 NOTE — PATIENT INSTRUCTIONS
Body Mass Index: Care Instructions  Your Care Instructions     Body mass index (BMI) can help you see if your weight is raising your risk for health problems. It uses a formula to compare how much you weigh with how tall you are. · A BMI lower than 18.5 is considered underweight. · A BMI between 18.5 and 24.9 is considered healthy. · A BMI between 25 and 29.9 is considered overweight. A BMI of 30 or higher is considered obese. If your BMI is in the normal range, it means that you have a lower risk for weight-related health problems. If your BMI is in the overweight or obese range, you may be at increased risk for weight-related health problems, such as high blood pressure, heart disease, stroke, arthritis or joint pain, and diabetes. If your BMI is in the underweight range, you may be at increased risk for health problems such as fatigue, lower protection (immunity) against illness, muscle loss, bone loss, hair loss, and hormone problems. BMI is just one measure of your risk for weight-related health problems. You may be at higher risk for health problems if you are not active, you eat an unhealthy diet, or you drink too much alcohol or use tobacco products. Follow-up care is a key part of your treatment and safety. Be sure to make and go to all appointments, and call your doctor if you are having problems. It's also a good idea to know your test results and keep a list of the medicines you take. How can you care for yourself at home? · Practice healthy eating habits. This includes eating plenty of fruits, vegetables, whole grains, lean protein, and low-fat dairy. · If your doctor recommends it, get more exercise. Walking is a good choice. Bit by bit, increase the amount you walk every day. Try for at least 30 minutes on most days of the week. · Do not smoke. Smoking can increase your risk for health problems. If you need help quitting, talk to your doctor about stop-smoking programs and medicines. These can increase your chances of quitting for good. · Limit alcohol to 2 drinks a day for men and 1 drink a day for women. Too much alcohol can cause health problems. If you have a BMI higher than 25  · Your doctor may do other tests to check your risk for weight-related health problems. This may include measuring the distance around your waist. A waist measurement of more than 40 inches in men or 35 inches in women can increase the risk of weight-related health problems. · Talk with your doctor about steps you can take to stay healthy or improve your health. You may need to make lifestyle changes to lose weight and stay healthy, such as changing your diet and getting regular exercise. If you have a BMI lower than 18.5  · Your doctor may do other tests to check your risk for health problems. · Talk with your doctor about steps you can take to stay healthy or improve your health. You may need to make lifestyle changes to gain or maintain weight and stay healthy, such as getting more healthy foods in your diet and doing exercises to build muscle. Where can you learn more? Go to http://www.fang.com/  Enter S176 in the search box to learn more about \"Body Mass Index: Care Instructions. \"  Current as of: December 11, 2019               Content Version: 12.6  © 8978-8075 [employer]. Care instructions adapted under license by [employer] (which disclaims liability or warranty for this information). If you have questions about a medical condition or this instruction, always ask your healthcare professional. [address] any warranty or liability for your use of this information.

## 2021-03-25 NOTE — PROGRESS NOTES
HIPAA verified by two patient identifiers. Leydi Vera III is a 73 y.o. male    Chief Complaint   Patient presents with    Hypertension     3 mon f/u    Diabetes    Cholesterol Problem       Visit Vitals  BP (!) 166/92 (BP 1 Location: Left upper arm, BP Patient Position: Sitting, BP Cuff Size: Large adult)   Pulse 70   Temp 98.2 °F (36.8 °C) (Oral)   Resp 16   Ht 6' 1\" (1.854 m)   Wt 248 lb 1.6 oz (112.5 kg)   SpO2 97%   BMI 32.73 kg/m²       Pain Scale: 0 - No pain/10  Pain Location:       Health Maintenance Due   Topic Date Due    Eye Exam Retinal or Dilated  Never done    DTaP/Tdap/Td series (1 - Tdap) Never done    Shingrix Vaccine Age 50> (1 of 2) Never done    Colorectal Cancer Screening Combo  02/17/2019    Pneumococcal 65+ years (2 of 2 - PPSV23) 11/16/2020    Foot Exam Q1  03/09/2021         Coordination of Care Questionnaire:  :   1) Have you been to an emergency room, urgent care, or hospitalized since your last visit? If yes, where when, and reason for visit? no       2. Have seen or consulted any other health care provider since your last visit? If yes, where when, and reason for visit? NO      Patient is accompanied by self I have received verbal consent from Leydi Vera III to discuss any/all medical information while they are present in the room.

## 2021-03-30 ENCOUNTER — IMMUNIZATION (OUTPATIENT)
Dept: INTERNAL MEDICINE CLINIC | Age: 71
End: 2021-03-30
Payer: MEDICARE

## 2021-03-30 DIAGNOSIS — Z23 ENCOUNTER FOR IMMUNIZATION: Primary | ICD-10-CM

## 2021-03-30 PROCEDURE — 91300 COVID-19, MRNA, LNP-S, PF, 30MCG/0.3ML DOSE(PFIZER): CPT | Performed by: FAMILY MEDICINE

## 2021-03-30 PROCEDURE — 0002A COVID-19, MRNA, LNP-S, PF, 30MCG/0.3ML DOSE(PFIZER): CPT | Performed by: FAMILY MEDICINE
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
## 2021-04-27 ENCOUNTER — OFFICE VISIT (OUTPATIENT)
Dept: INTERNAL MEDICINE CLINIC | Age: 71
End: 2021-04-27
Payer: COMMERCIAL

## 2021-04-27 VITALS
SYSTOLIC BLOOD PRESSURE: 137 MMHG | HEART RATE: 69 BPM | WEIGHT: 250.5 LBS | DIASTOLIC BLOOD PRESSURE: 84 MMHG | RESPIRATION RATE: 16 BRPM | BODY MASS INDEX: 33.2 KG/M2 | TEMPERATURE: 98.1 F | OXYGEN SATURATION: 96 % | HEIGHT: 73 IN

## 2021-04-27 DIAGNOSIS — I12.9 HYPERTENSION WITH RENAL DISEASE: Primary | ICD-10-CM

## 2021-04-27 DIAGNOSIS — I42.9 CARDIOMYOPATHY, UNSPECIFIED TYPE (HCC): ICD-10-CM

## 2021-04-27 PROCEDURE — 99213 OFFICE O/P EST LOW 20 MIN: CPT | Performed by: INTERNAL MEDICINE

## 2021-04-27 RX ORDER — AMLODIPINE BESYLATE 10 MG/1
10 TABLET ORAL DAILY
Qty: 90 TAB | Refills: 3 | Status: SHIPPED | OUTPATIENT
Start: 2021-04-27 | End: 2021-07-21 | Stop reason: SDUPTHER

## 2021-04-27 NOTE — PROGRESS NOTES
HIPAA verified by two patient identifiers. Jasmyne Westbrook III is a 73 y.o. male    Chief Complaint   Patient presents with    Hypertension     bp check       Visit Vitals  /84 (BP 1 Location: Right arm, BP Patient Position: Sitting, BP Cuff Size: Large adult)   Pulse 69   Temp 98.1 °F (36.7 °C) (Oral)   Resp 16   Ht 6' 1\" (1.854 m)   Wt 250 lb 8 oz (113.6 kg)   SpO2 96%   BMI 33.05 kg/m²       Pain Scale: 0 - No pain/10  Pain Location:       Health Maintenance Due   Topic Date Due    Eye Exam Retinal or Dilated  Never done    DTaP/Tdap/Td series (1 - Tdap) Never done    Shingrix Vaccine Age 50> (1 of 2) Never done    Colorectal Cancer Screening Combo  02/17/2019    Pneumococcal 65+ years (2 of 2 - PPSV23) 11/16/2020         Coordination of Care Questionnaire:  :   1) Have you been to an emergency room, urgent care, or hospitalized since your last visit? If yes, where when, and reason for visit? no       2. Have seen or consulted any other health care provider since your last visit? If yes, where when, and reason for visit? NO      Patient is accompanied by self I have received verbal consent from Jasmyne Westbrook III to discuss any/all medical information while they are present in the room.

## 2021-04-27 NOTE — PATIENT INSTRUCTIONS
Body Mass Index: Care Instructions  Your Care Instructions     Body mass index (BMI) can help you see if your weight is raising your risk for health problems. It uses a formula to compare how much you weigh with how tall you are. · A BMI lower than 18.5 is considered underweight. · A BMI between 18.5 and 24.9 is considered healthy. · A BMI between 25 and 29.9 is considered overweight. A BMI of 30 or higher is considered obese. If your BMI is in the normal range, it means that you have a lower risk for weight-related health problems. If your BMI is in the overweight or obese range, you may be at increased risk for weight-related health problems, such as high blood pressure, heart disease, stroke, arthritis or joint pain, and diabetes. If your BMI is in the underweight range, you may be at increased risk for health problems such as fatigue, lower protection (immunity) against illness, muscle loss, bone loss, hair loss, and hormone problems. BMI is just one measure of your risk for weight-related health problems. You may be at higher risk for health problems if you are not active, you eat an unhealthy diet, or you drink too much alcohol or use tobacco products. Follow-up care is a key part of your treatment and safety. Be sure to make and go to all appointments, and call your doctor if you are having problems. It's also a good idea to know your test results and keep a list of the medicines you take. How can you care for yourself at home? · Practice healthy eating habits. This includes eating plenty of fruits, vegetables, whole grains, lean protein, and low-fat dairy. · If your doctor recommends it, get more exercise. Walking is a good choice. Bit by bit, increase the amount you walk every day. Try for at least 30 minutes on most days of the week. · Do not smoke. Smoking can increase your risk for health problems. If you need help quitting, talk to your doctor about stop-smoking programs and medicines. These can increase your chances of quitting for good. · Limit alcohol to 2 drinks a day for men and 1 drink a day for women. Too much alcohol can cause health problems. If you have a BMI higher than 25  · Your doctor may do other tests to check your risk for weight-related health problems. This may include measuring the distance around your waist. A waist measurement of more than 40 inches in men or 35 inches in women can increase the risk of weight-related health problems. · Talk with your doctor about steps you can take to stay healthy or improve your health. You may need to make lifestyle changes to lose weight and stay healthy, such as changing your diet and getting regular exercise. If you have a BMI lower than 18.5  · Your doctor may do other tests to check your risk for health problems. · Talk with your doctor about steps you can take to stay healthy or improve your health. You may need to make lifestyle changes to gain or maintain weight and stay healthy, such as getting more healthy foods in your diet and doing exercises to build muscle. Where can you learn more? Go to http://www.fang.com/  Enter S176 in the search box to learn more about \"Body Mass Index: Care Instructions. \"  Current as of: September 23, 2020               Content Version: 12.8  © 2006-2021 Healthwise, Incorporated. Care instructions adapted under license by [employer] (which disclaims liability or warranty for this information). If you have questions about a medical condition or this instruction, always ask your healthcare professional. [place] [address] any warranty or liability for your use of this information.

## 2021-06-07 DIAGNOSIS — K21.9 GASTROESOPHAGEAL REFLUX DISEASE WITHOUT ESOPHAGITIS: ICD-10-CM

## 2021-06-08 RX ORDER — ESOMEPRAZOLE MAGNESIUM 40 MG/1
CAPSULE, DELAYED RELEASE ORAL
Qty: 90 CAPSULE | Refills: 3 | Status: SHIPPED | OUTPATIENT
Start: 2021-06-08 | End: 2022-04-15

## 2021-06-08 RX ORDER — NIACIN 750 MG/1
TABLET, EXTENDED RELEASE ORAL
Qty: 180 TABLET | Refills: 3 | Status: SHIPPED | OUTPATIENT
Start: 2021-06-08 | End: 2022-04-15

## 2021-06-08 NOTE — TELEPHONE ENCOUNTER
RX refill request from the patient/pharmacy. Patient last seen 04- with labs, and next appt. scheduled for 06-  Requested Prescriptions     Pending Prescriptions Disp Refills    esomeprazole (NEXIUM) 40 mg capsule [Pharmacy Med Name: ESOMEPRAZOL  40MG  CAP  DELAYED RELEASE MAG] 90 Capsule 3     Sig: TAKE 1 CAPSULE BY MOUTH  DAILY    niacin ER (NIASPAN) 750 mg Tb24 [Pharmacy Med Name: NIACIN  750MG  TAB  EXTENDED RELEASE] 180 Tablet 3     Sig: TAKE 2 TABLETS BY MOUTH AT  BEDTIME   .

## 2021-06-25 NOTE — PROGRESS NOTES
Chief Complaint   Patient presents with    Hypertension     3 month f/up    Chronic Kidney Disease    Diabetes    Cholesterol Problem       SUBJECTIVE:    Josué Winchester III is a 73 y.o. male returns in follow-up of his medical problems include hypertension, , hyperlipidemia, DJD, obesity, cardiomyopathy, sleep apnea and other multiple medical problems. He is taking his medications and trying to follow his diet but notes he is having a hard time exercising because he has severe right knee pain and does need a knee replacement. He denies any chest pain, shortness of breath, palpitations, PND, orthopnea or other cardiac or respiratory complaints. He notes no current GI or  complaints. He notes no headaches, dizziness or neurologic complaints. He has no change of his chronic arthritic complaints. He has no other complaints on complete your systems. Current Outpatient Medications   Medication Sig Dispense Refill    esomeprazole (NEXIUM) 40 mg capsule TAKE 1 CAPSULE BY MOUTH  DAILY 90 Capsule 3    niacin ER (NIASPAN) 750 mg Tb24 TAKE 2 TABLETS BY MOUTH AT  BEDTIME 180 Tablet 3    amLODIPine (NORVASC) 10 mg tablet Take 1 Tab by mouth daily. 90 Tab 3    carvediloL (COREG) 25 mg tablet TAKE 1 TABLET BY MOUTH  TWICE DAILY WITH MEALS 180 Tab 3    famotidine (PEPCID) 40 mg tablet TAKE 1 TABLET BY MOUTH  DAILY 90 Tab 3    rosuvastatin (CRESTOR) 10 mg tablet TAKE 1 TABLET BY MOUTH  DAILY 90 Tab 3    levothyroxine (SYNTHROID) 50 mcg tablet TAKE 1 TABLET BY MOUTH  DAILY 90 Tab 3    fenofibrate nanocrystallized (TRICOR) 145 mg tablet TAKE 1 TABLET BY MOUTH  DAILY 90 Tab 3    metFORMIN ER (GLUCOPHAGE XR) 500 mg tablet TAKE 1 TABLET BY MOUTH IN  THE MORNING AND 2 TABLETS  AT DINNER 270 Tab 3    diclofenac EC (VOLTAREN) 75 mg EC tablet TAKE 1 TABLET BY MOUTH TWO  TIMES DAILY 180 Tab 3    olmesartan (BENICAR) 40 mg tablet Take 1 Tab by mouth daily.  90 Tab 3    lisinopril (PRINIVIL, ZESTRIL) 40 mg tablet Take 1 Tab by mouth daily. 90 Tab 3    fish oil-dha-epa 1,200-144-216 mg cap Take  by mouth.  aspirin 81 mg tablet Take 81 mg by mouth daily as needed. Past Medical History:   Diagnosis Date    Arthritis     fingers    CAD (coronary artery disease)     Cardiomyopathy ([facility]     Cholelithiasis 9/21/2017    CKD (chronic kidney disease) 9/21/2017    Diabetes mellitus ([facility]     Diverticulosis 9/21/2017    DJD (degenerative joint disease) 9/21/2017    GERD (gastroesophageal reflux disease)     H/O: depression 9/21/2017    Hiatal hernia     Hypercholesteremia     Hypertension     Hypertension with renal disease 9/21/2017    Hypothyroidism     Kidney stones     Obesity 9/21/2017    On statin therapy 9/21/2017    Sleep apnea 9/21/2017    Thyroid disease     Viral meningitis 9/1990     Past Surgical History:   Procedure Laterality Date    HX HERNIA REPAIR      HX KNEE ARTHROSCOPY      both     Allergies   Allergen Reactions    Demerol [Meperidine] Nausea and Vomiting    Phenergan [Promethazine] Unknown (comments)       REVIEW OF SYSTEMS:  General: negative for - chills or fever, or weight loss or gain  ENT: negative for - headaches, nasal congestion or tinnitus  Eyes: no blurred or visual changes  Neck: No stiffness or swollen nodes  Respiratory: negative for - cough, hemoptysis, shortness of breath or wheezing  Cardiovascular : negative for - chest pain, edema, palpitations or shortness of breath  Gastrointestinal: negative for - abdominal pain, blood in stools, heartburn or nausea/vomiting  Genito-Urinary: no dysuria, trouble voiding, or hematuria  Musculoskeletal: negative for - gait disturbance, joint pain, joint stiffness or joint swelling.    Neurological: no TIA or stroke symptoms  Hematologic: no bruises, no bleeding  Lymphatic: no swollen glands  Integument: no lumps, mole changes, nail changes or rash  Endocrine:no malaise/lethargy poly uria or polydipsia or unexpected weight changes        Social History     Socioeconomic History    Marital status:      Spouse name: Not on file    Number of children: Not on file    Years of education: Not on file    Highest education level: Not on file   Tobacco Use    Smoking status: Never Smoker    Smokeless tobacco: Never Used   Substance and Sexual Activity    Alcohol use: No    Drug use: No    Sexual activity: Yes     Partners: Female     Social Determinants of Health     Financial Resource Strain:     Difficulty of Paying Living Expenses:    Food Insecurity:     Worried About Running Out of Food in the Last Year:     [address] in the Last Year:    Transportation Needs:     Lack of Transportation (Medical):  Lack of Transportation (Non-Medical):    Physical Activity:     Days of Exercise per Week:     Minutes of Exercise per Session:    Stress:     Feeling of Stress :    Social Connections:     Frequency of Communication with Friends and Family:     Frequency of Social Gatherings with Friends and Family:     Attends Christian Services:     Active Member of Clubs or Organizations:     Attends Club or Organization Meetings:     Marital Status:      Family History   Problem Relation Age of Onset    Heart Disease Mother         AICD    Thyroid Disease Mother     Heart Disease Father         pacemaker    Hypertension Father     Cancer Father         lymphoma    Thyroid Disease Sister     Heart Disease Paternal Grandmother         angina       OBJECTIVE:     Visit Vitals  /79   Pulse 68   Temp 98.1 °F (36.7 °C)   Resp 16   Ht 6' 1\" (1.854 m)   Wt 249 lb (112.9 kg)   SpO2 97%   BMI 32.85 kg/m²     CONSTITUTIONAL:   well nourished, appears age appropriate  EYES: sclera anicteric, PERRL, EOMI  ENMT:nares clear, moist mucous membranes, pharynx clear  NECK: supple.  Thyroid normal, No JVD or bruits  RESPIRATORY: Chest: clear to ascultation and percussion, normal inspiratory effort  CARDIOVASCULAR: Heart: regular rate and rhythm no murmurs, rubs or gallops, PMI not displaced, No thrills, no peripheral edema  GASTROINTESTINAL: Abdomen: non distended, soft, non tender, bowel sounds normal  HEMATOLOGIC: no purpura, petechiae or bruising  LYMPHATIC: No lymph node enlargemant  MUSCULOSKELETAL: Extremities: no active synovitis, pulse 1+   INTEGUMENT: No unusual rashes or suspicious skin lesions noted. Nails appear normal.  PERIPHERAL VASCULAR: normal pulses femoral, PT and DP  NEUROLOGIC: non-focal exam, A & O X 3  PSYCHIATRIC:, appropriate affect     ASSESSMENT:   1. Hypertension with renal disease    2. Controlled type 2 diabetes mellitus with stage 3 chronic kidney disease, without long-term current use of insulin ([address]    3. Mixed hyperlipidemia    4. Gastroesophageal reflux disease without esophagitis    5. Primary osteoarthritis involving multiple joints    6. Stage 3 chronic kidney disease, unspecified whether stage 3a or 3b CKD ([address]    7. Cardiomyopathy, unspecified type ([address]    8. Obstructive sleep apnea syndrome      Impression  1. Hypertension is controlled so continue current therapy reviewed with him. 2.  Diabetes repeat status pending and prior lab reviewed now make adjustments if necessary. 3.  Hyperlipidemia prior lab reviewed repeat status pending I will adjust if needed. 4.  GERD that is stable  5. DJD currently stable  6. CKD stage III repeat status pending  7. Cardiomyopathy stable  8. Sleep apnea he is no longer using CPAP  I will recheck him in 3 months or sooner should the be a problem. I strongly suggested diet, exercise and weight reduction for overall health benefit. I will call her lab results. PLAN:  .  Orders Placed This Encounter    LIPID PANEL    METABOLIC PANEL, COMPREHENSIVE    CK    HEMOGLOBIN A1C WITH EAG         ATTENTION:   This medical record was transcribed using an electronic medical records system.   Although proofread, it may and can contain electronic and spelling errors. Other human spelling and other errors may be present. Corrections may be executed at a later time. Please feel free to contact us for any clarifications as needed. Follow-up and Dispositions    · Return in about 3 months (around 9/28/2021). No results found for any visits on 06/28/21. Jake Wood MD    The patient verbalized understanding of the problems and plans as explained.

## 2021-06-28 ENCOUNTER — OFFICE VISIT (OUTPATIENT)
Dept: INTERNAL MEDICINE CLINIC | Age: 71
End: 2021-06-28
Payer: COMMERCIAL

## 2021-06-28 VITALS
HEART RATE: 68 BPM | TEMPERATURE: 98.1 F | SYSTOLIC BLOOD PRESSURE: 138 MMHG | WEIGHT: 249 LBS | DIASTOLIC BLOOD PRESSURE: 79 MMHG | RESPIRATION RATE: 16 BRPM | BODY MASS INDEX: 33 KG/M2 | HEIGHT: 73 IN | OXYGEN SATURATION: 97 %

## 2021-06-28 DIAGNOSIS — M15.9 PRIMARY OSTEOARTHRITIS INVOLVING MULTIPLE JOINTS: ICD-10-CM

## 2021-06-28 DIAGNOSIS — G47.33 OBSTRUCTIVE SLEEP APNEA SYNDROME: ICD-10-CM

## 2021-06-28 DIAGNOSIS — N18.30 CONTROLLED TYPE 2 DIABETES MELLITUS WITH STAGE 3 CHRONIC KIDNEY DISEASE, WITHOUT LONG-TERM CURRENT USE OF INSULIN (HCC): ICD-10-CM

## 2021-06-28 DIAGNOSIS — I42.9 CARDIOMYOPATHY, UNSPECIFIED TYPE (HCC): ICD-10-CM

## 2021-06-28 DIAGNOSIS — E11.22 CONTROLLED TYPE 2 DIABETES MELLITUS WITH STAGE 3 CHRONIC KIDNEY DISEASE, WITHOUT LONG-TERM CURRENT USE OF INSULIN (HCC): ICD-10-CM

## 2021-06-28 DIAGNOSIS — K21.9 GASTROESOPHAGEAL REFLUX DISEASE WITHOUT ESOPHAGITIS: ICD-10-CM

## 2021-06-28 DIAGNOSIS — E78.2 MIXED HYPERLIPIDEMIA: ICD-10-CM

## 2021-06-28 DIAGNOSIS — N18.30 STAGE 3 CHRONIC KIDNEY DISEASE, UNSPECIFIED WHETHER STAGE 3A OR 3B CKD (HCC): ICD-10-CM

## 2021-06-28 DIAGNOSIS — I12.9 HYPERTENSION WITH RENAL DISEASE: Primary | ICD-10-CM

## 2021-06-28 LAB
ALBUMIN SERPL-MCNC: 4.2 G/DL (ref 3.5–5)
ALBUMIN/GLOB SERPL: 1.5 {RATIO} (ref 1.1–2.2)
ALP SERPL-CCNC: 102 U/L (ref 45–117)
ALT SERPL-CCNC: 29 U/L (ref 12–78)
ANION GAP SERPL CALC-SCNC: 5 MMOL/L (ref 5–15)
AST SERPL-CCNC: 23 U/L (ref 15–37)
BILIRUB SERPL-MCNC: 0.4 MG/DL (ref 0.2–1)
BUN SERPL-MCNC: 23 MG/DL (ref 6–20)
BUN/CREAT SERPL: 18 (ref 12–20)
CALCIUM SERPL-MCNC: 9.2 MG/DL (ref 8.5–10.1)
CHLORIDE SERPL-SCNC: 110 MMOL/L (ref 97–108)
CHOLEST SERPL-MCNC: 124 MG/DL
CK SERPL-CCNC: 125 U/L (ref 39–308)
CO2 SERPL-SCNC: 27 MMOL/L (ref 21–32)
CREAT SERPL-MCNC: 1.26 MG/DL (ref 0.7–1.3)
EST. AVERAGE GLUCOSE BLD GHB EST-MCNC: 123 MG/DL
GLOBULIN SER CALC-MCNC: 2.8 G/DL (ref 2–4)
GLUCOSE SERPL-MCNC: 127 MG/DL (ref 65–100)
HBA1C MFR BLD: 5.9 % (ref 4–5.6)
HDLC SERPL-MCNC: 36 MG/DL
HDLC SERPL: 3.4 {RATIO} (ref 0–5)
LDLC SERPL CALC-MCNC: 70 MG/DL (ref 0–100)
POTASSIUM SERPL-SCNC: 4 MMOL/L (ref 3.5–5.1)
PROT SERPL-MCNC: 7 G/DL (ref 6.4–8.2)
SODIUM SERPL-SCNC: 142 MMOL/L (ref 136–145)
TRIGL SERPL-MCNC: 90 MG/DL (ref ?–150)
VLDLC SERPL CALC-MCNC: 18 MG/DL

## 2021-06-28 PROCEDURE — 99214 OFFICE O/P EST MOD 30 MIN: CPT | Performed by: INTERNAL MEDICINE

## 2021-06-28 NOTE — PATIENT INSTRUCTIONS
Body Mass Index: Care Instructions  Your Care Instructions     Body mass index (BMI) can help you see if your weight is raising your risk for health problems. It uses a formula to compare how much you weigh with how tall you are. · A BMI lower than 18.5 is considered underweight. · A BMI between 18.5 and 24.9 is considered healthy. · A BMI between 25 and 29.9 is considered overweight. A BMI of 30 or higher is considered obese. If your BMI is in the normal range, it means that you have a lower risk for weight-related health problems. If your BMI is in the overweight or obese range, you may be at increased risk for weight-related health problems, such as high blood pressure, heart disease, stroke, arthritis or joint pain, and diabetes. If your BMI is in the underweight range, you may be at increased risk for health problems such as fatigue, lower protection (immunity) against illness, muscle loss, bone loss, hair loss, and hormone problems. BMI is just one measure of your risk for weight-related health problems. You may be at higher risk for health problems if you are not active, you eat an unhealthy diet, or you drink too much alcohol or use tobacco products. Follow-up care is a key part of your treatment and safety. Be sure to make and go to all appointments, and call your doctor if you are having problems. It's also a good idea to know your test results and keep a list of the medicines you take. How can you care for yourself at home? · Practice healthy eating habits. This includes eating plenty of fruits, vegetables, whole grains, lean protein, and low-fat dairy. · If your doctor recommends it, get more exercise. Walking is a good choice. Bit by bit, increase the amount you walk every day. Try for at least 30 minutes on most days of the week. · Do not smoke. Smoking can increase your risk for health problems. If you need help quitting, talk to your doctor about stop-smoking programs and medicines. These can increase your chances of quitting for good. · Limit alcohol to 2 drinks a day for men and 1 drink a day for women. Too much alcohol can cause health problems. If you have a BMI higher than 25  · Your doctor may do other tests to check your risk for weight-related health problems. This may include measuring the distance around your waist. A waist measurement of more than 40 inches in men or 35 inches in women can increase the risk of weight-related health problems. · Talk with your doctor about steps you can take to stay healthy or improve your health. You may need to make lifestyle changes to lose weight and stay healthy, such as changing your diet and getting regular exercise. If you have a BMI lower than 18.5  · Your doctor may do other tests to check your risk for health problems. · Talk with your doctor about steps you can take to stay healthy or improve your health. You may need to make lifestyle changes to gain or maintain weight and stay healthy, such as getting more healthy foods in your diet and doing exercises to build muscle. Where can you learn more? Go to http://www.fang.com/  Enter S176 in the search box to learn more about \"Body Mass Index: Care Instructions. \"  Current as of: September 23, 2020               Content Version: 12.8  © 2006-2021 Healthwise, Incorporated. Care instructions adapted under license by [email or web address] (which disclaims liability or warranty for this information). If you have questions about a medical condition or this instruction, always ask your healthcare professional. [place] [address] any warranty or liability for your use of this information.

## 2021-06-28 NOTE — PROGRESS NOTES
Chief Complaint   Patient presents with    Hypertension     3 month f/up    Chronic Kidney Disease    Diabetes    Cholesterol Problem     1. Have you been to the ER, urgent care clinic since your last visit? Hospitalized since your last visit? No    2. Have you seen or consulted any other health care providers outside of the [address] [place] [address] since your last visit? Include any pap smears or colon screening.  No

## 2021-06-30 NOTE — PROGRESS NOTES
Lab okay except HDL cholesterol at 36 is below the goal of 48. Improving aerobic exercise along with weight reduction increasing fiber should help that.

## 2021-07-01 ENCOUNTER — TELEPHONE (OUTPATIENT)
Dept: PHARMACY | Age: 71
End: 2021-07-01

## 2021-07-01 NOTE — TELEPHONE ENCOUNTER
CLINICAL PHARMACY: ADHERENCE REVIEW  Identified care gap per Eryn; fills at [facility]: ACE/ARB, Diabetes and Statin adherence    Last Visit: 6/28/21    Patient not found in Outcomes MTM    ASSESSMENT  ACE/ARB ADHERENCE    Per Insurance Records through 6/21/21 ([facility] = filled only once):   Olmesartan 40mg daily last filled on 2/9/21 for 90 day supply. Next refill due: 5/10/21    Per chart, last rx'd 8/31/20 for #90, 3 refills    BP Readings from Last 3 Encounters:   06/28/21 138/79   04/27/21 137/84   03/25/21 (!) 160/86     Estimated Creatinine Clearance: 72.9 mL/min (by C-G formula based on SCr of 1.26 mg/dL). DIABETES ADHERENCE    Per Insurance Records through 6/21/21 [facility] Lucila = filled only once):   Metformin ER 500mg, 1 tab qAM and 2 tabs qPM, last filled on 2/9/21 for 90 day supply. Next refill due: 5/10/21    Per chart, last rx'd 10/21/20 for #270, 3 refills    Lab Results   Component Value Date/Time    Hemoglobin A1c 5.9 (H) 06/28/2021 09:39 AM    Hemoglobin A1c 6.2 (H) 03/25/2021 11:29 AM    Hemoglobin A1c 5.8 (H) 12/16/2020 11:40 AM     STATIN ADHERENCE    Per Insurance Records through 6/21/21 (JOSE ROBERTO Rohit Gaytan = filled only once):   Rosuvastatin 10mg daily last filled on 2/9/21 for 90 day supply.  Next refill due: 5/10/21    Per chart, last rx'd 10/21/20 for #90, 3 refills    Lab Results   Component Value Date/Time    Cholesterol, total 124 06/28/2021 09:39 AM    Cholesterol (POC) 124 05/09/2018 10:34 AM    HDL Cholesterol 36 06/28/2021 09:39 AM    HDL Cholesterol (POC) 38 05/09/2018 10:34 AM    LDL Cholesterol (POC) 58.6 05/09/2018 10:34 AM    LDL, calculated 70 06/28/2021 09:39 AM    VLDL, calculated 18 06/28/2021 09:39 AM    Triglyceride 90 06/28/2021 09:39 AM    Triglycerides (POC) 137 05/09/2018 10:34 AM    CHOL/HDL Ratio 3.4 06/28/2021 09:39 AM     ALT (SGPT)   Date Value Ref Range Status   06/28/2021 29 12 - 78 U/L Final     AST (SGOT)   Date Value Ref Range Status   06/28/2021 23 15 - 37 U/L Final     The ASCVD Risk score (Peg Marin, et al., 2013) failed to calculate for the following reasons: The valid total cholesterol range is 130 to 320 mg/dL     PLAN  The following are interventions that have been identified:  - Patient overdue refilling rosuvastatin, olmesartan and metformin (refills were due @5/10 and should have refill/s remaining) and active on home medication list  - Taking lisinopril? (on medication list, but last rx'd in 2019 and as above, appears to be taking olmesartan - per 4/10/20 OV, does appear olmesartan was to be added to current therapy, including the lisinopril)    Reached patient to review. Polite, but not very conversational, stating he has all of his medications and is taking them as prescribed. Attempted to inquire if he's still taking lisinopril and states he is - reviewed that it looks like it was last rx'd in 2019 and patient again reports he has his medications and taking them as rx'd.      Future Appointments   Date Time Provider Pernell Courtney   9/29/2021  2:00 PM MD ELIZABETH Dupree BS AMB   12/20/2021  8:00 AM Sue López MD [facility] BS AMB       Alexa Arenas, PharmD, [address]  Direct: [phone]  Department, toll free: [phone], option [phone]      =========================================================   For Pharmacy Admin Tracking Only     CPA in place: No   Recommendation Provided To: Patient/Caregiver: 1 via Telephone   Intervention Detail: Adherence Monitoring: 3   Gap Closed?: No   Total # of Interventions Recommended: 1   Total # of Interventions Accepted: 0   Intervention Accepted By: Patient/Caregiver: 0   Time Spent (min): 15

## 2021-07-21 ENCOUNTER — OFFICE VISIT (OUTPATIENT)
Dept: INTERNAL MEDICINE CLINIC | Age: 71
End: 2021-07-21
Payer: COMMERCIAL

## 2021-07-21 VITALS
SYSTOLIC BLOOD PRESSURE: 132 MMHG | BODY MASS INDEX: 33.08 KG/M2 | RESPIRATION RATE: 18 BRPM | HEART RATE: 71 BPM | HEIGHT: 73 IN | DIASTOLIC BLOOD PRESSURE: 76 MMHG | WEIGHT: 249.6 LBS | TEMPERATURE: 98.2 F | OXYGEN SATURATION: 97 %

## 2021-07-21 DIAGNOSIS — I42.9 CARDIOMYOPATHY, UNSPECIFIED TYPE (HCC): ICD-10-CM

## 2021-07-21 DIAGNOSIS — N18.30 STAGE 3 CHRONIC KIDNEY DISEASE, UNSPECIFIED WHETHER STAGE 3A OR 3B CKD (HCC): ICD-10-CM

## 2021-07-21 DIAGNOSIS — I12.9 HYPERTENSION WITH RENAL DISEASE: ICD-10-CM

## 2021-07-21 DIAGNOSIS — R60.9 EDEMA, UNSPECIFIED TYPE: Primary | ICD-10-CM

## 2021-07-21 LAB
ANION GAP SERPL CALC-SCNC: 7 MMOL/L (ref 5–15)
BUN SERPL-MCNC: 16 MG/DL (ref 6–20)
BUN/CREAT SERPL: 13 (ref 12–20)
CALCIUM SERPL-MCNC: 9.1 MG/DL (ref 8.5–10.1)
CHLORIDE SERPL-SCNC: 109 MMOL/L (ref 97–108)
CO2 SERPL-SCNC: 27 MMOL/L (ref 21–32)
CREAT SERPL-MCNC: 1.28 MG/DL (ref 0.7–1.3)
GLUCOSE SERPL-MCNC: 104 MG/DL (ref 65–100)
POTASSIUM SERPL-SCNC: 3.8 MMOL/L (ref 3.5–5.1)
SODIUM SERPL-SCNC: 143 MMOL/L (ref 136–145)

## 2021-07-21 PROCEDURE — 99214 OFFICE O/P EST MOD 30 MIN: CPT | Performed by: INTERNAL MEDICINE

## 2021-07-21 PROCEDURE — 93000 ELECTROCARDIOGRAM COMPLETE: CPT | Performed by: INTERNAL MEDICINE

## 2021-07-21 RX ORDER — BUMETANIDE 0.5 MG/1
0.5 TABLET ORAL DAILY
Qty: 30 TABLET | Status: SHIPPED | OUTPATIENT
Start: 2021-07-21 | End: 2021-08-04 | Stop reason: SDUPTHER

## 2021-07-21 RX ORDER — AMLODIPINE BESYLATE 5 MG/1
5 TABLET ORAL DAILY
Qty: 30 TABLET | Status: SHIPPED | OUTPATIENT
Start: 2021-07-21 | End: 2022-03-30 | Stop reason: SDUPTHER

## 2021-07-21 NOTE — PROGRESS NOTES
Subjective:   Asad Ortiz III is a 73 y.o. male      Chief Complaint   Patient presents with    Foot Swelling     bilateral foot swelling x 3 days        History of present illness: He presents today complaining of swelling in his feet that has been going on for about 3 days and seems to involve both feet. He had not noted up in the point. When the swelling started he actually stopped taking his Norvasc and did not take any last couple days but did take it this morning. He does note the swelling is little better. He notes no chest pain, shortness of breath, palpitations, PND, orthopnea or dyspnea on exertion or other cardiac or respiratory complaints. Of note he has his blood pressure was markedly elevated on March 25 at which time we increased his Norvasc from 5 mg daily to 10 mg daily and follow-up in a month at which time he is blood pressure was well controlled and he did had no problems with edema no that he had problems with edema when he was seen by me in June. He denies any problems passing his urine sent he has nocturia about 2 times per night. That has not changed. He denies any GI or  complaints of any type. He notes no other complaints on complete review of systems.     Patient Active Problem List   Diagnosis Code    Cardiomyopathy ([facility] I42.9    Controlled type 2 diabetes mellitus with stage 3 chronic kidney disease, without long-term current use of insulin ([facility]) E11.22, N18.30    Mixed hyperlipidemia E78.2    Acquired hypothyroidism E03.9    Gastroesophageal reflux disease without esophagitis K21.9    Primary osteoarthritis involving multiple joints M89.49    Cholelithiasis K80.20    Diverticulosis K57.90    History of viral meningitis Z86.61    H/O: depression Z86.59    Obstructive sleep apnea syndrome G47.33    Class 1 obesity due to excess calories with serious comorbidity and body mass index (BMI) of 33.0 to 33.9 in adult E66.09, Z68.33    Hypertension with renal disease I12.9    Stage 3 chronic kidney disease (HCC) N18.30    Hearing impaired H91.90    Alcohol screening Z13.39    Edema R60.9      Past Medical History:   Diagnosis Date    Arthritis     fingers    CAD (coronary artery disease)     Cardiomyopathy ([facility]     Cholelithiasis 9/21/2017    CKD (chronic kidney disease) 9/21/2017    Diabetes mellitus ([facility]     Diverticulosis 9/21/2017    DJD (degenerative joint disease) 9/21/2017    GERD (gastroesophageal reflux disease)     H/O: depression 9/21/2017    Hiatal hernia     Hypercholesteremia     Hypertension     Hypertension with renal disease 9/21/2017    Hypothyroidism     Kidney stones     Obesity 9/21/2017    On statin therapy 9/21/2017    Sleep apnea 9/21/2017    Thyroid disease     Viral meningitis 9/1990      Allergies   Allergen Reactions    Demerol [Meperidine] Nausea and Vomiting    Phenergan [Promethazine] Unknown (comments)      Family History   Problem Relation Age of Onset    Heart Disease Mother         AICD    Thyroid Disease Mother     Heart Disease Father         pacemaker    Hypertension Father     Cancer Father         lymphoma    Thyroid Disease Sister     Heart Disease Paternal Grandmother         angina      Social History     Socioeconomic History    Marital status:      Spouse name: Not on file    Number of children: Not on file    Years of education: Not on file    Highest education level: Not on file   Occupational History    Not on file   Tobacco Use    Smoking status: Never Smoker    Smokeless tobacco: Never Used   Vaping Use    Vaping Use: Never used   Substance and Sexual Activity    Alcohol use: No    Drug use: No    Sexual activity: Yes     Partners: Female   Other Topics Concern    Not on file   Social History Narrative    Not on file     Social Determinants of Health     Financial Resource Strain:     Difficulty of Paying Living Expenses:    Food Insecurity:     Worried About Running Out of Food in the Last Year:    [address] in the Last Year:    Transportation Needs:     Lack of Transportation (Medical):  Lack of Transportation (Non-Medical):    Physical Activity:     Days of Exercise per Week:     Minutes of Exercise per Session:    Stress:     Feeling of Stress :    Social Connections:     Frequency of Communication with Friends and Family:     Frequency of Social Gatherings with Friends and Family:     Attends Mosque Services:     Active Member of Clubs or Organizations:     Attends Club or Organization Meetings:     Marital Status:    Intimate Partner Violence:     Fear of Current or Ex-Partner:     Emotionally Abused:     Physically Abused:     Sexually Abused:      Prior to Admission medications    Medication Sig Start Date End Date Taking? Authorizing Provider   bumetanide (BUMEX) 0.5 mg tablet Take 1 Tablet by mouth daily. 7/21/21  Yes Huan Springer MD   amLODIPine (NORVASC) 5 mg tablet Take 1 Tablet by mouth daily.  7/21/21  Yes Huan Springer MD   esomeprazole (NEXIUM) 40 mg capsule TAKE 1 CAPSULE BY MOUTH  DAILY 6/8/21  Yes Huan Springer MD   niacin ER (NIASPAN) 750 mg Tb24 TAKE 2 TABLETS BY MOUTH AT  BEDTIME 6/8/21  Yes Huan Springer MD   carvediloL (COREG) 25 mg tablet TAKE 1 TABLET BY MOUTH  TWICE DAILY WITH MEALS 1/11/21  Yes Huan Springer MD   rosuvastatin (CRESTOR) 10 mg tablet TAKE 1 TABLET BY MOUTH  DAILY 10/21/20  Yes Huan Springer MD   levothyroxine (SYNTHROID) 50 mcg tablet TAKE 1 TABLET BY MOUTH  DAILY 10/21/20  Yes Huan Springer MD   fenofibrate nanocrystallized (TRICOR) 145 mg tablet TAKE 1 TABLET BY MOUTH  DAILY 10/21/20  Yes Huan Springer MD   metFORMIN ER (GLUCOPHAGE XR) 500 mg tablet TAKE 1 TABLET BY MOUTH IN  THE MORNING AND 2 TABLETS  AT [facility] 10/21/20  Yes Huan Springer MD   diclofenac EC (VOLTAREN) 75 mg EC tablet TAKE 1 TABLET BY MOUTH TWO  TIMES DAILY 10/21/20  Yes Rakan Cadet MD AILEEN   olmesartan (BENICAR) 40 mg tablet Take 1 Tab by mouth daily. 8/31/20  Yes Desiree Carpenter MD   lisinopril (PRINIVIL, ZESTRIL) 40 mg tablet Take 1 Tab by mouth daily. 11/21/19  Yes Desiree Carpenter MD   fish oil-dha-epa 8,072-564-777 mg cap Take  by mouth. Yes Provider, Historical   aspirin 81 mg tablet Take 81 mg by mouth daily as needed. Yes Provider, Historical   famotidine (PEPCID) 40 mg tablet TAKE 1 TABLET BY MOUTH  DAILY  Patient not taking: Reported on 7/21/2021 1/11/21   Desiree Carpenter MD        Review of Systems              Constitutional:  He denies fever, weight loss, sweats or fatigue. EYES: No blurred or double vision,               ENT: no nasal congestion, no headache or dizziness. No difficulty with               swallowing, taste, speech or smell. Respiratory:  No cough, wheezing or shortness of breath. No sputum production. Cardiac:  Denies chest pain, palpitations, unexplained indigestion, syncope, PND or orthopnea. Positive for swelling in his feet  GI:  No changes in bowel movements, no abdominal pain, no bloating, anorexia, nausea, vomiting or heartburn. :  No frequency or dysuria. Denies incontinence or sexual dysfunction. Extremities:  No joint pain, stiffness or swelling  Back:.no pain or soreness  Skin:  No recent rashes or mole changes. Neurological:  No numbness, tingling, burning paresthesias or loss of motor strength. No syncope, dizziness, frequent headaches or memory loss. Hematologic:  No easy bruising  Lymphatic: No lymph node enlargement    Objective:     Vitals:    07/21/21 1307   BP: 132/76   Pulse: 71   Resp: 18   Temp: 98.2 °F (36.8 °C)   TempSrc: Temporal   SpO2: 97%   Weight: 249 lb 9.6 oz (113.2 kg)   Height: 6' 1\" (1.854 m)   PainSc:   0 - No pain       Body mass index is 32.93 kg/m². Physical Examination:              General Appearance:  Well-developed, well-nourished, no acute distress.              HEENT:      Ears: The TMs and ear canals were clear. Eyes:  The pupillary responses were normal.  Extraocular muscle function intact. Lids and conjunctiva not injected. Funduscopic exam revealed sharp disc margins. Nares: Clear w/o edema or erythema  Pharynx:  Clear with teeth in good repair. No masses were noted. Neck:  Supple without thyromegaly or adenopathy. No JVD noted. No carotid bruits. Lungs:  Clear to auscultation and percussion. Cardiac:  Regular rate and rhythm without murmur. PMI not displaced. No gallop, rub or click. 1-2+ peripheral edema bilateral  Abdominal: Soft, non-tender, no hepata-spleenomegally or masses  Extremities:  No clubbing, cyanosis or edema. Skin:  No rash or unusual mole changes noted. Lymph Nodes:  None felt in the cervical, supraclavicular, axillary or inguinal region. Neurological: . DTRs 2+ and symmetric. Station and gait normal.   Hematologic:   No purpura or petechiae        Assessment/Plan:         1. Edema, unspecified type    2. Cardiomyopathy, unspecified type ([address]    3. Stage 3 chronic kidney disease, unspecified whether stage 3a or 3b CKD ([address]    4. Hypertension with renal disease        Impressions/Plan:  Impression  1. Edema I think this is probably from the Norvasc so I am going to decrease his Norvasc back to 5 mg daily we will have to watch his blood pressure and edema closely. He is currently not on diuretics I will place him on Bumex 0.5 daily. 2.  Cardiomyopathy I reviewed his last echocardiogram where he had a EF of 20%. He is on carvedilol as well as lisinopril. 3.  CKD stage III we will see what that status is today  4. Hypertension that is currently controlled  EKG obtained today normal sinus rhythm at 66 left bundle branch block. No change from previous tracing. So in summary today I will check a BMP to follow his renal function. I will start Bumex 0.5 daily and decrease Norvasc to 5 mg daily. I will see him back myself in 2 weeks.   If he does not get better by that time or if he gets worse he is to notify me. May have to consider Entresto but will see how he responds to these changes. Orders Placed This Encounter    METABOLIC PANEL, BASIC    AMB POC EKG ROUTINE W/ 12 LEADS, INTER & REP    bumetanide (BUMEX) 0.5 mg tablet    amLODIPine (NORVASC) 5 mg tablet       Follow-up and Dispositions    · Return in about 2 weeks (around 8/4/2021). No results found for any visits on 07/21/21. Sue Wyatt MD    The patient was given after the visit summary the patient verbalized an understanding of the plans and problems as explained.

## 2021-07-21 NOTE — PROGRESS NOTES
Chief Complaint   Patient presents with    Foot Swelling     bilateral foot swelling x 3 days     Visit Vitals  /76 (BP 1 Location: Left upper arm, BP Patient Position: Sitting, BP Cuff Size: Large adult)   Pulse 71   Temp 98.2 °F (36.8 °C) (Temporal)   Resp 18   Ht 6' 1\" (1.854 m)   Wt 249 lb 9.6 oz (113.2 kg)   SpO2 97%   BMI 32.93 kg/m²     1. Have you been to the ER, urgent care clinic since your last visit? Hospitalized since your last visit? No    2. Have you seen or consulted any other health care providers outside of the [address] [place] [address] since your last visit? Include any pap smears or colon screening.  No

## 2021-07-21 NOTE — PATIENT INSTRUCTIONS
Body Mass Index: Care Instructions  Your Care Instructions     Body mass index (BMI) can help you see if your weight is raising your risk for health problems. It uses a formula to compare how much you weigh with how tall you are. · A BMI lower than 18.5 is considered underweight. · A BMI between 18.5 and 24.9 is considered healthy. · A BMI between 25 and 29.9 is considered overweight. A BMI of 30 or higher is considered obese. If your BMI is in the normal range, it means that you have a lower risk for weight-related health problems. If your BMI is in the overweight or obese range, you may be at increased risk for weight-related health problems, such as high blood pressure, heart disease, stroke, arthritis or joint pain, and diabetes. If your BMI is in the underweight range, you may be at increased risk for health problems such as fatigue, lower protection (immunity) against illness, muscle loss, bone loss, hair loss, and hormone problems. BMI is just one measure of your risk for weight-related health problems. You may be at higher risk for health problems if you are not active, you eat an unhealthy diet, or you drink too much alcohol or use tobacco products. Follow-up care is a key part of your treatment and safety. Be sure to make and go to all appointments, and call your doctor if you are having problems. It's also a good idea to know your test results and keep a list of the medicines you take. How can you care for yourself at home? · Practice healthy eating habits. This includes eating plenty of fruits, vegetables, whole grains, lean protein, and low-fat dairy. · If your doctor recommends it, get more exercise. Walking is a good choice. Bit by bit, increase the amount you walk every day. Try for at least 30 minutes on most days of the week. · Do not smoke. Smoking can increase your risk for health problems. If you need help quitting, talk to your doctor about stop-smoking programs and medicines. These can increase your chances of quitting for good. · Limit alcohol to 2 drinks a day for men and 1 drink a day for women. Too much alcohol can cause health problems. If you have a BMI higher than 25  · Your doctor may do other tests to check your risk for weight-related health problems. This may include measuring the distance around your waist. A waist measurement of more than 40 inches in men or 35 inches in women can increase the risk of weight-related health problems. · Talk with your doctor about steps you can take to stay healthy or improve your health. You may need to make lifestyle changes to lose weight and stay healthy, such as changing your diet and getting regular exercise. If you have a BMI lower than 18.5  · Your doctor may do other tests to check your risk for health problems. · Talk with your doctor about steps you can take to stay healthy or improve your health. You may need to make lifestyle changes to gain or maintain weight and stay healthy, such as getting more healthy foods in your diet and doing exercises to build muscle. Where can you learn more? Go to http://www.fang.com/  Enter S176 in the search box to learn more about \"Body Mass Index: Care Instructions. \"  Current as of: September 23, 2020               Content Version: 12.8  © 2006-2021 [employer]. Care instructions adapted under license by [employer] (which disclaims liability or warranty for this information). If you have questions about a medical condition or this instruction, always ask your healthcare professional. [place] [address] any warranty or liability for your use of this information.

## 2021-08-03 NOTE — PROGRESS NOTES
Chief Complaint   Patient presents with    Foot Swelling     2 week follow up    Hypertension       SUBJECTIVE:    Jewels Webb III is a 73 y.o. male who returns in follow-up for his hypertension, peripheral edema, cardiomyopathy, obesity and other medical problems. On his last visit here because of those problems we decrease his Norvasc to 5 mg daily and started Bumex 0.5 which he has noted some improvement but still has some swelling. He currently denies any chest pain, shortness of breath, palpitations, PND, orthopnea or other cardiac or respiratory complaints. He notes no GI or  complaints. He notes no headaches, dizziness or neurologic complaints. There are no current active arthritic complaints and he has no other complaints on complete review of systems. Current Outpatient Medications   Medication Sig Dispense Refill    bumetanide (BUMEX) 1 mg tablet Take 1 Tablet by mouth daily. 30 Tablet prn    amLODIPine (NORVASC) 5 mg tablet Take 1 Tablet by mouth daily. 30 Tablet prn    esomeprazole (NEXIUM) 40 mg capsule TAKE 1 CAPSULE BY MOUTH  DAILY 90 Capsule 3    niacin ER (NIASPAN) 750 mg Tb24 TAKE 2 TABLETS BY MOUTH AT  BEDTIME 180 Tablet 3    carvediloL (COREG) 25 mg tablet TAKE 1 TABLET BY MOUTH  TWICE DAILY WITH MEALS 180 Tab 3    famotidine (PEPCID) 40 mg tablet TAKE 1 TABLET BY MOUTH  DAILY 90 Tab 3    rosuvastatin (CRESTOR) 10 mg tablet TAKE 1 TABLET BY MOUTH  DAILY 90 Tab 3    levothyroxine (SYNTHROID) 50 mcg tablet TAKE 1 TABLET BY MOUTH  DAILY 90 Tab 3    fenofibrate nanocrystallized (TRICOR) 145 mg tablet TAKE 1 TABLET BY MOUTH  DAILY 90 Tab 3    metFORMIN ER (GLUCOPHAGE XR) 500 mg tablet TAKE 1 TABLET BY MOUTH IN  THE MORNING AND 2 TABLETS  AT DINNER 270 Tab 3    diclofenac EC (VOLTAREN) 75 mg EC tablet TAKE 1 TABLET BY MOUTH TWO  TIMES DAILY 180 Tab 3    olmesartan (BENICAR) 40 mg tablet Take 1 Tab by mouth daily.  90 Tab 3    lisinopril (PRINIVIL, ZESTRIL) 40 mg tablet Take 1 Tab by mouth daily. 90 Tab 3    fish oil-dha-epa 1,200-144-216 mg cap Take  by mouth.  aspirin 81 mg tablet Take 81 mg by mouth daily as needed. Past Medical History:   Diagnosis Date    Arthritis     fingers    CAD (coronary artery disease)     Cardiomyopathy ([facility]     Cholelithiasis 9/21/2017    CKD (chronic kidney disease) 9/21/2017    Diabetes mellitus ([facility]     Diverticulosis 9/21/2017    DJD (degenerative joint disease) 9/21/2017    GERD (gastroesophageal reflux disease)     H/O: depression 9/21/2017    Hiatal hernia     Hypercholesteremia     Hypertension     Hypertension with renal disease 9/21/2017    Hypothyroidism     Kidney stones     Obesity 9/21/2017    On statin therapy 9/21/2017    Sleep apnea 9/21/2017    Thyroid disease     Viral meningitis 9/1990     Past Surgical History:   Procedure Laterality Date    HX HERNIA REPAIR      HX KNEE ARTHROSCOPY      both     Allergies   Allergen Reactions    Demerol [Meperidine] Nausea and Vomiting    Phenergan [Promethazine] Unknown (comments)       REVIEW OF SYSTEMS:  General: negative for - chills or fever, or weight loss or gain  ENT: negative for - headaches, nasal congestion or tinnitus  Eyes: no blurred or visual changes  Neck: No stiffness or swollen nodes  Respiratory: negative for - cough, hemoptysis, shortness of breath or wheezing  Cardiovascular : negative for - chest pain, palpitations or shortness of breath.   Edema decreased  Gastrointestinal: negative for - abdominal pain, blood in stools, heartburn or nausea/vomiting  Genito-Urinary: no dysuria, trouble voiding, or hematuria  Musculoskeletal: negative for - gait disturbance, joint pain, joint stiffness or joint swelling  Neurological: no TIA or stroke symptoms  Hematologic: no bruises, no bleeding  Lymphatic: no swollen glands  Integument: no lumps, mole changes, nail changes or rash  Endocrine:no malaise/lethargy poly uria or polydipsia or unexpected weight changes        Social History     Socioeconomic History    Marital status:      Spouse name: Not on file    Number of children: Not on file    Years of education: Not on file    Highest education level: Not on file   Tobacco Use    Smoking status: Never Smoker    Smokeless tobacco: Never Used   Vaping Use    Vaping Use: Never used   Substance and Sexual Activity    Alcohol use: No    Drug use: No    Sexual activity: Yes     Partners: Female     Social Determinants of Health     Financial Resource Strain:     Difficulty of Paying Living Expenses:    Food Insecurity:     Worried About Running Out of Food in the Last Year:     [address] in the Last Year:    Transportation Needs:     Lack of Transportation (Medical):  Lack of Transportation (Non-Medical):    Physical Activity:     Days of Exercise per Week:     Minutes of Exercise per Session:    Stress:     Feeling of Stress :    Social Connections:     Frequency of Communication with Friends and Family:     Frequency of Social Gatherings with Friends and Family:     Attends Baptism Services:     Active Member of Clubs or Organizations:     Attends Club or Organization Meetings:     Marital Status:      Family History   Problem Relation Age of Onset    Heart Disease Mother         AICD    Thyroid Disease Mother     Heart Disease Father         pacemaker    Hypertension Father     Cancer Father         lymphoma    Thyroid Disease Sister     Heart Disease Paternal Grandmother         angina       OBJECTIVE:     Visit Vitals  BP (!) 154/88   Pulse 65   Temp 98.1 °F (36.7 °C) (Temporal)   Resp 18   Ht 6' 1\" (1.854 m)   Wt 250 lb 9.6 oz (113.7 kg)   SpO2 97%   BMI 33.06 kg/m²     CONSTITUTIONAL:   well nourished, appears age appropriate  EYES: sclera anicteric, PERRL, EOMI  ENMT:nares clear, moist mucous membranes, pharynx clear  NECK: supple.  Thyroid normal, No JVD or bruits  RESPIRATORY: Chest: clear to ascultation and percussion, normal inspiratory effort  CARDIOVASCULAR: Heart: regular rate and rhythm no murmurs, rubs or gallops, PMI not displaced, No thrills, trace peripheral edema  GASTROINTESTINAL: Abdomen: non distended, soft, non tender, bowel sounds normal  HEMATOLOGIC: no purpura, petechiae or bruising  LYMPHATIC: No lymph node enlargemant  MUSCULOSKELETAL: Extremities: no active synovitis, pulse 1+   INTEGUMENT: No unusual rashes or suspicious skin lesions noted. Nails appear normal.  PERIPHERAL VASCULAR: normal pulses femoral, PT and DP  NEUROLOGIC: non-focal exam, A & O X 3  PSYCHIATRIC:, appropriate affect     ASSESSMENT:   1. Edema, unspecified type    2. Cardiomyopathy, unspecified type ([address]    3. Stage 3 chronic kidney disease, unspecified whether stage 3a or 3b CKD ([facility]    4. Hypertension with renal disease      Impression  1. Edema improved but not resolved increase Bumex to 1 mg daily. 2.  Hypertension not controlled we'll see if that improves by increasing the Bumex. We will keep his Norvasc at the decreased dose of 5 mg as his edema is better. 3.  Cardiomyopathy stable   4. CKD stage III repeat status pending  I'll recheck him again myself in 1 month or sooner should it be a problem. PLAN:  .  Orders Placed This Encounter    METABOLIC PANEL, BASIC    bumetanide (BUMEX) 1 mg tablet         ATTENTION:   This medical record was transcribed using an electronic medical records system. Although proofread, it may and can contain electronic and spelling errors. Other human spelling and other errors may be present. Corrections may be executed at a later time. Please feel free to contact us for any clarifications as needed. Follow-up and Dispositions    · Return At next scheduled appointment in September. No results found for any visits on 08/04/21. Jeffrey Mohr MD    The patient verbalized understanding of the problems and plans as explained.

## 2021-08-04 ENCOUNTER — OFFICE VISIT (OUTPATIENT)
Dept: INTERNAL MEDICINE CLINIC | Age: 71
End: 2021-08-04
Payer: COMMERCIAL

## 2021-08-04 VITALS
SYSTOLIC BLOOD PRESSURE: 154 MMHG | RESPIRATION RATE: 18 BRPM | WEIGHT: 250.6 LBS | OXYGEN SATURATION: 97 % | HEIGHT: 73 IN | TEMPERATURE: 98.1 F | HEART RATE: 65 BPM | BODY MASS INDEX: 33.21 KG/M2 | DIASTOLIC BLOOD PRESSURE: 88 MMHG

## 2021-08-04 DIAGNOSIS — N18.30 STAGE 3 CHRONIC KIDNEY DISEASE, UNSPECIFIED WHETHER STAGE 3A OR 3B CKD (HCC): ICD-10-CM

## 2021-08-04 DIAGNOSIS — I12.9 HYPERTENSION WITH RENAL DISEASE: ICD-10-CM

## 2021-08-04 DIAGNOSIS — R60.9 EDEMA, UNSPECIFIED TYPE: Primary | ICD-10-CM

## 2021-08-04 DIAGNOSIS — I42.9 CARDIOMYOPATHY, UNSPECIFIED TYPE (HCC): ICD-10-CM

## 2021-08-04 LAB
ANION GAP SERPL CALC-SCNC: 4 MMOL/L (ref 5–15)
BUN SERPL-MCNC: 18 MG/DL (ref 6–20)
BUN/CREAT SERPL: 13 (ref 12–20)
CALCIUM SERPL-MCNC: 8.9 MG/DL (ref 8.5–10.1)
CHLORIDE SERPL-SCNC: 109 MMOL/L (ref 97–108)
CO2 SERPL-SCNC: 29 MMOL/L (ref 21–32)
CREAT SERPL-MCNC: 1.36 MG/DL (ref 0.7–1.3)
GLUCOSE SERPL-MCNC: 89 MG/DL (ref 65–100)
POTASSIUM SERPL-SCNC: 3.7 MMOL/L (ref 3.5–5.1)
SODIUM SERPL-SCNC: 142 MMOL/L (ref 136–145)

## 2021-08-04 PROCEDURE — 99213 OFFICE O/P EST LOW 20 MIN: CPT | Performed by: INTERNAL MEDICINE

## 2021-08-04 RX ORDER — BUMETANIDE 1 MG/1
1 TABLET ORAL DAILY
Qty: 30 TABLET | Status: SHIPPED | OUTPATIENT
Start: 2021-08-04 | End: 2021-10-13 | Stop reason: ALTCHOICE

## 2021-08-04 NOTE — PROGRESS NOTES
Chief Complaint   Patient presents with    Foot Swelling     2 week follow up    Hypertension     Visit Vitals  BP (!) 184/92 (BP 1 Location: Left upper arm, BP Patient Position: Sitting, BP Cuff Size: Adult)   Pulse 65   Temp 98.1 °F (36.7 °C) (Temporal)   Resp 18   Ht 6' 1\" (1.854 m)   Wt 250 lb 9.6 oz (113.7 kg)   SpO2 97%   BMI 33.06 kg/m²     1. Have you been to the ER, urgent care clinic since your last visit? Hospitalized since your last visit? No    2. Have you seen or consulted any other health care providers outside of the [address] [place] [address] since your last visit? Include any pap smears or colon screening.  No

## 2021-08-04 NOTE — PATIENT INSTRUCTIONS
Body Mass Index: Care Instructions  Your Care Instructions     Body mass index (BMI) can help you see if your weight is raising your risk for health problems. It uses a formula to compare how much you weigh with how tall you are. · A BMI lower than 18.5 is considered underweight. · A BMI between 18.5 and 24.9 is considered healthy. · A BMI between 25 and 29.9 is considered overweight. A BMI of 30 or higher is considered obese. If your BMI is in the normal range, it means that you have a lower risk for weight-related health problems. If your BMI is in the overweight or obese range, you may be at increased risk for weight-related health problems, such as high blood pressure, heart disease, stroke, arthritis or joint pain, and diabetes. If your BMI is in the underweight range, you may be at increased risk for health problems such as fatigue, lower protection (immunity) against illness, muscle loss, bone loss, hair loss, and hormone problems. BMI is just one measure of your risk for weight-related health problems. You may be at higher risk for health problems if you are not active, you eat an unhealthy diet, or you drink too much alcohol or use tobacco products. Follow-up care is a key part of your treatment and safety. Be sure to make and go to all appointments, and call your doctor if you are having problems. It's also a good idea to know your test results and keep a list of the medicines you take. How can you care for yourself at home? · Practice healthy eating habits. This includes eating plenty of fruits, vegetables, whole grains, lean protein, and low-fat dairy. · If your doctor recommends it, get more exercise. Walking is a good choice. Bit by bit, increase the amount you walk every day. Try for at least 30 minutes on most days of the week. · Do not smoke. Smoking can increase your risk for health problems. If you need help quitting, talk to your doctor about stop-smoking programs and medicines. These can increase your chances of quitting for good. · Limit alcohol to 2 drinks a day for men and 1 drink a day for women. Too much alcohol can cause health problems. If you have a BMI higher than 25  · Your doctor may do other tests to check your risk for weight-related health problems. This may include measuring the distance around your waist. A waist measurement of more than 40 inches in men or 35 inches in women can increase the risk of weight-related health problems. · Talk with your doctor about steps you can take to stay healthy or improve your health. You may need to make lifestyle changes to lose weight and stay healthy, such as changing your diet and getting regular exercise. If you have a BMI lower than 18.5  · Your doctor may do other tests to check your risk for health problems. · Talk with your doctor about steps you can take to stay healthy or improve your health. You may need to make lifestyle changes to gain or maintain weight and stay healthy, such as getting more healthy foods in your diet and doing exercises to build muscle. Where can you learn more? Go to http://www.fang.com/  Enter S176 in the search box to learn more about \"Body Mass Index: Care Instructions. \"  Current as of: September 23, 2020               Content Version: 12.8  © 2006-2021 Healthwise, Incorporated. Care instructions adapted under license by [employer] (which disclaims liability or warranty for this information). If you have questions about a medical condition or this instruction, always ask your healthcare professional. [place] [address] any warranty or liability for your use of this information.

## 2021-09-13 RX ORDER — OLMESARTAN MEDOXOMIL 40 MG/1
TABLET ORAL
Qty: 90 TABLET | Refills: 3 | Status: SHIPPED | OUTPATIENT
Start: 2021-09-13 | End: 2022-07-20 | Stop reason: ALTCHOICE

## 2021-09-13 NOTE — TELEPHONE ENCOUNTER
RX refill request from the patient/pharmacy. Patient last seen 08- with labs, and next appt. scheduled for 09-  Requested Prescriptions     Pending Prescriptions Disp Refills    olmesartan (BENICAR) 40 mg tablet [Pharmacy Med Name: OLMESARTAN  40MG  TAB] 90 Tablet 3     Sig: TAKE 1 TABLET BY MOUTH  DAILY   .

## 2021-10-12 NOTE — PROGRESS NOTES
Chief Complaint   Patient presents with    Hypertension     3 month follow up    Diabetes    Cholesterol Problem       SUBJECTIVE:    Leatha Rene III is a 73 y.o. male is in follow-up of his medical problems include hypertension, diabetes, hyperlipidemia, ASCVD, cardiomyopathy, GERD, DJD, CKD stage III, obesity and other medical problems. He is taking his medications and trying to follow his diet and remains physically active. He currently denies any chest pain, shortness of breath, palpitations, PND, orthopnea or other cardiac or respiratory complaints. He notes no current GI or  complaints. He notes no headaches, dizziness or neurologic complaints. He has no current active arthritic complaints and and no other complaints on complete review of systems. He is overdue for his colonoscopy. Current Outpatient Medications   Medication Sig Dispense Refill    hydrALAZINE (APRESOLINE) 25 mg tablet Take 1 Tablet by mouth three (3) times daily. 90 Tablet 5    bumetanide (BUMEX) 1 mg tablet Take 1 Tablet by mouth daily. 30 Tablet prn    olmesartan (BENICAR) 40 mg tablet TAKE 1 TABLET BY MOUTH  DAILY 90 Tablet 3    amLODIPine (NORVASC) 5 mg tablet Take 1 Tablet by mouth daily.  30 Tablet prn    esomeprazole (NEXIUM) 40 mg capsule TAKE 1 CAPSULE BY MOUTH  DAILY 90 Capsule 3    niacin ER (NIASPAN) 750 mg Tb24 TAKE 2 TABLETS BY MOUTH AT  BEDTIME 180 Tablet 3    carvediloL (COREG) 25 mg tablet TAKE 1 TABLET BY MOUTH  TWICE DAILY WITH MEALS 180 Tab 3    famotidine (PEPCID) 40 mg tablet TAKE 1 TABLET BY MOUTH  DAILY 90 Tab 3    rosuvastatin (CRESTOR) 10 mg tablet TAKE 1 TABLET BY MOUTH  DAILY 90 Tab 3    levothyroxine (SYNTHROID) 50 mcg tablet TAKE 1 TABLET BY MOUTH  DAILY 90 Tab 3    fenofibrate nanocrystallized (TRICOR) 145 mg tablet TAKE 1 TABLET BY MOUTH  DAILY 90 Tab 3    metFORMIN ER (GLUCOPHAGE XR) 500 mg tablet TAKE 1 TABLET BY MOUTH IN  THE MORNING AND 2 TABLETS  AT DINNER 270 Tab 3    diclofenac EC (VOLTAREN) 75 mg EC tablet TAKE 1 TABLET BY MOUTH TWO  TIMES DAILY 180 Tab 3    lisinopril (PRINIVIL, ZESTRIL) 40 mg tablet Take 1 Tab by mouth daily. 90 Tab 3    fish oil-dha-epa 1,200-144-216 mg cap Take  by mouth.  aspirin 81 mg tablet Take 81 mg by mouth daily as needed.        Past Medical History:   Diagnosis Date    Arthritis     fingers    CAD (coronary artery disease)     [facility]     Cholelithiasis 9/21/2017    CKD (chronic kidney disease) 9/21/2017    Diabetes mellitus [facility]     Diverticulosis 9/21/2017    DJD (degenerative joint disease) 9/21/2017    GERD (gastroesophageal reflux disease)     H/O: depression 9/21/2017    Hiatal hernia     Hypercholesteremia     Hypertension     Hypertension with renal disease 9/21/2017    Hypothyroidism     Kidney stones     Obesity 9/21/2017    On statin therapy 9/21/2017    Sleep apnea 9/21/2017    Thyroid disease     Viral meningitis 9/1990     Past Surgical History:   Procedure Laterality Date    HX HERNIA REPAIR      HX KNEE ARTHROSCOPY      both     Allergies   Allergen Reactions    Demerol [Meperidine] Nausea and Vomiting    Phenergan [Promethazine] Unknown (comments)       REVIEW OF SYSTEMS:  General: negative for - chills or fever, or weight loss or gain  ENT: negative for - headaches, nasal congestion or tinnitus  Eyes: no blurred or visual changes  Neck: No stiffness or swollen nodes  Respiratory: negative for - cough, hemoptysis, shortness of breath or wheezing  Cardiovascular : negative for - chest pain, edema, palpitations or shortness of breath  Gastrointestinal: negative for - abdominal pain, blood in stools, heartburn or nausea/vomiting  Genito-Urinary: no dysuria, trouble voiding, or hematuria  Musculoskeletal: negative for - gait disturbance, joint pain, joint stiffness or joint swelling  Neurological: no TIA or stroke symptoms  Hematologic: no bruises, no bleeding  Lymphatic: no swollen glands  Integument: no lumps, mole changes, nail changes or rash  Endocrine:no malaise/lethargy poly uria or polydipsia or unexpected weight changes        Social History     Socioeconomic History    Marital status:      Spouse name: Not on file    Number of children: Not on file    Years of education: Not on file    Highest education level: Not on file   Tobacco Use    Smoking status: Never Smoker    Smokeless tobacco: Never Used   Vaping Use    Vaping Use: Never used   Substance and Sexual Activity    Alcohol use: No    Drug use: No    Sexual activity: Yes     Partners: Female     Social Determinants of Health     Financial Resource Strain:     Difficulty of Paying Living Expenses:    Food Insecurity:     Worried About Running Out of Food in the Last Year:     [address] in the Last Year:    Transportation Needs:     Lack of Transportation (Medical):      Lack of Transportation (Non-Medical):    Physical Activity:     Days of Exercise per Week:     Minutes of Exercise per Session:    Stress:     Feeling of Stress :    Social Connections:     Frequency of Communication with Friends and Family:     Frequency of Social Gatherings with Friends and Family:     Attends [employer] Services:     Active Member of Clubs or Organizations:     Attends Club or Organization Meetings:     Marital Status:      Family History   Problem Relation Age of Onset    Heart Disease Mother         AICD    Thyroid Disease Mother     Heart Disease Father         pacemaker    Hypertension Father     Cancer Father         lymphoma    Thyroid Disease Sister     Heart Disease Paternal Grandmother         angina       OBJECTIVE:     Visit Vitals  BP (!) 160/80   Pulse 64   Temp 98.3 °F (36.8 °C) (Oral)   Resp 17   Ht 6' 1\" (1.854 m)   Wt 251 lb 8 oz (114.1 kg)   SpO2 98%   BMI 33.18 kg/m²     CONSTITUTIONAL:   well nourished, appears age appropriate  EYES: sclera anicteric, PERRL, EOMI  ENMT:nares clear, moist mucous membranes, pharynx clear  NECK: supple. Thyroid normal, No JVD or bruits  RESPIRATORY: Chest: clear to ascultation and percussion, normal inspiratory effort  CARDIOVASCULAR: Heart: regular rate and rhythm no murmurs, rubs or gallops, PMI not displaced, No thrills, no peripheral edema  GASTROINTESTINAL: Abdomen: non distended, soft, non tender, bowel sounds normal  HEMATOLOGIC: no purpura, petechiae or bruising  LYMPHATIC: No lymph node enlargemant  MUSCULOSKELETAL: Extremities: no active synovitis, pulse 1+   INTEGUMENT: No unusual rashes or suspicious skin lesions noted. Nails appear normal.  PERIPHERAL VASCULAR: normal pulses femoral, PT and DP  NEUROLOGIC: non-focal exam, A & O X 3  PSYCHIATRIC:, appropriate affect     ASSESSMENT:   1. Hypertension with renal disease    2. Controlled type 2 diabetes mellitus with stage 3 chronic kidney disease, without long-term current use of insulin ([facility]    3. Mixed hyperlipidemia    4. Cardiomyopathy, unspecified type ([facility]    5. Gastroesophageal reflux disease without esophagitis    6. Primary osteoarthritis involving multiple joints    7. Stage 3 chronic kidney disease, unspecified whether stage 3a or 3b CKD (HCC)    8. Class 1 obesity due to excess calories with serious comorbidity and body mass index (BMI) of 33.0 to 33.9 in adult    9. Needs flu shot    10. Colon cancer screening      Impression  1. Hypertension that is poorly controlled and he is already maxed out on his current medications. I will add hydralazine 25 mg 3 times daily. He has not been taking his Bumex have asked him to resume that. 2.  Diabetes repeat status pending a prior lab review not make adjustments if necessary. 3.  Hyperlipidemia prior lab reviewed and repeat status pending and I will adjust if needed. 4.  Cardiomyopathy that seems to be stable  5. GERD that is stable  6   DJD that is stable  7. CKD stage III repeat status pending  8.   Obesity I did discuss diet, exercise and weight reduction for overall health benefit. He is overdue for colonoscopy so I will refer him to get that done. Flu shot given today. I will call with lab results and make further recommendations or adjustments are necessary. Follow-up scheduled with me again in 1 month for hypertension in 3 months for other medical problems. PLAN:  .  Orders Placed This Encounter    Influenza Vaccine, QUAD, 65 Yrs +  IM  (Fluad 46821 )    METABOLIC PANEL, COMPREHENSIVE    LIPID PANEL    CK    HEMOGLOBIN A1C WITH EAG    [facility]    hydrALAZINE (APRESOLINE) 25 mg tablet    bumetanide (BUMEX) 1 mg tablet         ATTENTION:   This medical record was transcribed using an electronic medical records system. Although proofread, it may and can contain electronic and spelling errors. Other human spelling and other errors may be present. Corrections may be executed at a later time. Please feel free to contact us for any clarifications as needed. Follow-up and Dispositions    · Return in about 3 months (around 1/13/2022). No results found for any visits on 10/13/21. Loyd Garland MD    The patient verbalized understanding of the problems and plans as explained.

## 2021-10-13 ENCOUNTER — OFFICE VISIT (OUTPATIENT)
Dept: INTERNAL MEDICINE CLINIC | Age: 71
End: 2021-10-13
Payer: COMMERCIAL

## 2021-10-13 VITALS
HEART RATE: 64 BPM | BODY MASS INDEX: 33.33 KG/M2 | DIASTOLIC BLOOD PRESSURE: 80 MMHG | HEIGHT: 73 IN | SYSTOLIC BLOOD PRESSURE: 160 MMHG | TEMPERATURE: 98.3 F | OXYGEN SATURATION: 98 % | WEIGHT: 251.5 LBS | RESPIRATION RATE: 17 BRPM

## 2021-10-13 DIAGNOSIS — I42.9 CARDIOMYOPATHY, UNSPECIFIED TYPE (HCC): ICD-10-CM

## 2021-10-13 DIAGNOSIS — I12.9 HYPERTENSION WITH RENAL DISEASE: Primary | ICD-10-CM

## 2021-10-13 DIAGNOSIS — N18.30 STAGE 3 CHRONIC KIDNEY DISEASE, UNSPECIFIED WHETHER STAGE 3A OR 3B CKD (HCC): ICD-10-CM

## 2021-10-13 DIAGNOSIS — Z23 NEEDS FLU SHOT: ICD-10-CM

## 2021-10-13 DIAGNOSIS — E78.2 MIXED HYPERLIPIDEMIA: ICD-10-CM

## 2021-10-13 DIAGNOSIS — K21.9 GASTROESOPHAGEAL REFLUX DISEASE WITHOUT ESOPHAGITIS: ICD-10-CM

## 2021-10-13 DIAGNOSIS — E11.22 CONTROLLED TYPE 2 DIABETES MELLITUS WITH STAGE 3 CHRONIC KIDNEY DISEASE, WITHOUT LONG-TERM CURRENT USE OF INSULIN (HCC): ICD-10-CM

## 2021-10-13 DIAGNOSIS — N18.30 CONTROLLED TYPE 2 DIABETES MELLITUS WITH STAGE 3 CHRONIC KIDNEY DISEASE, WITHOUT LONG-TERM CURRENT USE OF INSULIN (HCC): ICD-10-CM

## 2021-10-13 DIAGNOSIS — Z12.11 COLON CANCER SCREENING: ICD-10-CM

## 2021-10-13 DIAGNOSIS — E66.09 CLASS 1 OBESITY DUE TO EXCESS CALORIES WITH SERIOUS COMORBIDITY AND BODY MASS INDEX (BMI) OF 33.0 TO 33.9 IN ADULT: ICD-10-CM

## 2021-10-13 DIAGNOSIS — M15.9 PRIMARY OSTEOARTHRITIS INVOLVING MULTIPLE JOINTS: ICD-10-CM

## 2021-10-13 PROCEDURE — 3044F HG A1C LEVEL LT 7.0%: CPT | Performed by: INTERNAL MEDICINE

## 2021-10-13 PROCEDURE — 90694 VACC AIIV4 NO PRSRV 0.5ML IM: CPT | Performed by: INTERNAL MEDICINE

## 2021-10-13 PROCEDURE — 3017F COLORECTAL CA SCREEN DOC REV: CPT | Performed by: INTERNAL MEDICINE

## 2021-10-13 PROCEDURE — 1101F PT FALLS ASSESS-DOCD LE1/YR: CPT | Performed by: INTERNAL MEDICINE

## 2021-10-13 PROCEDURE — G8417 CALC BMI ABV UP PARAM F/U: HCPCS | Performed by: INTERNAL MEDICINE

## 2021-10-13 PROCEDURE — 99214 OFFICE O/P EST MOD 30 MIN: CPT | Performed by: INTERNAL MEDICINE

## 2021-10-13 PROCEDURE — G8536 NO DOC ELDER MAL SCRN: HCPCS | Performed by: INTERNAL MEDICINE

## 2021-10-13 PROCEDURE — G8427 DOCREV CUR MEDS BY ELIG CLIN: HCPCS | Performed by: INTERNAL MEDICINE

## 2021-10-13 PROCEDURE — G8510 SCR DEP NEG, NO PLAN REQD: HCPCS | Performed by: INTERNAL MEDICINE

## 2021-10-13 PROCEDURE — 2022F DILAT RTA XM EVC RTNOPTHY: CPT | Performed by: INTERNAL MEDICINE

## 2021-10-13 PROCEDURE — G0008 ADMIN INFLUENZA VIRUS VAC: HCPCS | Performed by: INTERNAL MEDICINE

## 2021-10-13 RX ORDER — BUMETANIDE 1 MG/1
1 TABLET ORAL DAILY
Qty: 30 TABLET | Status: SHIPPED | OUTPATIENT
Start: 2021-10-13 | End: 2022-07-20 | Stop reason: SDUPTHER

## 2021-10-13 RX ORDER — HYDRALAZINE HYDROCHLORIDE 25 MG/1
25 TABLET, FILM COATED ORAL 3 TIMES DAILY
Qty: 90 TABLET | Refills: 5 | Status: SHIPPED | OUTPATIENT
Start: 2021-10-13 | End: 2021-12-03 | Stop reason: SDUPTHER

## 2021-10-13 NOTE — PROGRESS NOTES
Chief Complaint   Patient presents with    Hypertension     3 month follow up    Diabetes    Cholesterol Problem     Visit Vitals  BP (!) 164/92 (BP 1 Location: Left upper arm, BP Patient Position: Sitting, BP Cuff Size: Adult)   Pulse 64   Temp 98.3 °F (36.8 °C) (Oral)   Resp 17   Ht 6' 1\" (1.854 m)   Wt 251 lb 8 oz (114.1 kg)   SpO2 98%   BMI 33.18 kg/m²     1. Have you been to the ER, urgent care clinic since your last visit? Hospitalized since your last visit? No    2. Have you seen or consulted any other health care providers outside of the [address] [place] [address] since your last visit? Include any pap smears or colon screening. No    After obtaining consent and per verbal order from Dr. Anne Marie Sánchez, patient received influenza vaccine given by Ella Oneil and verified by Clary Ayoub. Fluad Influenza 0.5ml was given IM in right deltoid. Patient tolerated injection and was observed for 10 minutes post injection. VIS was given.

## 2021-10-13 NOTE — PATIENT INSTRUCTIONS
Learning About ACE Inhibitors and ARBs for Diabetes  Introduction     ACE inhibitors and ARBs are medicines used to manage blood pressure. They allow blood vessels to relax and open up. This lowers your blood pressure. When you have diabetes, taking an ACE inhibitor or ARB can help to:  · Treat high blood pressure. Your risk of problems from diabetes goes up when you have high blood pressure. · Prevent or slow kidney damage. Diabetes can damage the blood vessels in the kidneys. High blood pressure can damage the kidneys, too. · Lower the risks of stroke and heart attack. Your risks go up when you have high blood pressure, heart disease, or both. An ACE inhibitor or ARB is a good choice for people with diabetes. Unlike some medicines, these don't affect blood sugar levels. Examples  ACE inhibitors include:  · Benazepril. · Lisinopril. · Ramipril. ARBs include:  · Irbesartan. · Losartan. · Telmisartan. Possible side effects  All medicines can cause side effects. Some side effects of ACE inhibitors include:  · Low blood pressure. You may feel dizzy and weak. · A dry cough. · High potassium levels. · Swelling of your lips, tongue, or face. If the swelling is severe, you may need treatment right away. Severe swelling can make it hard to breathe, but this is rare. Some side effects of ARBs include:  · Low blood pressure. You may feel dizzy and weak. · High potassium levels. You may have other side effects or reactions not listed here. Check the information that comes with your medicine. What to know about taking this medicine  · Be safe with medicines. Take your medicines exactly as prescribed. Call your doctor if you think you are having a problem with your medicine. · Before starting an ACE inhibitor or ARB, tell your doctor if you:  ? Use a salt substitute. ? Take diuretics or potassium tablets. · These medicines are not safe for pregnancy.  If you are pregnant or planning to be, talk to your doctor about a safe blood pressure medicine. · ACE inhibitors can cause a dry cough. If the cough is bad, talk to your doctor. Switching to an ARB is likely to help. · Taking some medicines together can cause problems. Tell your doctor or pharmacist all the medicines you take. This includes over-the-counter medicines, vitamins, herbal products, and supplements. · You may need regular blood and urine tests. Where can you learn more? Go to http://www.fang.com/  Enter M316 in the search box to learn more about \"Learning About ACE Inhibitors and ARBs for Diabetes. \"  Current as of: August 31, 2020               Content Version: 13.0  © 2055-3920 [employer]. Care instructions adapted under license by [employer] (which disclaims liability or warranty for this information). If you have questions about a medical condition or this instruction, always ask your healthcare professional. [place] [address] any warranty or liability for your use of this information. Diabetes Blood Sugar Emergencies: Your Action Plan  How can you prevent a blood sugar emergency? An important part of living with diabetes is keeping your blood sugar in your target range. You'll need to know what to do if it's too high or too low. Managing your blood sugar levels helps you avoid emergencies. This care sheet will teach you about the signs of high and low blood sugar. It will help you make an action plan with your doctor for when these signs occur. Low blood sugar is more likely to happen if you take certain medicines for diabetes. It can also happen if you skip a meal, drink alcohol, or exercise more than usual.  You may get high blood sugar if you eat differently than you normally do. One example is eating more carbohydrate than usual. Having a cold, the flu, or other sudden illness can also cause high blood sugar levels.  Levels can also rise if you miss a dose of medicine. Any change in how you take your medicine may affect your blood sugar level. So it's important to work with your doctor before you make any changes. Track your blood sugar  Work with your doctor to fill in the blank spaces below that apply to you. Track your levels, know your target range, and write down ways you can get your blood sugar back in your target range. A log book can help you track your levels. Take the book to all of your medical appointments. · Check your blood sugar _____ times a day, at these times:________________________________________________. (For example: Before meals, at bedtime, before exercise, during exercise, other.)  · Your blood sugar target range before a meal is ___________________. Your blood sugar target range after a meal is _______________________. · Do this--___________________________________________________--to get your blood sugar back within your safe range if your blood sugar results are _________________________________________. (For example: Less than 70 or above 250 mg/dL.)  Call your doctor when your blood sugar results are ___________________________________. (For example: Less than 70 or above 250 mg/dL.)  What are the symptoms of low and high blood sugar? Common symptoms of low blood sugar are sweating and feeling shaky, weak, hungry, or confused. Symptoms can start quickly. Common symptoms of high blood sugar are feeling very thirsty or very hungry. You may also pass urine more often than usual. You may have blurry vision and may lose weight without trying. But some people may have high or low blood sugar without having any symptoms. That's a good reason to check your blood sugar on a regular schedule. What should you do if you have symptoms? Work with your doctor to fill in the blank spaces below that apply to you. Low blood sugar and \"the rule of 15\"  If you have symptoms of low blood sugar, check your blood sugar.  If it's below _____ ( for example, below 70), eat or drink a quick-sugar food that has about 15 grams of carbohydrate. Your goal is to get your level back to your safe range. Check your blood sugar again 15 minutes later. If it's still not in your target range, take another 15 grams of carbohydrate and check your blood sugar again in 15 minutes. Repeat this until you reach your target. Then go back to your regular testing schedule. Children usually need less than 15 grams of carbohydrate. Check with your doctor or diabetes educator for the amount that is right for your child. When you have low blood sugar, it's best to stop or reduce any physical activity until your blood sugar is back in your target range and is stable. If you must stay active, eat or drink 30 grams of carbohydrate. Then check your blood sugar again in 15 minutes. If it's not in your target range, take another 30 grams of carbohydrates. Check your blood sugar again in 15 minutes. Keep doing this until you reach your target. You can then go back to your regular testing schedule. If your symptoms or blood sugar levels are getting worse or have not improved after 15 minutes, seek medical care right away. If you take insulin, always carry a glucagon emergency kit. Be sure your family, friends, and coworkers know how to give glucagon. Here are some examples of quick-sugar foods with 15 grams of carbohydrate:  · 3 or 4 glucose tablets  · 1 tablespoon (3 teaspoons) table sugar  · ½ cup to ¾ cup (4 to 6 ounces) of fruit juice or regular (not diet) soda  · Hard candy (such as 6 Life Savers)  High blood sugar  If you have symptoms of high blood sugar, check your blood sugar. Your goal is to get your level back to your target range. If it's above ______ ( for example, above 250), follow these steps:  · If you missed a dose of your diabetes medicine, take it now. Take only the amount of medicine that you have been prescribed. Do not take more or less medicine.   · Give yourself insulin if your doctor has prescribed it for high blood sugar. · Test for ketones, if the doctor told you to do so. If the results of the ketone test show a moderate-to-large amount of ketones, call the doctor for advice. · Wait 30 minutes after you take the extra insulin or the missed medicine. Check your blood sugar again. If your symptoms or blood sugar levels are getting worse or have not improved after taking these steps, seek medical care right away. Follow-up care is a key part of your treatment and safety. Be sure to make and go to all appointments, and call your doctor if you are having problems. It's also a good idea to know your test results and keep a list of the medicines you take. Where can you learn more? Go to http://www.gray.com/  Enter Z866 in the search box to learn more about \"Diabetes Blood Sugar Emergencies: Your Action Plan. \"  Current as of: August 31, 2020               Content Version: 13.0  © 2006-2021 Healthwise, Incorporated. Care instructions adapted under license by [employer] (which disclaims liability or warranty for this information). If you have questions about a medical condition or this instruction, always ask your healthcare professional. [address] any warranty or liability for your use of this information.

## 2021-10-14 LAB
ALBUMIN SERPL-MCNC: 4 G/DL (ref 3.5–5)
ALBUMIN/GLOB SERPL: 1.4 {RATIO} (ref 1.1–2.2)
ALP SERPL-CCNC: 87 U/L (ref 45–117)
ALT SERPL-CCNC: 32 U/L (ref 12–78)
ANION GAP SERPL CALC-SCNC: 6 MMOL/L (ref 5–15)
AST SERPL-CCNC: 23 U/L (ref 15–37)
BILIRUB SERPL-MCNC: 0.4 MG/DL (ref 0.2–1)
BUN SERPL-MCNC: 16 MG/DL (ref 6–20)
BUN/CREAT SERPL: 14 (ref 12–20)
CALCIUM SERPL-MCNC: 9.1 MG/DL (ref 8.5–10.1)
CHLORIDE SERPL-SCNC: 108 MMOL/L (ref 97–108)
CHOLEST SERPL-MCNC: 99 MG/DL
CK SERPL-CCNC: 109 U/L (ref 39–308)
CO2 SERPL-SCNC: 26 MMOL/L (ref 21–32)
CREAT SERPL-MCNC: 1.17 MG/DL (ref 0.7–1.3)
EST. AVERAGE GLUCOSE BLD GHB EST-MCNC: 128 MG/DL
GLOBULIN SER CALC-MCNC: 2.8 G/DL (ref 2–4)
GLUCOSE SERPL-MCNC: 126 MG/DL (ref 65–100)
HBA1C MFR BLD: 6.1 % (ref 4–5.6)
HDLC SERPL-MCNC: 38 MG/DL
HDLC SERPL: 2.6 {RATIO} (ref 0–5)
LDLC SERPL CALC-MCNC: 44.2 MG/DL (ref 0–100)
POTASSIUM SERPL-SCNC: 4.1 MMOL/L (ref 3.5–5.1)
PROT SERPL-MCNC: 6.8 G/DL (ref 6.4–8.2)
SODIUM SERPL-SCNC: 140 MMOL/L (ref 136–145)
TRIGL SERPL-MCNC: 84 MG/DL (ref ?–150)
VLDLC SERPL CALC-MCNC: 16.8 MG/DL

## 2021-11-05 RX ORDER — METFORMIN HYDROCHLORIDE 500 MG/1
TABLET, EXTENDED RELEASE ORAL
Qty: 270 TABLET | Refills: 3 | Status: SHIPPED | OUTPATIENT
Start: 2021-11-05 | End: 2021-11-11 | Stop reason: SDUPTHER

## 2021-11-05 RX ORDER — LEVOTHYROXINE SODIUM 50 UG/1
50 TABLET ORAL DAILY
Qty: 90 TABLET | Refills: 3 | Status: SHIPPED | OUTPATIENT
Start: 2021-11-05 | End: 2021-11-11 | Stop reason: SDUPTHER

## 2021-11-05 RX ORDER — FENOFIBRATE 145 MG/1
145 TABLET, COATED ORAL DAILY
Qty: 90 TABLET | Refills: 3 | Status: SHIPPED | OUTPATIENT
Start: 2021-11-05 | End: 2021-11-11 | Stop reason: SDUPTHER

## 2021-11-05 RX ORDER — ROSUVASTATIN CALCIUM 10 MG/1
10 TABLET, COATED ORAL DAILY
Qty: 90 TABLET | Refills: 3 | Status: SHIPPED | OUTPATIENT
Start: 2021-11-05 | End: 2021-11-11 | Stop reason: SDUPTHER

## 2021-11-05 RX ORDER — DICLOFENAC SODIUM 75 MG/1
75 TABLET, DELAYED RELEASE ORAL 2 TIMES DAILY
Qty: 180 TABLET | Refills: 3 | Status: SHIPPED | OUTPATIENT
Start: 2021-11-05 | End: 2021-11-11 | Stop reason: SDUPTHER

## 2021-11-05 NOTE — TELEPHONE ENCOUNTER
PCP: Maurice Painter MD    Last appt: 10/13/2021  Future Appointments   Date Time Provider Pernell Courtney   11/17/2021  1:20 PM MD TATYANA CazaresM [facility]   12/20/2021  8:00 AM MD TATYANA CazaresM [facility]       Requested Prescriptions     Pending Prescriptions Disp Refills    metFORMIN ER (GLUCOPHAGE XR) 500 mg tablet 270 Tablet 3     Sig: One tabs in the morning and two tabs at dinner    levothyroxine (SYNTHROID) 50 mcg tablet 90 Tablet 3     Sig: Take 1 Tablet by mouth daily.  rosuvastatin (CRESTOR) 10 mg tablet 90 Tablet 3     Sig: Take 1 Tablet by mouth daily.  diclofenac EC (VOLTAREN) 75 mg EC tablet 180 Tablet 3     Sig: Take 1 Tablet by mouth two (2) times a day.  fenofibrate nanocrystallized (TRICOR) 145 mg tablet 90 Tablet 3     Sig: Take 1 Tablet by mouth daily.          Other Comments:

## 2021-11-11 RX ORDER — FENOFIBRATE 145 MG/1
145 TABLET, COATED ORAL DAILY
Qty: 90 TABLET | Refills: 3 | Status: SHIPPED | OUTPATIENT
Start: 2021-11-11 | End: 2022-09-16

## 2021-11-11 RX ORDER — ROSUVASTATIN CALCIUM 10 MG/1
10 TABLET, COATED ORAL DAILY
Qty: 90 TABLET | Refills: 3 | Status: SHIPPED | OUTPATIENT
Start: 2021-11-11 | End: 2022-09-16

## 2021-11-11 RX ORDER — METFORMIN HYDROCHLORIDE 500 MG/1
TABLET, EXTENDED RELEASE ORAL
Qty: 270 TABLET | Refills: 3 | Status: SHIPPED | OUTPATIENT
Start: 2021-11-11 | End: 2022-09-16

## 2021-11-11 RX ORDER — LEVOTHYROXINE SODIUM 50 UG/1
50 TABLET ORAL DAILY
Qty: 90 TABLET | Refills: 3 | Status: SHIPPED | OUTPATIENT
Start: 2021-11-11 | End: 2022-09-16

## 2021-11-11 RX ORDER — DICLOFENAC SODIUM 75 MG/1
75 TABLET, DELAYED RELEASE ORAL 2 TIMES DAILY
Qty: 180 TABLET | Refills: 3 | Status: SHIPPED | OUTPATIENT
Start: 2021-11-11 | End: 2022-09-16

## 2021-11-11 NOTE — TELEPHONE ENCOUNTER
RX refill request from the patient/pharmacy. Patient last seen 10- with labs, and next appt. scheduled for 12-  Requested Prescriptions     Pending Prescriptions Disp Refills    rosuvastatin (CRESTOR) 10 mg tablet 90 Tablet 3     Sig: Take 1 Tablet by mouth daily.  levothyroxine (SYNTHROID) 50 mcg tablet 90 Tablet 3     Sig: Take 1 Tablet by mouth daily.  metFORMIN ER (GLUCOPHAGE XR) 500 mg tablet 270 Tablet 3     Sig: One tabs in the morning and two tabs at dinner    fenofibrate nanocrystallized (TRICOR) 145 mg tablet 90 Tablet 3     Sig: Take 1 Tablet by mouth daily.  diclofenac EC (VOLTAREN) 75 mg EC tablet 180 Tablet 3     Sig: Take 1 Tablet by mouth two (2) times a day. Juan Pablo Petty

## 2021-12-02 PROBLEM — E55.9 VITAMIN D DEFICIENCY: Status: ACTIVE | Noted: 2021-12-02

## 2021-12-03 ENCOUNTER — OFFICE VISIT (OUTPATIENT)
Dept: INTERNAL MEDICINE CLINIC | Age: 71
End: 2021-12-03
Payer: COMMERCIAL

## 2021-12-03 VITALS
DIASTOLIC BLOOD PRESSURE: 84 MMHG | HEIGHT: 73 IN | SYSTOLIC BLOOD PRESSURE: 160 MMHG | WEIGHT: 250.8 LBS | RESPIRATION RATE: 18 BRPM | TEMPERATURE: 98.1 F | OXYGEN SATURATION: 96 % | BODY MASS INDEX: 33.24 KG/M2 | HEART RATE: 67 BPM

## 2021-12-03 DIAGNOSIS — Z12.5 PROSTATE CANCER SCREENING: ICD-10-CM

## 2021-12-03 DIAGNOSIS — Z00.00 MEDICARE ANNUAL WELLNESS VISIT, SUBSEQUENT: ICD-10-CM

## 2021-12-03 DIAGNOSIS — E66.09 CLASS 1 OBESITY DUE TO EXCESS CALORIES WITH SERIOUS COMORBIDITY AND BODY MASS INDEX (BMI) OF 33.0 TO 33.9 IN ADULT: ICD-10-CM

## 2021-12-03 DIAGNOSIS — E78.2 MIXED HYPERLIPIDEMIA: ICD-10-CM

## 2021-12-03 DIAGNOSIS — I12.9 HYPERTENSION WITH RENAL DISEASE: Primary | ICD-10-CM

## 2021-12-03 DIAGNOSIS — I42.9 CARDIOMYOPATHY, UNSPECIFIED TYPE (HCC): ICD-10-CM

## 2021-12-03 DIAGNOSIS — E55.9 VITAMIN D DEFICIENCY: ICD-10-CM

## 2021-12-03 DIAGNOSIS — E11.22 CONTROLLED TYPE 2 DIABETES MELLITUS WITH STAGE 3 CHRONIC KIDNEY DISEASE, WITHOUT LONG-TERM CURRENT USE OF INSULIN (HCC): ICD-10-CM

## 2021-12-03 DIAGNOSIS — Z86.59 H/O: DEPRESSION: ICD-10-CM

## 2021-12-03 DIAGNOSIS — K21.9 GASTROESOPHAGEAL REFLUX DISEASE WITHOUT ESOPHAGITIS: ICD-10-CM

## 2021-12-03 DIAGNOSIS — K57.90 DIVERTICULOSIS: ICD-10-CM

## 2021-12-03 DIAGNOSIS — G47.33 OBSTRUCTIVE SLEEP APNEA SYNDROME: ICD-10-CM

## 2021-12-03 DIAGNOSIS — Z13.39 ALCOHOL SCREENING: ICD-10-CM

## 2021-12-03 DIAGNOSIS — M15.9 PRIMARY OSTEOARTHRITIS INVOLVING MULTIPLE JOINTS: ICD-10-CM

## 2021-12-03 DIAGNOSIS — N18.30 STAGE 3 CHRONIC KIDNEY DISEASE, UNSPECIFIED WHETHER STAGE 3A OR 3B CKD (HCC): ICD-10-CM

## 2021-12-03 DIAGNOSIS — N18.30 CONTROLLED TYPE 2 DIABETES MELLITUS WITH STAGE 3 CHRONIC KIDNEY DISEASE, WITHOUT LONG-TERM CURRENT USE OF INSULIN (HCC): ICD-10-CM

## 2021-12-03 DIAGNOSIS — E03.9 ACQUIRED HYPOTHYROIDISM: ICD-10-CM

## 2021-12-03 LAB — MICROALBUMIN UR TEST STR-MCNC: 10 MG/L (ref 0–20)

## 2021-12-03 PROCEDURE — G8432 DEP SCR NOT DOC, RNG: HCPCS | Performed by: INTERNAL MEDICINE

## 2021-12-03 PROCEDURE — 1101F PT FALLS ASSESS-DOCD LE1/YR: CPT | Performed by: INTERNAL MEDICINE

## 2021-12-03 PROCEDURE — G8536 NO DOC ELDER MAL SCRN: HCPCS | Performed by: INTERNAL MEDICINE

## 2021-12-03 PROCEDURE — 3017F COLORECTAL CA SCREEN DOC REV: CPT | Performed by: INTERNAL MEDICINE

## 2021-12-03 PROCEDURE — 99214 OFFICE O/P EST MOD 30 MIN: CPT | Performed by: INTERNAL MEDICINE

## 2021-12-03 PROCEDURE — 3044F HG A1C LEVEL LT 7.0%: CPT | Performed by: INTERNAL MEDICINE

## 2021-12-03 PROCEDURE — 2022F DILAT RTA XM EVC RTNOPTHY: CPT | Performed by: INTERNAL MEDICINE

## 2021-12-03 PROCEDURE — G8417 CALC BMI ABV UP PARAM F/U: HCPCS | Performed by: INTERNAL MEDICINE

## 2021-12-03 PROCEDURE — 82044 UR ALBUMIN SEMIQUANTITATIVE: CPT | Performed by: INTERNAL MEDICINE

## 2021-12-03 PROCEDURE — 93000 ELECTROCARDIOGRAM COMPLETE: CPT | Performed by: INTERNAL MEDICINE

## 2021-12-03 PROCEDURE — G8427 DOCREV CUR MEDS BY ELIG CLIN: HCPCS | Performed by: INTERNAL MEDICINE

## 2021-12-03 PROCEDURE — G0439 PPPS, SUBSEQ VISIT: HCPCS | Performed by: INTERNAL MEDICINE

## 2021-12-03 RX ORDER — HYDRALAZINE HYDROCHLORIDE 50 MG/1
50 TABLET, FILM COATED ORAL 3 TIMES DAILY
Qty: 270 TABLET | Refills: 3 | Status: SHIPPED | OUTPATIENT
Start: 2021-12-03 | End: 2022-10-05

## 2021-12-03 NOTE — PROGRESS NOTES
This is a Subsequent Medicare Annual Wellness Visit providing Personalized Prevention Plan Services (PPPS) (Performed 12 months after initial AWV and PPPS )    I have reviewed the patient's medical history in detail and updated the computerized patient record. He returns today for his Medicare subsequent annual wellness examination and screening questionnaire. He is also in follow-up of his multiple medical problems include hypertension, diabetes, hyperlipidemia, cardiomyopathy, GERD, diverticulosis, hypothyroidism, sleep apnea, DJD, CKD stage III, obesity, history of depression and other multiple medical problems. He is taking his medications and trying to follow his diet and try to remain physically active. He does have a problem taking the midday dose of hydralazine because he frequently forgets that since his other medicines are all just once or twice a day. Previously gone to 10 mg of Norvasc and he had significant problems with pedal edema thus we did go back to 5 mg of that and thus he is on the maximum tolerable dose of 4 blood pressure medications before we added the hydralazine. He denies any chest pain, shortness of breath, palpitations, PND, orthopnea or other cardiac or respiratory complaints. He notes no GI or  complaints. He notes no headaches, dizziness or neurologic complaints. He has no current active arthritic complaints although he does have his chronic joint aches and pains are not changed. He has no other complaints on complete review of systems.     History     Past Medical History:   Diagnosis Date    Arthritis     fingers    CAD (coronary artery disease)     Cardiomyopathy [facility]     Cholelithiasis 9/21/2017    CKD (chronic kidney disease) 9/21/2017    Diabetes mellitus ([facility] [address]     Diverticulosis 9/21/2017    DJD (degenerative joint disease) 9/21/2017    GERD (gastroesophageal reflux disease)     H/O: depression 9/21/2017    Hiatal hernia     Hypercholesteremia     Hypertension     Hypertension with renal disease 9/21/2017    Hypothyroidism     Kidney stones     Obesity 9/21/2017    On statin therapy 9/21/2017    Sleep apnea 9/21/2017    Thyroid disease     Viral meningitis 9/1990      Past Surgical History:   Procedure Laterality Date    HX HERNIA REPAIR      HX KNEE ARTHROSCOPY      both     Social History     Tobacco Use    Smoking status: Never Smoker    Smokeless tobacco: Never Used   Vaping Use    Vaping Use: Never used   Substance Use Topics    Alcohol use: No    Drug use: No     Current Outpatient Medications   Medication Sig Dispense Refill    hydrALAZINE (APRESOLINE) 50 mg tablet Take 1 Tablet by mouth three (3) times daily. 270 Tablet 3    rosuvastatin (CRESTOR) 10 mg tablet Take 1 Tablet by mouth daily. 90 Tablet 3    levothyroxine (SYNTHROID) 50 mcg tablet Take 1 Tablet by mouth daily. 90 Tablet 3    metFORMIN ER (GLUCOPHAGE XR) 500 mg tablet One tabs in the morning and two tabs at dinner 270 Tablet 3    fenofibrate nanocrystallized (TRICOR) 145 mg tablet Take 1 Tablet by mouth daily. 90 Tablet 3    bumetanide (BUMEX) 1 mg tablet Take 1 Tablet by mouth daily. 30 Tablet prn    olmesartan (BENICAR) 40 mg tablet TAKE 1 TABLET BY MOUTH  DAILY 90 Tablet 3    amLODIPine (NORVASC) 5 mg tablet Take 1 Tablet by mouth daily. 30 Tablet prn    esomeprazole (NEXIUM) 40 mg capsule TAKE 1 CAPSULE BY MOUTH  DAILY 90 Capsule 3    niacin ER (NIASPAN) 750 mg Tb24 TAKE 2 TABLETS BY MOUTH AT  BEDTIME 180 Tablet 3    carvediloL (COREG) 25 mg tablet TAKE 1 TABLET BY MOUTH  TWICE DAILY WITH MEALS 180 Tab 3    famotidine (PEPCID) 40 mg tablet TAKE 1 TABLET BY MOUTH  DAILY 90 Tab 3    lisinopril (PRINIVIL, ZESTRIL) 40 mg tablet Take 1 Tab by mouth daily. 90 Tab 3    fish oil-dha-epa 1,200-144-216 mg cap Take  by mouth.  aspirin 81 mg tablet Take 81 mg by mouth daily as needed.       diclofenac EC (VOLTAREN) 75 mg EC tablet Take 1 Tablet by mouth two (2) times a day. 180 Tablet 3     Allergies   Allergen Reactions    Demerol [Meperidine] Nausea and Vomiting    Phenergan [Promethazine] Unknown (comments)     Family History   Problem Relation Age of Onset    Heart Disease Mother         AICD    Thyroid Disease Mother     Heart Disease Father         pacemaker    Hypertension Father     Cancer Father         lymphoma    Thyroid Disease Sister     Heart Disease Paternal Grandmother         angina       Patient Active Problem List    Diagnosis    Hypertension with renal disease    Stage 3 chronic kidney disease ([facility] [address]    Mixed hyperlipidemia    Gastroesophageal reflux disease without esophagitis    Primary osteoarthritis involving multiple joints    Cardiomyopathy ([facility] [address]     Diagnosis 2012 EF at that time 25-30%, follow-up echo in 2014 EF of 30% and apparently had a recent echo showing EF of 25% done early this year 2017      Controlled type 2 diabetes mellitus with stage 3 chronic kidney disease, without long-term current use of insulin (HCC)    Diverticulosis    Class 1 obesity due to excess calories with serious comorbidity and body mass index (BMI) of 33.0 to 33.9 in adult    Acquired hypothyroidism    Vitamin D deficiency    Edema    Alcohol screening    Medicare annual wellness visit, subsequent    Hearing impaired    Prostate cancer screening    Cholelithiasis    History of viral meningitis    H/O: depression    Obstructive sleep apnea syndrome       Patient Care Team:  Lakshmi Recinos MD as PCP - General (Internal Medicine)  Lakshmi Recinos MD as PCP - Payton Bowens Provider    Depression Risk Factor Screening:     3 most recent PHQ Screens 12/3/2021   Little interest or pleasure in doing things -   Feeling down, depressed, irritable, or hopeless Not at all   Total Score PHQ 2 -     Alcohol Risk Factor Screening: You do not drink alcohol or very rarely.     Functional Ability and Level of Safety:     Fall Risk     Fall Risk Assessment, last 12 mths 12/3/2021   Able to walk? Yes   Fall in past 12 months? 0   Do you feel unsteady? -   Are you worried about falling 0       Hearing Loss   mild    Activities of Daily Living   Self-care. ADL Assessment 12/3/2021   Feeding yourself No Help Needed   Getting from bed to chair No Help Needed   Getting dressed No Help Needed   Bathing or showering No Help Needed   Walk across the room (includes cane/walker) No Help Needed   Using the telphone No Help Needed   Taking your medications No Help Needed   Preparing meals No Help Needed   Managing money (expenses/bills) No Help Needed   Moderately strenuous housework (laundry) No Help Needed   Shopping for personal items (toiletries/medicines) No Help Needed   Shopping for groceries No Help Needed   Driving No Help Needed   Climbing a flight of stairs No Help Needed   Getting to places beyond walking distances No Help Needed       Abuse Screen   Patient is not abused    Social History     Social History Narrative    Not on file       Review of Systems      ROS:    Constitutional: He denies fevers, weight loss, sweats. Eyes: No blurred or double vision. ENT: No difficulty with swallowing, taste, speech or smell. Neck: no stiffness or swelling  Respiratory: No cough wheezing or shortness of breath. Cardiovascular: Denies chest pain, palpitations, unexplained indigestion or syncope. Gastrointestinal:  No changes in bowel movements, no abdominal pain, no bloating. Genitourinary:  He denies frequency, nocturia or stranguria. Extremities: No joint pain, stiffness or swelling. Neurological:  No numbness, tingling, burring paresthesias or loss of motor strength. No syncope, dizziness or frequent headache  Lymphatic: no adenopathy noted  Hematologic: no easy bruising or bleeding gums  Skin:  No recent rashes or mole changes. Psychiatric/Behavioral:  Negative for depression.       Physical Examination     Evaluation of Cognitive Function:  Mood/affect:  happy  Appearance: age appropriate  Family member/caregiver input: None    Vitals:    12/03/21 1307 12/03/21 1335   BP: (!) 146/82 (!) 160/84   Pulse: 67    Resp: 18    Temp: 98.1 °F (36.7 °C)    TempSrc: Oral    SpO2: 96%    Weight: 250 lb 12.8 oz (113.8 kg)    Height: 6' 1\" (1.854 m)    PainSc:   0 - No pain         PHYSICAL EXAM:    General appearance - alert, well appearing, and in no distress  Mental status - alert, oriented to person, place, and time  HEENT:  Ears - bilateral TM's and external ear canals clear  Eyes - pupillary responses were normal.  Extraocular muscle function intact. Lids and conjunctiva not injected. Fundoscopic exam revealed sharp disc margins. eye movements intact  Pharynx- clear with teeth in good repair. No masses were noted  Neck - supple without thyromegaly or burit. No JVD noted  Lungs - clear to auscultation and percussion  Cardiac- normal rate, regular rhythm without murmurs. PMI not displaced. No gallop, rub or click  Abdomen - flat, soft, non-tender without palpable organomegaly or mass. No pulsatile mass was felt, and not bruit was heard. Bowel sounds were active  : Circumcised, Testes descended w/o masses  Rectal: normal sphincter tone, prostate 1+ enlarged, smooth and nontender without nodules, no masses, stool brown and hemacult negative  Extremities -  no clubbing cyanosis or edema  Lymphatics - no palpable lymphadenopathy, no hepatosplenomegaly  Hematologic: no petechiae or purpura  Peripheral vascular -Femoral, Dorsalis pedis and posterior tibial pulses felt without difficulty  Skin - no rash or unusual mole change noted  Neurological - Cranial nerves II-XII grossly intact. Motor strength 5/5. DTR's 2+ and symmetric.   Station and gait normal  Back exam - full range of motion, no tenderness, palpable spasm or pain on motion  Musculoskeletal - no joint tenderness, deformity or swelling    a    Results for orders placed or performed in visit on 10/13/21   HEMOGLOBIN A1C WITH EAG   Result Value Ref Range    Hemoglobin A1c 6.1 (H) 4.0 - 5.6 %    Est. average glucose 128 mg/dL   CK   Result Value Ref Range     39 - 308 U/L   LIPID PANEL   Result Value Ref Range    Cholesterol, total 99 <200 MG/DL    Triglyceride 84 <150 MG/DL    HDL Cholesterol 38 MG/DL    LDL, calculated 44.2 0 - 100 MG/DL    VLDL, calculated 16.8 MG/DL    CHOL/HDL Ratio 2.6 0.0 - 5.0     METABOLIC PANEL, COMPREHENSIVE   Result Value Ref Range    Sodium 140 136 - 145 mmol/L    Potassium 4.1 3.5 - 5.1 mmol/L    Chloride 108 97 - 108 mmol/L    CO2 26 21 - 32 mmol/L    Anion gap 6 5 - 15 mmol/L    Glucose 126 (H) 65 - 100 mg/dL    BUN 16 6 - 20 MG/DL    Creatinine 1.17 0.70 - 1.30 MG/DL    BUN/Creatinine ratio 14 12 - 20      GFR est AA >60 >60 ml/min/1.73m2    GFR est non-AA >60 >60 ml/min/1.73m2    Calcium 9.1 8.5 - 10.1 MG/DL    Bilirubin, total 0.4 0.2 - 1.0 MG/DL    ALT (SGPT) 32 12 - 78 U/L    AST (SGOT) 23 15 - 37 U/L    Alk. phosphatase 87 45 - 117 U/L    Protein, total 6.8 6.4 - 8.2 g/dL    Albumin 4.0 3.5 - 5.0 g/dL    Globulin 2.8 2.0 - 4.0 g/dL    A-G Ratio 1.4 1.1 - 2.2         Advice/Referrals/Counseling   Education and counseling provided:  Are appropriate based on today's review and evaluation  End-of-Life planning (with patient's consent)  Pneumococcal Vaccine  Influenza Vaccine  Colorectal cancer screening tests      Assessment/Plan     ASSESSMENT:   1. Hypertension with renal disease    2. Controlled type 2 diabetes mellitus with stage 3 chronic kidney disease, without long-term current use of insulin ([address]    3. Mixed hyperlipidemia    4. Gastroesophageal reflux disease without esophagitis    5. Stage 3 chronic kidney disease, unspecified whether stage 3a or 3b CKD ([address]    6. Primary osteoarthritis involving multiple joints    7. Cardiomyopathy, unspecified type ([address]    8. Acquired hypothyroidism    9.  Class 1 obesity due to excess calories with serious comorbidity and body mass index (BMI) of 33.0 to 33.9 in adult    10. Diverticulosis    11. H/O: depression    12. Obstructive sleep apnea syndrome    13. Prostate cancer screening    14. Alcohol screening    15. Medicare annual wellness visit, subsequent    16. Vitamin D deficiency      Impression  1. Hypertension that is not controlled so at this point I will increase his hydralazine to 50 mg 3 times daily and I encouraged him to try to make sure he does not missing the midday dose. 2.  Diabetes I will check his blood sugar today but I did not repeat his A1c which was 6.1 on October 13. That was reviewed with him. 3.  Hyperlipidemia lipid profile from October 13 reviewed at which time he had a HDL slightly low at 38 and LDL is excellent number at 44. I did encourage him to increase fiber in his diet and add additional fish oil to see if that will get his HDL up to 40 which would be the goal.  4.  GERD that is stable  5. CKD stage III repeat status pending  6   DJD chronic but stable  7   Cardiomyopathy EKG obtained today reveals no acute process. 8.  Hypothyroidism repeat status is pending  9   Oesity we did discuss diet, exercise and weight reduction for overall health benefit  10. Diverticulosis no recent symptoms  11. Depression that is stable  12. Sleep apnea stable  13 prostate cancer screening done today with PSA pending  14. Alcohol screening is done with alcohol discussed and he claims not to drink at all. We spent 5 minutes discussing excessive alcohol use in males or more than 2 drinks per day with increased cardiovascular risk and increased risk of liver disease and other GI effects. 15.  Vitamin D deficiency status pending  Medicare subsequent annual wellness examination screening questionnaires completed today. The results were reviewed with him and his questions were answered. Lifestyle recommendations modifications discussed and made.   Since his blood pressure is significantly elevated I will get him back here in about 2 to 3 weeks to assess his blood pressure with increase of hydralazine. If it continues to be a problem may have to consider looking at his renal artery since he is on so many blood pressure medications with blood pressure elevation but can't do an MRI because he has a pacemaker/defibrillator IN    PLAN:  .  Orders Placed This Encounter    CBC WITH AUTOMATED DIFF    METABOLIC PANEL, COMPREHENSIVE    T4 (THYROXINE)    TSH 3RD GENERATION    URINALYSIS W/ REFLEX CULTURE    PSA SCREENING (SCREENING)    VITAMIN D, 25 HYDROXY    AMB POC URINE, MICROALBUMIN, SEMIQUANT (1 RESULT)    AMB POC EKG ROUTINE W/ 12 LEADS, INTER & REP    hydrALAZINE (APRESOLINE) 50 mg tablet         ATTENTION:   This medical record was transcribed using an electronic medical records system. Although proofread, it may and can contain electronic and spelling errors. Other human spelling and other errors may be present. Corrections may be executed at a later time. Please feel free to contact us for any clarifications as needed. Follow-up and Dispositions    · Return in about 3 weeks (around 12/24/2021). Kyle Abernathy MD    Recommended healthy diet low in carbohydrates, fats, sodium and cholesterol. Recommended regular cardiovascular exercise 3-6 times per week for 30-60 minutes daily. Current Outpatient Medications   Medication Sig Dispense Refill    hydrALAZINE (APRESOLINE) 50 mg tablet Take 1 Tablet by mouth three (3) times daily. 270 Tablet 3    rosuvastatin (CRESTOR) 10 mg tablet Take 1 Tablet by mouth daily. 90 Tablet 3    levothyroxine (SYNTHROID) 50 mcg tablet Take 1 Tablet by mouth daily. 90 Tablet 3    metFORMIN ER (GLUCOPHAGE XR) 500 mg tablet One tabs in the morning and two tabs at dinner 270 Tablet 3    fenofibrate nanocrystallized (TRICOR) 145 mg tablet Take 1 Tablet by mouth daily.  90 Tablet 3    bumetanide (BUMEX) 1 mg tablet Take 1 Tablet by mouth daily. 30 Tablet prn    olmesartan (BENICAR) 40 mg tablet TAKE 1 TABLET BY MOUTH  DAILY 90 Tablet 3    amLODIPine (NORVASC) 5 mg tablet Take 1 Tablet by mouth daily. 30 Tablet prn    esomeprazole (NEXIUM) 40 mg capsule TAKE 1 CAPSULE BY MOUTH  DAILY 90 Capsule 3    niacin ER (NIASPAN) 750 mg Tb24 TAKE 2 TABLETS BY MOUTH AT  BEDTIME 180 Tablet 3    carvediloL (COREG) 25 mg tablet TAKE 1 TABLET BY MOUTH  TWICE DAILY WITH MEALS 180 Tab 3    famotidine (PEPCID) 40 mg tablet TAKE 1 TABLET BY MOUTH  DAILY 90 Tab 3    lisinopril (PRINIVIL, ZESTRIL) 40 mg tablet Take 1 Tab by mouth daily. 90 Tab 3    fish oil-dha-epa 1,200-144-216 mg cap Take  by mouth.  aspirin 81 mg tablet Take 81 mg by mouth daily as needed.  diclofenac EC (VOLTAREN) 75 mg EC tablet Take 1 Tablet by mouth two (2) times a day. 180 Tablet 3       No results found for any visits on 12/03/21. Verbal and written instructions (see AVS) provided. Patient expresses understanding of diagnosis and treatment plan.     Mario Ojeda MD

## 2021-12-03 NOTE — PATIENT INSTRUCTIONS
Learning About ARBs  Introduction     ARBs (angiotensin II receptor blockers) block a hormone that makes blood vessels narrow. As a result, the blood vessels relax and widen. This lowers blood pressure. ARBs also put more water and salt into the urine. This also lowers blood pressure. ARBs can treat:  · High blood pressure. · Coronary artery disease. · Heart failure. They also may be used to help your kidneys when you have diabetes. Examples  · candesartan (Atacand)  · irbesartan (Avapro)  · losartan (Cozaar)  · olmesartan (Benicar)  · valsartan (Diovan)  This is not a complete list of all ARBs. Possible side effects  Side effects may include:  · Low blood pressure. You may feel dizzy and weak. · High potassium levels. You may have other side effects or reactions not listed here. Check the information that comes with your medicine. What to know about taking this medicine  · ARBs may be used if you had a cough when you tried to take an ACE inhibitor. ARBs are less likely to cause a cough. · You may need regular blood tests. · Take your medicines exactly as prescribed. Call your doctor if you think you are having a problem with your medicine. · Tell your doctor or pharmacist all the medicines you take. This includes over-the-counter medicines, vitamins, herbal products, and supplements. Taking some medicines together can cause problems. · You should not take ARBs if you are pregnant or planning to become pregnant. Where can you learn more? Go to http://www.gray.com/  Enter K212 in the search box to learn more about \"Learning About ARBs. \"  Current as of: April 29, 2021               Content Version: 13.0  © 6676-3153 [employer]. Care instructions adapted under license by [employer] (which disclaims liability or warranty for this information).  If you have questions about a medical condition or this instruction, always ask your healthcare professional. [address] any warranty or liability for your use of this information.

## 2021-12-03 NOTE — PROGRESS NOTES
Rm E1    Chief Complaint   Patient presents with    Medication Evaluation     follow up    Annual Wellness Visit      Pt has no concerns. Pt states he is here for medication evaluation as well as medicare wellness. Visit Vitals  BP (!) 146/82 (BP 1 Location: Left arm, BP Patient Position: Sitting, BP Cuff Size: Large adult)   Pulse 67   Temp 98.1 °F (36.7 °C) (Oral)   Resp 18   Ht 6' 1\" (1.854 m)   Wt 250 lb 12.8 oz (113.8 kg)   SpO2 96%   BMI 33.09 kg/m²        1. Have you been to the ER, urgent care clinic since your last visit? Hospitalized since your last visit? No    2. Have you seen or consulted any other health care providers outside of the [address] [place] [address] since your last visit? Include any pap smears or colon screening. No     Health Maintenance Due   Topic Date Due    Eye Exam Retinal or Dilated  Never done    DTaP/Tdap/Td series (1 - Tdap) Never done    Shingrix Vaccine Age 50> (1 of 2) Never done    Colorectal Cancer Screening Combo  02/17/2019    Pneumococcal 65+ years (2 of 2 - PPSV23) 11/16/2020    COVID-19 Vaccine (3 - Booster for Pfizer series) 09/30/2021    MICROALBUMIN Q1  12/16/2021    Medicare Yearly Exam  12/17/2021        3 most recent PHQ Screens 12/3/2021   Little interest or pleasure in doing things -   Feeling down, depressed, irritable, or hopeless Not at all   Total Score PHQ 2 -        Fall Risk Assessment, last 12 mths 12/3/2021   Able to walk?  Yes   Fall in past 12 months? 0   Do you feel unsteady? -   Are you worried about falling 0       Learning Assessment 11/27/2017   PRIMARY LEARNER Patient   HIGHEST LEVEL OF EDUCATION - PRIMARY LEARNER  2 YEARS OF COLLEGE   BARRIERS PRIMARY LEARNER NONE   CO-LEARNER CAREGIVER No   PRIMARY LANGUAGE ENGLISH   LEARNER PREFERENCE PRIMARY LISTENING   ANSWERED BY patient   RELATIONSHIP SELF

## 2021-12-04 LAB
25(OH)D3 SERPL-MCNC: 19.3 NG/ML (ref 30–100)
ALBUMIN SERPL-MCNC: 4.3 G/DL (ref 3.5–5)
ALBUMIN/GLOB SERPL: 1.6 {RATIO} (ref 1.1–2.2)
ALP SERPL-CCNC: 84 U/L (ref 45–117)
ALT SERPL-CCNC: 34 U/L (ref 12–78)
ANION GAP SERPL CALC-SCNC: 6 MMOL/L (ref 5–15)
APPEARANCE UR: CLEAR
AST SERPL-CCNC: 23 U/L (ref 15–37)
BACTERIA URNS QL MICRO: NEGATIVE /HPF
BASOPHILS # BLD: 0.1 K/UL (ref 0–0.1)
BASOPHILS NFR BLD: 1 % (ref 0–1)
BILIRUB SERPL-MCNC: 0.5 MG/DL (ref 0.2–1)
BILIRUB UR QL: NEGATIVE
BUN SERPL-MCNC: 20 MG/DL (ref 6–20)
BUN/CREAT SERPL: 16 (ref 12–20)
CALCIUM SERPL-MCNC: 9.2 MG/DL (ref 8.5–10.1)
CHLORIDE SERPL-SCNC: 108 MMOL/L (ref 97–108)
CO2 SERPL-SCNC: 29 MMOL/L (ref 21–32)
COLOR UR: NORMAL
CREAT SERPL-MCNC: 1.26 MG/DL (ref 0.7–1.3)
DIFFERENTIAL METHOD BLD: ABNORMAL
EOSINOPHIL # BLD: 0.2 K/UL (ref 0–0.4)
EOSINOPHIL NFR BLD: 3 % (ref 0–7)
EPITH CASTS URNS QL MICRO: NORMAL /LPF
ERYTHROCYTE [DISTWIDTH] IN BLOOD BY AUTOMATED COUNT: 13.1 % (ref 11.5–14.5)
GLOBULIN SER CALC-MCNC: 2.7 G/DL (ref 2–4)
GLUCOSE SERPL-MCNC: 114 MG/DL (ref 65–100)
GLUCOSE UR STRIP.AUTO-MCNC: NEGATIVE MG/DL
HCT VFR BLD AUTO: 37 % (ref 36.6–50.3)
HGB BLD-MCNC: 12.6 G/DL (ref 12.1–17)
HGB UR QL STRIP: NEGATIVE
HYALINE CASTS URNS QL MICRO: NORMAL /LPF (ref 0–5)
IMM GRANULOCYTES # BLD AUTO: 0 K/UL (ref 0–0.04)
IMM GRANULOCYTES NFR BLD AUTO: 0 % (ref 0–0.5)
KETONES UR QL STRIP.AUTO: NEGATIVE MG/DL
LEUKOCYTE ESTERASE UR QL STRIP.AUTO: NEGATIVE
LYMPHOCYTES # BLD: 1.5 K/UL (ref 0.8–3.5)
LYMPHOCYTES NFR BLD: 32 % (ref 12–49)
MCH RBC QN AUTO: 31.1 PG (ref 26–34)
MCHC RBC AUTO-ENTMCNC: 34.1 G/DL (ref 30–36.5)
MCV RBC AUTO: 91.4 FL (ref 80–99)
MONOCYTES # BLD: 0.4 K/UL (ref 0–1)
MONOCYTES NFR BLD: 10 % (ref 5–13)
NEUTS SEG # BLD: 2.4 K/UL (ref 1.8–8)
NEUTS SEG NFR BLD: 54 % (ref 32–75)
NITRITE UR QL STRIP.AUTO: NEGATIVE
NRBC # BLD: 0 K/UL (ref 0–0.01)
NRBC BLD-RTO: 0 PER 100 WBC
PH UR STRIP: 5.5 [PH] (ref 5–8)
PLATELET # BLD AUTO: 159 K/UL (ref 150–400)
PMV BLD AUTO: 11.3 FL (ref 8.9–12.9)
POTASSIUM SERPL-SCNC: 3.7 MMOL/L (ref 3.5–5.1)
PROT SERPL-MCNC: 7 G/DL (ref 6.4–8.2)
PROT UR STRIP-MCNC: NEGATIVE MG/DL
PSA SERPL-MCNC: 2.4 NG/ML (ref 0.01–4)
RBC # BLD AUTO: 4.05 M/UL (ref 4.1–5.7)
RBC #/AREA URNS HPF: NORMAL /HPF (ref 0–5)
SODIUM SERPL-SCNC: 143 MMOL/L (ref 136–145)
SP GR UR REFRACTOMETRY: 1.01 (ref 1–1.03)
T4 SERPL-MCNC: 8.8 UG/DL (ref 4.5–12.1)
TSH SERPL DL<=0.05 MIU/L-ACNC: 1.19 UIU/ML (ref 0.36–3.74)
UA: UC IF INDICATED,UAUC: NORMAL
UROBILINOGEN UR QL STRIP.AUTO: 0.2 EU/DL (ref 0.2–1)
WBC # BLD AUTO: 4.6 K/UL (ref 4.1–11.1)
WBC URNS QL MICRO: NORMAL /HPF (ref 0–4)

## 2021-12-17 NOTE — PROGRESS NOTES
Chief Complaint   Patient presents with    Hypertension     3 week follow up       SUBJECTIVE:    Margo Rae III is a 73 y.o. male who returns in follow-up regarding his hypertension which was markedly uncontrolled his last visit here. At that point we increased his Dralzine to 3 times daily and stressed importance of taking more regular. He returns today and seems to be doing better. He is taking his medications and trying to follow his diet and remains without any symptoms. He denies any chest pain, shortness of breath, palpitations, PND, orthopnea or cardiac or respiratory complaints. He notes no GI or  complaints. He notes no headache, dizziness or neurologic complaints. No current arthritic complaints and no other complaints on review of systems. Current Outpatient Medications   Medication Sig Dispense Refill    hydrALAZINE (APRESOLINE) 50 mg tablet Take 1 Tablet by mouth three (3) times daily. 270 Tablet 3    rosuvastatin (CRESTOR) 10 mg tablet Take 1 Tablet by mouth daily. 90 Tablet 3    levothyroxine (SYNTHROID) 50 mcg tablet Take 1 Tablet by mouth daily. 90 Tablet 3    metFORMIN ER (GLUCOPHAGE XR) 500 mg tablet One tabs in the morning and two tabs at dinner 270 Tablet 3    fenofibrate nanocrystallized (TRICOR) 145 mg tablet Take 1 Tablet by mouth daily. 90 Tablet 3    diclofenac EC (VOLTAREN) 75 mg EC tablet Take 1 Tablet by mouth two (2) times a day. 180 Tablet 3    bumetanide (BUMEX) 1 mg tablet Take 1 Tablet by mouth daily. 30 Tablet prn    olmesartan (BENICAR) 40 mg tablet TAKE 1 TABLET BY MOUTH  DAILY 90 Tablet 3    amLODIPine (NORVASC) 5 mg tablet Take 1 Tablet by mouth daily.  30 Tablet prn    esomeprazole (NEXIUM) 40 mg capsule TAKE 1 CAPSULE BY MOUTH  DAILY 90 Capsule 3    niacin ER (NIASPAN) 750 mg Tb24 TAKE 2 TABLETS BY MOUTH AT  BEDTIME 180 Tablet 3    carvediloL (COREG) 25 mg tablet TAKE 1 TABLET BY MOUTH  TWICE DAILY WITH MEALS 180 Tab 3    famotidine (PEPCID) 40 mg tablet TAKE 1 TABLET BY MOUTH  DAILY 90 Tab 3    lisinopril (PRINIVIL, ZESTRIL) 40 mg tablet Take 1 Tab by mouth daily. 90 Tab 3    fish oil-dha-epa 1,200-144-216 mg cap Take  by mouth.  aspirin 81 mg tablet Take 81 mg by mouth daily as needed.        Past Medical History:   Diagnosis Date    Arthritis     fingers    CAD (coronary artery disease)     Cardiomyopathy ([facility]     Cholelithiasis 9/21/2017    CKD (chronic kidney disease) 9/21/2017    Diabetes mellitus ([facility]     Diverticulosis 9/21/2017    DJD (degenerative joint disease) 9/21/2017    GERD (gastroesophageal reflux disease)     H/O: depression 9/21/2017    Hiatal hernia     Hypercholesteremia     Hypertension     Hypertension with renal disease 9/21/2017    Hypothyroidism     Kidney stones     Obesity 9/21/2017    On statin therapy 9/21/2017    Sleep apnea 9/21/2017    Thyroid disease     Viral meningitis 9/1990     Past Surgical History:   Procedure Laterality Date    HX HERNIA REPAIR      HX KNEE ARTHROSCOPY      both     Allergies   Allergen Reactions    Demerol [Meperidine] Nausea and Vomiting    Phenergan [Promethazine] Unknown (comments)       REVIEW OF SYSTEMS:  General: negative for - chills or fever, or weight loss or gain  ENT: negative for - headaches, nasal congestion or tinnitus  Eyes: no blurred or visual changes  Neck: No stiffness or swollen nodes  Respiratory: negative for - cough, hemoptysis, shortness of breath or wheezing  Cardiovascular : negative for - chest pain, edema, palpitations or shortness of breath  Gastrointestinal: negative for - abdominal pain, blood in stools, heartburn or nausea/vomiting  Genito-Urinary: no dysuria, trouble voiding, or hematuria  Musculoskeletal: negative for - gait disturbance, joint pain, joint stiffness or joint swelling  Neurological: no TIA or stroke symptoms  Hematologic: no bruises, no bleeding  Lymphatic: no swollen glands  Integument: no lumps, mole changes, nail changes or rash  Endocrine:no malaise/lethargy poly uria or polydipsia or unexpected weight changes        Social History     Socioeconomic History    Marital status:    Tobacco Use    Smoking status: Never Smoker    Smokeless tobacco: Never Used   Vaping Use    Vaping Use: Never used   Substance and Sexual Activity    Alcohol use: No    Drug use: No    Sexual activity: Yes     Partners: Female     Family History   Problem Relation Age of Onset    Heart Disease Mother         AICD    Thyroid Disease Mother     Heart Disease Father         pacemaker    Hypertension Father     Cancer Father         lymphoma    Thyroid Disease Sister     Heart Disease Paternal Grandmother         angina       OBJECTIVE:     Visit Vitals  /68   Pulse 74   Temp 98.4 °F (36.9 °C) (Oral)   Resp 17   Ht 6' 1\" (1.854 m)   Wt 252 lb 4.8 oz (114.4 kg)   SpO2 97%   BMI 33.29 kg/m²     CONSTITUTIONAL:   well nourished, appears age appropriate  EYES: sclera anicteric, PERRL, EOMI  ENMT:nares clear, moist mucous membranes, pharynx clear  NECK: supple. Thyroid normal, No JVD or bruits  RESPIRATORY: Chest: clear to ascultation and percussion, normal inspiratory effort  CARDIOVASCULAR: Heart: regular rate and rhythm no murmurs, rubs or gallops, PMI not displaced, No thrills, no peripheral edema  GASTROINTESTINAL: Abdomen: non distended, soft, non tender, bowel sounds normal  HEMATOLOGIC: no purpura, petechiae or bruising  LYMPHATIC: No lymph node enlargemant  MUSCULOSKELETAL: Extremities: no active synovitis, pulse 1+   INTEGUMENT: No unusual rashes or suspicious skin lesions noted. Nails appear normal.  PERIPHERAL VASCULAR: normal pulses femoral, PT and DP  NEUROLOGIC: non-focal exam, A & O X 3  PSYCHIATRIC:, appropriate affect     ASSESSMENT:   1. Hypertension with renal disease    2. Class 1 obesity due to excess calories with serious comorbidity and body mass index (BMI) of 33.0 to 33.9 in adult    3. Cardiomyopathy, unspecified type ([facility]      Impression  1. Hypertension that is now controlled so we will continue current therapy. 2.  Obesity weight is unfortunate up a little but it is Mary season so we will not make any changes today we did stress importance of diet. 3   Cardiomyopathy seems to be stable  I will recheck him again when he is due for his fasting blood work which is about a month from now. I will see him sooner if there are problems. PLAN:  . No orders of the defined types were placed in this encounter. ATTENTION:   This medical record was transcribed using an electronic medical records system. Although proofread, it may and can contain electronic and spelling errors. Other human spelling and other errors may be present. Corrections may be executed at a later time. Please feel free to contact us for any clarifications as needed. Follow-up and Dispositions    · Return in about 4 weeks (around 1/17/2022). No results found for any visits on 12/20/21. Will Yates MD    The patient verbalized understanding of the problems and plans as explained.

## 2021-12-20 ENCOUNTER — OFFICE VISIT (OUTPATIENT)
Dept: INTERNAL MEDICINE CLINIC | Age: 71
End: 2021-12-20
Payer: COMMERCIAL

## 2021-12-20 VITALS
HEIGHT: 73 IN | DIASTOLIC BLOOD PRESSURE: 68 MMHG | TEMPERATURE: 98.4 F | BODY MASS INDEX: 33.44 KG/M2 | WEIGHT: 252.3 LBS | OXYGEN SATURATION: 97 % | SYSTOLIC BLOOD PRESSURE: 134 MMHG | RESPIRATION RATE: 17 BRPM | HEART RATE: 74 BPM

## 2021-12-20 DIAGNOSIS — E66.09 CLASS 1 OBESITY DUE TO EXCESS CALORIES WITH SERIOUS COMORBIDITY AND BODY MASS INDEX (BMI) OF 33.0 TO 33.9 IN ADULT: ICD-10-CM

## 2021-12-20 DIAGNOSIS — I12.9 HYPERTENSION WITH RENAL DISEASE: Primary | ICD-10-CM

## 2021-12-20 DIAGNOSIS — I42.9 CARDIOMYOPATHY, UNSPECIFIED TYPE (HCC): ICD-10-CM

## 2021-12-20 PROCEDURE — 3017F COLORECTAL CA SCREEN DOC REV: CPT | Performed by: INTERNAL MEDICINE

## 2021-12-20 PROCEDURE — 99213 OFFICE O/P EST LOW 20 MIN: CPT | Performed by: INTERNAL MEDICINE

## 2021-12-20 PROCEDURE — G8536 NO DOC ELDER MAL SCRN: HCPCS | Performed by: INTERNAL MEDICINE

## 2021-12-20 PROCEDURE — G8417 CALC BMI ABV UP PARAM F/U: HCPCS | Performed by: INTERNAL MEDICINE

## 2021-12-20 PROCEDURE — G8427 DOCREV CUR MEDS BY ELIG CLIN: HCPCS | Performed by: INTERNAL MEDICINE

## 2021-12-20 PROCEDURE — 1101F PT FALLS ASSESS-DOCD LE1/YR: CPT | Performed by: INTERNAL MEDICINE

## 2021-12-20 PROCEDURE — G8432 DEP SCR NOT DOC, RNG: HCPCS | Performed by: INTERNAL MEDICINE

## 2021-12-20 NOTE — PATIENT INSTRUCTIONS
Noninsulin Medicines for Type 2 Diabetes: Care Instructions  Overview     There are different types of noninsulin medicines for diabetes. Each works in a different way. But they all help you control your blood sugar. Some types help your body make insulin to lower your blood sugar. Others lower how much insulin your body needs. Some can slow how fast your body digests sugars. And some can remove extra glucose through your urine. You may need to take more than one medicine for diabetes. Two or more medicines may work better to lower your blood sugar level than just one does. · Metformin. This lowers how much glucose your liver makes. And it helps you respond better to insulin. It also lowers the amount of stored sugar that your liver releases when you are not eating. · Sulfonylureas. These help your body release more insulin. Some work for many hours. They can cause low blood sugar if you don't eat as you planned. An example is glipizide. · Thiazolidinediones. These reduce the amount of blood glucose. They also help you respond better to insulin. An example is pioglitazone. · SGLT2 inhibitors. These help to remove extra glucose through your urine. They may also help some people lose weight. An example is ertugliflozin. · DPP-4 inhibitors. These help your body raise the level of insulin after you eat. They also help your body make less of a hormone that raises blood sugar. An example is alogliptin. · GLP-1 receptor agonists. These help your body make a protein that can raise your insulin level and make you less hungry. They're given as shots or pills. An example is semaglutide. · Meglitinides. These help your body release insulin. They also help slow how your body digests sugars. So they can keep your blood sugar from rising too fast after you eat. · Alpha-glucosidase inhibitors. These keep starches from breaking down. This means that they lower the amount of glucose absorbed when you eat.  They don't help your body make more insulin. So they will not cause low blood sugar unless you use them with other medicines for diabetes. Follow-up care is a key part of your treatment and safety. Be sure to make and go to all appointments, and call your doctor if you are having problems. It's also a good idea to know your test results and keep a list of the medicines you take. How can you care for yourself at home? · Eat a healthy diet. Get some exercise each day. This may help you to reduce how much medicine you need. · Do not take other prescription or over-the-counter medicines, vitamins, herbal products, or supplements without talking to your doctor first. Some medicines for type 2 diabetes can cause problems with other medicines or supplements. · Tell your doctor if you plan to get pregnant. Some of these drugs are not safe for pregnant women. · Be safe with medicines. Take your medicines exactly as prescribed. Meglitinides and sulfonylureas can cause your blood sugar to drop very low. Call your doctor if you think you are having a problem with your medicine. · Check your blood sugar often. You can use a glucose monitor. Keeping track can help you know how certain foods, activities, and medicines affect your blood sugar. And it can help you keep your blood sugar from getting so low that it's not safe. When should you call for help? Call 911 anytime you think you may need emergency care. For example, call if:    · You passed out (lost consciousness).     · You are confused or cannot think clearly.     · Your blood sugar is very high or very low. Watch closely for changes in your health, and be sure to contact your doctor if:    · Your blood sugar stays outside the level your doctor set for you.     · You have any problems. Where can you learn more?   Go to http://rosa-jaylene.info/  Enter H153 in the search box to learn more about \"Noninsulin Medicines for Type 2 Diabetes: Care Instructions. \"  Current as of: August 31, 2020               Content Version: 13.0  © 2560-9433 Healthwise, [facility]. Care instructions adapted under license by [employer] (which disclaims liability or warranty for this information). If you have questions about a medical condition or this instruction, always ask your healthcare professional. [place] [address] any warranty or liability for your use of this information.

## 2021-12-20 NOTE — PROGRESS NOTES
Chief Complaint   Patient presents with    Hypertension     3 week follow up     Visit Vitals  BP (!) 140/76 (BP 1 Location: Left upper arm, BP Patient Position: Sitting, BP Cuff Size: Large adult)   Pulse 74   Temp 98.4 °F (36.9 °C) (Oral)   Resp 17   Ht 6' 1\" (1.854 m)   Wt 252 lb 4.8 oz (114.4 kg)   SpO2 97%   BMI 33.29 kg/m²     1. Have you been to the ER, urgent care clinic since your last visit? Hospitalized since your last visit? No    2. Have you seen or consulted any other health care providers outside of the [address] [place] [address] since your last visit? Include any pap smears or colon screening.  No

## 2022-01-01 PROBLEM — Z12.5 PROSTATE CANCER SCREENING: Status: RESOLVED | Noted: 2017-10-17 | Resolved: 2022-01-01

## 2022-01-01 PROBLEM — Z00.00 MEDICARE ANNUAL WELLNESS VISIT, SUBSEQUENT: Status: RESOLVED | Noted: 2019-11-20 | Resolved: 2022-01-01

## 2022-01-18 NOTE — PROGRESS NOTES
Chief Complaint   Patient presents with    GERD    Diabetes       SUBJECTIVE:    Cintia Jameson III is a 73 y.o. male who returns in follow-up for his medical problems including hypertension, diabetes, hyperlipidemia, cardiomyopathy, GERD, DJD, obesity, CKD stage III and other multiple medical problems. He is taking his medications and trying to follow his diet and remains physically active. He currently denies any chest pain, shortness of breath, palpitations, PND, orthopnea or other cardiovascular complaints other than a lot of persistent head and nasal congestion that he has had since he had COVID infection on December 23. He notes no fevers or chills. He denies any GI or  complaints. He has no headaches, dizziness or neurologic complaints. There are no current active arthritic complaints other than his chronic joint aches and pains and and no other complaints on complete review of systems. Current Outpatient Medications   Medication Sig Dispense Refill    azithromycin (ZITHROMAX) 250 mg tablet Take 1 Tablet by mouth See Admin Instructions for 5 days. Take 2 tablets the first day, then 1 tablet daily for the next four days. 6 Tablet 0    hydrALAZINE (APRESOLINE) 50 mg tablet Take 1 Tablet by mouth three (3) times daily. 270 Tablet 3    rosuvastatin (CRESTOR) 10 mg tablet Take 1 Tablet by mouth daily. 90 Tablet 3    levothyroxine (SYNTHROID) 50 mcg tablet Take 1 Tablet by mouth daily. 90 Tablet 3    metFORMIN ER (GLUCOPHAGE XR) 500 mg tablet One tabs in the morning and two tabs at dinner 270 Tablet 3    fenofibrate nanocrystallized (TRICOR) 145 mg tablet Take 1 Tablet by mouth daily. 90 Tablet 3    diclofenac EC (VOLTAREN) 75 mg EC tablet Take 1 Tablet by mouth two (2) times a day. 180 Tablet 3    bumetanide (BUMEX) 1 mg tablet Take 1 Tablet by mouth daily.  30 Tablet prn    olmesartan (BENICAR) 40 mg tablet TAKE 1 TABLET BY MOUTH  DAILY 90 Tablet 3    amLODIPine (NORVASC) 5 mg tablet Take 1 Tablet by mouth daily. 30 Tablet prn    esomeprazole (NEXIUM) 40 mg capsule TAKE 1 CAPSULE BY MOUTH  DAILY 90 Capsule 3    niacin ER (NIASPAN) 750 mg Tb24 TAKE 2 TABLETS BY MOUTH AT  BEDTIME 180 Tablet 3    carvediloL (COREG) 25 mg tablet TAKE 1 TABLET BY MOUTH  TWICE DAILY WITH MEALS 180 Tab 3    famotidine (PEPCID) 40 mg tablet TAKE 1 TABLET BY MOUTH  DAILY 90 Tab 3    lisinopril (PRINIVIL, ZESTRIL) 40 mg tablet Take 1 Tab by mouth daily. 90 Tab 3    fish oil-dha-epa 1,200-144-216 mg cap Take  by mouth.  aspirin 81 mg tablet Take 81 mg by mouth daily as needed.        Past Medical History:   Diagnosis Date    Arthritis     fingers    CAD (coronary artery disease)     [facility]     Cholelithiasis 9/21/2017    CKD (chronic kidney disease) 9/21/2017    Diabetes mellitus ([facility]     Diverticulosis 9/21/2017    DJD (degenerative joint disease) 9/21/2017    GERD (gastroesophageal reflux disease)     H/O: depression 9/21/2017    Hiatal hernia     Hypercholesteremia     Hypertension     Hypertension with renal disease 9/21/2017    Hypothyroidism     Kidney stones     Obesity 9/21/2017    On statin therapy 9/21/2017    Sleep apnea 9/21/2017    Thyroid disease     Viral meningitis 9/1990     Past Surgical History:   Procedure Laterality Date    HX HERNIA REPAIR      HX KNEE ARTHROSCOPY      both     Allergies   Allergen Reactions    Demerol [Meperidine] Nausea and Vomiting    Phenergan [Promethazine] Unknown (comments)       REVIEW OF SYSTEMS:  General: negative for - chills or fever, or weight loss or gain  ENT: negative for - headaches, nasal congestion or tinnitus  Eyes: no blurred or visual changes  Neck: No stiffness or swollen nodes  Respiratory: negative for - cough, hemoptysis, shortness of breath or wheezing  Cardiovascular : negative for - chest pain, edema, palpitations or shortness of breath  Gastrointestinal: negative for - abdominal pain, blood in stools, heartburn or nausea/vomiting  Genito-Urinary: no dysuria, trouble voiding, or hematuria  Musculoskeletal: negative for - gait disturbance, joint pain, joint stiffness or joint swelling  Neurological: no TIA or stroke symptoms  Hematologic: no bruises, no bleeding  Lymphatic: no swollen glands  Integument: no lumps, mole changes, nail changes or rash  Endocrine:no malaise/lethargy poly uria or polydipsia or unexpected weight changes        Social History     Socioeconomic History    Marital status:    Tobacco Use    Smoking status: Never Smoker    Smokeless tobacco: Never Used   Vaping Use    Vaping Use: Never used   Substance and Sexual Activity    Alcohol use: No    Drug use: No    Sexual activity: Yes     Partners: Female     Family History   Problem Relation Age of Onset    Heart Disease Mother         AICD    Thyroid Disease Mother     Heart Disease Father         pacemaker    Hypertension Father     Cancer Father         lymphoma    Thyroid Disease Sister     Heart Disease Paternal Grandmother         angina       OBJECTIVE:     Visit Vitals  /82 (BP 1 Location: Left upper arm, BP Patient Position: Sitting, BP Cuff Size: Large adult)   Pulse 71   Temp 98.3 °F (36.8 °C) (Oral)   Resp 17   Ht 6' 1\" (1.854 m)   Wt 242 lb 14.4 oz (110.2 kg)   SpO2 98%   BMI 32.05 kg/m²     CONSTITUTIONAL:   well nourished, appears age appropriate  EYES: sclera anicteric, PERRL, EOMI  ENMT:nares clear, moist mucous membranes, pharynx clear  NECK: supple.  Thyroid normal, No JVD or bruits  RESPIRATORY: Chest: clear to ascultation and percussion, normal inspiratory effort  CARDIOVASCULAR: Heart: regular rate and rhythm no murmurs, rubs or gallops, PMI not displaced, No thrills, no peripheral edema  GASTROINTESTINAL: Abdomen: non distended, soft, non tender, bowel sounds normal  HEMATOLOGIC: no purpura, petechiae or bruising  LYMPHATIC: No lymph node enlargemant  MUSCULOSKELETAL: Extremities: no active synovitis, pulse 1+. No diabetic foot changes noted. . Normal distal sensation and proprioception all toes both feet. INTEGUMENT: No unusual rashes or suspicious skin lesions noted. Nails appear normal.  PERIPHERAL VASCULAR: normal pulses femoral, PT and DP  NEUROLOGIC: non-focal exam, A & O X 3. Normal distal sensation and proprioception no toes both feet. PSYCHIATRIC:, appropriate affect     ASSESSMENT:   1. Hypertension with renal disease    2. Controlled type 2 diabetes mellitus with stage 3 chronic kidney disease, without long-term current use of insulin ([address]    3. Mixed hyperlipidemia    4. Gastroesophageal reflux disease without esophagitis    5. Primary osteoarthritis involving multiple joints    6. Stage 3 chronic kidney disease, unspecified whether stage 3a or 3b CKD (HCC)    7. Cardiomyopathy, unspecified type ([address]    8. Class 1 obesity due to excess calories with serious comorbidity and body mass index (BMI) of 33.0 to 33.9 in adult    9. COVID-19      Impression  1. Hypertension that is controlled so continue current therapy reviewed with him. 2.  Diabetes repeat status pending and prior lab reviewed and I will adjust if needed. 3.  Hyperlipidemia prior lab reviewed and repeat status pending I will adjust if needed. Before GERD that is stable  5. DJD that is stable  6. CKD stage III repeat status is pending  7. Cardiomyopathy stable  8. Obesity we did discuss diet, exercise and weight reduction for overall health benefit. 9.  COVID infections December only residual sinus infection  I will recheck him again in 3 months or sooner should the be a problem. I will call with lab results in interim. PLAN:  .  Orders Placed This Encounter    METABOLIC PANEL, COMPREHENSIVE    LIPID PANEL    CK    HEMOGLOBIN A1C WITH EAG    azithromycin (ZITHROMAX) 250 mg tablet         ATTENTION:   This medical record was transcribed using an electronic medical records system.   Although proofread, it may and can contain electronic and spelling errors. Other human spelling and other errors may be present. Corrections may be executed at a later time. Please feel free to contact us for any clarifications as needed. Follow-up and Dispositions    · Return in about 3 months (around 4/19/2022). No results found for any visits on 01/19/22. Gloria Chong MD    The patient verbalized understanding of the problems and plans as explained.

## 2022-01-19 ENCOUNTER — OFFICE VISIT (OUTPATIENT)
Dept: INTERNAL MEDICINE CLINIC | Age: 72
End: 2022-01-19
Payer: MEDICARE

## 2022-01-19 VITALS
OXYGEN SATURATION: 98 % | TEMPERATURE: 98.3 F | HEART RATE: 71 BPM | SYSTOLIC BLOOD PRESSURE: 135 MMHG | WEIGHT: 242.9 LBS | HEIGHT: 73 IN | BODY MASS INDEX: 32.19 KG/M2 | RESPIRATION RATE: 17 BRPM | DIASTOLIC BLOOD PRESSURE: 82 MMHG

## 2022-01-19 DIAGNOSIS — E66.09 CLASS 1 OBESITY DUE TO EXCESS CALORIES WITH SERIOUS COMORBIDITY AND BODY MASS INDEX (BMI) OF 33.0 TO 33.9 IN ADULT: ICD-10-CM

## 2022-01-19 DIAGNOSIS — E78.2 MIXED HYPERLIPIDEMIA: ICD-10-CM

## 2022-01-19 DIAGNOSIS — E11.22 CONTROLLED TYPE 2 DIABETES MELLITUS WITH STAGE 3 CHRONIC KIDNEY DISEASE, WITHOUT LONG-TERM CURRENT USE OF INSULIN (HCC): ICD-10-CM

## 2022-01-19 DIAGNOSIS — U07.1 COVID-19: ICD-10-CM

## 2022-01-19 DIAGNOSIS — M15.9 PRIMARY OSTEOARTHRITIS INVOLVING MULTIPLE JOINTS: ICD-10-CM

## 2022-01-19 DIAGNOSIS — N18.30 CONTROLLED TYPE 2 DIABETES MELLITUS WITH STAGE 3 CHRONIC KIDNEY DISEASE, WITHOUT LONG-TERM CURRENT USE OF INSULIN (HCC): ICD-10-CM

## 2022-01-19 DIAGNOSIS — K21.9 GASTROESOPHAGEAL REFLUX DISEASE WITHOUT ESOPHAGITIS: ICD-10-CM

## 2022-01-19 DIAGNOSIS — I12.9 HYPERTENSION WITH RENAL DISEASE: Primary | ICD-10-CM

## 2022-01-19 DIAGNOSIS — N18.30 STAGE 3 CHRONIC KIDNEY DISEASE, UNSPECIFIED WHETHER STAGE 3A OR 3B CKD (HCC): ICD-10-CM

## 2022-01-19 DIAGNOSIS — I42.9 CARDIOMYOPATHY, UNSPECIFIED TYPE (HCC): ICD-10-CM

## 2022-01-19 LAB
ALBUMIN SERPL-MCNC: 3.9 G/DL (ref 3.5–5)
ALBUMIN/GLOB SERPL: 1.3 {RATIO} (ref 1.1–2.2)
ALP SERPL-CCNC: 76 U/L (ref 45–117)
ALT SERPL-CCNC: 29 U/L (ref 12–78)
ANION GAP SERPL CALC-SCNC: 3 MMOL/L (ref 5–15)
AST SERPL-CCNC: 21 U/L (ref 15–37)
BILIRUB SERPL-MCNC: 0.5 MG/DL (ref 0.2–1)
BUN SERPL-MCNC: 18 MG/DL (ref 6–20)
BUN/CREAT SERPL: 12 (ref 12–20)
CALCIUM SERPL-MCNC: 8.8 MG/DL (ref 8.5–10.1)
CHLORIDE SERPL-SCNC: 108 MMOL/L (ref 97–108)
CHOLEST SERPL-MCNC: 108 MG/DL
CK SERPL-CCNC: 98 U/L (ref 39–308)
CO2 SERPL-SCNC: 29 MMOL/L (ref 21–32)
CREAT SERPL-MCNC: 1.48 MG/DL (ref 0.7–1.3)
EST. AVERAGE GLUCOSE BLD GHB EST-MCNC: 123 MG/DL
GLOBULIN SER CALC-MCNC: 2.9 G/DL (ref 2–4)
GLUCOSE SERPL-MCNC: 108 MG/DL (ref 65–100)
HBA1C MFR BLD: 5.9 % (ref 4–5.6)
HDLC SERPL-MCNC: 35 MG/DL
HDLC SERPL: 3.1 {RATIO} (ref 0–5)
LDLC SERPL CALC-MCNC: 50 MG/DL (ref 0–100)
POTASSIUM SERPL-SCNC: 3.7 MMOL/L (ref 3.5–5.1)
PROT SERPL-MCNC: 6.8 G/DL (ref 6.4–8.2)
SODIUM SERPL-SCNC: 140 MMOL/L (ref 136–145)
TRIGL SERPL-MCNC: 115 MG/DL (ref ?–150)
VLDLC SERPL CALC-MCNC: 23 MG/DL

## 2022-01-19 PROCEDURE — 3046F HEMOGLOBIN A1C LEVEL >9.0%: CPT | Performed by: INTERNAL MEDICINE

## 2022-01-19 PROCEDURE — 3017F COLORECTAL CA SCREEN DOC REV: CPT | Performed by: INTERNAL MEDICINE

## 2022-01-19 PROCEDURE — G8417 CALC BMI ABV UP PARAM F/U: HCPCS | Performed by: INTERNAL MEDICINE

## 2022-01-19 PROCEDURE — 1101F PT FALLS ASSESS-DOCD LE1/YR: CPT | Performed by: INTERNAL MEDICINE

## 2022-01-19 PROCEDURE — G8536 NO DOC ELDER MAL SCRN: HCPCS | Performed by: INTERNAL MEDICINE

## 2022-01-19 PROCEDURE — 2022F DILAT RTA XM EVC RTNOPTHY: CPT | Performed by: INTERNAL MEDICINE

## 2022-01-19 PROCEDURE — G8427 DOCREV CUR MEDS BY ELIG CLIN: HCPCS | Performed by: INTERNAL MEDICINE

## 2022-01-19 PROCEDURE — G8510 SCR DEP NEG, NO PLAN REQD: HCPCS | Performed by: INTERNAL MEDICINE

## 2022-01-19 PROCEDURE — 99214 OFFICE O/P EST MOD 30 MIN: CPT | Performed by: INTERNAL MEDICINE

## 2022-01-19 RX ORDER — AZITHROMYCIN 250 MG/1
250 TABLET, FILM COATED ORAL SEE ADMIN INSTRUCTIONS
Qty: 6 TABLET | Refills: 0 | Status: SHIPPED | OUTPATIENT
Start: 2022-01-19 | End: 2022-01-24

## 2022-01-19 NOTE — PROGRESS NOTES
HIPAA verified by two patient identifiers. Tayler Muñoz III is a 73 y.o. male    Chief Complaint   Patient presents with    GERD    Diabetes       Visit Vitals  /82 (BP 1 Location: Left upper arm, BP Patient Position: Sitting, BP Cuff Size: Large adult)   Pulse 71   Temp 98.3 °F (36.8 °C) (Oral)   Resp 17   Ht 6' 1\" (1.854 m)   Wt 242 lb 14.4 oz (110.2 kg)   SpO2 98%   BMI 32.05 kg/m²       Pain Scale: 0 - No pain/10  Pain Location:       Health Maintenance Due   Topic Date Due    Eye Exam Retinal or Dilated  Never done    DTaP/Tdap/Td series (1 - Tdap) Never done    Shingrix Vaccine Age 50> (1 of 2) Never done    Colorectal Cancer Screening Combo  02/17/2019    Pneumococcal 65+ years (2 of 2 - PPSV23) 11/16/2020    COVID-19 Vaccine (3 - Booster for Pfizer series) 09/30/2021         Coordination of Care Questionnaire:  :   1) Have you been to an emergency room, urgent care, or hospitalized since your last visit? If yes, where when, and reason for visit? no       2. Have seen or consulted any other health care provider since your last visit? If yes, where when, and reason for visit? NO      Patient is accompanied by self I have received verbal consent from Tayler Muñoz III to discuss any/all medical information while they are present in the room.

## 2022-01-19 NOTE — PATIENT INSTRUCTIONS
Learning About ACE Inhibitors and ARBs for Diabetes  Introduction     ACE inhibitors and ARBs are medicines used to manage blood pressure. They allow blood vessels to relax and open up. This lowers your blood pressure. When you have diabetes, taking an ACE inhibitor or ARB can help to:  · Treat high blood pressure. Your risk of problems from diabetes goes up when you have high blood pressure. · Prevent or slow kidney damage. Diabetes can damage the blood vessels in the kidneys. High blood pressure can damage the kidneys, too. · Lower the risks of stroke and heart attack. Your risks go up when you have high blood pressure, heart disease, or both. An ACE inhibitor or ARB is a good choice for people with diabetes. Unlike some medicines, these don't affect blood sugar levels. Examples  ACE inhibitors include:  · Benazepril. · Lisinopril. · Ramipril. ARBs include:  · Irbesartan. · Losartan. · Telmisartan. Possible side effects  All medicines can cause side effects. Some side effects of ACE inhibitors include:  · Low blood pressure. You may feel dizzy and weak. · A dry cough. · High potassium levels. · Swelling of your lips, tongue, or face. If the swelling is severe, you may need treatment right away. Severe swelling can make it hard to breathe, but this is rare. Some side effects of ARBs include:  · Low blood pressure. You may feel dizzy and weak. · High potassium levels. You may have other side effects or reactions not listed here. Check the information that comes with your medicine. What to know about taking this medicine  · Be safe with medicines. Take your medicines exactly as prescribed. Call your doctor if you think you are having a problem with your medicine. · Before starting an ACE inhibitor or ARB, tell your doctor if you:  ? Use a salt substitute. ? Take diuretics or potassium tablets. · These medicines are not safe for pregnancy.  If you are pregnant or planning to be, talk to your doctor about a safe blood pressure medicine. · ACE inhibitors can cause a dry cough. If the cough is bad, talk to your doctor. Switching to an ARB is likely to help. · Taking some medicines together can cause problems. Tell your doctor or pharmacist all the medicines you take. This includes over-the-counter medicines, vitamins, herbal products, and supplements. · You may need regular blood and urine tests. Where can you learn more? Go to http://www.fang.com/  Enter M316 in the search box to learn more about \"Learning About ACE Inhibitors and ARBs for Diabetes. \"  Current as of: August 31, 2020               Content Version: 13.0  © 1965-7815 [employer]. Care instructions adapted under license by [employer] (which disclaims liability or warranty for this information). If you have questions about a medical condition or this instruction, always ask your healthcare professional. [address] any warranty or liability for your use of this information.

## 2022-01-21 NOTE — PROGRESS NOTES
Labs without significant change except slightly higher creatinine so we will recheck that on follow-up

## 2022-01-21 NOTE — PROGRESS NOTES
Labs without significant change except slightly higher creatinine so we will recheck that on follow-up. Letter generated to patient regarding.

## 2022-03-18 PROBLEM — K57.90 DIVERTICULOSIS: Status: ACTIVE | Noted: 2017-09-21

## 2022-03-18 PROBLEM — U07.1 COVID-19: Status: ACTIVE | Noted: 2022-01-19

## 2022-03-18 PROBLEM — Z86.61 HISTORY OF VIRAL MENINGITIS: Status: ACTIVE | Noted: 2017-09-21

## 2022-03-18 PROBLEM — K80.20 CHOLELITHIASIS: Status: ACTIVE | Noted: 2017-09-21

## 2022-03-18 PROBLEM — R60.9 EDEMA: Status: ACTIVE | Noted: 2021-07-21

## 2022-03-18 PROBLEM — N18.30 STAGE 3 CHRONIC KIDNEY DISEASE (HCC): Status: ACTIVE | Noted: 2017-09-21

## 2022-03-19 PROBLEM — M15.9 PRIMARY OSTEOARTHRITIS INVOLVING MULTIPLE JOINTS: Status: ACTIVE | Noted: 2017-09-20

## 2022-03-19 PROBLEM — K21.9 GASTROESOPHAGEAL REFLUX DISEASE WITHOUT ESOPHAGITIS: Status: ACTIVE | Noted: 2017-09-20

## 2022-03-19 PROBLEM — E66.09 CLASS 1 OBESITY DUE TO EXCESS CALORIES WITH SERIOUS COMORBIDITY AND BODY MASS INDEX (BMI) OF 33.0 TO 33.9 IN ADULT: Status: ACTIVE | Noted: 2017-09-21

## 2022-03-19 PROBLEM — G47.33 OBSTRUCTIVE SLEEP APNEA SYNDROME: Status: ACTIVE | Noted: 2017-09-21

## 2022-03-19 PROBLEM — E78.2 MIXED HYPERLIPIDEMIA: Status: ACTIVE | Noted: 2017-09-20

## 2022-03-19 PROBLEM — E66.811 CLASS 1 OBESITY DUE TO EXCESS CALORIES WITH SERIOUS COMORBIDITY AND BODY MASS INDEX (BMI) OF 33.0 TO 33.9 IN ADULT: Status: ACTIVE | Noted: 2017-09-21

## 2022-03-19 PROBLEM — E55.9 VITAMIN D DEFICIENCY: Status: ACTIVE | Noted: 2021-12-02

## 2022-03-19 PROBLEM — Z86.59 H/O: DEPRESSION: Status: ACTIVE | Noted: 2017-09-21

## 2022-03-19 PROBLEM — Z13.39 ALCOHOL SCREENING: Status: ACTIVE | Noted: 2019-11-20

## 2022-03-19 PROBLEM — M15.0 PRIMARY OSTEOARTHRITIS INVOLVING MULTIPLE JOINTS: Status: ACTIVE | Noted: 2017-09-20

## 2022-03-19 PROBLEM — E03.9 ACQUIRED HYPOTHYROIDISM: Status: ACTIVE | Noted: 2017-09-20

## 2022-03-20 PROBLEM — I12.9 HYPERTENSION WITH RENAL DISEASE: Status: ACTIVE | Noted: 2017-09-21

## 2022-03-20 PROBLEM — H91.90 HEARING IMPAIRED: Status: ACTIVE | Noted: 2019-03-21

## 2022-03-30 ENCOUNTER — OFFICE VISIT (OUTPATIENT)
Dept: INTERNAL MEDICINE CLINIC | Age: 72
End: 2022-03-30

## 2022-03-30 VITALS
RESPIRATION RATE: 17 BRPM | HEIGHT: 73 IN | DIASTOLIC BLOOD PRESSURE: 62 MMHG | WEIGHT: 248.7 LBS | HEART RATE: 79 BPM | OXYGEN SATURATION: 97 % | TEMPERATURE: 98.1 F | SYSTOLIC BLOOD PRESSURE: 114 MMHG | BODY MASS INDEX: 32.96 KG/M2

## 2022-03-30 DIAGNOSIS — M54.32 SCIATICA OF LEFT SIDE: Primary | ICD-10-CM

## 2022-03-30 DIAGNOSIS — G89.29 CHRONIC MIDLINE LOW BACK PAIN WITH LEFT-SIDED SCIATICA: ICD-10-CM

## 2022-03-30 DIAGNOSIS — M54.42 CHRONIC MIDLINE LOW BACK PAIN WITH LEFT-SIDED SCIATICA: ICD-10-CM

## 2022-03-30 PROCEDURE — 3017F COLORECTAL CA SCREEN DOC REV: CPT | Performed by: INTERNAL MEDICINE

## 2022-03-30 PROCEDURE — G8417 CALC BMI ABV UP PARAM F/U: HCPCS | Performed by: INTERNAL MEDICINE

## 2022-03-30 PROCEDURE — G8510 SCR DEP NEG, NO PLAN REQD: HCPCS | Performed by: INTERNAL MEDICINE

## 2022-03-30 PROCEDURE — 99213 OFFICE O/P EST LOW 20 MIN: CPT | Performed by: INTERNAL MEDICINE

## 2022-03-30 PROCEDURE — G8536 NO DOC ELDER MAL SCRN: HCPCS | Performed by: INTERNAL MEDICINE

## 2022-03-30 PROCEDURE — G8427 DOCREV CUR MEDS BY ELIG CLIN: HCPCS | Performed by: INTERNAL MEDICINE

## 2022-03-30 PROCEDURE — 1101F PT FALLS ASSESS-DOCD LE1/YR: CPT | Performed by: INTERNAL MEDICINE

## 2022-03-30 RX ORDER — PREDNISONE 5 MG/1
TABLET ORAL
Qty: 48 TABLET | Refills: 0 | Status: SHIPPED | OUTPATIENT
Start: 2022-03-30 | End: 2022-04-20 | Stop reason: ALTCHOICE

## 2022-03-30 RX ORDER — AMLODIPINE BESYLATE 5 MG/1
5 TABLET ORAL DAILY
Qty: 90 TABLET | Refills: 3 | Status: SHIPPED | OUTPATIENT
Start: 2022-03-30

## 2022-03-30 NOTE — PROGRESS NOTES
HIPAA verified by two patient identifiers. Angle Majano III is a 81 y.o. male    Chief Complaint   Patient presents with    Leg Pain        sciatica  left        Visit Vitals  /62 (BP 1 Location: Left upper arm, BP Patient Position: Sitting, BP Cuff Size: Large adult)   Pulse 79   Temp 98.1 °F (36.7 °C) (Oral)   Resp 17   Ht 6' 1\" (1.854 m)   Wt 248 lb 11.2 oz (112.8 kg)   SpO2 97%   BMI 32.81 kg/m²       Pain Scale: 0 - No pain/10  Pain Location:       Health Maintenance Due   Topic Date Due    Eye Exam Retinal or Dilated  Never done    DTaP/Tdap/Td series (1 - Tdap) Never done    Shingrix Vaccine Age 50> (1 of 2) Never done    Colorectal Cancer Screening Combo  02/17/2019    Pneumococcal 65+ years (2 of 2 - PPSV23) 11/16/2020    COVID-19 Vaccine (3 - Booster for Pfizer series) 08/30/2021         Coordination of Care Questionnaire:  :   1) Have you been to an emergency room, urgent care, or hospitalized since your last visit? If yes, where when, and reason for visit? no       2. Have seen or consulted any other health care provider since your last visit? If yes, where when, and reason for visit? NO      Patient is accompanied by self I have received verbal consent from Angle Majano III to discuss any/all medical information while they are present in the room.

## 2022-03-30 NOTE — PATIENT INSTRUCTIONS
Back Pain: Care Instructions  Your Care Instructions     Back pain has many possible causes. It is often related to problems with muscles and ligaments of the back. It may also be related to problems with the nerves, discs, or bones of the back. Moving, lifting, standing, sitting, or sleeping in an awkward way can strain the back. Sometimes you don't notice the injury until later. Arthritis is another common cause of back pain. Although it may hurt a lot, back pain usually improves on its own within several weeks. Most people recover in 12 weeks or less. Using good home treatment and being careful not to stress your back can help you feel better sooner. Follow-up care is a key part of your treatment and safety. Be sure to make and go to all appointments, and call your doctor if you are having problems. It's also a good idea to know your test results and keep a list of the medicines you take. How can you care for yourself at home? · Sit or lie in positions that are most comfortable and reduce your pain. Try one of these positions when you lie down:  ? Lie on your back with your knees bent and supported by large pillows. ? Lie on the floor with your legs on the seat of a sofa or chair. ? Lie on your side with your knees and hips bent and a pillow between your legs. ? Lie on your stomach if it does not make pain worse. · Do not sit up in bed, and avoid soft couches and twisted positions. Bed rest can help relieve pain at first, but it delays healing. Avoid bed rest after the first day of back pain. · Change positions every 30 minutes. If you must sit for long periods of time, take breaks from sitting. Get up and walk around, or lie in a comfortable position. · Try using a heating pad on a low or medium setting for 15 to 20 minutes every 2 or 3 hours. Try a warm shower in place of one session with the heating pad. · You can also try an ice pack for 10 to 15 minutes every 2 to 3 hours.  Put a thin cloth between the ice pack and your skin. · Take pain medicines exactly as directed. ? If the doctor gave you a prescription medicine for pain, take it as prescribed. ? If you are not taking a prescription pain medicine, ask your doctor if you can take an over-the-counter medicine. · Take short walks several times a day. You can start with 5 to 10 minutes, 3 or 4 times a day, and work up to longer walks. Walk on level surfaces and avoid hills and stairs until your back is better. · Return to work and other activities as soon as you can. Continued rest without activity is usually not good for your back. · To prevent future back pain, do exercises to stretch and strengthen your back and stomach. Learn how to use good posture, safe lifting techniques, and proper body mechanics. When should you call for help? Call your doctor now or seek immediate medical care if:    · You have new or worsening numbness in your legs.     · You have new or worsening weakness in your legs. (This could make it hard to stand up.)     · You lose control of your bladder or bowels. Watch closely for changes in your health, and be sure to contact your doctor if:    · You have a fever, lose weight, or don't feel well.     · You do not get better as expected. Where can you learn more? Go to http://www.fang.com/  Enter I594 in the search box to learn more about \"Back Pain: Care Instructions. \"  Current as of: July 1, 2021               Content Version: 13.2  © 0361-5239 [employer]. Care instructions adapted under license by [employer] (which disclaims liability or warranty for this information). If you have questions about a medical condition or this instruction, always ask your healthcare professional. [place] [address] any warranty or liability for your use of this information.

## 2022-03-30 NOTE — TELEPHONE ENCOUNTER
RX refill request from the patient/pharmacy. Patient last seen 03- with labs, and next appt. scheduled for 04-  Requested Prescriptions     Pending Prescriptions Disp Refills    amLODIPine (NORVASC) 5 mg tablet 90 Tablet 3     Sig: Take 1 Tablet by mouth daily. Angela Mcpherson

## 2022-03-30 NOTE — PROGRESS NOTES
Subjective:   Toya Ramirez III is a 81 y.o. male      Chief Complaint   Patient presents with    Leg Pain        sciatica  left         History of present illness: He presents complaints of left leg weakness and numbness that has been present now since this past weekend when they drove a good little way to [facility] for a festival and after driving in the car he was not able to stand up and walk straight for a while. He notes it initially seemed to have some pain going down both legs but now seems to be predominant in the left leg. He really does not note any back pain. 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 He denies any paresthesias and denies any weakness in the leg is just predominately a pain and it gets worse when he tries to completely straighten up.     Patient Active Problem List   Diagnosis Code    Cardiomyopathy ([facility] [address] I42.9    Controlled type 2 diabetes mellitus with stage 3 chronic kidney disease, without long-term current use of insulin (HCC) E11.22, N18.30    Mixed hyperlipidemia E78.2    Acquired hypothyroidism E03.9    Gastroesophageal reflux disease without esophagitis K21.9    Primary osteoarthritis involving multiple joints M89.49    Cholelithiasis K80.20    Diverticulosis K57.90    History of viral meningitis Z86.61    H/O: depression Z86.59    Obstructive sleep apnea syndrome G47.33    Class 1 obesity due to excess calories with serious comorbidity and body mass index (BMI) of 33.0 to 33.9 in adult E66.09, Z68.33    Hypertension with renal disease I12.9    Stage 3 chronic kidney disease (HCC) N18.30    Hearing impaired H91.90    Alcohol screening Z13.39    Edema R60.9    Vitamin D deficiency E55.9    COVID-19 U07.1    Sciatica of left side M54.32      Past Medical History:   Diagnosis Date    Arthritis     fingers    CAD (coronary artery disease)     Cardiomyopathy ([facility]     Cholelithiasis 9/21/2017    CKD (chronic kidney disease) 9/21/2017    Diabetes mellitus ([facility]     Diverticulosis 9/21/2017    DJD (degenerative joint disease) 9/21/2017    GERD (gastroesophageal reflux disease)     H/O: depression 9/21/2017    Hiatal hernia     Hypercholesteremia     Hypertension     Hypertension with renal disease 9/21/2017    Hypothyroidism     Kidney stones     Obesity 9/21/2017    On statin therapy 9/21/2017    Sleep apnea 9/21/2017    Thyroid disease     Viral meningitis 9/1990      Allergies   Allergen Reactions    Demerol [Meperidine] Nausea and Vomiting    Phenergan [Promethazine] Unknown (comments)      Family History   Problem Relation Age of Onset    Heart Disease Mother         AICD    Thyroid Disease Mother     Heart Disease Father         pacemaker    Hypertension Father     Cancer Father         lymphoma    Thyroid Disease Sister     Heart Disease Paternal Grandmother         angina      Social History     Socioeconomic History    Marital status:      Spouse name: Not on file    Number of children: Not on file    Years of education: Not on file    Highest education level: Not on file   Occupational History    Not on file   Tobacco Use    Smoking status: Never Smoker    Smokeless tobacco: Never Used   Vaping Use    Vaping Use: Never used   Substance and Sexual Activity    Alcohol use: No    Drug use: No    Sexual activity: Yes     Partners: Female   Other Topics Concern    Not on file   Social History Narrative    Not on file     Social Determinants of Health     Financial Resource Strain:     Difficulty of Paying Living Expenses: Not on file   Food Insecurity:     Worried About Running Out of Food in the Last Year: Not on file    Anupam of Food in the Last Year: Not on file   Transportation Needs:     Lack of Transportation (Medical):  Not on file    Lack of Transportation (Non-Medical): Not on file   Physical Activity:     Days of Exercise per Week: Not on file    Minutes of Exercise per Session: Not on file   Stress:     Feeling of Stress : Not on file   Social Connections:     Frequency of Communication with Friends and Family: Not on file    Frequency of Social Gatherings with Friends and Family: Not on file    Attends Yarsani Services: Not on file    Active Member of [address] [place] [address] or Organizations: Not on file    Attends Club or Organization Meetings: Not on file    Marital Status: Not on file   Intimate Partner Violence:     Fear of Current or Ex-Partner: Not on file    Emotionally Abused: Not on file    Physically Abused: Not on file    Sexually Abused: Not on file   Housing Stability:     Unable to Pay for Housing in the Last Year: Not on file    Number of [place] in the Last Year: Not on file    Unstable Housing in the Last Year: Not on file     Prior to Admission medications    Medication Sig Start Date End Date Taking? Authorizing Provider   hydrALAZINE (APRESOLINE) 50 mg tablet Take 1 Tablet by mouth three (3) times daily. 12/3/21  Yes Natalie West MD   rosuvastatin (CRESTOR) 10 mg tablet Take 1 Tablet by mouth daily. 11/11/21  Yes Natalie West MD   levothyroxine (SYNTHROID) 50 mcg tablet Take 1 Tablet by mouth daily. 11/11/21  Yes Natalie West MD   metFORMIN ER (GLUCOPHAGE XR) 500 mg tablet One tabs in the morning and two tabs at dinner 11/11/21  Yes Natalie West MD   fenofibrate nanocrystallized (TRICOR) 145 mg tablet Take 1 Tablet by mouth daily. 11/11/21  Yes Natalie West MD   diclofenac EC (VOLTAREN) 75 mg EC tablet Take 1 Tablet by mouth two (2) times a day. 11/11/21  Yes Natalie West MD   bumetanide (BUMEX) 1 mg tablet Take 1 Tablet by mouth daily.  10/13/21  Yes Natalie West MD   olmesartan (BENICAR) 40 mg tablet TAKE 1 TABLET BY MOUTH  DAILY 9/13/21  Yes Natalie West MD   amLODIPine ([address]) 5 mg tablet Take 1 Tablet by mouth daily. 7/21/21  Yes Tamara Barraza MD   esomeprazole (NEXIUM) 40 mg capsule TAKE 1 CAPSULE BY MOUTH  DAILY 6/8/21  Yes Tamara Barraza MD   niacin ER (NIASPAN) 750 mg Tb24 TAKE 2 TABLETS BY MOUTH AT  BEDTIME 6/8/21  Yes Tamara Barraza MD   carvediloL (COREG) 25 mg tablet TAKE 1 TABLET BY MOUTH  TWICE DAILY WITH MEALS 1/11/21  Yes Tamara Barraza MD   famotidine (PEPCID) 40 mg tablet TAKE 1 TABLET BY MOUTH  DAILY 1/11/21  Yes Tamara Barraza MD   lisinopril (PRINIVIL, ZESTRIL) 40 mg tablet Take 1 Tab by mouth daily. 11/21/19  Yes Tamara Barraza MD   fish oil-dha-epa [phone] mg cap Take  by mouth. Yes Provider, Historical   aspirin 81 mg tablet Take 81 mg by mouth daily as needed. Yes Provider, Historical        Review of Systems              Constitutional:  He denies fever, weight loss, sweats or fatigue. EYES: No blurred or double vision,               ENT: no nasal congestion, no headache or dizziness. No difficulty with               swallowing, taste, speech or smell. Respiratory:  No cough, wheezing or shortness of breath. No sputum production. Cardiac:  Denies chest pain, palpitations, unexplained indigestion, syncope, edema, PND or orthopnea. GI:  No changes in bowel movements, no abdominal pain, no bloating, anorexia, nausea, vomiting or heartburn. :  No frequency or dysuria. Denies incontinence or sexual dysfunction. Extremities:  No joint pain, stiffness or swelling  Back:.no pain or soreness but positive for sciatica type pain of the left leg  Skin:  No recent rashes or mole changes. Neurological:  No numbness, tingling, burning paresthesias or loss of motor strength. No syncope, dizziness, frequent headaches or memory loss.   Hematologic:  No easy bruising  Lymphatic: No lymph node enlargement    Objective:     Vitals:    03/30/22 1351   BP: 114/62   Pulse: 79   Resp: 17   Temp: 98.1 °F (36.7 °C)   TempSrc: Oral   SpO2: 97%   Weight: 248 lb 11.2 oz (112.8 kg)   Height: 6' 1\" (1.854 m)   PainSc:   0 - No pain       Body mass index is 32.81 kg/m². Physical Examination:              General Appearance:  Well-developed, well-nourished, no acute distress. HEENT:      Ears:  The TMs and ear canals were clear. Eyes:  The pupillary responses were normal.  Extraocular muscle function intact. Lids and conjunctiva not injected. Funduscopic exam revealed sharp disc margins. Nares: Clear w/o edema or erythema  Pharynx:  Clear with teeth in good repair. No masses were noted. Neck:  Supple without thyromegaly or adenopathy. No JVD noted. No carotid                bruits. Lungs:  Clear to auscultation and percussion. Cardiac:  Regular rate and rhythm without murmur. PMI not displaced. No gallop, rub or click. Abdominal: Soft, non-tender, no hepata-spleenomegally or masses  Extremities:  No clubbing, cyanosis or edema. Back: Discomfort left leg with straight leg raising at about 60 degrees  Skin:  No rash or unusual mole changes noted. Lymph Nodes:  None felt in the cervical, supraclavicular, axillary or inguinal region. Neurological: . DTRs 2+ and symmetric. Station and gait normal.   Hematologic:   No purpura or petechiae        Assessment/Plan:         1. Sciatica of left side    2. Chronic midline low back pain with left-sided sciatica        Impressions/Plan:  Impression  1. Sciatica on the left side related to his chronic back pain. X-ray obtained today reveals severe arthritic changes with marked spurring of multiple lumbar vertebra. There is almost complete loss of the disc space at L2-3. I will try a steroid Dosepak 5 mg 12-day and this is not effective he will need to have a CT as we cannot do an MRI since he has a pacemaker defibrillator and  Recheck otherwise per previous schedule.     Orders Placed This Encounter    XR SPINE LUMB 2 OR 3 V       Follow-up and Dispositions · Return At previously scheduled appointment. No results found for any visits on 03/30/22. Franco Bosch MD    The patient was given after the visit summary the patient verbalized an understanding of the plans and problems as explained.

## 2022-04-15 DIAGNOSIS — K21.9 GASTROESOPHAGEAL REFLUX DISEASE WITHOUT ESOPHAGITIS: ICD-10-CM

## 2022-04-15 RX ORDER — ESOMEPRAZOLE MAGNESIUM 40 MG/1
CAPSULE, DELAYED RELEASE ORAL
Qty: 90 CAPSULE | Refills: 3 | Status: SHIPPED | OUTPATIENT
Start: 2022-04-15

## 2022-04-15 RX ORDER — CARVEDILOL 25 MG/1
TABLET ORAL
Qty: 180 TABLET | Refills: 3 | Status: SHIPPED | OUTPATIENT
Start: 2022-04-15

## 2022-04-15 RX ORDER — FAMOTIDINE 40 MG/1
TABLET, FILM COATED ORAL
Qty: 90 TABLET | Refills: 3 | Status: SHIPPED | OUTPATIENT
Start: 2022-04-15 | End: 2022-07-20 | Stop reason: ALTCHOICE

## 2022-04-15 RX ORDER — NIACIN 750 MG/1
TABLET, EXTENDED RELEASE ORAL
Qty: 180 TABLET | Refills: 3 | Status: SHIPPED | OUTPATIENT
Start: 2022-04-15

## 2022-04-15 NOTE — TELEPHONE ENCOUNTER
RX refill request from the patient/pharmacy. Patient last seen 03- with labs, and next appt. scheduled for 04-  Requested Prescriptions     Pending Prescriptions Disp Refills    carvediloL (COREG) 25 mg tablet [Pharmacy Med Name: Carvedilol 25 MG Oral Tablet] 180 Tablet 3     Sig: TAKE 1 TABLET BY MOUTH  TWICE DAILY WITH MEALS    famotidine (PEPCID) 40 mg tablet [Pharmacy Med Name: Famotidine 40 MG Oral Tablet] 90 Tablet 3     Sig: TAKE 1 TABLET BY MOUTH  DAILY    niacin ER (NIASPAN) 750 mg Tb24 [Pharmacy Med Name: NIACIN  750MG  TAB  EXTENDED RELEASE] 180 Tablet 3     Sig: TAKE 2 TABLETS BY MOUTH AT  BEDTIME    esomeprazole (NEXIUM) 40 mg capsule [Pharmacy Med Name: Esomeprazole Magnesium 40 MG Oral Capsule Delayed Release] 90 Capsule 3     Sig: TAKE 1 CAPSULE BY MOUTH  DAILY   .

## 2022-04-19 NOTE — PROGRESS NOTES
Chief Complaint   Patient presents with    Hypertension     3 month follow up    Diabetes    Cholesterol Problem       SUBJECTIVE:    Phuong Long III is a 81 y.o. male returns in follow-up of his medical problems include hypertension, diabetes, hyperlipidemia, GERD, DJD, diverticulosis and other medical problems. He is taking his medications trying to follow his diet and try and remain physically active although he did run out of his Coreg a couple days ago and is supposed to arrive in the mail today or tomorrow. Trevin De Leon He currently denies any chest pain, shortness of breath, palpitations, PND, orthopnea or other cardiac or respiratory complaints. He notes no current GI or  complaints. He notes no headaches, dizziness or neurologic complaints. There are no current active arthritic complaints and he has no other complaints on complete review of systems. Current Outpatient Medications   Medication Sig Dispense Refill    carvediloL (COREG) 25 mg tablet TAKE 1 TABLET BY MOUTH  TWICE DAILY WITH MEALS 180 Tablet 3    famotidine (PEPCID) 40 mg tablet TAKE 1 TABLET BY MOUTH  DAILY 90 Tablet 3    niacin ER (NIASPAN) 750 mg Tb24 TAKE 2 TABLETS BY MOUTH AT  BEDTIME 180 Tablet 3    esomeprazole (NEXIUM) 40 mg capsule TAKE 1 CAPSULE BY MOUTH  DAILY 90 Capsule 3    amLODIPine (NORVASC) 5 mg tablet Take 1 Tablet by mouth daily. 90 Tablet 3    hydrALAZINE (APRESOLINE) 50 mg tablet Take 1 Tablet by mouth three (3) times daily. 270 Tablet 3    rosuvastatin (CRESTOR) 10 mg tablet Take 1 Tablet by mouth daily. 90 Tablet 3    levothyroxine (SYNTHROID) 50 mcg tablet Take 1 Tablet by mouth daily. 90 Tablet 3    metFORMIN ER (GLUCOPHAGE XR) 500 mg tablet One tabs in the morning and two tabs at dinner 270 Tablet 3    fenofibrate nanocrystallized (TRICOR) 145 mg tablet Take 1 Tablet by mouth daily. 90 Tablet 3    diclofenac EC (VOLTAREN) 75 mg EC tablet Take 1 Tablet by mouth two (2) times a day.  180 Tablet 3    bumetanide (BUMEX) 1 mg tablet Take 1 Tablet by mouth daily. 30 Tablet prn    olmesartan (BENICAR) 40 mg tablet TAKE 1 TABLET BY MOUTH  DAILY 90 Tablet 3    lisinopril (PRINIVIL, ZESTRIL) 40 mg tablet Take 1 Tab by mouth daily. 90 Tab 3    fish oil-dha-epa 1,200-144-216 mg cap Take  by mouth.  aspirin 81 mg tablet Take 81 mg by mouth daily as needed.        Past Medical History:   Diagnosis Date    Arthritis     fingers    CAD (coronary artery disease)     Cardiomy[facility]     Cholelithiasis 9/21/2017    CKD (chronic kidney disease) 9/21/2017    Diabetes mellitus ([facility]     Diverticulosis 9/21/2017    DJD (degenerative joint disease) 9/21/2017    GERD (gastroesophageal reflux disease)     H/O: depression 9/21/2017    Hiatal hernia     Hypercholesteremia     Hypertension     Hypertension with renal disease 9/21/2017    Hypothyroidism     Kidney stones     Obesity 9/21/2017    On statin therapy 9/21/2017    Sleep apnea 9/21/2017    Thyroid disease     Viral meningitis 9/1990     Past Surgical History:   Procedure Laterality Date    HX HERNIA REPAIR      HX KNEE ARTHROSCOPY      both     Allergies   Allergen Reactions    Demerol [Meperidine] Nausea and Vomiting    Phenergan [Promethazine] Unknown (comments)       REVIEW OF SYSTEMS:  General: negative for - chills or fever, or weight loss or gain  ENT: negative for - headaches, nasal congestion or tinnitus  Eyes: no blurred or visual changes  Neck: No stiffness or swollen nodes  Respiratory: negative for - cough, hemoptysis, shortness of breath or wheezing  Cardiovascular : negative for - chest pain, edema, palpitations or shortness of breath  Gastrointestinal: negative for - abdominal pain, blood in stools, heartburn or nausea/vomiting  Genito-Urinary: no dysuria, trouble voiding, or hematuria  Musculoskeletal: negative for - gait disturbance, joint pain, joint stiffness or joint swelling  Neurological: no TIA or stroke symptoms  Hematologic: no bruises, no bleeding  Lymphatic: no swollen glands  Integument: no lumps, mole changes, nail changes or rash  Endocrine:no malaise/lethargy poly uria or polydipsia or unexpected weight changes        Social History     Socioeconomic History    Marital status:    Tobacco Use    Smoking status: Never Smoker    Smokeless tobacco: Never Used   Vaping Use    Vaping Use: Never used   Substance and Sexual Activity    Alcohol use: No    Drug use: No    Sexual activity: Yes     Partners: Female     Family History   Problem Relation Age of Onset    Heart Disease Mother         AICD    Thyroid Disease Mother     Heart Disease Father         pacemaker    Hypertension Father     Cancer Father         lymphoma    Thyroid Disease Sister     Heart Disease Paternal Grandmother         angina       OBJECTIVE:     Visit Vitals  BP (!) 152/82   Pulse 78   Temp 98.5 °F (36.9 °C) (Oral)   Resp 17   Ht 6' 1\" (1.854 m)   Wt 250 lb 3.2 oz (113.5 kg)   SpO2 98%   BMI 33.01 kg/m²     CONSTITUTIONAL:   well nourished, appears age appropriate  EYES: sclera anicteric, PERRL, EOMI  ENMT:nares clear, moist mucous membranes, pharynx clear  NECK: supple. Thyroid normal, No JVD or bruits  RESPIRATORY: Chest: clear to ascultation and percussion, normal inspiratory effort  CARDIOVASCULAR: Heart: regular rate and rhythm no murmurs, rubs or gallops, PMI not displaced, No thrills, no peripheral edema  GASTROINTESTINAL: Abdomen: non distended, soft, non tender, bowel sounds normal  HEMATOLOGIC: no purpura, petechiae or bruising  LYMPHATIC: No lymph node enlargemant  MUSCULOSKELETAL: Extremities: no active synovitis, pulse 1+   INTEGUMENT: No unusual rashes or suspicious skin lesions noted. Nails appear normal.  PERIPHERAL VASCULAR: normal pulses femoral, PT and DP  NEUROLOGIC: non-focal exam, A & O X 3  PSYCHIATRIC:, appropriate affect     ASSESSMENT:   1. Hypertension with renal disease    2.  Controlled type 2 diabetes mellitus with stage 3 chronic kidney disease, without long-term current use of insulin ([facility]    3. Mixed hyperlipidemia    4. Gastroesophageal reflux disease without esophagitis    5. Primary osteoarthritis involving multiple joints    6. Stage 3 chronic kidney disease, unspecified whether stage 3a or 3b CKD (HCC)    7. Cardiomyopathy, unspecified type ([facility]    8. Class 1 obesity due to excess calories with serious comorbidity and body mass index (BMI) of 33.0 to 33.9 in adult      Impression  1. Hypertension that is not controlled but he is out of Coreg and have asked him to get back on that  2. Diabetes repeat status is pending and prior lab reviewed and I will adjust if needed. 3.  Hyperlipidemia prior lab reviewed repeat status pending I will adjust if needed. 4.  GERD that is stable  5. DJD that is stable  6. CKD stage III repeat status pending  7. Cardiomyopathy seems to be controlled but needs to get back on his Coreg  8. Obesity we did discuss diet, exercise and weight reduction for overall health benefit. I will call the lab and make further recommendations or adjustments if necessary. Follow-up in 3 months or sooner if there is a problem. PLAN:  .  Orders Placed This Encounter    METABOLIC PANEL, COMPREHENSIVE    LIPID PANEL    CK    HEMOGLOBIN A1C WITH EAG         ATTENTION:   This medical record was transcribed using an electronic medical records system. Although proofread, it may and can contain electronic and spelling errors. Other human spelling and other errors may be present. Corrections may be executed at a later time. Please feel free to contact us for any clarifications as needed. Follow-up and Dispositions    · Return in about 3 months (around 7/20/2022). No results found for any visits on 04/20/22. Tasha Bravo MD    The patient verbalized understanding of the problems and plans as explained.

## 2022-04-20 ENCOUNTER — OFFICE VISIT (OUTPATIENT)
Dept: INTERNAL MEDICINE CLINIC | Age: 72
End: 2022-04-20
Payer: MEDICARE

## 2022-04-20 VITALS
SYSTOLIC BLOOD PRESSURE: 152 MMHG | DIASTOLIC BLOOD PRESSURE: 82 MMHG | HEIGHT: 73 IN | WEIGHT: 250.2 LBS | TEMPERATURE: 98.5 F | HEART RATE: 78 BPM | BODY MASS INDEX: 33.16 KG/M2 | RESPIRATION RATE: 17 BRPM | OXYGEN SATURATION: 98 %

## 2022-04-20 DIAGNOSIS — K21.9 GASTROESOPHAGEAL REFLUX DISEASE WITHOUT ESOPHAGITIS: ICD-10-CM

## 2022-04-20 DIAGNOSIS — I42.9 CARDIOMYOPATHY, UNSPECIFIED TYPE (HCC): ICD-10-CM

## 2022-04-20 DIAGNOSIS — N18.30 STAGE 3 CHRONIC KIDNEY DISEASE, UNSPECIFIED WHETHER STAGE 3A OR 3B CKD (HCC): ICD-10-CM

## 2022-04-20 DIAGNOSIS — E66.09 CLASS 1 OBESITY DUE TO EXCESS CALORIES WITH SERIOUS COMORBIDITY AND BODY MASS INDEX (BMI) OF 33.0 TO 33.9 IN ADULT: ICD-10-CM

## 2022-04-20 DIAGNOSIS — I12.9 HYPERTENSION WITH RENAL DISEASE: Primary | ICD-10-CM

## 2022-04-20 DIAGNOSIS — E78.2 MIXED HYPERLIPIDEMIA: ICD-10-CM

## 2022-04-20 DIAGNOSIS — M15.9 PRIMARY OSTEOARTHRITIS INVOLVING MULTIPLE JOINTS: ICD-10-CM

## 2022-04-20 DIAGNOSIS — E11.22 CONTROLLED TYPE 2 DIABETES MELLITUS WITH STAGE 3 CHRONIC KIDNEY DISEASE, WITHOUT LONG-TERM CURRENT USE OF INSULIN (HCC): ICD-10-CM

## 2022-04-20 DIAGNOSIS — N18.30 CONTROLLED TYPE 2 DIABETES MELLITUS WITH STAGE 3 CHRONIC KIDNEY DISEASE, WITHOUT LONG-TERM CURRENT USE OF INSULIN (HCC): ICD-10-CM

## 2022-04-20 PROCEDURE — 3044F HG A1C LEVEL LT 7.0%: CPT | Performed by: INTERNAL MEDICINE

## 2022-04-20 PROCEDURE — G8417 CALC BMI ABV UP PARAM F/U: HCPCS | Performed by: INTERNAL MEDICINE

## 2022-04-20 PROCEDURE — 99214 OFFICE O/P EST MOD 30 MIN: CPT | Performed by: INTERNAL MEDICINE

## 2022-04-20 PROCEDURE — G8536 NO DOC ELDER MAL SCRN: HCPCS | Performed by: INTERNAL MEDICINE

## 2022-04-20 PROCEDURE — G8510 SCR DEP NEG, NO PLAN REQD: HCPCS | Performed by: INTERNAL MEDICINE

## 2022-04-20 PROCEDURE — G8427 DOCREV CUR MEDS BY ELIG CLIN: HCPCS | Performed by: INTERNAL MEDICINE

## 2022-04-20 PROCEDURE — 2022F DILAT RTA XM EVC RTNOPTHY: CPT | Performed by: INTERNAL MEDICINE

## 2022-04-20 PROCEDURE — 3017F COLORECTAL CA SCREEN DOC REV: CPT | Performed by: INTERNAL MEDICINE

## 2022-04-20 PROCEDURE — 1101F PT FALLS ASSESS-DOCD LE1/YR: CPT | Performed by: INTERNAL MEDICINE

## 2022-04-20 NOTE — PATIENT INSTRUCTIONS
Arthritis: Care Instructions  Overview     Arthritis, also called osteoarthritis, is a breakdown of the cartilage that cushions your joints. When the cartilage wears down, your bones rub against each other. This causes pain and stiffness. Many people have some arthritis as they age. Arthritis most often affects the joints of the spine, hands, hips, knees, or feet. Arthritis never goes away completely. But medicine and home treatment can help reduce pain and help you stay active. Follow-up care is a key part of your treatment and safety. Be sure to make and go to all appointments, and call your doctor if you are having problems. It's also a good idea to know your test results and keep a list of the medicines you take. How can you care for yourself at home? · Stay at a healthy weight. Being overweight puts extra strain on your joints. · Talk to your doctor or physical therapist about exercises that will help ease joint pain. ? Stretch. You may enjoy gentle forms of yoga to help keep your joints and muscles flexible. ? Walk instead of jog. Other types of exercise that are less stressful on the joints include riding a bike, swimming, flip chi, or water exercise. ? Lift weights. Strong muscles help reduce stress on your joints. Stronger thigh muscles, for example, take some of the stress off of the knees and hips. Learn the right way to lift weights so you don't make joint pain worse. · Take your medicines exactly as prescribed. Call your doctor if you think you are having a problem with your medicine. · Take pain medicines exactly as directed. ? If the doctor gave you a prescription medicine for pain, take it as prescribed. ? If you are not taking a prescription pain medicine, ask your doctor if you can take an over-the-counter medicine. · Use a cane, crutch, walker, or another device if you need help to get around. These can help rest your joints.  You also can use other things to make life easier, such as a higher toilet seat and padded handles on kitchen utensils. · Do not sit in low chairs. They can make it hard to get up. · Put heat or cold on your sore joints as needed. Use whichever helps you most. You can also switch between hot and cold packs. ? Apply heat 2 or 3 times a day for 20 to 30 minutes--using a heating pad, hot shower, or hot pack--to relieve pain and stiffness. But don't use heat on a swollen joint. ? Put ice or a cold pack on your sore joint for 10 to 20 minutes at a time. Put a thin cloth between the ice and your skin. When should you call for help? Call your doctor now or seek immediate medical care if:    · You have sudden swelling, warmth, or pain in any joint.     · You have joint pain and a fever or rash.     · You have such bad pain that you cannot use a joint. Watch closely for changes in your health, and be sure to contact your doctor if:    · You have mild joint symptoms that continue even with more than 6 weeks of care at home.     · You have stomach pain or other problems with your medicine. Where can you learn more? Go to http://www.gray.com/  Enter J746 in the search box to learn more about \"Arthritis: Care Instructions. \"  Current as of: December 20, 2021               Content Version: 13.2  © 2569-1387 [employer]. Care instructions adapted under license by [employer] (which disclaims liability or warranty for this information). If you have questions about a medical condition or this instruction, always ask your healthcare professional. [place] [address] any warranty or liability for your use of this information.

## 2022-04-20 NOTE — PROGRESS NOTES
Chief Complaint   Patient presents with    Hypertension     3 month follow up    Diabetes    Cholesterol Problem     Visit Vitals  BP (!) 176/90 (BP 1 Location: Left upper arm, BP Patient Position: Sitting, BP Cuff Size: Adult)   Pulse 78   Temp 98.5 °F (36.9 °C) (Oral)   Resp 17   Ht 6' 1\" (1.854 m)   Wt 250 lb 3.2 oz (113.5 kg)   SpO2 98%   BMI 33.01 kg/m²     1. Have you been to the ER, urgent care clinic since your last visit? Hospitalized since your last visit? No    2. Have you seen or consulted any other health care providers outside of the [address] [place] [address] since your last visit? Include any pap smears or colon screening.  No

## 2022-04-21 LAB
ALBUMIN SERPL-MCNC: 4.2 G/DL (ref 3.5–5)
ALBUMIN/GLOB SERPL: 1.4 {RATIO} (ref 1.1–2.2)
ALP SERPL-CCNC: 88 U/L (ref 45–117)
ALT SERPL-CCNC: 35 U/L (ref 12–78)
ANION GAP SERPL CALC-SCNC: 3 MMOL/L (ref 5–15)
AST SERPL-CCNC: 27 U/L (ref 15–37)
BILIRUB SERPL-MCNC: 0.3 MG/DL (ref 0.2–1)
BUN SERPL-MCNC: 14 MG/DL (ref 6–20)
BUN/CREAT SERPL: 11 (ref 12–20)
CALCIUM SERPL-MCNC: 9.4 MG/DL (ref 8.5–10.1)
CHLORIDE SERPL-SCNC: 109 MMOL/L (ref 97–108)
CHOLEST SERPL-MCNC: 133 MG/DL
CK SERPL-CCNC: 146 U/L (ref 39–308)
CO2 SERPL-SCNC: 29 MMOL/L (ref 21–32)
CREAT SERPL-MCNC: 1.32 MG/DL (ref 0.7–1.3)
EST. AVERAGE GLUCOSE BLD GHB EST-MCNC: 126 MG/DL
GLOBULIN SER CALC-MCNC: 3 G/DL (ref 2–4)
GLUCOSE SERPL-MCNC: 116 MG/DL (ref 65–100)
HBA1C MFR BLD: 6 % (ref 4–5.6)
HDLC SERPL-MCNC: 46 MG/DL
HDLC SERPL: 2.9 {RATIO} (ref 0–5)
LDLC SERPL CALC-MCNC: 67.2 MG/DL (ref 0–100)
POTASSIUM SERPL-SCNC: 4.7 MMOL/L (ref 3.5–5.1)
PROT SERPL-MCNC: 7.2 G/DL (ref 6.4–8.2)
SODIUM SERPL-SCNC: 141 MMOL/L (ref 136–145)
TRIGL SERPL-MCNC: 99 MG/DL (ref ?–150)
VLDLC SERPL CALC-MCNC: 19.8 MG/DL

## 2022-07-19 NOTE — PROGRESS NOTES
Chief Complaint   Patient presents with    Hypertension    GERD    Diabetes    Hypothyroidism     3 month follow up       SUBJECTIVE:    David Gill III is a 81 y.o. male who returns in follow-up his medical problems include hypertension, diabetes, hyperlipidemia, cardiomyopathy, GERD, CKD stage III, obesity and other multiple medical problems. He is taking his medications trying to follow his diet try and remain physically active. He currently denies any chest pain, shortness of breath, palpitations, PND, orthopnea or other cardiac or respiratory complaints. He notes no current GI or  complaints. He notes no headaches, dizziness or neurologic complaints. He has no current active arthritic complaints and there are no other complaints on complete review of systems. Current Outpatient Medications   Medication Sig Dispense Refill    Entresto  mg tablet Take 1 Tablet by mouth two (2) times a day. bumetanide (BUMEX) 1 mg tablet Take 1 Tablet by mouth in the morning. 90 Tablet 3    carvediloL (COREG) 25 mg tablet TAKE 1 TABLET BY MOUTH  TWICE DAILY WITH MEALS 180 Tablet 3    niacin ER (NIASPAN) 750 mg Tb24 TAKE 2 TABLETS BY MOUTH AT  BEDTIME 180 Tablet 3    esomeprazole (NEXIUM) 40 mg capsule TAKE 1 CAPSULE BY MOUTH  DAILY 90 Capsule 3    amLODIPine (NORVASC) 5 mg tablet Take 1 Tablet by mouth daily. 90 Tablet 3    hydrALAZINE (APRESOLINE) 50 mg tablet Take 1 Tablet by mouth three (3) times daily. 270 Tablet 3    rosuvastatin (CRESTOR) 10 mg tablet Take 1 Tablet by mouth daily. 90 Tablet 3    levothyroxine (SYNTHROID) 50 mcg tablet Take 1 Tablet by mouth daily. 90 Tablet 3    metFORMIN ER (GLUCOPHAGE XR) 500 mg tablet One tabs in the morning and two tabs at dinner 270 Tablet 3    fenofibrate nanocrystallized (TRICOR) 145 mg tablet Take 1 Tablet by mouth daily. 90 Tablet 3    diclofenac EC (VOLTAREN) 75 mg EC tablet Take 1 Tablet by mouth two (2) times a day.  180 Tablet 3    lisinopril (PRINIVIL, ZESTRIL) 40 mg tablet Take 1 Tab by mouth daily. 90 Tab 3    fish oil-dha-epa 1,200-144-216 mg cap Take 3 Tablets by mouth daily. aspirin 81 mg tablet Take 81 mg by mouth daily as needed.        Past Medical History:   Diagnosis Date    Arthritis     fingers    CAD (coronary artery disease)     Cardiomyopathy ([facility]     Cholelithiasis 9/21/2017    CKD (chronic kidney disease) 9/21/2017    Diabetes mellitus ([facility]     Diverticulosis 9/21/2017    DJD (degenerative joint disease) 9/21/2017    GERD (gastroesophageal reflux disease)     H/O: depression 9/21/2017    Hiatal hernia     Hypercholesteremia     Hypertension     Hypertension with renal disease 9/21/2017    Hypothyroidism     Kidney stones     Obesity 9/21/2017    On statin therapy 9/21/2017    Sleep apnea 9/21/2017    Thyroid disease     Viral meningitis 9/1990     Past Surgical History:   Procedure Laterality Date    HX HERNIA REPAIR      HX KNEE ARTHROSCOPY      both     Allergies   Allergen Reactions    Demerol [Meperidine] Nausea and Vomiting    Phenergan [Promethazine] Unknown (comments)       REVIEW OF SYSTEMS:  General: negative for - chills or fever, or weight loss or gain  ENT: negative for - headaches, nasal congestion or tinnitus  Eyes: no blurred or visual changes  Neck: No stiffness or swollen nodes  Respiratory: negative for - cough, hemoptysis, shortness of breath or wheezing  Cardiovascular : negative for - chest pain, edema, palpitations or shortness of breath  Gastrointestinal: negative for - abdominal pain, blood in stools, heartburn or nausea/vomiting  Genito-Urinary: no dysuria, trouble voiding, or hematuria  Musculoskeletal: negative for - gait disturbance, joint pain, joint stiffness or joint swelling  Neurological: no TIA or stroke symptoms  Hematologic: no bruises, no bleeding  Lymphatic: no swollen glands  Integument: no lumps, mole changes, nail changes or rash  Endocrine:no malaise/lethargy poly uria or polydipsia or unexpected weight changes        Social History     Socioeconomic History    Marital status:    Tobacco Use    Smoking status: Never    Smokeless tobacco: Never   Vaping Use    Vaping Use: Never used   Substance and Sexual Activity    Alcohol use: No    Drug use: No    Sexual activity: Yes     Partners: Female     Family History   Problem Relation Age of Onset    Heart Disease Mother         AICD    Thyroid Disease Mother     Heart Disease Father         pacemaker    Hypertension Father     Cancer Father         lymphoma    Thyroid Disease Sister     Heart Disease Paternal Grandmother         angina       OBJECTIVE:     Visit Vitals  /70 (BP 1 Location: Left upper arm, BP Patient Position: Sitting, BP Cuff Size: Adult)   Pulse 77   Temp 98.2 °F (36.8 °C) (Oral)   Resp 17   Ht 6' 1\" (1.854 m)   Wt 245 lb 14.4 oz (111.5 kg)   SpO2 96%   BMI 32.44 kg/m²     CONSTITUTIONAL:   well nourished, appears age appropriate  EYES: sclera anicteric, PERRL, EOMI  ENMT:nares clear, moist mucous membranes, pharynx clear  NECK: supple. Thyroid normal, No JVD or bruits  RESPIRATORY: Chest: clear to ascultation and percussion, normal inspiratory effort  CARDIOVASCULAR: Heart: regular rate and rhythm no murmurs, rubs or gallops, PMI not displaced, No thrills, no peripheral edema  GASTROINTESTINAL: Abdomen: non distended, soft, non tender, bowel sounds normal  HEMATOLOGIC: no purpura, petechiae or bruising  LYMPHATIC: No lymph node enlargemant  MUSCULOSKELETAL: Extremities: no active synovitis, pulse 1+   INTEGUMENT: No unusual rashes or suspicious skin lesions noted. Nails appear normal.  PERIPHERAL VASCULAR: normal pulses femoral, PT and DP  NEUROLOGIC: non-focal exam, A & O X 3  PSYCHIATRIC:, appropriate affect     ASSESSMENT:   1. Hypertension with renal disease    2. Controlled type 2 diabetes mellitus with stage 3 chronic kidney disease, without long-term current use of insulin ([address]    3. Mixed hyperlipidemia    4. Gastroesophageal reflux disease without esophagitis    5. Primary osteoarthritis involving multiple joints    6. Cardiomyopathy, unspecified type ([address]    7. Stage 3 chronic kidney disease, unspecified whether stage 3a or 3b CKD (HCC)    8. Class 1 obesity due to excess calories with serious comorbidity and body mass index (BMI) of 33.0 to 33.9 in adult      Impression  1. Hypertension that is controlled so continue current therapy reviewed with him. 2.  Diabetes repeat status pending  3. Hyperlipidemia prior lab reviewed repeat pending  4. GERD stable neck #5 DJD stable  Was 6 cardiomyopathy stable now on Entresto  7. CKD stage III repeat status pending  8 obesity weight is down 5 pounds encouraged continued diet, exercise weight reduction for overall health benefit. I will call with lab and make further recommendations or adjustments if necessary. Follow-up in 3 months or sooner if there is a problem. PLAN:  .  Orders Placed This Encounter    METABOLIC PANEL, COMPREHENSIVE    LIPID PANEL    CK    HEMOGLOBIN A1C WITH EAG    Entresto  mg tablet    bumetanide (BUMEX) 1 mg tablet         ATTENTION:   This medical record was transcribed using an electronic medical records system. Although proofread, it may and can contain electronic and spelling errors. Other human spelling and other errors may be present. Corrections may be executed at a later time. Please feel free to contact us for any clarifications as needed. Follow-up and Dispositions    Return in about 3 months (around 10/20/2022). No results found for any visits on 07/20/22. Emily Duron MD    The patient verbalized understanding of the problems and plans as explained.

## 2022-07-20 ENCOUNTER — OFFICE VISIT (OUTPATIENT)
Dept: INTERNAL MEDICINE CLINIC | Age: 72
End: 2022-07-20
Payer: MEDICARE

## 2022-07-20 VITALS
HEIGHT: 73 IN | BODY MASS INDEX: 32.59 KG/M2 | WEIGHT: 245.9 LBS | SYSTOLIC BLOOD PRESSURE: 126 MMHG | RESPIRATION RATE: 17 BRPM | DIASTOLIC BLOOD PRESSURE: 70 MMHG | OXYGEN SATURATION: 96 % | TEMPERATURE: 98.2 F | HEART RATE: 77 BPM

## 2022-07-20 DIAGNOSIS — M15.9 PRIMARY OSTEOARTHRITIS INVOLVING MULTIPLE JOINTS: ICD-10-CM

## 2022-07-20 DIAGNOSIS — N18.30 CONTROLLED TYPE 2 DIABETES MELLITUS WITH STAGE 3 CHRONIC KIDNEY DISEASE, WITHOUT LONG-TERM CURRENT USE OF INSULIN (HCC): ICD-10-CM

## 2022-07-20 DIAGNOSIS — I12.9 HYPERTENSION WITH RENAL DISEASE: Primary | ICD-10-CM

## 2022-07-20 DIAGNOSIS — E78.2 MIXED HYPERLIPIDEMIA: ICD-10-CM

## 2022-07-20 DIAGNOSIS — E66.09 CLASS 1 OBESITY DUE TO EXCESS CALORIES WITH SERIOUS COMORBIDITY AND BODY MASS INDEX (BMI) OF 33.0 TO 33.9 IN ADULT: ICD-10-CM

## 2022-07-20 DIAGNOSIS — K21.9 GASTROESOPHAGEAL REFLUX DISEASE WITHOUT ESOPHAGITIS: ICD-10-CM

## 2022-07-20 DIAGNOSIS — N18.30 STAGE 3 CHRONIC KIDNEY DISEASE, UNSPECIFIED WHETHER STAGE 3A OR 3B CKD (HCC): ICD-10-CM

## 2022-07-20 DIAGNOSIS — E11.22 CONTROLLED TYPE 2 DIABETES MELLITUS WITH STAGE 3 CHRONIC KIDNEY DISEASE, WITHOUT LONG-TERM CURRENT USE OF INSULIN (HCC): ICD-10-CM

## 2022-07-20 DIAGNOSIS — I42.9 CARDIOMYOPATHY, UNSPECIFIED TYPE (HCC): ICD-10-CM

## 2022-07-20 LAB
ALBUMIN SERPL-MCNC: 4.2 G/DL (ref 3.5–5)
ALBUMIN/GLOB SERPL: 1.5 {RATIO} (ref 1.1–2.2)
ALP SERPL-CCNC: 75 U/L (ref 45–117)
ALT SERPL-CCNC: 22 U/L (ref 12–78)
ANION GAP SERPL CALC-SCNC: 7 MMOL/L (ref 5–15)
AST SERPL-CCNC: 16 U/L (ref 15–37)
BILIRUB SERPL-MCNC: 0.5 MG/DL (ref 0.2–1)
BUN SERPL-MCNC: 23 MG/DL (ref 6–20)
BUN/CREAT SERPL: 15 (ref 12–20)
CALCIUM SERPL-MCNC: 9.3 MG/DL (ref 8.5–10.1)
CHLORIDE SERPL-SCNC: 110 MMOL/L (ref 97–108)
CHOLEST SERPL-MCNC: 108 MG/DL
CK SERPL-CCNC: 128 U/L (ref 39–308)
CO2 SERPL-SCNC: 27 MMOL/L (ref 21–32)
CREAT SERPL-MCNC: 1.57 MG/DL (ref 0.7–1.3)
EST. AVERAGE GLUCOSE BLD GHB EST-MCNC: 126 MG/DL
GLOBULIN SER CALC-MCNC: 2.8 G/DL (ref 2–4)
GLUCOSE SERPL-MCNC: 130 MG/DL (ref 65–100)
HBA1C MFR BLD: 6 % (ref 4–5.6)
HDLC SERPL-MCNC: 38 MG/DL
HDLC SERPL: 2.8 {RATIO} (ref 0–5)
LDLC SERPL CALC-MCNC: 51.4 MG/DL (ref 0–100)
POTASSIUM SERPL-SCNC: 3.9 MMOL/L (ref 3.5–5.1)
PROT SERPL-MCNC: 7 G/DL (ref 6.4–8.2)
SODIUM SERPL-SCNC: 144 MMOL/L (ref 136–145)
TRIGL SERPL-MCNC: 93 MG/DL (ref ?–150)
VLDLC SERPL CALC-MCNC: 18.6 MG/DL

## 2022-07-20 PROCEDURE — G8427 DOCREV CUR MEDS BY ELIG CLIN: HCPCS | Performed by: INTERNAL MEDICINE

## 2022-07-20 PROCEDURE — 2022F DILAT RTA XM EVC RTNOPTHY: CPT | Performed by: INTERNAL MEDICINE

## 2022-07-20 PROCEDURE — G8510 SCR DEP NEG, NO PLAN REQD: HCPCS | Performed by: INTERNAL MEDICINE

## 2022-07-20 PROCEDURE — 3044F HG A1C LEVEL LT 7.0%: CPT | Performed by: INTERNAL MEDICINE

## 2022-07-20 PROCEDURE — 3017F COLORECTAL CA SCREEN DOC REV: CPT | Performed by: INTERNAL MEDICINE

## 2022-07-20 PROCEDURE — 1123F ACP DISCUSS/DSCN MKR DOCD: CPT | Performed by: INTERNAL MEDICINE

## 2022-07-20 PROCEDURE — 1101F PT FALLS ASSESS-DOCD LE1/YR: CPT | Performed by: INTERNAL MEDICINE

## 2022-07-20 PROCEDURE — G8536 NO DOC ELDER MAL SCRN: HCPCS | Performed by: INTERNAL MEDICINE

## 2022-07-20 PROCEDURE — 99214 OFFICE O/P EST MOD 30 MIN: CPT | Performed by: INTERNAL MEDICINE

## 2022-07-20 PROCEDURE — G8417 CALC BMI ABV UP PARAM F/U: HCPCS | Performed by: INTERNAL MEDICINE

## 2022-07-20 RX ORDER — BUMETANIDE 1 MG/1
1 TABLET ORAL DAILY
Qty: 90 TABLET | Refills: 3 | Status: SHIPPED | OUTPATIENT
Start: 2022-07-20

## 2022-07-20 RX ORDER — SACUBITRIL AND VALSARTAN 97; 103 MG/1; MG/1
1 TABLET, FILM COATED ORAL 2 TIMES DAILY
COMMUNITY
Start: 2022-07-06

## 2022-07-20 NOTE — PROGRESS NOTES
Chief Complaint   Patient presents with    Hypertension    GERD    Diabetes    Hypothyroidism     3 month follow up     Visit Vitals  /70 (BP 1 Location: Left upper arm, BP Patient Position: Sitting, BP Cuff Size: Adult)   Pulse 77   Temp 98.2 °F (36.8 °C) (Oral)   Resp 17   Ht 6' 1\" (1.854 m)   Wt 245 lb 14.4 oz (111.5 kg)   SpO2 96%   BMI 32.44 kg/m²     1. Have you been to the ER, urgent care clinic since your last visit? Hospitalized since your last visit? No    2. Have you seen or consulted any other health care providers outside of the [address] [place] [address] since your last visit? Include any pap smears or colon screening.  Dr. Dejuan Hathaway

## 2022-08-13 NOTE — ACP (ADVANCE CARE PLANNING)
Discussed advanced care medical directives. Patient has a copy at home. Will bring for his medical record. No sex, no pushing, no heavy lifting, no strenuous activity, Nothing per vagina x  6 weeks - no sex, tampons, douching, tub baths, etc. Continue breastfeeding.  Motrin as needed for pain. Follow up in office in 5-6 weeks for post partum check up. Please call for appt.   check blood pressure twice daily. Return if any blood pressure >160/>100. Return if any headache, visual disturbances or epigastric pain. Please see obstetrician in 2-3days for blood pressure check. Case management set up

## 2022-08-17 ENCOUNTER — LAB ONLY (OUTPATIENT)
Dept: INTERNAL MEDICINE CLINIC | Age: 72
End: 2022-08-17

## 2022-08-17 DIAGNOSIS — I12.9 HYPERTENSION WITH RENAL DISEASE: Primary | ICD-10-CM

## 2022-08-18 LAB
ANION GAP SERPL CALC-SCNC: 6 MMOL/L (ref 5–15)
BUN SERPL-MCNC: 18 MG/DL (ref 6–20)
BUN/CREAT SERPL: 14 (ref 12–20)
CALCIUM SERPL-MCNC: 9 MG/DL (ref 8.5–10.1)
CHLORIDE SERPL-SCNC: 107 MMOL/L (ref 97–108)
CO2 SERPL-SCNC: 27 MMOL/L (ref 21–32)
CREAT SERPL-MCNC: 1.26 MG/DL (ref 0.7–1.3)
GLUCOSE SERPL-MCNC: 121 MG/DL (ref 65–100)
POTASSIUM SERPL-SCNC: 3.9 MMOL/L (ref 3.5–5.1)
SODIUM SERPL-SCNC: 140 MMOL/L (ref 136–145)

## 2022-09-16 RX ORDER — DICLOFENAC SODIUM 75 MG/1
TABLET, DELAYED RELEASE ORAL
Qty: 180 TABLET | Refills: 3 | Status: SHIPPED | OUTPATIENT
Start: 2022-09-16

## 2022-09-16 RX ORDER — FENOFIBRATE 145 MG/1
145 TABLET, COATED ORAL DAILY
Qty: 90 TABLET | Refills: 3 | Status: SHIPPED | OUTPATIENT
Start: 2022-09-16

## 2022-09-16 RX ORDER — LEVOTHYROXINE SODIUM 50 UG/1
50 TABLET ORAL DAILY
Qty: 90 TABLET | Refills: 3 | Status: SHIPPED | OUTPATIENT
Start: 2022-09-16

## 2022-09-16 RX ORDER — ROSUVASTATIN CALCIUM 10 MG/1
10 TABLET, COATED ORAL DAILY
Qty: 90 TABLET | Refills: 3 | Status: SHIPPED | OUTPATIENT
Start: 2022-09-16

## 2022-09-16 RX ORDER — METFORMIN HYDROCHLORIDE 500 MG/1
TABLET, EXTENDED RELEASE ORAL
Qty: 270 TABLET | Refills: 3 | Status: SHIPPED | OUTPATIENT
Start: 2022-09-16

## 2022-09-16 NOTE — TELEPHONE ENCOUNTER
RX refill request from the patient/pharmacy. Patient last seen 07- with labs, and next appt. scheduled for 10-  Requested Prescriptions     Pending Prescriptions Disp Refills    fenofibrate nanocrystallized (TRICOR) 145 mg tablet [Pharmacy Med Name: Fenofibrate 145 MG Oral Tablet] 90 Tablet 3     Sig: TAKE 1 TABLET BY MOUTH  DAILY    rosuvastatin (CRESTOR) 10 mg tablet [Pharmacy Med Name: Rosuvastatin Calcium 10 MG Oral Tablet] 90 Tablet 3     Sig: TAKE 1 TABLET BY MOUTH  DAILY    diclofenac EC (VOLTAREN) 75 mg EC tablet [Pharmacy Med Name: Diclofenac Sodium 75 MG Oral Tablet Delayed Release] 180 Tablet 3     Sig: TAKE 1 TABLET BY MOUTH  TWICE DAILY    levothyroxine (SYNTHROID) 50 mcg tablet [Pharmacy Med Name: Levothyroxine Sodium 50 MCG Oral Tablet] 90 Tablet 3     Sig: TAKE 1 TABLET BY MOUTH  DAILY    metFORMIN ER (GLUCOPHAGE XR) 500 mg tablet [Pharmacy Med Name: metFORMIN HCl  MG Oral Tablet Extended Release 24 Hour] 270 Tablet 3     Sig: TAKE 1 TABLET BY MOUTH IN  THE MORNING AND 2 TABLETS  BY MOUTH AT DINNER    .

## 2022-09-19 ENCOUNTER — OFFICE VISIT (OUTPATIENT)
Dept: INTERNAL MEDICINE CLINIC | Age: 72
End: 2022-09-19
Payer: MEDICARE

## 2022-09-19 VITALS
HEIGHT: 73 IN | RESPIRATION RATE: 17 BRPM | DIASTOLIC BLOOD PRESSURE: 72 MMHG | TEMPERATURE: 98.4 F | HEART RATE: 79 BPM | BODY MASS INDEX: 31.9 KG/M2 | WEIGHT: 240.7 LBS | SYSTOLIC BLOOD PRESSURE: 126 MMHG | OXYGEN SATURATION: 97 %

## 2022-09-19 DIAGNOSIS — H66.001 NON-RECURRENT ACUTE SUPPURATIVE OTITIS MEDIA OF RIGHT EAR WITHOUT SPONTANEOUS RUPTURE OF TYMPANIC MEMBRANE: Primary | ICD-10-CM

## 2022-09-19 PROCEDURE — G8417 CALC BMI ABV UP PARAM F/U: HCPCS | Performed by: INTERNAL MEDICINE

## 2022-09-19 PROCEDURE — 3017F COLORECTAL CA SCREEN DOC REV: CPT | Performed by: INTERNAL MEDICINE

## 2022-09-19 PROCEDURE — 1123F ACP DISCUSS/DSCN MKR DOCD: CPT | Performed by: INTERNAL MEDICINE

## 2022-09-19 PROCEDURE — 99213 OFFICE O/P EST LOW 20 MIN: CPT | Performed by: INTERNAL MEDICINE

## 2022-09-19 PROCEDURE — G8427 DOCREV CUR MEDS BY ELIG CLIN: HCPCS | Performed by: INTERNAL MEDICINE

## 2022-09-19 PROCEDURE — 1101F PT FALLS ASSESS-DOCD LE1/YR: CPT | Performed by: INTERNAL MEDICINE

## 2022-09-19 PROCEDURE — G8536 NO DOC ELDER MAL SCRN: HCPCS | Performed by: INTERNAL MEDICINE

## 2022-09-19 PROCEDURE — G8510 SCR DEP NEG, NO PLAN REQD: HCPCS | Performed by: INTERNAL MEDICINE

## 2022-09-19 RX ORDER — LEVOFLOXACIN 500 MG/1
500 TABLET, FILM COATED ORAL DAILY
Qty: 10 TABLET | Refills: 0 | Status: SHIPPED | OUTPATIENT
Start: 2022-09-19 | End: 2022-09-30 | Stop reason: ALTCHOICE

## 2022-09-19 NOTE — PROGRESS NOTES
Subjective:   Thomas Palmer III is a 81 y.o. male      Chief Complaint   Patient presents with    Ear Pain     Bilateral ear pain x 3 days    Cough        History of present illness: He presents complaints of bilateral ear pain in the right greater than the left. The been present about 3 days. He notes no fevers or chills. [place] actually noted the pain was particularly bad.   He has tested for him self for COVID and that has been negative    Patient Active Problem List   Diagnosis Code    Cardiomyopathy ([facility] I42.9    Controlled type 2 diabetes mellitus with stage 3 chronic kidney disease, without long-term current use of insulin (HCC) E11.22, N18.30    Mixed hyperlipidemia E78.2    Acquired hypothyroidism E03.9    Gastroesophageal reflux disease without esophagitis K21.9    Primary osteoarthritis involving multiple joints M15.9    Cholelithiasis K80.20    Diverticulosis K57.90    History of viral meningitis Z86.61    H/O: depression Z86.59    Obstructive sleep apnea syndrome G47.33    Class 1 obesity due to excess calories with serious comorbidity and body mass index (BMI) of 33.0 to 33.9 in adult E66.09, Z68.33    Hypertension with renal disease I12.9    Stage 3 chronic kidney disease (HCC) N18.30    Hearing impaired H91.90    Alcohol screening Z13.39    Edema R60.9    Vitamin D deficiency E55.9    COVID-19 U07.1    Sciatica of left side M54.32    Non-recurrent acute suppurative otitis media of right ear without spontaneous rupture of tympanic membrane H66.001      Past Medical History:   Diagnosis Date    Arthritis     fingers    CAD (coronary artery disease)     Cardiomyopathy ([facility]     Cholelithiasis 9/21/2017    CKD (chronic kidney disease) 9/21/2017    Diabetes mellitus ([facility]     Diverticulosis 9/21/2017    DJD (degenerative joint disease) 9/21/2017    GERD (gastroesophageal reflux disease)     H/O: depression 9/21/2017    Hiatal hernia     Hypercholesteremia Hypertension     Hypertension with renal disease 9/21/2017    Hypothyroidism     Kidney stones     Obesity 9/21/2017    On statin therapy 9/21/2017    Sleep apnea 9/21/2017    Thyroid disease     Viral meningitis 9/1990      Allergies   Allergen Reactions    Demerol [Meperidine] Nausea and Vomiting    Phenergan [Promethazine] Unknown (comments)      Family History   Problem Relation Age of Onset    Heart Disease Mother         AICD    Thyroid Disease Mother     Heart Disease Father         pacemaker    Hypertension Father     Cancer Father         lymphoma    Thyroid Disease Sister     Heart Disease Paternal Grandmother         angina      Social History     Socioeconomic History    Marital status:      Spouse name: Not on file    Number of children: Not on file    Years of education: Not on file    Highest education level: Not on file   Occupational History    Not on file   Tobacco Use    Smoking status: Never    Smokeless tobacco: Never   Vaping Use    Vaping Use: Never used   Substance and Sexual Activity    Alcohol use: No    Drug use: No    Sexual activity: Yes     Partners: Female   Other Topics Concern    Not on file   Social History Narrative    Not on file     Social Determinants of Health     Financial Resource Strain: Not on file   Food Insecurity: Not on file   Transportation Needs: Not on file   Physical Activity: Not on file   Stress: Not on file   Social Connections: Not on file   Intimate Partner Violence: Not on file   Housing Stability: Not on file     Prior to Admission medications    Medication Sig Start Date End Date Taking? Authorizing Provider   levoFLOXacin (LEVAQUIN) 500 mg tablet Take 1 Tablet by mouth daily.  9/19/22  Yes Guillermo Randall MD   fenofibrate nanocrystallized (TRICOR) 145 mg tablet TAKE 1 TABLET BY MOUTH  DAILY 9/16/22  Yes Guillermo Randall MD   rosuvastatin (CRESTOR) 10 mg tablet TAKE 1 TABLET BY MOUTH  DAILY 9/16/22  Yes Guillermo Randall MD   diclofenac EC (VOLTAREN) 75 mg EC tablet TAKE 1 TABLET BY MOUTH  TWICE DAILY 9/16/22  Yes Flaco Abernathy MD   levothyroxine (SYNTHROID) 50 mcg tablet TAKE 1 TABLET BY MOUTH  DAILY 9/16/22  Yes Flaco Abernathy MD   metFORMIN ER (GLUCOPHAGE XR) 500 mg tablet TAKE 1 TABLET BY MOUTH IN  THE MORNING AND 2 TABLETS  BY MOUTH AT [facility] 9/16/22  Yes Flaco Abernathy MD   Entresto  mg tablet Take 1 Tablet by mouth two (2) times a day. 7/6/22  Yes Provider, Historical   bumetanide (BUMEX) 1 mg tablet Take 1 Tablet by mouth in the morning. 7/20/22  Yes Flaco Abernathy MD   carvediloL (COREG) 25 mg tablet TAKE 1 TABLET BY MOUTH  TWICE DAILY WITH MEALS 4/15/22  Yes Flaco Abernathy MD   niacin ER (NIASPAN) 750 mg Tb24 TAKE 2 TABLETS BY MOUTH AT  BEDTIME 4/15/22  Yes Flaco Abernathy MD   esomeprazole (NEXIUM) 40 mg capsule TAKE 1 CAPSULE BY MOUTH  DAILY 4/15/22  Yes Flaco Abernathy MD   amLODIPine (NORVASC) 5 mg tablet Take 1 Tablet by mouth daily. 3/30/22  Yes Flaco Abernathy MD   hydrALAZINE (APRESOLINE) 50 mg tablet Take 1 Tablet by mouth three (3) times daily. 12/3/21  Yes Flaco Abernathy MD   lisinopril (PRINIVIL, ZESTRIL) 40 mg tablet Take 1 Tab by mouth daily. 11/21/19  Yes Flaco Abernathy MD   fish oil-dha-epa 5,568-160-763 mg cap Take 3 Tablets by mouth daily. Yes Provider, Historical   aspirin 81 mg tablet Take 81 mg by mouth daily as needed. Yes Provider, Historical        Review of Systems              Constitutional:  He denies fever, weight loss, sweats or fatigue. EYES: No blurred or double vision,               ENT: no nasal congestion, no headache or dizziness. No difficulty with swallowing, taste, speech or smell. Bilateral ear pain  Respiratory:  No cough, wheezing or shortness of breath. No sputum production. Cardiac:  Denies chest pain, palpitations, unexplained indigestion, syncope, edema, PND or orthopnea.     GI:  No changes in bowel movements, no abdominal pain, no bloating, anorexia, nausea, vomiting or heartburn. :  No frequency or dysuria. Denies incontinence or sexual dysfunction. Extremities:  No joint pain, stiffness or swelling  Back:.no pain or soreness  Skin:  No recent rashes or mole changes. Neurological:  No numbness, tingling, burning paresthesias or loss of motor strength. No syncope, dizziness, frequent headaches or memory loss. Hematologic:  No easy bruising  Lymphatic: No lymph node enlargement    Objective:     Vitals:    09/19/22 1437   BP: 126/72   Pulse: 79   Resp: 17   Temp: 98.4 °F (36.9 °C)   TempSrc: Oral   SpO2: 97%   Weight: 240 lb 11.2 oz (109.2 kg)   Height: 6' 1\" (1.854 m)   PainSc:   6   PainLoc: Ear       Body mass index is 31.76 kg/m². Physical Examination:              General Appearance:  Well-developed, well-nourished, no acute distress. HEENT:      Ears: Mild erythema left tympanic membrane and marked erythema right tympanic membrane with fluid behind it. Canals appear to be normal.  Eyes:  The pupillary responses were normal.  Extraocular muscle function intact. Lids and conjunctiva not injected. Funduscopic exam revealed sharp disc margins. Nares: Clear w/o edema or erythema  Pharynx:  Clear with teeth in good repair. No masses were noted. Neck:  Supple without thyromegaly or adenopathy. No JVD noted. No carotid                bruits. Lungs:  Clear to auscultation and percussion. Cardiac:  Regular rate and rhythm without murmur. PMI not displaced. No gallop, rub or click. Abdominal: Soft, non-tender, no hepata-spleenomegally or masses  Extremities:  No clubbing, cyanosis or edema. Skin:  No rash or unusual mole changes noted. Lymph Nodes:  None felt in the cervical, supraclavicular, axillary or inguinal region. Neurological: . DTRs 2+ and symmetric. Station and gait normal.   Hematologic:   No purpura or petechiae        Assessment/Plan:         1.  Non-recurrent acute suppurative otitis media of right ear without spontaneous rupture of tympanic membrane        Impressions/Plan:  Impression  1. Right greater than left otitis media  Levaquin 500 a day for 10 days  Recheck if not resolved. Orders Placed This Encounter    levoFLOXacin (LEVAQUIN) 500 mg tablet       Follow-up and Dispositions    Return At previously scheduled appointment. No results found for any visits on 09/19/22. Aníbal Patten MD    The patient was given after the visit summary the patient verbalized an understanding of the plans and problems as explained.

## 2022-09-19 NOTE — PROGRESS NOTES
Chief Complaint   Patient presents with    Ear Pain     Bilateral ear pain x 3 days    Cough     Visit Vitals  /72 (BP 1 Location: Left upper arm, BP Patient Position: Sitting, BP Cuff Size: Adult)   Pulse 79   Temp 98.4 °F (36.9 °C) (Oral)   Resp 17   Ht 6' 1\" (1.854 m)   Wt 240 lb 11.2 oz (109.2 kg)   SpO2 97%   BMI 31.76 kg/m²     1. Have you been to the ER, urgent care clinic since your last visit? Hospitalized since your last visit? No    2. Have you seen or consulted any other health care providers outside of the [address] [place] [address] since your last visit? Include any pap smears or colon screening.  No

## 2022-09-22 ENCOUNTER — TELEPHONE (OUTPATIENT)
Dept: INTERNAL MEDICINE CLINIC | Age: 72
End: 2022-09-22

## 2022-09-22 NOTE — TELEPHONE ENCOUNTER
Patient called stating he was seen on Monday, 9/19 for a possible ear infection and now he has a terrible cough and hardly able to sleep at night. Would like to be prescribed something to help with his cough. And would like the Rx sent to [facility].

## 2022-09-23 DIAGNOSIS — R05.9 COUGH: Primary | ICD-10-CM

## 2022-09-23 RX ORDER — HYDROCODONE POLISTIREX AND CHLORPHENIRAMINE POLISTIREX 10; 8 MG/5ML; MG/5ML
5 SUSPENSION, EXTENDED RELEASE ORAL
Qty: 200 ML | Refills: 0 | Status: SHIPPED | OUTPATIENT
Start: 2022-09-23 | End: 2022-09-30

## 2022-09-23 NOTE — TELEPHONE ENCOUNTER
RX refill request from the patient/pharmacy. Patient last seen 09- with labs, and next appt. scheduled for 10-  Requested Prescriptions     Pending Prescriptions Disp Refills    HYDROcodone-chlorpheniramine (TUSSIONEX) 10-8 mg/5 mL suspension 200 mL 0     Sig: Take 5 mL by mouth every twelve (12) hours as needed for Cough for up to 7 days. Max Daily Amount: 10 mL. Lm Espinal Spoke to Dr. Correa and informed her of infants birth

## 2022-09-30 ENCOUNTER — OFFICE VISIT (OUTPATIENT)
Dept: INTERNAL MEDICINE CLINIC | Age: 72
End: 2022-09-30
Payer: MEDICARE

## 2022-09-30 VITALS
DIASTOLIC BLOOD PRESSURE: 64 MMHG | BODY MASS INDEX: 31.41 KG/M2 | WEIGHT: 237 LBS | HEIGHT: 73 IN | OXYGEN SATURATION: 97 % | SYSTOLIC BLOOD PRESSURE: 116 MMHG | TEMPERATURE: 98 F | HEART RATE: 70 BPM

## 2022-09-30 DIAGNOSIS — H65.91 SEROMUCINOUS OTITIS MEDIA OF RIGHT EAR: Primary | ICD-10-CM

## 2022-09-30 DIAGNOSIS — B37.0 THRUSH: ICD-10-CM

## 2022-09-30 DIAGNOSIS — R63.4 WEIGHT LOSS: ICD-10-CM

## 2022-09-30 PROCEDURE — 1101F PT FALLS ASSESS-DOCD LE1/YR: CPT | Performed by: INTERNAL MEDICINE

## 2022-09-30 PROCEDURE — 3017F COLORECTAL CA SCREEN DOC REV: CPT | Performed by: INTERNAL MEDICINE

## 2022-09-30 PROCEDURE — 1123F ACP DISCUSS/DSCN MKR DOCD: CPT | Performed by: INTERNAL MEDICINE

## 2022-09-30 PROCEDURE — G8417 CALC BMI ABV UP PARAM F/U: HCPCS | Performed by: INTERNAL MEDICINE

## 2022-09-30 PROCEDURE — G8427 DOCREV CUR MEDS BY ELIG CLIN: HCPCS | Performed by: INTERNAL MEDICINE

## 2022-09-30 PROCEDURE — G8510 SCR DEP NEG, NO PLAN REQD: HCPCS | Performed by: INTERNAL MEDICINE

## 2022-09-30 PROCEDURE — 99214 OFFICE O/P EST MOD 30 MIN: CPT | Performed by: INTERNAL MEDICINE

## 2022-09-30 PROCEDURE — G8536 NO DOC ELDER MAL SCRN: HCPCS | Performed by: INTERNAL MEDICINE

## 2022-09-30 RX ORDER — AZITHROMYCIN 250 MG/1
250 TABLET, FILM COATED ORAL SEE ADMIN INSTRUCTIONS
Qty: 6 TABLET | Refills: 0 | Status: SHIPPED | OUTPATIENT
Start: 2022-09-30 | End: 2022-10-05

## 2022-09-30 RX ORDER — NYSTATIN 100000 [USP'U]/ML
5 SUSPENSION ORAL 4 TIMES DAILY
Qty: 200 ML | Refills: 0 | Status: SHIPPED | OUTPATIENT
Start: 2022-09-30 | End: 2022-10-26 | Stop reason: ALTCHOICE

## 2022-09-30 NOTE — PROGRESS NOTES
Subjective:   Soila Rosas III is a 81 y.o. male      Chief Complaint   Patient presents with    Hearing Problem     Right ear is muffled        History of present illness: He presents for evaluation of a muffled sensation in his right ear. He was recently seen for upper respiratory infection and he does noted burning sensation in his tongue so he stopped taking the Levaquin after about 7 days. He notes he continues to have muffled sensation and can hear well in the right ear and he feels like the left ear is full. He denies any cough or chest congestion. He notes no nasal congestion. He notes no fevers or chills. He is also concerned about the fact that he is continuing to lose weight he does note his appetite is okay he says he just eats a salad for lunch so he has changed his dietary measures. He denies any change in bowel habits. He notes no nausea vomiting abdominal pain. He notes no cardiorespiratory complaints other than that related to upper respiratory symptoms. He has no other complaints on complete review of systems.     Patient Active Problem List   Diagnosis Code    Cardiomyopathy [facility]) I42.9    Controlled type 2 diabetes mellitus with stage 3 chronic kidney disease, without long-term current use of insulin (HCC) E11.22, N18.30    Mixed hyperlipidemia E78.2    Acquired hypothyroidism E03.9    Gastroesophageal reflux disease without esophagitis K21.9    Primary osteoarthritis involving multiple joints M15.9    Cholelithiasis K80.20    Diverticulosis K57.90    History of viral meningitis Z86.61    H/O: depression Z86.59    Obstructive sleep apnea syndrome G47.33    Class 1 obesity due to excess calories with serious comorbidity and body mass index (BMI) of 33.0 to 33.9 in adult E66.09, Z68.33    Hypertension with renal disease I12.9    Stage 3 chronic kidney disease (HCC) N18.30    Hearing impaired H91.90    Alcohol screening Z13.39    Edema R60.9    Vitamin D deficiency E55.9    COVID-19 U07.1    Sciatica of left side M54.32    Non-recurrent acute suppurative otitis media of right ear without spontaneous rupture of tympanic membrane H66.001      Past Medical History:   Diagnosis Date    Arthritis     fingers    CAD (coronary artery disease)     Cardiomyopathy ([facility]     Cholelithiasis 9/21/2017    CKD (chronic kidney disease) 9/21/2017    Diabetes mellitus ([facility].)     Diverticulosis 9/21/2017    DJD (degenerative joint disease) 9/21/2017    GERD (gastroesophageal reflux disease)     H/O: depression 9/21/2017    Hiatal hernia     Hypercholesteremia     Hypertension     Hypertension with renal disease 9/21/2017    Hypothyroidism     Kidney stones     Obesity 9/21/2017    On statin therapy 9/21/2017    Sleep apnea 9/21/2017    Thyroid disease     Viral meningitis 9/1990      Allergies   Allergen Reactions    Demerol [Meperidine] Nausea and Vomiting    Phenergan [Promethazine] Unknown (comments)      Family History   Problem Relation Age of Onset    Heart Disease Mother         AICD    Thyroid Disease Mother     Heart Disease Father         pacemaker    Hypertension Father     Cancer Father         lymphoma    Thyroid Disease Sister     Heart Disease Paternal Grandmother         angina      Social History     Socioeconomic History    Marital status:      Spouse name: Not on file    Number of children: Not on file    Years of education: Not on file    Highest education level: Not on file   Occupational History    Not on file   Tobacco Use    Smoking status: Never    Smokeless tobacco: Never   Vaping Use    Vaping Use: Never used   Substance and Sexual Activity    Alcohol use: No    Drug use: No    Sexual activity: Yes     Partners: Female   Other Topics Concern    Not on file   Social History Narrative    Not on file     Social Determinants of Health     Financial Resource Strain: Not on file   Food Insecurity: Not on file   Transportation Needs: Not on file   Physical Activity: Not on file   Stress: Not on file   Social Connections: Not on file   Intimate Partner Violence: Not on file   Housing Stability: Not on file     Prior to Admission medications    Medication Sig Start Date End Date Taking? Authorizing Provider   azithromycin (ZITHROMAX) 250 mg tablet Take 1 Tablet by mouth See Admin Instructions for 5 days. Take 2 tablets the first day, then 1 tablet daily for the next four days. 9/30/22 10/5/22 Yes Sary Prasad MD   nystatin (MYCOSTATIN) 100,000 unit/mL suspension Take 5 mL by mouth four (4) times daily. swish and spit 9/30/22  Yes Sary Prasad MD   fenofibrate nanocrystallized (TRICOR) 145 mg tablet TAKE 1 TABLET BY MOUTH  DAILY 9/16/22  Yes Sary Prasad MD   rosuvastatin (CRESTOR) 10 mg tablet TAKE 1 TABLET BY MOUTH  DAILY 9/16/22  Yes Sary Prasad MD   diclofenac EC (VOLTAREN) 75 mg EC tablet TAKE 1 TABLET BY MOUTH  TWICE DAILY 9/16/22  Yes Sary Prasad MD   levothyroxine (SYNTHROID) 50 mcg tablet TAKE 1 TABLET BY MOUTH  DAILY 9/16/22  Yes Sary Prasad MD   metFORMIN ER (GLUCOPHAGE XR) 500 mg tablet TAKE 1 TABLET BY MOUTH IN  THE MORNING AND 2 TABLETS  BY MOUTH AT [facility] 9/16/22  Yes Sary Prasad MD   Entresto  mg tablet Take 1 Tablet by mouth two (2) times a day. 7/6/22  Yes Provider, Historical   bumetanide (BUMEX) 1 mg tablet Take 1 Tablet by mouth in the morning. 7/20/22  Yes Sary Prasad MD   carvediloL (COREG) 25 mg tablet TAKE 1 TABLET BY MOUTH  TWICE DAILY WITH MEALS 4/15/22  Yes Sary Prasad MD   niacin ER (NIASPAN) 750 mg Tb24 TAKE 2 TABLETS BY MOUTH AT  BEDTIME 4/15/22  Yes Sary Prasad MD   esomeprazole (NEXIUM) 40 mg capsule TAKE 1 CAPSULE BY MOUTH  DAILY 4/15/22  Yes Sary Prasad MD   amLODIPine (NORVASC) 5 mg tablet Take 1 Tablet by mouth daily. 3/30/22  Yes Sary Prasad MD   hydrALAZINE (APRESOLINE) 50 mg tablet Take 1 Tablet by mouth three (3) times daily.  12/3/21  Yes Sary Prasad MD fish oil-dha-epa 1,200-144-216 mg cap Take 3 Tablets by mouth daily. Yes Provider, Historical   aspirin 81 mg tablet Take 81 mg by mouth daily as needed. Yes Provider, Historical   HYDROcodone-chlorpheniramine (TUSSIONEX) 10-8 mg/5 mL suspension Take 5 mL by mouth every twelve (12) hours as needed for Cough for up to 7 days. Max Daily Amount: 10 mL. Patient not taking: Reported on 9/30/2022 9/23/22 9/30/22  Cisco Winchester MD        Review of Systems              Constitutional:  He denies fever, sweats or fatigue. He does voice some concern regarding weight loss although has no other symptoms              EYES: No blurred or double vision,               ENT: no nasal congestion, no headache or dizziness. No difficulty with               swallowing, taste, speech or smell. Bilateral-ear fullness and burning of his tongue  Respiratory:  No cough, wheezing or shortness of breath. No sputum production. Cardiac:  Denies chest pain, palpitations, unexplained indigestion, syncope, edema, PND or orthopnea. GI:  No changes in bowel movements, no abdominal pain, no bloating, anorexia, nausea, vomiting or heartburn. :  No frequency or dysuria. Denies incontinence or sexual dysfunction. Extremities:  No joint pain, stiffness or swelling  Back:.no pain or soreness  Skin:  No recent rashes or mole changes. Neurological:  No numbness, tingling, burning paresthesias or loss of motor strength. No syncope, dizziness, frequent headaches or memory loss. Hematologic:  No easy bruising  Lymphatic: No lymph node enlargement    Objective:     Vitals:    09/30/22 1149 09/30/22 1205   BP: (!) 98/58 116/64   Pulse: 70    Temp: 98 °F (36.7 °C)    TempSrc: Oral    SpO2: 97%    Weight: 237 lb (107.5 kg)    Height: 6' 1\" (1.854 m)    PainSc:   0 - No pain        Body mass index is 31.27 kg/m². Physical Examination:              General Appearance:  Well-developed, well-nourished, no acute distress.              HEENT: Ears:  The TMs and ear canals were clear. Right tympanic membrane is clear fluid behind it  Eyes:  The pupillary responses were normal.  Extraocular muscle function intact. Lids and conjunctiva not injected. Funduscopic exam revealed sharp disc margins. Nares: Clear w/o edema or erythema  Pharynx:  Clear with teeth in good repair. No masses were noted. .  Tongue appears to have a early coating consistent with thrush    Neck:  Supple without thyromegaly or adenopathy. No JVD noted. No carotid                bruits. Lungs:  Clear to auscultation and percussion. Cardiac:  Regular rate and rhythm without murmur. PMI not displaced. No gallop, rub or click. Abdominal: Soft, non-tender, no hepata-spleenomegally or masses  Extremities:  No clubbing, cyanosis or edema. Skin:  No rash or unusual mole changes noted. Lymph Nodes:  None felt in the cervical, supraclavicular, axillary or inguinal region. Neurological: . DTRs 2+ and symmetric. Station and gait normal.   Hematologic:   No purpura or petechiae        Assessment/Plan:         1. Seromucinous otitis media of right ear    2. Thrush    3. Weight loss        Impressions/Plan:  Impression  1. Mucinous otitis media we will try Zithromax for this. I did recommend Claritin and to take over-the-counter. He has also been using some Nasonex  2. Thrush use nystatin swish and swallow  3. Weight loss his weight is indeed down since April 13 pounds and he has an appointment see next month so we will evaluate that further if his weight has not improved. 30 minutes spent in direct care of this patient they were greater than 50% in counseling coordination of care. Orders Placed This Encounter    azithromycin (ZITHROMAX) 250 mg tablet    nystatin (MYCOSTATIN) 100,000 unit/mL suspension       Follow-up and Dispositions    Return in about 4 weeks (around 10/28/2022). No results found for any visits on 09/30/22.       Vicki Díaz MD    The patient was given after the visit summary the patient verbalized an understanding of the plans and problems as explained.

## 2022-09-30 NOTE — PROGRESS NOTES
Jorge Wang III is a 81 y.o. male     Chief Complaint   Patient presents with    Hearing Problem     Right ear is muffled       Visit Vitals  BP (!) 98/58 (BP 1 Location: Left upper arm, BP Patient Position: Sitting, BP Cuff Size: Large adult)   Pulse 70   Temp 98 °F (36.7 °C) (Oral)   Ht 6' 1\" (1.854 m)   Wt 237 lb (107.5 kg)   SpO2 97%   BMI 31.27 kg/m²       Health Maintenance Due   Topic Date Due    Eye Exam Retinal or Dilated  Never done    DTaP/Tdap/Td series (1 - Tdap) Never done    Shingrix Vaccine Age 50> (1 of 2) Never done    Colorectal Cancer Screening Combo  02/17/2019    Pneumococcal 65+ years (2 - PPSV23 or PCV20) 11/16/2020    COVID-19 Vaccine (3 - Booster for Reece Peter series) 08/30/2021    Flu Vaccine (1) 08/01/2022         1. \"Have you been to the ER, urgent care clinic since your last visit? Hospitalized since your last visit? \" No    2. \"Have you seen or consulted any other health care providers outside of the [address] [place] [address] since your last visit? \" No     3. For patients aged 39-70: Has the patient had a colonoscopy / FIT/ Cologuard? No      If the patient is female:    4. For patients aged 41-77: Has the patient had a mammogram within the past 2 years? NA - based on age or sex      11. For patients aged 21-65: Has the patient had a pap smear?  NA - based on age or sex

## 2022-10-05 RX ORDER — HYDRALAZINE HYDROCHLORIDE 50 MG/1
TABLET, FILM COATED ORAL
Qty: 270 TABLET | Refills: 3 | Status: SHIPPED | OUTPATIENT
Start: 2022-10-05

## 2022-10-05 NOTE — TELEPHONE ENCOUNTER
RX refill request from the patient/pharmacy. Patient last seen 09- with labs, and next appt. scheduled for 10-  Requested Prescriptions     Pending Prescriptions Disp Refills    hydrALAZINE (APRESOLINE) 50 mg tablet [Pharmacy Med Name: hydrALAZINE HCl 50 MG Oral Tablet] 270 Tablet 3     Sig: TAKE 1 TABLET BY MOUTH 3  TIMES DAILY    .

## 2022-10-26 ENCOUNTER — OFFICE VISIT (OUTPATIENT)
Dept: INTERNAL MEDICINE CLINIC | Age: 72
End: 2022-10-26
Payer: MEDICARE

## 2022-10-26 VITALS
HEART RATE: 70 BPM | DIASTOLIC BLOOD PRESSURE: 62 MMHG | HEIGHT: 73 IN | TEMPERATURE: 98.3 F | SYSTOLIC BLOOD PRESSURE: 120 MMHG | WEIGHT: 240.9 LBS | OXYGEN SATURATION: 96 % | BODY MASS INDEX: 31.93 KG/M2

## 2022-10-26 DIAGNOSIS — E66.09 CLASS 1 OBESITY DUE TO EXCESS CALORIES WITH SERIOUS COMORBIDITY AND BODY MASS INDEX (BMI) OF 33.0 TO 33.9 IN ADULT: ICD-10-CM

## 2022-10-26 DIAGNOSIS — Z23 NEEDS FLU SHOT: ICD-10-CM

## 2022-10-26 DIAGNOSIS — N18.30 STAGE 3 CHRONIC KIDNEY DISEASE, UNSPECIFIED WHETHER STAGE 3A OR 3B CKD (HCC): ICD-10-CM

## 2022-10-26 DIAGNOSIS — I42.9 CARDIOMYOPATHY, UNSPECIFIED TYPE (HCC): ICD-10-CM

## 2022-10-26 DIAGNOSIS — E78.2 MIXED HYPERLIPIDEMIA: ICD-10-CM

## 2022-10-26 DIAGNOSIS — K21.9 GASTROESOPHAGEAL REFLUX DISEASE WITHOUT ESOPHAGITIS: ICD-10-CM

## 2022-10-26 DIAGNOSIS — N18.30 CONTROLLED TYPE 2 DIABETES MELLITUS WITH STAGE 3 CHRONIC KIDNEY DISEASE, WITHOUT LONG-TERM CURRENT USE OF INSULIN (HCC): ICD-10-CM

## 2022-10-26 DIAGNOSIS — M15.9 PRIMARY OSTEOARTHRITIS INVOLVING MULTIPLE JOINTS: ICD-10-CM

## 2022-10-26 DIAGNOSIS — E11.22 CONTROLLED TYPE 2 DIABETES MELLITUS WITH STAGE 3 CHRONIC KIDNEY DISEASE, WITHOUT LONG-TERM CURRENT USE OF INSULIN (HCC): ICD-10-CM

## 2022-10-26 DIAGNOSIS — I12.9 HYPERTENSION WITH RENAL DISEASE: Primary | ICD-10-CM

## 2022-10-26 PROCEDURE — G8510 SCR DEP NEG, NO PLAN REQD: HCPCS | Performed by: INTERNAL MEDICINE

## 2022-10-26 PROCEDURE — 3044F HG A1C LEVEL LT 7.0%: CPT | Performed by: INTERNAL MEDICINE

## 2022-10-26 PROCEDURE — G8427 DOCREV CUR MEDS BY ELIG CLIN: HCPCS | Performed by: INTERNAL MEDICINE

## 2022-10-26 PROCEDURE — G8536 NO DOC ELDER MAL SCRN: HCPCS | Performed by: INTERNAL MEDICINE

## 2022-10-26 PROCEDURE — 90694 VACC AIIV4 NO PRSRV 0.5ML IM: CPT | Performed by: INTERNAL MEDICINE

## 2022-10-26 PROCEDURE — 2022F DILAT RTA XM EVC RTNOPTHY: CPT | Performed by: INTERNAL MEDICINE

## 2022-10-26 PROCEDURE — 1123F ACP DISCUSS/DSCN MKR DOCD: CPT | Performed by: INTERNAL MEDICINE

## 2022-10-26 PROCEDURE — G0008 ADMIN INFLUENZA VIRUS VAC: HCPCS | Performed by: INTERNAL MEDICINE

## 2022-10-26 PROCEDURE — 99214 OFFICE O/P EST MOD 30 MIN: CPT | Performed by: INTERNAL MEDICINE

## 2022-10-26 PROCEDURE — 1101F PT FALLS ASSESS-DOCD LE1/YR: CPT | Performed by: INTERNAL MEDICINE

## 2022-10-26 PROCEDURE — G8417 CALC BMI ABV UP PARAM F/U: HCPCS | Performed by: INTERNAL MEDICINE

## 2022-10-26 PROCEDURE — 3017F COLORECTAL CA SCREEN DOC REV: CPT | Performed by: INTERNAL MEDICINE

## 2022-10-26 NOTE — PROGRESS NOTES
Chief Complaint   Patient presents with    Hypertension     3 month f/up    Chronic Kidney Disease    Diabetes    GERD       SUBJECTIVE:    Dottie Hernandez III is a 81 y.o. male who returns in follow-up his medical problems include hypertension, diabetes, hyperlipidemia, DJD, cardiomyopathy, GERD and other mild medical problems. He is taking his medication trying to follow his diet and remains physically active. Currently denies any chest pain, shortness of breath or cardiac respiratory complaints. He notes no GI or  complaints. He notes no headaches, dizziness or neurologic complaints. He has no current active arthritic complaints but does have his chronic joint aches and pains. He has no other complaints on complete view of systems. Current Outpatient Medications   Medication Sig Dispense Refill    hydrALAZINE (APRESOLINE) 50 mg tablet TAKE 1 TABLET BY MOUTH 3  TIMES DAILY 270 Tablet 3    fenofibrate nanocrystallized (TRICOR) 145 mg tablet TAKE 1 TABLET BY MOUTH  DAILY 90 Tablet 3    rosuvastatin (CRESTOR) 10 mg tablet TAKE 1 TABLET BY MOUTH  DAILY 90 Tablet 3    diclofenac EC (VOLTAREN) 75 mg EC tablet TAKE 1 TABLET BY MOUTH  TWICE DAILY 180 Tablet 3    levothyroxine (SYNTHROID) 50 mcg tablet TAKE 1 TABLET BY MOUTH  DAILY 90 Tablet 3    metFORMIN ER (GLUCOPHAGE XR) 500 mg tablet TAKE 1 TABLET BY MOUTH IN  THE MORNING AND 2 TABLETS  BY MOUTH AT DINNER 270 Tablet 3    Entresto  mg tablet Take 1 Tablet by mouth two (2) times a day. bumetanide (BUMEX) 1 mg tablet Take 1 Tablet by mouth in the morning. 90 Tablet 3    carvediloL (COREG) 25 mg tablet TAKE 1 TABLET BY MOUTH  TWICE DAILY WITH MEALS 180 Tablet 3    niacin ER (NIASPAN) 750 mg Tb24 TAKE 2 TABLETS BY MOUTH AT  BEDTIME 180 Tablet 3    esomeprazole (NEXIUM) 40 mg capsule TAKE 1 CAPSULE BY MOUTH  DAILY 90 Capsule 3    amLODIPine (NORVASC) 5 mg tablet Take 1 Tablet by mouth daily.  90 Tablet 3    fish oil-dha-epa 1,200-144-216 mg cap Take 3 Tablets by mouth daily. aspirin 81 mg tablet Take 81 mg by mouth daily as needed.        Past Medical History:   Diagnosis Date    Arthritis     fingers    CAD (coronary artery disease)     Cardiomyopathy ([facility]     Cholelithiasis 9/21/2017    CKD (chronic kidney disease) 9/21/2017    Diabetes mellitus ([facility]     Diverticulosis 9/21/2017    DJD (degenerative joint disease) 9/21/2017    GERD (gastroesophageal reflux disease)     H/O: depression 9/21/2017    Hiatal hernia     Hypercholesteremia     Hypertension     Hypertension with renal disease 9/21/2017    Hypothyroidism     Kidney stones     Obesity 9/21/2017    On statin therapy 9/21/2017    Sleep apnea 9/21/2017    Thyroid disease     Viral meningitis 9/1990     Past Surgical History:   Procedure Laterality Date    HX HERNIA REPAIR      HX KNEE ARTHROSCOPY      both     Allergies   Allergen Reactions    Demerol [Meperidine] Nausea and Vomiting    Phenergan [Promethazine] Unknown (comments)       REVIEW OF SYSTEMS:  General: negative for - chills or fever, or weight loss or gain  ENT: negative for - headaches, nasal congestion or tinnitus  Eyes: no blurred or visual changes  Neck: No stiffness or swollen nodes  Respiratory: negative for - cough, hemoptysis, shortness of breath or wheezing  Cardiovascular : negative for - chest pain, edema, palpitations or shortness of breath  Gastrointestinal: negative for - abdominal pain, blood in stools, heartburn or nausea/vomiting  Genito-Urinary: no dysuria, trouble voiding, or hematuria  Musculoskeletal: negative for - gait disturbance, joint pain, joint stiffness or joint swelling  Neurological: no TIA or stroke symptoms  Hematologic: no bruises, no bleeding  Lymphatic: no swollen glands  Integument: no lumps, mole changes, nail changes or rash  Endocrine:no malaise/lethargy poly uria or polydipsia or unexpected weight changes        Social History     Socioeconomic History    Marital status:    Tobacco Use    Smoking status: Never    Smokeless tobacco: Never   Vaping Use    Vaping Use: Never used   Substance and Sexual Activity    Alcohol use: No    Drug use: No    Sexual activity: Yes     Partners: Female     Family History   Problem Relation Age of Onset    Heart Disease Mother         AICD    Thyroid Disease Mother     Heart Disease Father         pacemaker    Hypertension Father     Cancer Father         lymphoma    Thyroid Disease Sister     Heart Disease Paternal Grandmother         angina       OBJECTIVE:     Visit Vitals  /62 (BP 1 Location: Left upper arm, BP Patient Position: Sitting, BP Cuff Size: Adult)   Pulse 70   Temp 98.3 °F (36.8 °C)   Ht 6' 1\" (1.854 m)   Wt 240 lb 14.4 oz (109.3 kg)   SpO2 96%   BMI 31.78 kg/m²     CONSTITUTIONAL:   well nourished, appears age appropriate  EYES: sclera anicteric, PERRL, EOMI  ENMT:nares clear, moist mucous membranes, pharynx clear  NECK: supple. Thyroid normal, No JVD or bruits  RESPIRATORY: Chest: clear to ascultation and percussion, normal inspiratory effort  CARDIOVASCULAR: Heart: regular rate and rhythm no murmurs, rubs or gallops, PMI not displaced, No thrills, no peripheral edema  GASTROINTESTINAL: Abdomen: non distended, soft, non tender, bowel sounds normal  HEMATOLOGIC: no purpura, petechiae or bruising  LYMPHATIC: No lymph node enlargemant  MUSCULOSKELETAL: Extremities: no active synovitis, pulse 1+   INTEGUMENT: No unusual rashes or suspicious skin lesions noted. Nails appear normal.  PERIPHERAL VASCULAR: normal pulses femoral, PT and DP  NEUROLOGIC: non-focal exam, A & O X 3  PSYCHIATRIC:, appropriate affect     ASSESSMENT:   1. Hypertension with renal disease    2. Controlled type 2 diabetes mellitus with stage 3 chronic kidney disease, without long-term current use of insulin ([address]    3. Mixed hyperlipidemia    4. Gastroesophageal reflux disease without esophagitis    5. Primary osteoarthritis involving multiple joints    6.  Stage 3 chronic kidney disease, unspecified whether stage 3a or 3b CKD ([facility]    7. Cardiomyopathy, unspecified type ([facility]    8. Class 1 obesity due to excess calories with serious comorbidity and body mass index (BMI) of 33.0 to 33.9 in adult    9. Needs flu shot      Impression  1. Hypertension that is controlled so continue current therapy reviewed with him. 2.  Diabetes repeat status pending prior lab reviewed  3. Hyperlipidemia prior lab reviewed repeat status pending  4. GERD stable  5. DJD stable  6. CKD stage III repeat status pending next Umber 7 cardiomyopathy stable  8 obesity we did discuss diet, exercise weight reduction for overall health benefit  Flu shot given today. Follow-up again December for his yearly or sooner should to be a problem. I will call with lab results    PLAN:  .  Orders Placed This Encounter    Influenza, FLUAD, (age 72 y+), IM, PF, 0.5 mL    METABOLIC PANEL, COMPREHENSIVE    LIPID PANEL    CK    HEMOGLOBIN A1C WITH EAG         ATTENTION:   This medical record was transcribed using an electronic medical records system. Although proofread, it may and can contain electronic and spelling errors. Other human spelling and other errors may be present. Corrections may be executed at a later time. Please feel free to contact us for any clarifications as needed. Follow-up and Dispositions    Return in about 3 months (around 1/26/2023). No results found for any visits on 10/26/22. Sarah William MD    The patient verbalized understanding of the problems and plans as explained.

## 2022-10-26 NOTE — PROGRESS NOTES
Chief Complaint   Patient presents with    Hypertension     3 month f/up    Chronic Kidney Disease    Diabetes    GERD      1. Have you been to the ER, urgent care clinic since your last visit? Hospitalized since your last visit?no    2. Have you seen or consulted any other health care providers outside of the [address] [place] [address] since your last visit? Include any pap smears or colon screening.  Saw Dr. Luana Danielle

## 2022-10-26 NOTE — PROGRESS NOTES
Per verbal orders from Dr. Janice Parker, patient received Fluad flu vaccine given IM in left deltoid given by Abril Og. Vaccine was verified by Emilie Haque. Patient was observed for 15 minutes following injection with no complications noted. VIS was given.

## 2022-10-27 LAB
ALBUMIN SERPL-MCNC: 4 G/DL (ref 3.5–5)
ALBUMIN/GLOB SERPL: 1.4 {RATIO} (ref 1.1–2.2)
ALP SERPL-CCNC: 76 U/L (ref 45–117)
ALT SERPL-CCNC: 24 U/L (ref 12–78)
ANION GAP SERPL CALC-SCNC: 3 MMOL/L (ref 5–15)
AST SERPL-CCNC: 17 U/L (ref 15–37)
BILIRUB SERPL-MCNC: 0.5 MG/DL (ref 0.2–1)
BUN SERPL-MCNC: 22 MG/DL (ref 6–20)
BUN/CREAT SERPL: 16 (ref 12–20)
CALCIUM SERPL-MCNC: 8.7 MG/DL (ref 8.5–10.1)
CHLORIDE SERPL-SCNC: 111 MMOL/L (ref 97–108)
CHOLEST SERPL-MCNC: 116 MG/DL
CK SERPL-CCNC: 139 U/L (ref 39–308)
CO2 SERPL-SCNC: 29 MMOL/L (ref 21–32)
CREAT SERPL-MCNC: 1.38 MG/DL (ref 0.7–1.3)
EST. AVERAGE GLUCOSE BLD GHB EST-MCNC: 123 MG/DL
GLOBULIN SER CALC-MCNC: 2.8 G/DL (ref 2–4)
GLUCOSE SERPL-MCNC: 129 MG/DL (ref 65–100)
HBA1C MFR BLD: 5.9 % (ref 4–5.6)
HDLC SERPL-MCNC: 39 MG/DL
HDLC SERPL: 3 {RATIO} (ref 0–5)
LDLC SERPL CALC-MCNC: 59.4 MG/DL (ref 0–100)
POTASSIUM SERPL-SCNC: 3.7 MMOL/L (ref 3.5–5.1)
PROT SERPL-MCNC: 6.8 G/DL (ref 6.4–8.2)
SODIUM SERPL-SCNC: 143 MMOL/L (ref 136–145)
TRIGL SERPL-MCNC: 88 MG/DL (ref ?–150)
VLDLC SERPL CALC-MCNC: 17.6 MG/DL

## 2022-12-06 PROBLEM — Z12.5 PROSTATE CANCER SCREENING: Status: ACTIVE | Noted: 2017-10-17

## 2022-12-06 PROBLEM — H66.001 NON-RECURRENT ACUTE SUPPURATIVE OTITIS MEDIA OF RIGHT EAR WITHOUT SPONTANEOUS RUPTURE OF TYMPANIC MEMBRANE: Status: RESOLVED | Noted: 2022-09-19 | Resolved: 2022-12-06

## 2022-12-06 PROBLEM — Z00.00 MEDICARE ANNUAL WELLNESS VISIT, SUBSEQUENT: Status: ACTIVE | Noted: 2019-11-20

## 2022-12-06 NOTE — PROGRESS NOTES
This is a Subsequent Medicare Annual Wellness Visit providing Personalized Prevention Plan Services (PPPS) (Performed 12 months after initial AWV and PPPS )    I have reviewed the patient's medical history in detail and updated the computerized patient record. He returns in follow-up of his Medicare subsequent annual wellness examination and screening questionnaire. He is also in follow-up of his multiple medical problems include hypertension, diabetes, hyperlipidemia, cardiomyopathy, GERD, diverticulosis, sleep apnea, DJD, CKD stage III, obesity, history of depression, vitamin D deficiency and other multiple medical problems. He is taking his medication trying to follow his diet try and remain physically active. Currently he denies any chest pain, shortness of breath or cardiac respiratory complaints. He notes no current GI or  complaints. He notes no headaches, dizziness or neurologic complaints. He has no current active arthritic complaints and there are no other complaints on complete review of systems.     History     Past Medical History:   Diagnosis Date    Arthritis     fingers    CAD (coronary artery disease)     Cardiomyopathy ([facility]     Cholelithiasis 9/21/2017    CKD (chronic kidney disease) 9/21/2017    Diabetes mellitus ([facility]     Diverticulosis 9/21/2017    DJD (degenerative joint disease) 9/21/2017    GERD (gastroesophageal reflux disease)     H/O: depression 9/21/2017    Hiatal hernia     Hypercholesteremia     Hypertension     Hypertension with renal disease 9/21/2017    Hypothyroidism     Kidney stones     Obesity 9/21/2017    On statin therapy 9/21/2017    Sleep apnea 9/21/2017    Thyroid disease     Viral meningitis 9/1990      Past Surgical History:   Procedure Laterality Date    HX HERNIA REPAIR      HX KNEE ARTHROSCOPY      both     Social History     Tobacco Use    Smoking status: Never    Smokeless tobacco: Never   Vaping Use    Vaping Use: Never used   Substance Use Topics Alcohol use: No    Drug use: No     Current Outpatient Medications   Medication Sig Dispense Refill    hydrALAZINE (APRESOLINE) 50 mg tablet TAKE 1 TABLET BY MOUTH 3  TIMES DAILY 270 Tablet 3    fenofibrate nanocrystallized (TRICOR) 145 mg tablet TAKE 1 TABLET BY MOUTH  DAILY 90 Tablet 3    rosuvastatin (CRESTOR) 10 mg tablet TAKE 1 TABLET BY MOUTH  DAILY 90 Tablet 3    diclofenac EC (VOLTAREN) 75 mg EC tablet TAKE 1 TABLET BY MOUTH  TWICE DAILY 180 Tablet 3    levothyroxine (SYNTHROID) 50 mcg tablet TAKE 1 TABLET BY MOUTH  DAILY 90 Tablet 3    metFORMIN ER (GLUCOPHAGE XR) 500 mg tablet TAKE 1 TABLET BY MOUTH IN  THE MORNING AND 2 TABLETS  BY MOUTH AT DINNER 270 Tablet 3    Entresto  mg tablet Take 1 Tablet by mouth two (2) times a day. bumetanide (BUMEX) 1 mg tablet Take 1 Tablet by mouth in the morning. 90 Tablet 3    carvediloL (COREG) 25 mg tablet TAKE 1 TABLET BY MOUTH  TWICE DAILY WITH MEALS 180 Tablet 3    niacin ER (NIASPAN) 750 mg Tb24 TAKE 2 TABLETS BY MOUTH AT  BEDTIME 180 Tablet 3    esomeprazole (NEXIUM) 40 mg capsule TAKE 1 CAPSULE BY MOUTH  DAILY 90 Capsule 3    amLODIPine (NORVASC) 5 mg tablet Take 1 Tablet by mouth daily. 90 Tablet 3    fish oil-dha-epa 1,200-144-216 mg cap Take 3 Tablets by mouth daily. aspirin 81 mg tablet Take 81 mg by mouth daily as needed.        Allergies   Allergen Reactions    Demerol [Meperidine] Nausea and Vomiting    Phenergan [Promethazine] Unknown (comments)     Family History   Problem Relation Age of Onset    Heart Disease Mother         AICD    Thyroid Disease Mother     Heart Disease Father         pacemaker    Hypertension Father     Cancer Father         lymphoma    Thyroid Disease Sister     Heart Disease Paternal Grandmother         angina       Patient Active Problem List    Diagnosis    Hypertension with renal disease    Stage 3 chronic kidney disease ([facility] [facility]    Mixed hyperlipidemia    Gastroesophageal reflux disease without esophagitis    Primary osteoarthritis involving multiple joints    Cardiomyopathy ([facility]     Diagnosis 2012 EF at that time 25-30%, follow-up echo in 2014 EF of 30% and apparently had a recent echo showing EF of 25% done early this year 2017      Controlled type 2 diabetes mellitus with stage 3 chronic kidney disease, without long-term current use of insulin (HCC)    Diverticulosis    Class 1 obesity due to excess calories with serious comorbidity and body mass index (BMI) of 33.0 to 33.9 in adult    Acquired hypothyroidism    Sciatica of left side    COVID-19     02/51/4863 without complications. Had been vaccinated      Vitamin D deficiency    Edema    Alcohol screening    Medicare annual wellness visit, subsequent    Hearing impaired    Prostate cancer screening    Cholelithiasis    History of viral meningitis    H/O: depression    Obstructive sleep apnea syndrome       Patient Care Team:  Mirna Conde MD as PCP - General (Internal Medicine Physician)  Mirna Conde MD as PCP - [facility] Empaneled Provider    Depression Risk Factor Screening:     3 most recent PHQ Screens 12/7/2022   Little interest or pleasure in doing things Not at all   Feeling down, depressed, irritable, or hopeless Not at all   Total Score PHQ 2 0     Alcohol Risk Factor Screening: You do not drink alcohol or very rarely. Functional Ability and Level of Safety:     Fall Risk     Fall Risk Assessment, last 12 mths 12/7/2022   Able to walk? Yes   Fall in past 12 months? 0   Do you feel unsteady? 0   Are you worried about falling 0       Hearing Loss   mild    Activities of Daily Living   Self-care.    ADL Assessment 12/7/2022   Feeding yourself No Help Needed   Getting from bed to chair No Help Needed   Getting dressed No Help Needed   Bathing or showering No Help Needed   Walk across the room (includes cane/walker) No Help Needed   Using the telphone No Help Needed   Taking your medications No Help Needed   Preparing meals No Help Needed   Managing money (expenses/bills) No Help Needed   Moderately strenuous housework (laundry) No Help Needed   Shopping for personal items (toiletries/medicines) No Help Needed   Shopping for groceries No Help Needed   Driving No Help Needed   Climbing a flight of stairs No Help Needed   Getting to places beyond walking distances No Help Needed       Abuse Screen   Patient is not abused    Social History     Social History Narrative    Not on file       Review of Systems      ROS:    Constitutional: He denies fevers, weight loss, sweats. Eyes: No blurred or double vision. ENT: No difficulty with swallowing, taste, speech or smell. Neck: no stiffness or swelling  Respiratory: No cough wheezing or shortness of breath. Cardiovascular: Denies chest pain, palpitations, unexplained indigestion or syncope. Gastrointestinal:  No changes in bowel movements, no abdominal pain, no bloating. Genitourinary:  He denies frequency, nocturia or stranguria. Extremities: No joint pain, stiffness or swelling. Neurological:  No numbness, tingling, burring paresthesias or loss of motor strength. No syncope, dizziness or frequent headache  Lymphatic: no adenopathy noted  Hematologic: no easy bruising or bleeding gums  Skin:  No recent rashes or mole changes. Psychiatric/Behavioral:  Negative for depression. Physical Examination     Evaluation of Cognitive Function:  Mood/affect:  happy  Appearance: age appropriate  Family member/caregiver input: None    Vitals:    12/07/22 0908   BP: 130/72   Pulse: 68   Resp: 16   Temp: 97.8 °F (36.6 °C)   TempSrc: Oral   SpO2: 97%   Weight: 242 lb (109.8 kg)   Height: 6' 1\" (1.854 m)   PainSc:   0 - No pain        PHYSICAL EXAM:    General appearance - alert, well appearing, and in no distress  Mental status - alert, oriented to person, place, and time  HEENT:  Ears - bilateral TM's and external ear canals clear  Eyes - pupillary responses were normal.  Extraocular muscle function intact.   Lids and conjunctiva not injected. Fundoscopic exam revealed sharp disc margins. eye movements intact  Pharynx- clear with teeth in good repair. No masses were noted  Neck - supple without thyromegaly or burit. No JVD noted  Lungs - clear to auscultation and percussion  Cardiac- normal rate, regular rhythm without murmurs. PMI not displaced. No gallop, rub or click  Breast: deferred to GYN  Abdomen - flat, soft, non-tender without palpable organomegaly or mass. No pulsatile mass was felt, and not bruit was heard. Bowel sounds were active   Female - deferred to GYN  Rectal - deferred to GYN  Extremities -  no clubbing cyanosis or edema  Lymphatics - no palpable lymphadenopathy, no hepatosplenomegaly  Peripheral vascular - Dorsalis pedis and posterior tibial pulses felt without difficulty  Skin - no rash or unusual mole change noted  Neurological - Cranial nerves II-XII grossly intact. Motor strength 5/5. DTR's 2+ and symmetric.   Station and gait normal  Back exam - full range of motion, no tenderness, palpable spasm or pain on motion  Musculoskeletal - no joint tenderness, deformity or swelling  Hematologic: no purpura, petechiae or bruising     Results for orders placed or performed in visit on 10/26/22   HEMOGLOBIN A1C WITH EAG   Result Value Ref Range    Hemoglobin A1c 5.9 (H) 4.0 - 5.6 %    Est. average glucose 123 mg/dL   CK   Result Value Ref Range     39 - 308 U/L   LIPID PANEL   Result Value Ref Range    Cholesterol, total 116 <200 MG/DL    Triglyceride 88 <150 MG/DL    HDL Cholesterol 39 MG/DL    LDL, calculated 59.4 0 - 100 MG/DL    VLDL, calculated 17.6 MG/DL    CHOL/HDL Ratio 3.0 0.0 - 5.0     METABOLIC PANEL, COMPREHENSIVE   Result Value Ref Range    Sodium 143 136 - 145 mmol/L    Potassium 3.7 3.5 - 5.1 mmol/L    Chloride 111 (H) 97 - 108 mmol/L    CO2 29 21 - 32 mmol/L    Anion gap 3 (L) 5 - 15 mmol/L    Glucose 129 (H) 65 - 100 mg/dL    BUN 22 (H) 6 - 20 MG/DL    Creatinine 1.38 (H) 0.70 - 1.30 MG/DL    BUN/Creatinine ratio 16 12 - 20      eGFR 55 (L) >60 ml/min/1.73m2    Calcium 8.7 8.5 - 10.1 MG/DL    Bilirubin, total 0.5 0.2 - 1.0 MG/DL    ALT (SGPT) 24 12 - 78 U/L    AST (SGOT) 17 15 - 37 U/L    Alk. phosphatase 76 45 - 117 U/L    Protein, total 6.8 6.4 - 8.2 g/dL    Albumin 4.0 3.5 - 5.0 g/dL    Globulin 2.8 2.0 - 4.0 g/dL    A-G Ratio 1.4 1.1 - 2.2         Advice/Referrals/Counseling   Education and counseling provided:  Are appropriate based on today's review and evaluation  End-of-Life planning (with patient's consent)  Pneumococcal Vaccine  Influenza Vaccine  Colorectal cancer screening tests      Assessment/Plan     ASSESSMENT:   1. Hypertension with renal disease    2. Controlled type 2 diabetes mellitus with stage 3 chronic kidney disease, without long-term current use of insulin ([address]    3. Mixed hyperlipidemia    4. Gastroesophageal reflux disease without esophagitis    5. Primary osteoarthritis involving multiple joints    6. Stage 3 chronic kidney disease, unspecified whether stage 3a or 3b CKD ([address]    7. Cardiomyopathy, unspecified type ([address]    8. Obstructive sleep apnea syndrome    9. H/O: depression    10. Bilateral hearing loss, unspecified hearing loss type    11. Vitamin D deficiency    12. Acquired hypothyroidism    13. Diverticulosis    14. Class 1 obesity due to excess calories with serious comorbidity and body mass index (BMI) of 33.0 to 33.9 in adult    15. Prostate cancer screening    16. Alcohol screening    17. Medicare annual wellness visit, subsequent      Impression  1. Hypertension that is controlled so continue current therapy reviewed with him. 2.  Diabetes repeat status pending prior lab reviewed last glycohemoglobin done October 26 was 5.9 so that is not repeated today. Blood sugars pending. 3.  Hyperlipidemia prior lab reviewed repeat status pending   4. GERD that is stable  5. DJD chronic but stable  6. CKD stage III repeat status pending  7.   Cardiomyopathy EKG obtained today no acute process. No evidence of heart failure  8. Sleep apnea stable  9. History depression now controlled  10. Bilateral hearing loss seems stable  11 vitamin D deficiency repeat status pending  12. Hypothyroidism repeat status pending next Umber 13 diverticulosis no recent symptoms  14. Obesity we did discuss diet, exercise weight reduction for overall health benefit. 15.  Prostate cancer screening done today with PSA pending  16. Annual alcohol screening is done today and he claims to drink no alcohol at all. We did spend 8 minutes discussing excessive alcohol intake in males who average more than 2 drinks per day with increased cardiovascular risk and increased risk of liver disease and other GI problems. Medicare subsequent annual wellness examination screening questionnaire is completed today. The results were reviewed with him and his questions were answered. Lifestyle recommendations modifications discussed and made. Follow-up 3 months or sooner should to be a problem. I will call with lab results in the interim. PLAN:  .  Orders Placed This Encounter    CBC WITH AUTOMATED DIFF    METABOLIC PANEL, COMPREHENSIVE    LIPID PANEL    CK    T4 (THYROXINE)    TSH 3RD GENERATION    URINALYSIS W/ REFLEX CULTURE    VITAMIN D, 25 HYDROXY    PSA SCREENING (SCREENING)    MICROALBUMIN, UR, RAND W/ MICROALB/CREAT RATIO    AMB POC EKG ROUTINE W/ 12 LEADS, INTER & REP         ATTENTION:   This medical record was transcribed using an electronic medical records system. Although proofread, it may and can contain electronic and spelling errors. Other human spelling and other errors may be present. Corrections may be executed at a later time. Please feel free to contact us for any clarifications as needed. Cori Razo MD       Recommended healthy diet low in carbohydrates, fats, sodium and cholesterol.   Recommended regular cardiovascular exercise 3-6 times per week for 30-60 minutes daily. Current Outpatient Medications   Medication Sig Dispense Refill    hydrALAZINE (APRESOLINE) 50 mg tablet TAKE 1 TABLET BY MOUTH 3  TIMES DAILY 270 Tablet 3    fenofibrate nanocrystallized (TRICOR) 145 mg tablet TAKE 1 TABLET BY MOUTH  DAILY 90 Tablet 3    rosuvastatin (CRESTOR) 10 mg tablet TAKE 1 TABLET BY MOUTH  DAILY 90 Tablet 3    diclofenac EC (VOLTAREN) 75 mg EC tablet TAKE 1 TABLET BY MOUTH  TWICE DAILY 180 Tablet 3    levothyroxine (SYNTHROID) 50 mcg tablet TAKE 1 TABLET BY MOUTH  DAILY 90 Tablet 3    metFORMIN ER (GLUCOPHAGE XR) 500 mg tablet TAKE 1 TABLET BY MOUTH IN  THE MORNING AND 2 TABLETS  BY MOUTH AT DINNER 270 Tablet 3    Entresto  mg tablet Take 1 Tablet by mouth two (2) times a day. bumetanide (BUMEX) 1 mg tablet Take 1 Tablet by mouth in the morning. 90 Tablet 3    carvediloL (COREG) 25 mg tablet TAKE 1 TABLET BY MOUTH  TWICE DAILY WITH MEALS 180 Tablet 3    niacin ER (NIASPAN) 750 mg Tb24 TAKE 2 TABLETS BY MOUTH AT  BEDTIME 180 Tablet 3    esomeprazole (NEXIUM) 40 mg capsule TAKE 1 CAPSULE BY MOUTH  DAILY 90 Capsule 3    amLODIPine (NORVASC) 5 mg tablet Take 1 Tablet by mouth daily. 90 Tablet 3    fish oil-dha-epa 1,200-144-216 mg cap Take 3 Tablets by mouth daily. aspirin 81 mg tablet Take 81 mg by mouth daily as needed. No results found for any visits on 12/07/22. Verbal and written instructions (see AVS) provided. Patient expresses understanding of diagnosis and treatment plan.     Loly Wu MD

## 2022-12-07 ENCOUNTER — OFFICE VISIT (OUTPATIENT)
Dept: INTERNAL MEDICINE CLINIC | Age: 72
End: 2022-12-07
Payer: MEDICARE

## 2022-12-07 VITALS
HEIGHT: 73 IN | DIASTOLIC BLOOD PRESSURE: 72 MMHG | SYSTOLIC BLOOD PRESSURE: 130 MMHG | WEIGHT: 242 LBS | RESPIRATION RATE: 16 BRPM | OXYGEN SATURATION: 97 % | TEMPERATURE: 97.8 F | HEART RATE: 68 BPM | BODY MASS INDEX: 32.07 KG/M2

## 2022-12-07 DIAGNOSIS — Z12.5 PROSTATE CANCER SCREENING: ICD-10-CM

## 2022-12-07 DIAGNOSIS — N18.30 CONTROLLED TYPE 2 DIABETES MELLITUS WITH STAGE 3 CHRONIC KIDNEY DISEASE, WITHOUT LONG-TERM CURRENT USE OF INSULIN (HCC): ICD-10-CM

## 2022-12-07 DIAGNOSIS — E03.9 ACQUIRED HYPOTHYROIDISM: ICD-10-CM

## 2022-12-07 DIAGNOSIS — N18.30 STAGE 3 CHRONIC KIDNEY DISEASE, UNSPECIFIED WHETHER STAGE 3A OR 3B CKD (HCC): ICD-10-CM

## 2022-12-07 DIAGNOSIS — K57.90 DIVERTICULOSIS: ICD-10-CM

## 2022-12-07 DIAGNOSIS — E78.2 MIXED HYPERLIPIDEMIA: ICD-10-CM

## 2022-12-07 DIAGNOSIS — E55.9 VITAMIN D DEFICIENCY: ICD-10-CM

## 2022-12-07 DIAGNOSIS — M15.9 PRIMARY OSTEOARTHRITIS INVOLVING MULTIPLE JOINTS: ICD-10-CM

## 2022-12-07 DIAGNOSIS — K21.9 GASTROESOPHAGEAL REFLUX DISEASE WITHOUT ESOPHAGITIS: ICD-10-CM

## 2022-12-07 DIAGNOSIS — G47.33 OBSTRUCTIVE SLEEP APNEA SYNDROME: ICD-10-CM

## 2022-12-07 DIAGNOSIS — Z86.59 H/O: DEPRESSION: ICD-10-CM

## 2022-12-07 DIAGNOSIS — E11.22 CONTROLLED TYPE 2 DIABETES MELLITUS WITH STAGE 3 CHRONIC KIDNEY DISEASE, WITHOUT LONG-TERM CURRENT USE OF INSULIN (HCC): ICD-10-CM

## 2022-12-07 DIAGNOSIS — I42.9 CARDIOMYOPATHY, UNSPECIFIED TYPE (HCC): ICD-10-CM

## 2022-12-07 DIAGNOSIS — E66.09 CLASS 1 OBESITY DUE TO EXCESS CALORIES WITH SERIOUS COMORBIDITY AND BODY MASS INDEX (BMI) OF 33.0 TO 33.9 IN ADULT: ICD-10-CM

## 2022-12-07 DIAGNOSIS — Z00.00 MEDICARE ANNUAL WELLNESS VISIT, SUBSEQUENT: ICD-10-CM

## 2022-12-07 DIAGNOSIS — H91.93 BILATERAL HEARING LOSS, UNSPECIFIED HEARING LOSS TYPE: ICD-10-CM

## 2022-12-07 DIAGNOSIS — Z13.39 ALCOHOL SCREENING: ICD-10-CM

## 2022-12-07 DIAGNOSIS — I12.9 HYPERTENSION WITH RENAL DISEASE: Primary | ICD-10-CM

## 2022-12-07 PROCEDURE — 3044F HG A1C LEVEL LT 7.0%: CPT | Performed by: INTERNAL MEDICINE

## 2022-12-07 PROCEDURE — 1101F PT FALLS ASSESS-DOCD LE1/YR: CPT | Performed by: INTERNAL MEDICINE

## 2022-12-07 PROCEDURE — G0439 PPPS, SUBSEQ VISIT: HCPCS | Performed by: INTERNAL MEDICINE

## 2022-12-07 PROCEDURE — G8536 NO DOC ELDER MAL SCRN: HCPCS | Performed by: INTERNAL MEDICINE

## 2022-12-07 PROCEDURE — 3017F COLORECTAL CA SCREEN DOC REV: CPT | Performed by: INTERNAL MEDICINE

## 2022-12-07 PROCEDURE — G8417 CALC BMI ABV UP PARAM F/U: HCPCS | Performed by: INTERNAL MEDICINE

## 2022-12-07 PROCEDURE — 2022F DILAT RTA XM EVC RTNOPTHY: CPT | Performed by: INTERNAL MEDICINE

## 2022-12-07 PROCEDURE — G8432 DEP SCR NOT DOC, RNG: HCPCS | Performed by: INTERNAL MEDICINE

## 2022-12-07 PROCEDURE — 99214 OFFICE O/P EST MOD 30 MIN: CPT | Performed by: INTERNAL MEDICINE

## 2022-12-07 PROCEDURE — 1123F ACP DISCUSS/DSCN MKR DOCD: CPT | Performed by: INTERNAL MEDICINE

## 2022-12-07 PROCEDURE — 93000 ELECTROCARDIOGRAM COMPLETE: CPT | Performed by: INTERNAL MEDICINE

## 2022-12-07 PROCEDURE — G0442 ANNUAL ALCOHOL SCREEN 15 MIN: HCPCS | Performed by: INTERNAL MEDICINE

## 2022-12-07 PROCEDURE — G8427 DOCREV CUR MEDS BY ELIG CLIN: HCPCS | Performed by: INTERNAL MEDICINE

## 2022-12-07 NOTE — PATIENT INSTRUCTIONS
Medicare Wellness Visit, Male    The best way to live healthy is to have a lifestyle where you eat a well-balanced diet, exercise regularly, limit alcohol use, and quit all forms of tobacco/nicotine, if applicable. Regular preventive services are another way to keep healthy. Preventive services (vaccines, screening tests, monitoring & exams) can help personalize your care plan, which helps you manage your own care. Screening tests can find health problems at the earliest stages, when they are easiest to treat. [facility] follows the current, evidence-based guidelines published by the [facility]al[facility] ([facility]) when recommending preventive services for our patients. Because we follow these guidelines, sometimes recommendations change over time as research supports it. (For example, a prostate screening blood test is no longer routinely recommended for men with no symptoms). Of course, you and your doctor may decide to screen more often for some diseases, based on your risk and co-morbidities (chronic disease you are already diagnosed with). Preventive services for you include:  - Medicare offers their members a free annual wellness visit, which is time for you and your primary care provider to discuss and plan for your preventive service needs.  Take advantage of this benefit every year!    -Over the age of 72 should receive the recommended pneumonia vaccines.   -All adults should have a flu vaccine yearly.  -All adults should receive a tetanus vaccine every 10 years.  -Over the age of 48 should receive the shingles vaccines.    -All adults should be screened once for Hepatitis C.  -All adults age 38-68 who are overweight should have a diabetes screening test once every three years.   -Other screening tests & preventive services for persons with diabetes include: an eye exam to screen for diabetic retinopathy, a kidney function test, a foot exam, and stricter control over your cholesterol.   -Cardiovascular screening for adults with routine risk involves an electrocardiogram (ECG) at intervals determined by the provider.     -Colorectal cancer screening should be done for adults age 43-69 with no increased risk factors for colorectal cancer. There are a number of acceptable methods of screening for this type of cancer. Each test has its own benefits and drawbacks. Discuss with your provider what is most appropriate for you during your annual wellness visit. The different tests include: colonoscopy (considered the best screening method), a fecal occult blood test, a fecal DNA test, and sigmoidoscopy.    -Lung cancer screening is recommended annually with a low dose CT scan for adults between age 54 and 68, who have smoked at least 30 pack years (equivalent of 1 pack per day for 30 days), and who is a current smoker or quit less than 15 years ago. -An Abdominal Aortic Aneurysm (AAA) Screening is recommended for men age 73-68 who has ever smoked in their lifetime.      Here is a list of your current Health Maintenance items (your personalized list of preventive services) with a due date:  Health Maintenance Due   Topic Date Due    Eye Exam  Never done    DTaP/Tdap/Td  (1 - Tdap) Never done    Shingles Vaccine (1 of 2) Never done    Colorectal Screening  02/17/2019    Pneumococcal Vaccine (2 - PPSV23 if available, else PCV20) 11/16/2020    COVID-19 Vaccine (3 - Booster for Pfizer series) 05/25/2021    Albumin Urine Test  12/03/2022    Annual Well Visit  12/04/2022

## 2022-12-07 NOTE — PROGRESS NOTES
Chief Complaint   Patient presents with    Annual Wellness Visit     Visit Vitals  /72 (BP 1 Location: Left upper arm, BP Patient Position: Sitting, BP Cuff Size: Adult)   Pulse 68   Temp 97.8 °F (36.6 °C) (Oral)   Resp 16   Ht 6' 1\" (1.854 m)   Wt 242 lb (109.8 kg)   SpO2 97%   BMI 31.93 kg/m²       Depression Risk Factor Screening:     3 most recent PHQ Screens 12/7/2022   Little interest or pleasure in doing things Not at all   Feeling down, depressed, irritable, or hopeless Not at all   Total Score PHQ 2 0       Functional Ability and Level of Safety:     Activities of Daily Living  ADL Assessment 12/7/2022   Feeding yourself No Help Needed   Getting from bed to chair No Help Needed   Getting dressed No Help Needed   Bathing or showering No Help Needed   Walk across the room (includes cane/walker) No Help Needed   Using the telphone No Help Needed   Taking your medications No Help Needed   Preparing meals No Help Needed   Managing money (expenses/bills) No Help Needed   Moderately strenuous housework (laundry) No Help Needed   Shopping for personal items (toiletries/medicines) No Help Needed   Shopping for groceries No Help Needed   Driving No Help Needed   Climbing a flight of stairs No Help Needed   Getting to places beyond walking distances No Help Needed       Fall Risk  Fall Risk Assessment, last 12 mths 12/7/2022   Able to walk? Yes   Fall in past 12 months? 0   Do you feel unsteady? 0   Are you worried about falling 0       Abuse Screen  Abuse Screening Questionnaire 12/7/2022   Do you ever feel afraid of your partner? N   Are you in a relationship with someone who physically or mentally threatens you? N   Is it safe for you to go home?  Y         Patient Care Team   Patient Care Team:  Mahsa Guadarrama MD as PCP - General (Internal Medicine Physician)  Mahsa Guadarrama MD as PCP - [facility] Empaneled Provider

## 2022-12-08 ENCOUNTER — PATIENT MESSAGE (OUTPATIENT)
Dept: OTHER | Facility: CLINIC | Age: 72
End: 2022-12-08

## 2022-12-08 LAB
25(OH)D3 SERPL-MCNC: 15.7 NG/ML (ref 30–100)
ALBUMIN SERPL-MCNC: 4.3 G/DL (ref 3.5–5)
ALBUMIN/GLOB SERPL: 1.7 {RATIO} (ref 1.1–2.2)
ALP SERPL-CCNC: 73 U/L (ref 45–117)
ALT SERPL-CCNC: 24 U/L (ref 12–78)
ANION GAP SERPL CALC-SCNC: 5 MMOL/L (ref 5–15)
APPEARANCE UR: CLEAR
AST SERPL-CCNC: 17 U/L (ref 15–37)
BACTERIA URNS QL MICRO: NEGATIVE /HPF
BASOPHILS # BLD: 0.1 K/UL (ref 0–0.1)
BASOPHILS NFR BLD: 1 % (ref 0–1)
BILIRUB SERPL-MCNC: 0.5 MG/DL (ref 0.2–1)
BILIRUB UR QL: NEGATIVE
BUN SERPL-MCNC: 16 MG/DL (ref 6–20)
BUN/CREAT SERPL: 14 (ref 12–20)
CALCIUM SERPL-MCNC: 8.8 MG/DL (ref 8.5–10.1)
CHLORIDE SERPL-SCNC: 110 MMOL/L (ref 97–108)
CHOLEST SERPL-MCNC: 125 MG/DL
CK SERPL-CCNC: 101 U/L (ref 39–308)
CO2 SERPL-SCNC: 28 MMOL/L (ref 21–32)
COLOR UR: NORMAL
CREAT SERPL-MCNC: 1.14 MG/DL (ref 0.7–1.3)
CREAT UR-MCNC: 29.6 MG/DL
DIFFERENTIAL METHOD BLD: ABNORMAL
EOSINOPHIL # BLD: 0.1 K/UL (ref 0–0.4)
EOSINOPHIL NFR BLD: 3 % (ref 0–7)
EPITH CASTS URNS QL MICRO: NORMAL /LPF
ERYTHROCYTE [DISTWIDTH] IN BLOOD BY AUTOMATED COUNT: 13.2 % (ref 11.5–14.5)
GLOBULIN SER CALC-MCNC: 2.6 G/DL (ref 2–4)
GLUCOSE SERPL-MCNC: 114 MG/DL (ref 65–100)
GLUCOSE UR STRIP.AUTO-MCNC: NEGATIVE MG/DL
HCT VFR BLD AUTO: 36.5 % (ref 36.6–50.3)
HDLC SERPL-MCNC: 32 MG/DL
HDLC SERPL: 3.9 {RATIO} (ref 0–5)
HGB BLD-MCNC: 12.1 G/DL (ref 12.1–17)
HGB UR QL STRIP: NEGATIVE
HYALINE CASTS URNS QL MICRO: NORMAL /LPF (ref 0–5)
IMM GRANULOCYTES # BLD AUTO: 0 K/UL (ref 0–0.04)
IMM GRANULOCYTES NFR BLD AUTO: 0 % (ref 0–0.5)
KETONES UR QL STRIP.AUTO: NEGATIVE MG/DL
LDLC SERPL CALC-MCNC: 67.6 MG/DL (ref 0–100)
LEUKOCYTE ESTERASE UR QL STRIP.AUTO: NEGATIVE
LYMPHOCYTES # BLD: 1.5 K/UL (ref 0.8–3.5)
LYMPHOCYTES NFR BLD: 35 % (ref 12–49)
MCH RBC QN AUTO: 31.2 PG (ref 26–34)
MCHC RBC AUTO-ENTMCNC: 33.2 G/DL (ref 30–36.5)
MCV RBC AUTO: 94.1 FL (ref 80–99)
MICROALBUMIN UR-MCNC: 2.23 MG/DL
MICROALBUMIN/CREAT UR-RTO: 75 MG/G (ref 0–30)
MONOCYTES # BLD: 0.4 K/UL (ref 0–1)
MONOCYTES NFR BLD: 9 % (ref 5–13)
NEUTS SEG # BLD: 2.3 K/UL (ref 1.8–8)
NEUTS SEG NFR BLD: 52 % (ref 32–75)
NITRITE UR QL STRIP.AUTO: NEGATIVE
NRBC # BLD: 0 K/UL (ref 0–0.01)
NRBC BLD-RTO: 0 PER 100 WBC
PH UR STRIP: 5 [PH] (ref 5–8)
PLATELET # BLD AUTO: 201 K/UL (ref 150–400)
PMV BLD AUTO: 10.8 FL (ref 8.9–12.9)
POTASSIUM SERPL-SCNC: 3.7 MMOL/L (ref 3.5–5.1)
PROT SERPL-MCNC: 6.9 G/DL (ref 6.4–8.2)
PROT UR STRIP-MCNC: NEGATIVE MG/DL
PSA SERPL-MCNC: 2.8 NG/ML (ref 0.01–4)
RBC # BLD AUTO: 3.88 M/UL (ref 4.1–5.7)
RBC #/AREA URNS HPF: NORMAL /HPF (ref 0–5)
SODIUM SERPL-SCNC: 143 MMOL/L (ref 136–145)
SP GR UR REFRACTOMETRY: 1.01 (ref 1–1.03)
T4 SERPL-MCNC: 9.3 UG/DL (ref 4.5–12.1)
TRIGL SERPL-MCNC: 127 MG/DL (ref ?–150)
TSH SERPL DL<=0.05 MIU/L-ACNC: 1.19 UIU/ML (ref 0.36–3.74)
UA: UC IF INDICATED,UAUC: NORMAL
UROBILINOGEN UR QL STRIP.AUTO: 0.2 EU/DL (ref 0.2–1)
VLDLC SERPL CALC-MCNC: 25.4 MG/DL
WBC # BLD AUTO: 4.4 K/UL (ref 4.1–11.1)
WBC URNS QL MICRO: NORMAL /HPF (ref 0–4)

## 2022-12-09 NOTE — PROGRESS NOTES
Results have been reviewed and abnormal results noted. Please see documentation in new EMR. Please call the patient regarding his abnormal result.  Vitamin D level is low so start of 1000 units daily.  Other labs are okay. Letter generated to patient regarding.

## 2023-01-05 PROBLEM — Z00.00 MEDICARE ANNUAL WELLNESS VISIT, SUBSEQUENT: Status: RESOLVED | Noted: 2019-11-20 | Resolved: 2023-01-05

## 2023-01-05 PROBLEM — Z12.5 PROSTATE CANCER SCREENING: Status: RESOLVED | Noted: 2017-10-17 | Resolved: 2023-01-05

## 2023-01-30 RX ORDER — CARVEDILOL 25 MG/1
TABLET ORAL
Qty: 180 TABLET | Refills: 3 | Status: SHIPPED | OUTPATIENT
Start: 2023-01-30

## 2023-01-30 RX ORDER — AMLODIPINE BESYLATE 5 MG/1
5 TABLET ORAL DAILY
Qty: 90 TABLET | Refills: 3 | Status: SHIPPED | OUTPATIENT
Start: 2023-01-30

## 2023-01-30 NOTE — TELEPHONE ENCOUNTER
RX refill request from the patient/pharmacy. Patient last seen 12- with labs, and next appt. scheduled for 03-  Requested Prescriptions     Pending Prescriptions Disp Refills    carvediloL (COREG) 25 mg tablet [Pharmacy Med Name: Carvedilol 25 MG Oral Tablet] 180 Tablet 3     Sig: TAKE 1 TABLET BY MOUTH  TWICE DAILY WITH MEALS    amLODIPine (NORVASC) 5 mg tablet [Pharmacy Med Name: amLODIPine Besylate 5 MG Oral Tablet] 90 Tablet 3     Sig: TAKE 1 TABLET BY MOUTH  DAILY    .

## 2023-03-29 ENCOUNTER — OFFICE VISIT (OUTPATIENT)
Dept: INTERNAL MEDICINE CLINIC | Age: 73
End: 2023-03-29
Payer: MEDICARE

## 2023-03-29 VITALS
DIASTOLIC BLOOD PRESSURE: 69 MMHG | WEIGHT: 247.2 LBS | BODY MASS INDEX: 32.76 KG/M2 | HEART RATE: 67 BPM | TEMPERATURE: 98.3 F | SYSTOLIC BLOOD PRESSURE: 117 MMHG | OXYGEN SATURATION: 98 % | HEIGHT: 73 IN

## 2023-03-29 DIAGNOSIS — N18.30 CONTROLLED TYPE 2 DIABETES MELLITUS WITH STAGE 3 CHRONIC KIDNEY DISEASE, WITHOUT LONG-TERM CURRENT USE OF INSULIN (HCC): ICD-10-CM

## 2023-03-29 DIAGNOSIS — M15.9 PRIMARY OSTEOARTHRITIS INVOLVING MULTIPLE JOINTS: ICD-10-CM

## 2023-03-29 DIAGNOSIS — I12.9 HYPERTENSION WITH RENAL DISEASE: Primary | ICD-10-CM

## 2023-03-29 DIAGNOSIS — K21.9 GASTROESOPHAGEAL REFLUX DISEASE WITHOUT ESOPHAGITIS: ICD-10-CM

## 2023-03-29 DIAGNOSIS — E78.2 MIXED HYPERLIPIDEMIA: ICD-10-CM

## 2023-03-29 DIAGNOSIS — E66.09 CLASS 1 OBESITY DUE TO EXCESS CALORIES WITH SERIOUS COMORBIDITY AND BODY MASS INDEX (BMI) OF 33.0 TO 33.9 IN ADULT: ICD-10-CM

## 2023-03-29 DIAGNOSIS — N18.30 STAGE 3 CHRONIC KIDNEY DISEASE, UNSPECIFIED WHETHER STAGE 3A OR 3B CKD (HCC): ICD-10-CM

## 2023-03-29 DIAGNOSIS — E11.22 CONTROLLED TYPE 2 DIABETES MELLITUS WITH STAGE 3 CHRONIC KIDNEY DISEASE, WITHOUT LONG-TERM CURRENT USE OF INSULIN (HCC): ICD-10-CM

## 2023-03-29 DIAGNOSIS — I42.9 CARDIOMYOPATHY, UNSPECIFIED TYPE (HCC): ICD-10-CM

## 2023-03-29 PROCEDURE — G8510 SCR DEP NEG, NO PLAN REQD: HCPCS | Performed by: INTERNAL MEDICINE

## 2023-03-29 PROCEDURE — 3046F HEMOGLOBIN A1C LEVEL >9.0%: CPT | Performed by: INTERNAL MEDICINE

## 2023-03-29 PROCEDURE — 1123F ACP DISCUSS/DSCN MKR DOCD: CPT | Performed by: INTERNAL MEDICINE

## 2023-03-29 PROCEDURE — 2022F DILAT RTA XM EVC RTNOPTHY: CPT | Performed by: INTERNAL MEDICINE

## 2023-03-29 PROCEDURE — G8417 CALC BMI ABV UP PARAM F/U: HCPCS | Performed by: INTERNAL MEDICINE

## 2023-03-29 PROCEDURE — 1101F PT FALLS ASSESS-DOCD LE1/YR: CPT | Performed by: INTERNAL MEDICINE

## 2023-03-29 PROCEDURE — 99214 OFFICE O/P EST MOD 30 MIN: CPT | Performed by: INTERNAL MEDICINE

## 2023-03-29 PROCEDURE — G8427 DOCREV CUR MEDS BY ELIG CLIN: HCPCS | Performed by: INTERNAL MEDICINE

## 2023-03-29 PROCEDURE — 3017F COLORECTAL CA SCREEN DOC REV: CPT | Performed by: INTERNAL MEDICINE

## 2023-03-29 PROCEDURE — G8536 NO DOC ELDER MAL SCRN: HCPCS | Performed by: INTERNAL MEDICINE

## 2023-03-29 NOTE — PROGRESS NOTES
Chief Complaint   Patient presents with    Hypertension    Diabetes    Osteoarthritis    Cholesterol Problem    Vitamin D Deficiency       SUBJECTIVE:    Jonatan Servin III is a 72 y.o. male who returns in follow-up his medical problems include hypertension, diabetes, hyperlipidemia, ASCVD, cardiomyopathy, status post pacemaker placement/AICD, obesity and other medical problems. He is taking his medication trying to follow his diet and generally feels well. He does note some fatigue sometimes in the morning but that gets better as the day goes on. He did recently see his cardiologist told him that he probably needs an upgrade on his AICD and they might make him feel better if he did that. He currently denies any chest pain, shortness of breath or cardiorespiratory complaints. There are no GI or  complaints. He has no headaches, dizziness or neurologic complaints. He has no change of his chronic arthritic complaints and no other complaints on complete review of systems. Current Outpatient Medications   Medication Sig Dispense Refill    carvediloL (COREG) 25 mg tablet TAKE 1 TABLET BY MOUTH  TWICE DAILY WITH MEALS 180 Tablet 3    amLODIPine (NORVASC) 5 mg tablet TAKE 1 TABLET BY MOUTH  DAILY 90 Tablet 3    hydrALAZINE (APRESOLINE) 50 mg tablet TAKE 1 TABLET BY MOUTH 3  TIMES DAILY 270 Tablet 3    fenofibrate nanocrystallized (TRICOR) 145 mg tablet TAKE 1 TABLET BY MOUTH  DAILY 90 Tablet 3    rosuvastatin (CRESTOR) 10 mg tablet TAKE 1 TABLET BY MOUTH  DAILY 90 Tablet 3    diclofenac EC (VOLTAREN) 75 mg EC tablet TAKE 1 TABLET BY MOUTH  TWICE DAILY 180 Tablet 3    levothyroxine (SYNTHROID) 50 mcg tablet TAKE 1 TABLET BY MOUTH  DAILY 90 Tablet 3    metFORMIN ER (GLUCOPHAGE XR) 500 mg tablet TAKE 1 TABLET BY MOUTH IN  THE MORNING AND 2 TABLETS  BY MOUTH AT DINNER 270 Tablet 3    Entresto  mg tablet Take 1 Tablet by mouth two (2) times a day.       bumetanide (BUMEX) 1 mg tablet Take 1 Tablet by mouth in the morning. 90 Tablet 3    niacin ER (NIASPAN) 750 mg Tb24 TAKE 2 TABLETS BY MOUTH AT  BEDTIME 180 Tablet 3    esomeprazole (NEXIUM) 40 mg capsule TAKE 1 CAPSULE BY MOUTH  DAILY 90 Capsule 3    fish oil-dha-epa 1,200-144-216 mg cap Take 3 Tablets by mouth daily. aspirin 81 mg tablet Take 81 mg by mouth daily as needed.        Past Medical History:   Diagnosis Date    Arthritis     fingers    CAD (coronary artery disease)     Cardiomyopathy ([facility] [address]     Cholelithiasis 9/21/2017    CKD (chronic kidney disease) 9/21/2017    Diabetes mellitus ([facility] [address]     Diverticulosis 9/21/2017    DJD (degenerative joint disease) 9/21/2017    GERD (gastroesophageal reflux disease)     H/O: depression 9/21/2017    Hiatal hernia     Hypercholesteremia     Hypertension     Hypertension with renal disease 9/21/2017    Hypothyroidism     Kidney stones     Obesity 9/21/2017    On statin therapy 9/21/2017    Sleep apnea 9/21/2017    Thyroid disease     Viral meningitis 9/1990     Past Surgical History:   Procedure Laterality Date    HX HERNIA REPAIR      HX KNEE ARTHROSCOPY      both     Allergies   Allergen Reactions    Demerol [Meperidine] Nausea and Vomiting    Phenergan [Promethazine] Unknown (comments)       REVIEW OF SYSTEMS:  General: negative for - chills or fever, or weight loss or gain  ENT: negative for - headaches, nasal congestion or tinnitus  Eyes: no blurred or visual changes  Neck: No stiffness or swollen nodes  Respiratory: negative for - cough, hemoptysis, shortness of breath or wheezing  Cardiovascular : negative for - chest pain, edema, palpitations or shortness of breath  Gastrointestinal: negative for - abdominal pain, blood in stools, heartburn or nausea/vomiting  Genito-Urinary: no dysuria, trouble voiding, or hematuria  Musculoskeletal: negative for - gait disturbance, joint pain, joint stiffness or joint swelling  Neurological: no TIA or stroke symptoms  Hematologic: no bruises, no bleeding  Lymphatic: no swollen glands  Integument: no lumps, mole changes, nail changes or rash  Endocrine:no malaise/lethargy poly uria or polydipsia or unexpected weight changes        Social History     Socioeconomic History    Marital status:    Tobacco Use    Smoking status: Never    Smokeless tobacco: Never   Vaping Use    Vaping Use: Never used   Substance and Sexual Activity    Alcohol use: No    Drug use: No    Sexual activity: Yes     Partners: Female     Family History   Problem Relation Age of Onset    Heart Disease Mother         AICD    Thyroid Disease Mother     Heart Disease Father         pacemaker    Hypertension Father     Cancer Father         lymphoma    Thyroid Disease Sister     Heart Disease Paternal Grandmother         angina       OBJECTIVE:     Visit Vitals  /69 (BP 1 Location: Right arm, BP Patient Position: Sitting)   Pulse 67   Temp 98.3 °F (36.8 °C)   Ht 6' 1\" (1.854 m)   Wt 247 lb 3.2 oz (112.1 kg)   SpO2 98%   BMI 32.61 kg/m²     CONSTITUTIONAL:   well nourished, appears age appropriate  EYES: sclera anicteric, PERRL, EOMI  ENMT:nares clear, moist mucous membranes, pharynx clear  NECK: supple. Thyroid normal, No JVD or bruits  RESPIRATORY: Chest: clear to ascultation and percussion, normal inspiratory effort  CARDIOVASCULAR: Heart: regular rate and rhythm no murmurs, rubs or gallops, PMI not displaced, No thrills, no peripheral edema  GASTROINTESTINAL: Abdomen: non distended, soft, non tender, bowel sounds normal  HEMATOLOGIC: no purpura, petechiae or bruising  LYMPHATIC: No lymph node enlargemant  MUSCULOSKELETAL: Extremities: no active synovitis, pulse 1+. No diabetic foot changes noted. INTEGUMENT: No unusual rashes or suspicious skin lesions noted. Nails appear normal.  PERIPHERAL VASCULAR: normal pulses femoral, PT and DP  NEUROLOGIC: non-focal exam, A & O X 3. Normal distal sensation and proprioception all toes both feet. PSYCHIATRIC:, appropriate affect     ASSESSMENT:   1. Hypertension with renal disease    2. Controlled type 2 diabetes mellitus with stage 3 chronic kidney disease, without long-term current use of insulin ([address]    3. Mixed hyperlipidemia    4. Gastroesophageal reflux disease without esophagitis    5. Primary osteoarthritis involving multiple joints    6. Stage 3 chronic kidney disease, unspecified whether stage 3a or 3b CKD (HCC)    7. Cardiomyopathy, unspecified type ([address]    8. Class 1 obesity due to excess calories with serious comorbidity and body mass index (BMI) of 33.0 to 33.9 in adult      Impression  1. Hypertension is controlled so continue current therapy reviewed with him. 2.  Diabetes repeat status pending prior lab reviewed  3. Hyperlipidemia prior lab reviewed repeat status pending  4. GERD stable  5. DJD stable  6. CKD stage III repeat status pending  7. Cardiomyopathy stable we did discuss AICD replacement and I think it is reasonable to go ahead and get it done since he is going to have to do it inevitable anyway  8. Obesity we did discuss diet, exercise weight reduction for overall health benefit. Follow-up again 3 months or sooner if there is a problem. I will call with lab results in interim. PLAN:  .  Orders Placed This Encounter    METABOLIC PANEL, COMPREHENSIVE    LIPID PANEL    CK    HEMOGLOBIN A1C WITH EAG         ATTENTION:   This medical record was transcribed using an electronic medical records system. Although proofread, it may and can contain electronic and spelling errors. Other human spelling and other errors may be present. Corrections may be executed at a later time. Please feel free to contact us for any clarifications as needed. Follow-up and Dispositions    Return in about 3 months (around 6/29/2023). No results found for any visits on 03/29/23. Eden Hoover MD    The patient verbalized understanding of the problems and plans as explained.

## 2023-03-30 LAB
ALBUMIN SERPL-MCNC: 4 G/DL (ref 3.5–5)
ALBUMIN/GLOB SERPL: 1.6 (ref 1.1–2.2)
ALP SERPL-CCNC: 64 U/L (ref 45–117)
ALT SERPL-CCNC: 21 U/L (ref 12–78)
ANION GAP SERPL CALC-SCNC: 6 MMOL/L (ref 5–15)
AST SERPL-CCNC: 23 U/L (ref 15–37)
BILIRUB SERPL-MCNC: 0.4 MG/DL (ref 0.2–1)
BUN SERPL-MCNC: 21 MG/DL (ref 6–20)
BUN/CREAT SERPL: 18 (ref 12–20)
CALCIUM SERPL-MCNC: 9 MG/DL (ref 8.5–10.1)
CHLORIDE SERPL-SCNC: 111 MMOL/L (ref 97–108)
CHOLEST SERPL-MCNC: 102 MG/DL
CK SERPL-CCNC: 155 U/L (ref 39–308)
CO2 SERPL-SCNC: 25 MMOL/L (ref 21–32)
CREAT SERPL-MCNC: 1.16 MG/DL (ref 0.7–1.3)
EST. AVERAGE GLUCOSE BLD GHB EST-MCNC: 117 MG/DL
GLOBULIN SER CALC-MCNC: 2.5 G/DL (ref 2–4)
GLUCOSE SERPL-MCNC: 101 MG/DL (ref 65–100)
HBA1C MFR BLD: 5.7 % (ref 4–5.6)
HDLC SERPL-MCNC: 32 MG/DL
HDLC SERPL: 3.2 (ref 0–5)
LDLC SERPL CALC-MCNC: 52.4 MG/DL (ref 0–100)
POTASSIUM SERPL-SCNC: 3.8 MMOL/L (ref 3.5–5.1)
PROT SERPL-MCNC: 6.5 G/DL (ref 6.4–8.2)
SODIUM SERPL-SCNC: 142 MMOL/L (ref 136–145)
TRIGL SERPL-MCNC: 88 MG/DL (ref ?–150)
VLDLC SERPL CALC-MCNC: 17.6 MG/DL

## 2023-04-11 PROBLEM — M25.511 ACUTE PAIN OF RIGHT SHOULDER: Status: ACTIVE | Noted: 2023-04-11

## 2023-04-17 RX ORDER — FAMOTIDINE 40 MG/1
TABLET, FILM COATED ORAL
Qty: 90 TABLET | Refills: 3 | Status: SHIPPED | OUTPATIENT
Start: 2023-04-17

## 2023-04-17 NOTE — TELEPHONE ENCOUNTER
RX refill request from the patient/pharmacy. Patient last seen 04- with labs, and next appt. scheduled for 07-  Requested Prescriptions     Pending Prescriptions Disp Refills    famotidine (PEPCID) 40 mg tablet [Pharmacy Med Name: Famotidine 40 MG Oral Tablet] 90 Tablet 3     Sig: TAKE 1 TABLET BY MOUTH  DAILY    .

## 2023-05-01 ENCOUNTER — TELEPHONE (OUTPATIENT)
Dept: INTERNAL MEDICINE CLINIC | Age: 73
End: 2023-05-01

## 2023-05-01 DIAGNOSIS — M25.511 RIGHT SHOULDER PAIN, UNSPECIFIED CHRONICITY: Primary | ICD-10-CM

## 2023-05-01 NOTE — TELEPHONE ENCOUNTER
Patient continues to c/o right shoulder pain. Dr. Angel Tovar gave a verbal order for CT of right shoulder. Order generated.

## 2023-05-04 ENCOUNTER — TRANSCRIBE ORDERS (OUTPATIENT)
Facility: HOSPITAL | Age: 73
End: 2023-05-04

## 2023-05-04 DIAGNOSIS — M25.511 RIGHT SHOULDER PAIN, UNSPECIFIED CHRONICITY: Primary | ICD-10-CM

## 2023-05-06 ENCOUNTER — HOSPITAL ENCOUNTER (OUTPATIENT)
Facility: HOSPITAL | Age: 73
End: 2023-05-06
Payer: MEDICARE

## 2023-05-06 DIAGNOSIS — M25.511 RIGHT SHOULDER PAIN, UNSPECIFIED CHRONICITY: ICD-10-CM

## 2023-05-06 PROCEDURE — 73200 CT UPPER EXTREMITY W/O DYE: CPT

## 2023-05-08 DIAGNOSIS — M25.511 RIGHT SHOULDER PAIN, UNSPECIFIED CHRONICITY: Primary | ICD-10-CM

## 2023-05-11 ENCOUNTER — TRANSCRIBE ORDERS (OUTPATIENT)
Facility: HOSPITAL | Age: 73
End: 2023-05-11

## 2023-05-11 DIAGNOSIS — M25.511 RIGHT SHOULDER PAIN, UNSPECIFIED CHRONICITY: Primary | ICD-10-CM

## 2023-06-07 RX ORDER — NIACIN 750 MG/1
TABLET, EXTENDED RELEASE ORAL
Qty: 180 TABLET | Refills: 3 | Status: SHIPPED | OUTPATIENT
Start: 2023-06-07 | End: 2023-06-09

## 2023-06-07 RX ORDER — ESOMEPRAZOLE MAGNESIUM 40 MG/1
CAPSULE, DELAYED RELEASE ORAL
Qty: 90 CAPSULE | Refills: 3 | Status: SHIPPED | OUTPATIENT
Start: 2023-06-07 | End: 2023-06-09

## 2023-06-07 NOTE — TELEPHONE ENCOUNTER
RX refill request from the patient/pharmacy. Patient last seen 04- with labs, and next appt. scheduled for 07-  Requested Prescriptions     Pending Prescriptions Disp Refills    niacin (NIASPAN) 750 MG extended release tablet [Pharmacy Med Name: NIACIN  750MG  TAB  EXTENDED RELEASE] 180 tablet 3     Sig: TAKE 2 TABLETS BY MOUTH AT  BEDTIME    esomeprazole (NEXIUM) 40 MG delayed release capsule [Pharmacy Med Name: Esomeprazole Magnesium 40 MG Oral Capsule Delayed Release] 90 capsule 3     Sig: TAKE 1 CAPSULE BY MOUTH  DAILY    .

## 2023-06-09 RX ORDER — NIACIN 750 MG/1
TABLET, EXTENDED RELEASE ORAL
Qty: 180 TABLET | Refills: 3 | Status: SHIPPED | OUTPATIENT
Start: 2023-06-09

## 2023-06-09 RX ORDER — ESOMEPRAZOLE MAGNESIUM 40 MG/1
CAPSULE, DELAYED RELEASE ORAL
Qty: 90 CAPSULE | Refills: 3 | Status: SHIPPED | OUTPATIENT
Start: 2023-06-09

## 2023-07-03 PROBLEM — N18.30 STAGE 3 CHRONIC KIDNEY DISEASE (HCC): Status: ACTIVE | Noted: 2017-09-21

## 2023-07-03 PROBLEM — E03.9 ACQUIRED HYPOTHYROIDISM: Status: ACTIVE | Noted: 2017-09-20

## 2023-07-03 PROBLEM — E66.811 CLASS 1 OBESITY DUE TO EXCESS CALORIES WITH SERIOUS COMORBIDITY AND BODY MASS INDEX (BMI) OF 33.0 TO 33.9 IN ADULT: Status: ACTIVE | Noted: 2017-09-21

## 2023-07-03 PROBLEM — K21.9 GASTROESOPHAGEAL REFLUX DISEASE WITHOUT ESOPHAGITIS: Status: ACTIVE | Noted: 2017-09-20

## 2023-07-03 PROBLEM — E55.9 VITAMIN D DEFICIENCY: Status: ACTIVE | Noted: 2021-12-02

## 2023-07-03 PROBLEM — I12.9 HYPERTENSION WITH RENAL DISEASE: Status: ACTIVE | Noted: 2017-09-21

## 2023-07-03 PROBLEM — K57.90 DIVERTICULOSIS: Status: ACTIVE | Noted: 2017-09-21

## 2023-07-03 PROBLEM — G47.33 OBSTRUCTIVE SLEEP APNEA SYNDROME: Status: ACTIVE | Noted: 2017-09-21

## 2023-07-03 PROBLEM — M15.9 PRIMARY OSTEOARTHRITIS INVOLVING MULTIPLE JOINTS: Status: ACTIVE | Noted: 2017-09-20

## 2023-07-03 PROBLEM — M15.0 PRIMARY OSTEOARTHRITIS INVOLVING MULTIPLE JOINTS: Status: ACTIVE | Noted: 2017-09-20

## 2023-07-03 PROBLEM — E78.2 MIXED HYPERLIPIDEMIA: Status: ACTIVE | Noted: 2017-09-20

## 2023-07-03 PROBLEM — E66.09 CLASS 1 OBESITY DUE TO EXCESS CALORIES WITH SERIOUS COMORBIDITY AND BODY MASS INDEX (BMI) OF 33.0 TO 33.9 IN ADULT: Status: ACTIVE | Noted: 2017-09-21

## 2023-07-03 NOTE — PROGRESS NOTES
Chief Complaint   Patient presents with    Hypertension       SUBJECTIVE:    Kendra Pritchard III is a 72 y.o. male who returns in follow-up for his medical problems include hypertension, hyperlipidemia, cardiomyopathy, GERD, DJD, CKD stage III and other medical problems. He is taking his medication trying to follow his diet try and remain physically active. He still noting a lot of discomfort with his right arm but is not really in the shoulders more in the upper arm. He does not member any history of trauma that caused. He thinks is getting a little better so is not to the point where he wants to see an orthopedist now. He denies any chest pain, shortness of breath or cardiorespiratory complaints. There are no GI or  complaints. There are no headaches, dizziness or neurologic complaints. Other than the right arm there are no arthritic complaints except for his chronic joint aches and pains that did not change. He has no other complaints on complete review of systems.       Current Outpatient Medications   Medication Sig Dispense Refill    niacin (NIASPAN) 750 MG extended release tablet TAKE 2 TABLETS BY MOUTH AT  BEDTIME 180 tablet 3    esomeprazole (NEXIUM) 40 MG delayed release capsule TAKE 1 CAPSULE BY MOUTH  DAILY 90 capsule 3    amLODIPine (NORVASC) 5 MG tablet Take 1 tablet by mouth daily      bumetanide (BUMEX) 1 MG tablet Take 1 tablet by mouth daily      carvedilol (COREG) 25 MG tablet TAKE 1 TABLET BY MOUTH  TWICE DAILY WITH MEALS      diclofenac (VOLTAREN) 75 MG EC tablet TAKE 1 TABLET BY MOUTH  TWICE DAILY      fenofibrate (TRICOR) 145 MG tablet Take 1 tablet by mouth daily      hydrALAZINE (APRESOLINE) 50 MG tablet TAKE 1 TABLET BY MOUTH 3  TIMES DAILY      levothyroxine (SYNTHROID) 50 MCG tablet Take 1 tablet by mouth daily      metFORMIN (GLUCOPHAGE-XR) 500 MG extended release tablet TAKE 1 TABLET BY MOUTH IN  THE MORNING AND 2 TABLETS  BY MOUTH AT DINNER      rosuvastatin (CRESTOR) 10

## 2023-07-05 ENCOUNTER — OFFICE VISIT (OUTPATIENT)
Facility: CLINIC | Age: 73
End: 2023-07-05
Payer: MEDICARE

## 2023-07-05 VITALS
BODY MASS INDEX: 32.76 KG/M2 | WEIGHT: 247.2 LBS | OXYGEN SATURATION: 96 % | DIASTOLIC BLOOD PRESSURE: 71 MMHG | TEMPERATURE: 98.2 F | RESPIRATION RATE: 18 BRPM | HEART RATE: 69 BPM | HEIGHT: 73 IN | SYSTOLIC BLOOD PRESSURE: 120 MMHG

## 2023-07-05 DIAGNOSIS — E66.09 CLASS 1 OBESITY DUE TO EXCESS CALORIES WITH SERIOUS COMORBIDITY AND BODY MASS INDEX (BMI) OF 33.0 TO 33.9 IN ADULT: ICD-10-CM

## 2023-07-05 DIAGNOSIS — I12.9 HYPERTENSION WITH RENAL DISEASE: Primary | ICD-10-CM

## 2023-07-05 DIAGNOSIS — K21.9 GASTROESOPHAGEAL REFLUX DISEASE WITHOUT ESOPHAGITIS: ICD-10-CM

## 2023-07-05 DIAGNOSIS — N18.30 STAGE 3 CHRONIC KIDNEY DISEASE, UNSPECIFIED WHETHER STAGE 3A OR 3B CKD (HCC): ICD-10-CM

## 2023-07-05 DIAGNOSIS — E78.2 MIXED HYPERLIPIDEMIA: ICD-10-CM

## 2023-07-05 DIAGNOSIS — N18.30 CONTROLLED TYPE 2 DIABETES MELLITUS WITH STAGE 3 CHRONIC KIDNEY DISEASE, WITHOUT LONG-TERM CURRENT USE OF INSULIN (HCC): ICD-10-CM

## 2023-07-05 DIAGNOSIS — M15.9 PRIMARY OSTEOARTHRITIS INVOLVING MULTIPLE JOINTS: ICD-10-CM

## 2023-07-05 DIAGNOSIS — I42.0 DILATED CARDIOMYOPATHY (HCC): ICD-10-CM

## 2023-07-05 DIAGNOSIS — E11.22 CONTROLLED TYPE 2 DIABETES MELLITUS WITH STAGE 3 CHRONIC KIDNEY DISEASE, WITHOUT LONG-TERM CURRENT USE OF INSULIN (HCC): ICD-10-CM

## 2023-07-05 PROCEDURE — 3017F COLORECTAL CA SCREEN DOC REV: CPT | Performed by: INTERNAL MEDICINE

## 2023-07-05 PROCEDURE — 99214 OFFICE O/P EST MOD 30 MIN: CPT | Performed by: INTERNAL MEDICINE

## 2023-07-05 PROCEDURE — G8417 CALC BMI ABV UP PARAM F/U: HCPCS | Performed by: INTERNAL MEDICINE

## 2023-07-05 PROCEDURE — 1123F ACP DISCUSS/DSCN MKR DOCD: CPT | Performed by: INTERNAL MEDICINE

## 2023-07-05 PROCEDURE — 3044F HG A1C LEVEL LT 7.0%: CPT | Performed by: INTERNAL MEDICINE

## 2023-07-05 PROCEDURE — G8427 DOCREV CUR MEDS BY ELIG CLIN: HCPCS | Performed by: INTERNAL MEDICINE

## 2023-07-05 PROCEDURE — 1036F TOBACCO NON-USER: CPT | Performed by: INTERNAL MEDICINE

## 2023-07-05 PROCEDURE — 2022F DILAT RTA XM EVC RTNOPTHY: CPT | Performed by: INTERNAL MEDICINE

## 2023-07-05 SDOH — ECONOMIC STABILITY: INCOME INSECURITY: HOW HARD IS IT FOR YOU TO PAY FOR THE VERY BASICS LIKE FOOD, HOUSING, MEDICAL CARE, AND HEATING?: NOT HARD AT ALL

## 2023-07-05 SDOH — ECONOMIC STABILITY: FOOD INSECURITY: WITHIN THE PAST 12 MONTHS, YOU WORRIED THAT YOUR FOOD WOULD RUN OUT BEFORE YOU GOT MONEY TO BUY MORE.: NEVER TRUE

## 2023-07-05 SDOH — ECONOMIC STABILITY: FOOD INSECURITY: WITHIN THE PAST 12 MONTHS, THE FOOD YOU BOUGHT JUST DIDN'T LAST AND YOU DIDN'T HAVE MONEY TO GET MORE.: NEVER TRUE

## 2023-07-05 SDOH — ECONOMIC STABILITY: HOUSING INSECURITY
IN THE LAST 12 MONTHS, WAS THERE A TIME WHEN YOU DID NOT HAVE A STEADY PLACE TO SLEEP OR SLEPT IN A SHELTER (INCLUDING NOW)?: NO

## 2023-07-06 LAB
ALBUMIN SERPL-MCNC: 4.2 G/DL (ref 3.5–5)
ALBUMIN/GLOB SERPL: 1.5 (ref 1.1–2.2)
ALP SERPL-CCNC: 62 U/L (ref 45–117)
ALT SERPL-CCNC: 21 U/L (ref 12–78)
ANION GAP SERPL CALC-SCNC: 4 MMOL/L (ref 5–15)
AST SERPL-CCNC: 21 U/L (ref 15–37)
BILIRUB SERPL-MCNC: 0.6 MG/DL (ref 0.2–1)
BUN SERPL-MCNC: 23 MG/DL (ref 6–20)
BUN/CREAT SERPL: 17 (ref 12–20)
CALCIUM SERPL-MCNC: 9.2 MG/DL (ref 8.5–10.1)
CHLORIDE SERPL-SCNC: 109 MMOL/L (ref 97–108)
CHOLEST SERPL-MCNC: 110 MG/DL
CK SERPL-CCNC: 109 U/L (ref 39–308)
CO2 SERPL-SCNC: 27 MMOL/L (ref 21–32)
CREAT SERPL-MCNC: 1.36 MG/DL (ref 0.7–1.3)
EST. AVERAGE GLUCOSE BLD GHB EST-MCNC: 117 MG/DL
GLOBULIN SER CALC-MCNC: 2.8 G/DL (ref 2–4)
GLUCOSE SERPL-MCNC: 115 MG/DL (ref 65–100)
HBA1C MFR BLD: 5.7 % (ref 4–5.6)
HDLC SERPL-MCNC: 33 MG/DL
HDLC SERPL: 3.3 (ref 0–5)
LDLC SERPL CALC-MCNC: 55.6 MG/DL (ref 0–100)
POTASSIUM SERPL-SCNC: 3.6 MMOL/L (ref 3.5–5.1)
PROT SERPL-MCNC: 7 G/DL (ref 6.4–8.2)
SODIUM SERPL-SCNC: 140 MMOL/L (ref 136–145)
TRIGL SERPL-MCNC: 107 MG/DL
VLDLC SERPL CALC-MCNC: 21.4 MG/DL

## 2023-07-21 RX ORDER — BUMETANIDE 1 MG/1
TABLET ORAL
Qty: 90 TABLET | Refills: 3 | Status: SHIPPED | OUTPATIENT
Start: 2023-07-21

## 2023-07-21 NOTE — TELEPHONE ENCOUNTER
RX refill request from the patient/pharmacy. Patient last seen 07- with labs, and next appt. scheduled for 10-  Requested Prescriptions     Pending Prescriptions Disp Refills    bumetanide (BUMEX) 1 MG tablet [Pharmacy Med Name: Bumetanide 1 MG Oral Tablet] 90 tablet 3     Sig: TAKE 1 TABLET BY MOUTH IN  THE MORNING    .

## 2023-07-31 ENCOUNTER — ANESTHESIA EVENT (OUTPATIENT)
Facility: HOSPITAL | Age: 73
End: 2023-07-31
Payer: MEDICARE

## 2023-08-01 ENCOUNTER — ANESTHESIA (OUTPATIENT)
Facility: HOSPITAL | Age: 73
End: 2023-08-01
Payer: MEDICARE

## 2023-08-01 ENCOUNTER — HOSPITAL ENCOUNTER (OUTPATIENT)
Facility: HOSPITAL | Age: 73
Discharge: HOME | End: 2023-08-02
Attending: INTERNAL MEDICINE | Admitting: INTERNAL MEDICINE
Payer: MEDICARE

## 2023-08-01 ENCOUNTER — APPOINTMENT (OUTPATIENT)
Facility: HOSPITAL | Age: 73
End: 2023-08-01
Attending: INTERNAL MEDICINE
Payer: MEDICARE

## 2023-08-01 DIAGNOSIS — I42.9 CARDIOMYOPATHY (HCC): ICD-10-CM

## 2023-08-01 PROBLEM — I42.8 NONISCHEMIC CARDIOMYOPATHY (HCC): Status: ACTIVE | Noted: 2023-08-01

## 2023-08-01 LAB
GLUCOSE BLD STRIP.AUTO-MCNC: 116 MG/DL (ref 65–117)
GLUCOSE BLD STRIP.AUTO-MCNC: 126 MG/DL (ref 65–117)
GLUCOSE BLD STRIP.AUTO-MCNC: 133 MG/DL (ref 65–117)
GLUCOSE BLD STRIP.AUTO-MCNC: 154 MG/DL (ref 65–117)
SERVICE CMNT-IMP: ABNORMAL
SERVICE CMNT-IMP: NORMAL

## 2023-08-01 PROCEDURE — 2500000003 HC RX 250 WO HCPCS: Performed by: INTERNAL MEDICINE

## 2023-08-01 PROCEDURE — C1887 CATHETER, GUIDING: HCPCS | Performed by: INTERNAL MEDICINE

## 2023-08-01 PROCEDURE — 2709999900 HC NON-CHARGEABLE SUPPLY: Performed by: INTERNAL MEDICINE

## 2023-08-01 PROCEDURE — 2580000003 HC RX 258: Performed by: INTERNAL MEDICINE

## 2023-08-01 PROCEDURE — 2580000003 HC RX 258

## 2023-08-01 PROCEDURE — 6370000000 HC RX 637 (ALT 250 FOR IP): Performed by: INTERNAL MEDICINE

## 2023-08-01 PROCEDURE — 71045 X-RAY EXAM CHEST 1 VIEW: CPT

## 2023-08-01 PROCEDURE — 6360000002 HC RX W HCPCS

## 2023-08-01 PROCEDURE — 3700000000 HC ANESTHESIA ATTENDED CARE: Performed by: INTERNAL MEDICINE

## 2023-08-01 PROCEDURE — 3700000001 HC ADD 15 MINUTES (ANESTHESIA): Performed by: INTERNAL MEDICINE

## 2023-08-01 PROCEDURE — A4217 STERILE WATER/SALINE, 500 ML: HCPCS | Performed by: INTERNAL MEDICINE

## 2023-08-01 PROCEDURE — 2720000010 HC SURG SUPPLY STERILE: Performed by: INTERNAL MEDICINE

## 2023-08-01 PROCEDURE — 6360000004 HC RX CONTRAST MEDICATION: Performed by: INTERNAL MEDICINE

## 2023-08-01 PROCEDURE — 6360000002 HC RX W HCPCS: Performed by: INTERNAL MEDICINE

## 2023-08-01 PROCEDURE — C1882 AICD, OTHER THAN SING/DUAL: HCPCS | Performed by: INTERNAL MEDICINE

## 2023-08-01 PROCEDURE — C1769 GUIDE WIRE: HCPCS | Performed by: INTERNAL MEDICINE

## 2023-08-01 PROCEDURE — C1898 LEAD, PMKR, OTHER THAN TRANS: HCPCS | Performed by: INTERNAL MEDICINE

## 2023-08-01 PROCEDURE — 82962 GLUCOSE BLOOD TEST: CPT

## 2023-08-01 PROCEDURE — C1894 INTRO/SHEATH, NON-LASER: HCPCS | Performed by: INTERNAL MEDICINE

## 2023-08-01 PROCEDURE — 33249 INSJ/RPLCMT DEFIB W/LEAD(S): CPT | Performed by: INTERNAL MEDICINE

## 2023-08-01 PROCEDURE — C1900 LEAD, CORONARY VENOUS: HCPCS | Performed by: INTERNAL MEDICINE

## 2023-08-01 PROCEDURE — C1725 CATH, TRANSLUMIN NON-LASER: HCPCS | Performed by: INTERNAL MEDICINE

## 2023-08-01 DEVICE — LEFT HEART LEAD
Type: IMPLANTABLE DEVICE | Status: FUNCTIONAL
Brand: QUARTET™

## 2023-08-01 DEVICE — HF CARDIAC RESYNCHRONISATION THERAPY DEFIBRILLATOR VVED DDDRV
Type: IMPLANTABLE DEVICE | Status: FUNCTIONAL
Brand: GALLANT™

## 2023-08-01 DEVICE — PACING LEAD
Type: IMPLANTABLE DEVICE | Status: FUNCTIONAL
Brand: TENDRIL™

## 2023-08-01 RX ORDER — BUMETANIDE 1 MG/1
1 TABLET ORAL EVERY MORNING
Status: DISCONTINUED | OUTPATIENT
Start: 2023-08-01 | End: 2023-08-02 | Stop reason: HOSPADM

## 2023-08-01 RX ORDER — DICLOFENAC SODIUM 75 MG/1
75 TABLET, DELAYED RELEASE ORAL 2 TIMES DAILY
Status: DISCONTINUED | OUTPATIENT
Start: 2023-08-01 | End: 2023-08-01 | Stop reason: CLARIF

## 2023-08-01 RX ORDER — ACETAMINOPHEN 325 MG/1
650 TABLET ORAL EVERY 4 HOURS PRN
Status: DISCONTINUED | OUTPATIENT
Start: 2023-08-01 | End: 2023-08-01 | Stop reason: SDUPTHER

## 2023-08-01 RX ORDER — AMLODIPINE BESYLATE 5 MG/1
5 TABLET ORAL DAILY
Status: DISCONTINUED | OUTPATIENT
Start: 2023-08-02 | End: 2023-08-02 | Stop reason: HOSPADM

## 2023-08-01 RX ORDER — INSULIN LISPRO 100 [IU]/ML
0-4 INJECTION, SOLUTION INTRAVENOUS; SUBCUTANEOUS NIGHTLY
Status: DISCONTINUED | OUTPATIENT
Start: 2023-08-01 | End: 2023-08-02 | Stop reason: HOSPADM

## 2023-08-01 RX ORDER — ROSUVASTATIN CALCIUM 10 MG/1
10 TABLET, COATED ORAL DAILY
Status: DISCONTINUED | OUTPATIENT
Start: 2023-08-02 | End: 2023-08-02 | Stop reason: HOSPADM

## 2023-08-01 RX ORDER — FAMOTIDINE 40 MG/1
40 TABLET, FILM COATED ORAL DAILY
COMMUNITY

## 2023-08-01 RX ORDER — CEFAZOLIN SODIUM 1 G/3ML
INJECTION, POWDER, FOR SOLUTION INTRAMUSCULAR; INTRAVENOUS PRN
Status: DISCONTINUED | OUTPATIENT
Start: 2023-08-01 | End: 2023-08-01 | Stop reason: SDUPTHER

## 2023-08-01 RX ORDER — CARVEDILOL 12.5 MG/1
25 TABLET ORAL 2 TIMES DAILY WITH MEALS
Status: DISCONTINUED | OUTPATIENT
Start: 2023-08-01 | End: 2023-08-02 | Stop reason: HOSPADM

## 2023-08-01 RX ORDER — DEXTROSE MONOHYDRATE 100 MG/ML
INJECTION, SOLUTION INTRAVENOUS CONTINUOUS PRN
Status: DISCONTINUED | OUTPATIENT
Start: 2023-08-01 | End: 2023-08-02 | Stop reason: HOSPADM

## 2023-08-01 RX ORDER — SODIUM CHLORIDE 9 MG/ML
INJECTION, SOLUTION INTRAVENOUS PRN
Status: DISCONTINUED | OUTPATIENT
Start: 2023-08-01 | End: 2023-08-02 | Stop reason: HOSPADM

## 2023-08-01 RX ORDER — SODIUM CHLORIDE 0.9 % (FLUSH) 0.9 %
5-40 SYRINGE (ML) INJECTION PRN
Status: DISCONTINUED | OUTPATIENT
Start: 2023-08-01 | End: 2023-08-02 | Stop reason: HOSPADM

## 2023-08-01 RX ORDER — SODIUM CHLORIDE 0.9 % (FLUSH) 0.9 %
5-40 SYRINGE (ML) INJECTION EVERY 12 HOURS SCHEDULED
Status: DISCONTINUED | OUTPATIENT
Start: 2023-08-01 | End: 2023-08-02 | Stop reason: HOSPADM

## 2023-08-01 RX ORDER — ONDANSETRON 2 MG/ML
4 INJECTION INTRAMUSCULAR; INTRAVENOUS EVERY 6 HOURS PRN
Status: DISCONTINUED | OUTPATIENT
Start: 2023-08-01 | End: 2023-08-02 | Stop reason: HOSPADM

## 2023-08-01 RX ORDER — FENTANYL CITRATE 50 UG/ML
INJECTION, SOLUTION INTRAMUSCULAR; INTRAVENOUS PRN
Status: DISCONTINUED | OUTPATIENT
Start: 2023-08-01 | End: 2023-08-01 | Stop reason: SDUPTHER

## 2023-08-01 RX ORDER — INSULIN LISPRO 100 [IU]/ML
0-8 INJECTION, SOLUTION INTRAVENOUS; SUBCUTANEOUS
Status: DISCONTINUED | OUTPATIENT
Start: 2023-08-01 | End: 2023-08-02 | Stop reason: HOSPADM

## 2023-08-01 RX ORDER — SODIUM CHLORIDE 9 MG/ML
INJECTION, SOLUTION INTRAVENOUS CONTINUOUS PRN
Status: DISCONTINUED | OUTPATIENT
Start: 2023-08-01 | End: 2023-08-01 | Stop reason: SDUPTHER

## 2023-08-01 RX ORDER — LANOLIN ALCOHOL/MO/W.PET/CERES
750 CREAM (GRAM) TOPICAL NIGHTLY
COMMUNITY

## 2023-08-01 RX ORDER — HYDRALAZINE HYDROCHLORIDE 50 MG/1
50 TABLET, FILM COATED ORAL 3 TIMES DAILY
Status: DISCONTINUED | OUTPATIENT
Start: 2023-08-01 | End: 2023-08-02 | Stop reason: HOSPADM

## 2023-08-01 RX ORDER — LEVOTHYROXINE SODIUM 0.05 MG/1
50 TABLET ORAL
Status: DISCONTINUED | OUTPATIENT
Start: 2023-08-02 | End: 2023-08-02 | Stop reason: HOSPADM

## 2023-08-01 RX ORDER — OXYCODONE HYDROCHLORIDE 5 MG/1
5 TABLET ORAL EVERY 4 HOURS PRN
Status: DISCONTINUED | OUTPATIENT
Start: 2023-08-01 | End: 2023-08-02 | Stop reason: HOSPADM

## 2023-08-01 RX ORDER — NIACIN 500 MG/1
1500 TABLET, EXTENDED RELEASE ORAL NIGHTLY
Status: DISCONTINUED | OUTPATIENT
Start: 2023-08-01 | End: 2023-08-02 | Stop reason: HOSPADM

## 2023-08-01 RX ORDER — FAMOTIDINE 20 MG/1
40 TABLET, FILM COATED ORAL DAILY
Status: DISCONTINUED | OUTPATIENT
Start: 2023-08-02 | End: 2023-08-02 | Stop reason: HOSPADM

## 2023-08-01 RX ORDER — PANTOPRAZOLE SODIUM 40 MG/1
40 TABLET, DELAYED RELEASE ORAL
Status: DISCONTINUED | OUTPATIENT
Start: 2023-08-02 | End: 2023-08-02 | Stop reason: HOSPADM

## 2023-08-01 RX ORDER — ACETAMINOPHEN 325 MG/1
650 TABLET ORAL EVERY 4 HOURS PRN
Status: DISCONTINUED | OUTPATIENT
Start: 2023-08-01 | End: 2023-08-02 | Stop reason: HOSPADM

## 2023-08-01 RX ORDER — IBUPROFEN 600 MG/1
600 TABLET ORAL 3 TIMES DAILY
Status: DISCONTINUED | OUTPATIENT
Start: 2023-08-01 | End: 2023-08-02 | Stop reason: HOSPADM

## 2023-08-01 RX ORDER — LIDOCAINE HYDROCHLORIDE AND EPINEPHRINE 10; 10 MG/ML; UG/ML
INJECTION, SOLUTION INFILTRATION; PERINEURAL PRN
Status: DISCONTINUED | OUTPATIENT
Start: 2023-08-01 | End: 2023-08-01 | Stop reason: HOSPADM

## 2023-08-01 RX ADMIN — FENTANYL CITRATE 50 MCG: 50 INJECTION, SOLUTION INTRAMUSCULAR; INTRAVENOUS at 09:50

## 2023-08-01 RX ADMIN — FENTANYL CITRATE 50 MCG: 50 INJECTION, SOLUTION INTRAMUSCULAR; INTRAVENOUS at 10:23

## 2023-08-01 RX ADMIN — PHENYLEPHRINE HYDROCHLORIDE 20 MCG/MIN: 10 INJECTION INTRAVENOUS at 10:17

## 2023-08-01 RX ADMIN — SACUBITRIL AND VALSARTAN 1 TABLET: 97; 103 TABLET, FILM COATED ORAL at 22:07

## 2023-08-01 RX ADMIN — NIACIN 1500 MG: 500 TABLET, EXTENDED RELEASE ORAL at 22:06

## 2023-08-01 RX ADMIN — SODIUM CHLORIDE, PRESERVATIVE FREE 10 ML: 5 INJECTION INTRAVENOUS at 22:07

## 2023-08-01 RX ADMIN — ACETAMINOPHEN 325MG 650 MG: 325 TABLET ORAL at 11:41

## 2023-08-01 RX ADMIN — IBUPROFEN 600 MG: 600 TABLET, FILM COATED ORAL at 14:49

## 2023-08-01 RX ADMIN — SODIUM CHLORIDE, PRESERVATIVE FREE 10 ML: 5 INJECTION INTRAVENOUS at 12:46

## 2023-08-01 RX ADMIN — CARVEDILOL 25 MG: 12.5 TABLET, FILM COATED ORAL at 16:30

## 2023-08-01 RX ADMIN — SODIUM CHLORIDE: 9 INJECTION, SOLUTION INTRAVENOUS at 09:45

## 2023-08-01 RX ADMIN — BUMETANIDE 1 MG: 1 TABLET ORAL at 14:49

## 2023-08-01 RX ADMIN — HYDRALAZINE HYDROCHLORIDE 50 MG: 50 TABLET, FILM COATED ORAL at 22:07

## 2023-08-01 RX ADMIN — PROPOFOL 50 MCG/KG/MIN: 10 INJECTION, EMULSION INTRAVENOUS at 09:50

## 2023-08-01 RX ADMIN — CEFAZOLIN 2 G: 1 INJECTION, POWDER, FOR SOLUTION INTRAMUSCULAR; INTRAVENOUS; PARENTERAL at 09:56

## 2023-08-01 RX ADMIN — ACETAMINOPHEN 325MG 650 MG: 325 TABLET ORAL at 22:09

## 2023-08-01 ASSESSMENT — PAIN DESCRIPTION - DESCRIPTORS
DESCRIPTORS: DISCOMFORT
DESCRIPTORS: DISCOMFORT
DESCRIPTORS: ACHING
DESCRIPTORS: SORE

## 2023-08-01 ASSESSMENT — PAIN DESCRIPTION - LOCATION
LOCATION: INCISION;CHEST
LOCATION: CHEST
LOCATION: INCISION
LOCATION: CHEST;INCISION

## 2023-08-01 ASSESSMENT — PAIN DESCRIPTION - ORIENTATION
ORIENTATION: LEFT

## 2023-08-01 ASSESSMENT — PAIN SCALES - GENERAL
PAINLEVEL_OUTOF10: 3
PAINLEVEL_OUTOF10: 6
PAINLEVEL_OUTOF10: 3
PAINLEVEL_OUTOF10: 6

## 2023-08-01 ASSESSMENT — PAIN - FUNCTIONAL ASSESSMENT: PAIN_FUNCTIONAL_ASSESSMENT: ACTIVITIES ARE NOT PREVENTED

## 2023-08-01 NOTE — PROGRESS NOTES
1145: RN presumed care of pt post procedure. Pt c/o pain, tylenol given. L chest is CDI with skin glue and ice pack over incision. 1510: pt ambulated with  ft in hallway. Pt steady on feet with sling on left side. Pt currently up in chair.

## 2023-08-01 NOTE — H&P
EP/ ARRHYTHMIA    Patient ID:  Patient: Jose Gonzalez  MRN: [id number]  Age: 72 y.o.  : 1950    Date of  Admission: 2023  6:33 AM   PCP:  Donnette Boxer, MD    Assessment:   BIV-ICD upgrade candidate. Plan:     Given the potential benefits, risks, and alternatives, he agrees to proceed. Sunitha Burdick III is a 72 y.o. male with a history of the above here for his procedure.       Past Medical History:   Diagnosis Date    Arthritis     fingers    CAD (coronary artery disease)     Cardiomyopathy ([address])     Cholelithiasis 2017    CKD (chronic kidney disease) 2017    Diabetes mellitus ([address])     Diverticulosis 2017    DJD (degenerative joint disease) 2017    GERD (gastroesophageal reflux disease)     H/O: depression 2017    Hiatal hernia     Hypercholesteremia     Hypertension     Hypertension with renal disease 2017    Hypothyroidism     Kidney stones     Obesity 2017    On statin therapy 2017    Sleep apnea 2017    Thyroid disease     Viral meningitis 1990        Past Surgical History:   Procedure Laterality Date    HERNIA REPAIR      KNEE ARTHROSCOPY      both       Social History     Tobacco Use    Smoking status: Never    Smokeless tobacco: Never   Substance Use Topics    Alcohol use: No        Family History   Problem Relation Age of Onset    Heart Disease Paternal Grandmother         angina    Cancer Father         lymphoma    Hypertension Father     Heart Disease Father         pacemaker    Thyroid Disease Mother     Heart Disease Mother         AICD    Thyroid Disease Sister         Allergies   Allergen Reactions    Promethazine      Other reaction(s): Unknown (comments)    Meperidine Nausea And Vomiting          Current Facility-Administered Medications   Medication Dose Route Frequency    sodium chloride flush 0.9 % injection 5-40 mL  5-40 mL IntraVENous 2 times per day    sodium chloride flush 0.9 % injection 5-40

## 2023-08-01 NOTE — PROGRESS NOTES
Dual Skin preformed with ARIADNA austin  . Mepilex place on sacrum. Cardiac Cath Lab Recovery Arrival Note:      Mikhail Jamison III arrived to Cardiac Cath Lab, Recovery Area. Staff introduced to patient. Patient identifiers verified with NAME and DATE OF BIRTH. Procedure verified with patient. Consent forms reviewed and signed by patient or authorized representative and verified. Allergies verified. Patient and family oriented to department. Patient and family informed of procedure and plan of care. Questions answered with review. Patient prepped for procedure, per orders from physician, prior to arrival.    Patient on cardiac monitor, non-invasive blood pressure, SPO2 monitor. On RA. Patient is A&Ox 4. Patient reports no pain. Patient in stretcher, in low position, with side rails up, call bell within reach, patient instructed to call if assistance as needed. Patient prep in: [address] [place] [address]. Patient family has pager #   Family in: krissy.    Prep by: Gael Lee

## 2023-08-01 NOTE — PROGRESS NOTES
Cardiac Cath Lab Procedure Area Arrival Note:    Sunitha Burdick III arrived to Cardiac Cath Lab, Procedure Area. Patient identifiers verified with NAME and DATE OF BIRTH. Procedure verified with patient. Consent forms verified. Allergies verified. Patient informed of procedure and plan of care. Questions answered with review. Patient voiced understanding of procedure and plan of care. Patient on cardiac monitor, non-invasive blood pressure, SPO2 monitor. On RA o. IV of nacl on pump at kvo ml/hr. Patient status doing well without problems. Patient is A&Ox 4. Patient reports complaints surgical pain to left incision site. .    Refer to patients Cardiac Cath Lab PROCEDURE REPORT for vital signs, assessment, status, and response during procedure, printed at end of case. Printed report on chart or scanned into chart.

## 2023-08-02 VITALS
HEART RATE: 72 BPM | WEIGHT: 245 LBS | RESPIRATION RATE: 18 BRPM | DIASTOLIC BLOOD PRESSURE: 85 MMHG | SYSTOLIC BLOOD PRESSURE: 164 MMHG | OXYGEN SATURATION: 96 % | BODY MASS INDEX: 32.47 KG/M2 | HEIGHT: 73 IN | TEMPERATURE: 97.5 F

## 2023-08-02 LAB
GLUCOSE BLD STRIP.AUTO-MCNC: 103 MG/DL (ref 65–117)
SERVICE CMNT-IMP: NORMAL

## 2023-08-02 PROCEDURE — 2580000003 HC RX 258: Performed by: INTERNAL MEDICINE

## 2023-08-02 PROCEDURE — 82962 GLUCOSE BLOOD TEST: CPT

## 2023-08-02 PROCEDURE — 6370000000 HC RX 637 (ALT 250 FOR IP): Performed by: INTERNAL MEDICINE

## 2023-08-02 RX ADMIN — SACUBITRIL AND VALSARTAN 1 TABLET: 97; 103 TABLET, FILM COATED ORAL at 08:07

## 2023-08-02 RX ADMIN — FAMOTIDINE 40 MG: 20 TABLET, FILM COATED ORAL at 08:07

## 2023-08-02 RX ADMIN — HYDRALAZINE HYDROCHLORIDE 50 MG: 50 TABLET, FILM COATED ORAL at 08:08

## 2023-08-02 RX ADMIN — ROSUVASTATIN CALCIUM 10 MG: 10 TABLET, COATED ORAL at 08:08

## 2023-08-02 RX ADMIN — ACETAMINOPHEN 325MG 650 MG: 325 TABLET ORAL at 06:43

## 2023-08-02 RX ADMIN — SODIUM CHLORIDE, PRESERVATIVE FREE 10 ML: 5 INJECTION INTRAVENOUS at 08:08

## 2023-08-02 RX ADMIN — LEVOTHYROXINE SODIUM 50 MCG: 0.05 TABLET ORAL at 06:33

## 2023-08-02 RX ADMIN — AMLODIPINE BESYLATE 5 MG: 5 TABLET ORAL at 08:07

## 2023-08-02 RX ADMIN — PANTOPRAZOLE SODIUM 40 MG: 40 TABLET, DELAYED RELEASE ORAL at 08:07

## 2023-08-02 RX ADMIN — BUMETANIDE 1 MG: 1 TABLET ORAL at 08:08

## 2023-08-02 RX ADMIN — CARVEDILOL 25 MG: 12.5 TABLET, FILM COATED ORAL at 08:08

## 2023-08-02 RX ADMIN — IBUPROFEN 600 MG: 600 TABLET, FILM COATED ORAL at 08:07

## 2023-08-02 ASSESSMENT — PAIN DESCRIPTION - PAIN TYPE: TYPE: SURGICAL PAIN

## 2023-08-02 ASSESSMENT — PAIN - FUNCTIONAL ASSESSMENT
PAIN_FUNCTIONAL_ASSESSMENT: ACTIVITIES ARE NOT PREVENTED
PAIN_FUNCTIONAL_ASSESSMENT: ACTIVITIES ARE NOT PREVENTED

## 2023-08-02 ASSESSMENT — PAIN DESCRIPTION - DESCRIPTORS
DESCRIPTORS: SORE
DESCRIPTORS: SORE

## 2023-08-02 ASSESSMENT — PAIN DESCRIPTION - ORIENTATION
ORIENTATION: LEFT;UPPER
ORIENTATION: LEFT;UPPER

## 2023-08-02 ASSESSMENT — PAIN SCALES - GENERAL
PAINLEVEL_OUTOF10: 3
PAINLEVEL_OUTOF10: 3

## 2023-08-02 ASSESSMENT — PAIN DESCRIPTION - LOCATION
LOCATION: INCISION;CHEST
LOCATION: INCISION

## 2023-08-02 NOTE — PROGRESS NOTES
I have reviewed discharge instructions with the patient and patient's wife such as follow up appointments and site care instructions.

## 2023-08-02 NOTE — PLAN OF CARE
RN  Discharge to home or other facility with appropriate resources: Identify barriers to discharge with patient and caregiver     Problem: Pain  Goal: Verbalizes/displays adequate comfort level or baseline comfort level  8/2/2023 0822 by Lauren Sheehan RN  Outcome: [facility] 114 Resolved Met  Flowsheets (Taken 8/2/2023 0722)  Verbalizes/displays adequate comfort level or baseline comfort level: Encourage patient to monitor pain and request assistance  8/1/2023 2154 by Flynn Dunn RN  Outcome: Progressing  Flowsheets (Taken 8/1/2023 1900)  Verbalizes/displays adequate comfort level or baseline comfort level: Encourage patient to monitor pain and request assistance

## 2023-08-03 LAB — ECHO BSA: 2.39 M2

## 2023-10-04 RX ORDER — ROSUVASTATIN CALCIUM 10 MG/1
10 TABLET, COATED ORAL DAILY
Qty: 90 TABLET | Refills: 3 | Status: SHIPPED | OUTPATIENT
Start: 2023-10-04

## 2023-10-04 RX ORDER — DICLOFENAC SODIUM 75 MG/1
TABLET, DELAYED RELEASE ORAL
Qty: 180 TABLET | Refills: 3 | Status: SHIPPED | OUTPATIENT
Start: 2023-10-04

## 2023-10-04 RX ORDER — HYDRALAZINE HYDROCHLORIDE 50 MG/1
TABLET, FILM COATED ORAL
Qty: 270 TABLET | Refills: 3 | Status: SHIPPED | OUTPATIENT
Start: 2023-10-04

## 2023-10-04 RX ORDER — LEVOTHYROXINE SODIUM 0.05 MG/1
50 TABLET ORAL DAILY
Qty: 90 TABLET | Refills: 3 | Status: SHIPPED | OUTPATIENT
Start: 2023-10-04

## 2023-10-04 RX ORDER — FENOFIBRATE 145 MG/1
145 TABLET, COATED ORAL DAILY
Qty: 90 TABLET | Refills: 3 | Status: SHIPPED | OUTPATIENT
Start: 2023-10-04

## 2023-10-04 RX ORDER — METFORMIN HYDROCHLORIDE 500 MG/1
TABLET, EXTENDED RELEASE ORAL
Qty: 270 TABLET | Refills: 3 | Status: SHIPPED | OUTPATIENT
Start: 2023-10-04

## 2023-10-04 NOTE — TELEPHONE ENCOUNTER
RX refill request from the patient/pharmacy. Patient last seen 07- with labs, and next appt. scheduled for 10-  Requested Prescriptions     Pending Prescriptions Disp Refills    levothyroxine (SYNTHROID) 50 MCG tablet [Pharmacy Med Name: Levothyroxine Sodium 50 MCG Oral Tablet] 90 tablet 3     Sig: TAKE 1 TABLET BY MOUTH  DAILY    diclofenac (VOLTAREN) 75 MG EC tablet [Pharmacy Med Name: Diclofenac Sodium 75 MG Oral Tablet Delayed Release] 180 tablet 3     Sig: TAKE 1 TABLET BY MOUTH  TWICE DAILY    hydrALAZINE (APRESOLINE) 50 MG tablet [Pharmacy Med Name: hydrALAZINE HCl 50 MG Oral Tablet] 270 tablet 3     Sig: TAKE 1 TABLET BY MOUTH 3  TIMES DAILY    fenofibrate (TRICOR) 145 MG tablet [Pharmacy Med Name: Fenofibrate 145 MG Oral Tablet] 90 tablet 3     Sig: TAKE 1 TABLET BY MOUTH  DAILY    metFORMIN (GLUCOPHAGE-XR) 500 MG extended release tablet [Pharmacy Med Name: metFORMIN HCl  MG Oral Tablet Extended Release 24 Hour] 270 tablet 3     Sig: TAKE 1 TABLET BY MOUTH IN  THE MORNING AND 2 TABLETS  BY MOUTH AT DINNER    rosuvastatin (CRESTOR) 10 MG tablet [Pharmacy Med Name: Rosuvastatin Calcium 10 MG Oral Tablet] 90 tablet 3     Sig: TAKE 1 TABLET BY MOUTH  DAILY    .

## 2023-10-11 ENCOUNTER — OFFICE VISIT (OUTPATIENT)
Facility: CLINIC | Age: 73
End: 2023-10-11
Payer: MEDICARE

## 2023-10-11 VITALS
BODY MASS INDEX: 32.06 KG/M2 | HEART RATE: 71 BPM | OXYGEN SATURATION: 96 % | TEMPERATURE: 97.9 F | HEIGHT: 73 IN | DIASTOLIC BLOOD PRESSURE: 77 MMHG | SYSTOLIC BLOOD PRESSURE: 135 MMHG | RESPIRATION RATE: 18 BRPM | WEIGHT: 241.9 LBS

## 2023-10-11 DIAGNOSIS — G89.29 CHRONIC RIGHT SHOULDER PAIN: ICD-10-CM

## 2023-10-11 DIAGNOSIS — E78.2 MIXED HYPERLIPIDEMIA: ICD-10-CM

## 2023-10-11 DIAGNOSIS — I42.8 NONISCHEMIC CARDIOMYOPATHY (HCC): ICD-10-CM

## 2023-10-11 DIAGNOSIS — N18.30 CONTROLLED TYPE 2 DIABETES MELLITUS WITH STAGE 3 CHRONIC KIDNEY DISEASE, WITHOUT LONG-TERM CURRENT USE OF INSULIN (HCC): ICD-10-CM

## 2023-10-11 DIAGNOSIS — N18.30 STAGE 3 CHRONIC KIDNEY DISEASE, UNSPECIFIED WHETHER STAGE 3A OR 3B CKD (HCC): ICD-10-CM

## 2023-10-11 DIAGNOSIS — E11.22 CONTROLLED TYPE 2 DIABETES MELLITUS WITH STAGE 3 CHRONIC KIDNEY DISEASE, WITHOUT LONG-TERM CURRENT USE OF INSULIN (HCC): ICD-10-CM

## 2023-10-11 DIAGNOSIS — I12.9 HYPERTENSION WITH RENAL DISEASE: Primary | ICD-10-CM

## 2023-10-11 DIAGNOSIS — M15.9 PRIMARY OSTEOARTHRITIS INVOLVING MULTIPLE JOINTS: ICD-10-CM

## 2023-10-11 DIAGNOSIS — K21.9 GASTROESOPHAGEAL REFLUX DISEASE WITHOUT ESOPHAGITIS: ICD-10-CM

## 2023-10-11 DIAGNOSIS — M25.511 CHRONIC RIGHT SHOULDER PAIN: ICD-10-CM

## 2023-10-11 DIAGNOSIS — E66.09 CLASS 1 OBESITY DUE TO EXCESS CALORIES WITH SERIOUS COMORBIDITY AND BODY MASS INDEX (BMI) OF 33.0 TO 33.9 IN ADULT: ICD-10-CM

## 2023-10-11 DIAGNOSIS — I42.0 DILATED CARDIOMYOPATHY (HCC): ICD-10-CM

## 2023-10-11 PROCEDURE — 3017F COLORECTAL CA SCREEN DOC REV: CPT | Performed by: INTERNAL MEDICINE

## 2023-10-11 PROCEDURE — 90694 VACC AIIV4 NO PRSRV 0.5ML IM: CPT | Performed by: INTERNAL MEDICINE

## 2023-10-11 PROCEDURE — G8417 CALC BMI ABV UP PARAM F/U: HCPCS | Performed by: INTERNAL MEDICINE

## 2023-10-11 PROCEDURE — 99214 OFFICE O/P EST MOD 30 MIN: CPT | Performed by: INTERNAL MEDICINE

## 2023-10-11 PROCEDURE — 1036F TOBACCO NON-USER: CPT | Performed by: INTERNAL MEDICINE

## 2023-10-11 PROCEDURE — G8484 FLU IMMUNIZE NO ADMIN: HCPCS | Performed by: INTERNAL MEDICINE

## 2023-10-11 PROCEDURE — 2022F DILAT RTA XM EVC RTNOPTHY: CPT | Performed by: INTERNAL MEDICINE

## 2023-10-11 PROCEDURE — G0008 ADMIN INFLUENZA VIRUS VAC: HCPCS | Performed by: INTERNAL MEDICINE

## 2023-10-11 PROCEDURE — 3044F HG A1C LEVEL LT 7.0%: CPT | Performed by: INTERNAL MEDICINE

## 2023-10-11 PROCEDURE — 1123F ACP DISCUSS/DSCN MKR DOCD: CPT | Performed by: INTERNAL MEDICINE

## 2023-10-11 PROCEDURE — G8427 DOCREV CUR MEDS BY ELIG CLIN: HCPCS | Performed by: INTERNAL MEDICINE

## 2023-10-11 NOTE — PROGRESS NOTES
After obtaining written consent and per orders of Dr. Otilia Begum, injection of Fluad given by Nela Baires MA, CMA. Patient tolerated procedure well. VIS was given to them. No reactions noted.

## 2023-10-11 NOTE — PROGRESS NOTES
Chief Complaint   Patient presents with    Hypertension       SUBJECTIVE:    Josias Ramires III is a 72 y.o. male who returns in follow-up for his medical problems include hypertension, hyperlipidemia, diabetes, DJD, cardiomyopathy, GERD and other multimedical problems. He still having problems with his right shoulder where he had recent fall and injury and he has some difficulty with range of motion of the shoulder. He denies any other recent trauma. He notes no chest pain, shortness of breath cardiorespiratory complaints. There are no GI or  complaints. He has no headaches, dizziness or neurologic complaints. Other than the shoulder he has no arthritic complaints and there are no other complaints on complete review of systems.       Current Outpatient Medications   Medication Sig Dispense Refill    levothyroxine (SYNTHROID) 50 MCG tablet TAKE 1 TABLET BY MOUTH  DAILY 90 tablet 3    diclofenac (VOLTAREN) 75 MG EC tablet TAKE 1 TABLET BY MOUTH  TWICE DAILY 180 tablet 3    hydrALAZINE (APRESOLINE) 50 MG tablet TAKE 1 TABLET BY MOUTH 3  TIMES DAILY 270 tablet 3    fenofibrate (TRICOR) 145 MG tablet TAKE 1 TABLET BY MOUTH  DAILY 90 tablet 3    metFORMIN (GLUCOPHAGE-XR) 500 MG extended release tablet TAKE 1 TABLET BY MOUTH IN  THE MORNING AND 2 TABLETS  BY MOUTH AT DINNER 270 tablet 3    rosuvastatin (CRESTOR) 10 MG tablet TAKE 1 TABLET BY MOUTH  DAILY 90 tablet 3    famotidine (PEPCID) 40 MG tablet Take 1 tablet by mouth daily      niacin (SLO-NIACIN) 500 MG extended release tablet Take 750 mg by mouth nightly      bumetanide (BUMEX) 1 MG tablet TAKE 1 TABLET BY MOUTH IN  THE MORNING 90 tablet 3    niacin (NIASPAN) 750 MG extended release tablet TAKE 2 TABLETS BY MOUTH AT  BEDTIME 180 tablet 3    esomeprazole (NEXIUM) 40 MG delayed release capsule TAKE 1 CAPSULE BY MOUTH  DAILY 90 capsule 3    amLODIPine (NORVASC) 5 MG tablet Take 1 tablet by mouth daily      carvedilol (COREG) 25 MG tablet TAKE 1 TABLET BY

## 2023-10-12 LAB
ALBUMIN SERPL-MCNC: 4.3 G/DL (ref 3.5–5)
ALBUMIN/GLOB SERPL: 1.7 (ref 1.1–2.2)
ALP SERPL-CCNC: 70 U/L (ref 45–117)
ALT SERPL-CCNC: 28 U/L (ref 12–78)
ANION GAP SERPL CALC-SCNC: 4 MMOL/L (ref 5–15)
AST SERPL-CCNC: 22 U/L (ref 15–37)
BILIRUB SERPL-MCNC: 0.4 MG/DL (ref 0.2–1)
BUN SERPL-MCNC: 25 MG/DL (ref 6–20)
BUN/CREAT SERPL: 15 (ref 12–20)
CALCIUM SERPL-MCNC: 9.4 MG/DL (ref 8.5–10.1)
CHLORIDE SERPL-SCNC: 110 MMOL/L (ref 97–108)
CHOLEST SERPL-MCNC: 104 MG/DL
CK SERPL-CCNC: 526 U/L (ref 39–308)
CO2 SERPL-SCNC: 28 MMOL/L (ref 21–32)
CREAT SERPL-MCNC: 1.69 MG/DL (ref 0.7–1.3)
EST. AVERAGE GLUCOSE BLD GHB EST-MCNC: 126 MG/DL
GLOBULIN SER CALC-MCNC: 2.6 G/DL (ref 2–4)
GLUCOSE SERPL-MCNC: 113 MG/DL (ref 65–100)
HBA1C MFR BLD: 6 % (ref 4–5.6)
HDLC SERPL-MCNC: 33 MG/DL
HDLC SERPL: 3.2 (ref 0–5)
LDLC SERPL CALC-MCNC: 49.4 MG/DL (ref 0–100)
POTASSIUM SERPL-SCNC: 4 MMOL/L (ref 3.5–5.1)
PROT SERPL-MCNC: 6.9 G/DL (ref 6.4–8.2)
SODIUM SERPL-SCNC: 142 MMOL/L (ref 136–145)
TRIGL SERPL-MCNC: 108 MG/DL
VLDLC SERPL CALC-MCNC: 21.6 MG/DL

## 2023-11-29 RX ORDER — CARVEDILOL 25 MG/1
25 TABLET ORAL 2 TIMES DAILY WITH MEALS
Qty: 28 TABLET | Refills: 0 | Status: SHIPPED | OUTPATIENT
Start: 2023-11-29

## 2023-11-29 NOTE — TELEPHONE ENCOUNTER
RX refill request from the patient/pharmacy. Patient last seen 10- with labs, and next appt. scheduled for 12-  Requested Prescriptions     Pending Prescriptions Disp Refills    carvedilol (COREG) 25 MG tablet 28 tablet 0     Sig: Take 1 tablet by mouth 2 times daily (with meals)    .

## 2023-12-14 ENCOUNTER — OFFICE VISIT (OUTPATIENT)
Facility: CLINIC | Age: 73
End: 2023-12-14
Payer: MEDICARE

## 2023-12-14 VITALS
TEMPERATURE: 98.1 F | BODY MASS INDEX: 31.85 KG/M2 | OXYGEN SATURATION: 97 % | WEIGHT: 241.4 LBS | HEART RATE: 73 BPM | SYSTOLIC BLOOD PRESSURE: 126 MMHG | DIASTOLIC BLOOD PRESSURE: 81 MMHG

## 2023-12-14 DIAGNOSIS — J06.9 VIRAL UPPER RESPIRATORY TRACT INFECTION: Primary | ICD-10-CM

## 2023-12-14 PROCEDURE — G8417 CALC BMI ABV UP PARAM F/U: HCPCS | Performed by: PHYSICIAN ASSISTANT

## 2023-12-14 PROCEDURE — G8484 FLU IMMUNIZE NO ADMIN: HCPCS | Performed by: PHYSICIAN ASSISTANT

## 2023-12-14 PROCEDURE — 1036F TOBACCO NON-USER: CPT | Performed by: PHYSICIAN ASSISTANT

## 2023-12-14 PROCEDURE — 99213 OFFICE O/P EST LOW 20 MIN: CPT | Performed by: PHYSICIAN ASSISTANT

## 2023-12-14 PROCEDURE — G8427 DOCREV CUR MEDS BY ELIG CLIN: HCPCS | Performed by: PHYSICIAN ASSISTANT

## 2023-12-14 PROCEDURE — 1123F ACP DISCUSS/DSCN MKR DOCD: CPT | Performed by: PHYSICIAN ASSISTANT

## 2023-12-14 PROCEDURE — 3017F COLORECTAL CA SCREEN DOC REV: CPT | Performed by: PHYSICIAN ASSISTANT

## 2023-12-14 RX ORDER — BENZONATATE 200 MG/1
200 CAPSULE ORAL 3 TIMES DAILY PRN
Qty: 30 CAPSULE | Refills: 0 | Status: SHIPPED | OUTPATIENT
Start: 2023-12-14

## 2023-12-14 NOTE — PROGRESS NOTES
Chief Complaint   Patient presents with    Cough     cough    1. Have you been to the ER, urgent care clinic since your last visit? Hospitalized since your last visit?no    2. Have you seen or consulted any other health care providers outside of the [address] [place] [address] since your last visit? Include any pap smears or colon screening.  no

## 2023-12-17 PROBLEM — Z12.5 PROSTATE CANCER SCREENING: Status: ACTIVE | Noted: 2017-10-17

## 2023-12-17 PROBLEM — I42.8 NONISCHEMIC CARDIOMYOPATHY (HCC): Status: RESOLVED | Noted: 2023-08-01 | Resolved: 2023-12-17

## 2023-12-17 PROBLEM — Z00.00 MEDICARE ANNUAL WELLNESS VISIT, SUBSEQUENT: Status: ACTIVE | Noted: 2019-11-20

## 2023-12-26 ENCOUNTER — OFFICE VISIT (OUTPATIENT)
Age: 73
End: 2023-12-26

## 2023-12-26 VITALS
DIASTOLIC BLOOD PRESSURE: 69 MMHG | SYSTOLIC BLOOD PRESSURE: 116 MMHG | OXYGEN SATURATION: 97 % | RESPIRATION RATE: 16 BRPM | WEIGHT: 240.3 LBS | TEMPERATURE: 98.4 F | HEART RATE: 69 BPM | BODY MASS INDEX: 31.7 KG/M2

## 2023-12-26 DIAGNOSIS — J20.9 ACUTE BRONCHITIS, UNSPECIFIED ORGANISM: Primary | ICD-10-CM

## 2023-12-26 RX ORDER — DOXYCYCLINE HYCLATE 100 MG
100 TABLET ORAL 2 TIMES DAILY
Qty: 14 TABLET | Refills: 0 | Status: SHIPPED | OUTPATIENT
Start: 2023-12-26 | End: 2024-01-02

## 2023-12-26 RX ORDER — PREDNISONE 5 MG/1
TABLET ORAL
Qty: 1 EACH | Refills: 0 | Status: SHIPPED | OUTPATIENT
Start: 2023-12-26

## 2023-12-26 NOTE — PROGRESS NOTES
Chief Complaint   Patient presents with    Cough     C/o cough x 1 week. Cough  This is a new problem. The current episode started 1 to 4 weeks ago (2 weeks). The problem has been gradually improving. The problem occurs every few minutes. The cough is Productive of sputum. Associated symptoms include nasal congestion. Pertinent negatives include no chest pain, chills or shortness of breath. The symptoms are aggravated by lying down. He has tried prescription cough suppressant (z arturo) for the symptoms. Past Medical History:   Diagnosis Date    Arthritis     fingers    CAD (coronary artery disease)     Cardiomyopathy ([address])     Cholelithiasis 9/21/2017    CKD (chronic kidney disease) 9/21/2017    Diabetes mellitus ([address])     Diverticulosis 9/21/2017    DJD (degenerative joint disease) 9/21/2017    GERD (gastroesophageal reflux disease)     H/O: depression 9/21/2017    Hiatal hernia     Hypercholesteremia     Hypertension     Hypertension with renal disease 9/21/2017    Hypothyroidism     Kidney stones     Obesity 9/21/2017    On statin therapy 9/21/2017    Sleep apnea 9/21/2017    Thyroid disease     Viral meningitis 9/1990       Past Surgical History:   Procedure Laterality Date    EP DEVICE PROCEDURE N/A 8/1/2023    Insert ICD multi performed by Javier Dubon MD at [facility] CARDIAC CATH LAB    HERNIA REPAIR      KNEE ARTHROSCOPY      both       Social History     Tobacco Use    Smoking status: Never     Passive exposure: Never    Smokeless tobacco: Never   Substance Use Topics    Alcohol use: No    Drug use: No        Allergies   Allergen Reactions    Promethazine      Other reaction(s): Unknown (comments)    Meperidine Nausea And Vomiting        Review of Systems   Constitutional:  Negative for chills. HENT:  Positive for congestion. Respiratory:  Positive for cough. Negative for chest tightness and shortness of breath. Cardiovascular:  Negative for chest pain.    All other systems reviewed and

## 2024-01-16 PROBLEM — Z00.00 MEDICARE ANNUAL WELLNESS VISIT, SUBSEQUENT: Status: RESOLVED | Noted: 2019-11-20 | Resolved: 2024-01-16

## 2024-01-16 PROBLEM — Z12.5 PROSTATE CANCER SCREENING: Status: RESOLVED | Noted: 2017-10-17 | Resolved: 2024-01-16

## 2024-01-31 ENCOUNTER — HOSPITAL ENCOUNTER (OUTPATIENT)
Facility: HOSPITAL | Age: 74
Discharge: HOME | End: 2024-02-03
Attending: ORTHOPAEDIC SURGERY
Payer: MEDICARE

## 2024-01-31 DIAGNOSIS — M75.41 IMPINGEMENT SYNDROME OF RIGHT SHOULDER: ICD-10-CM

## 2024-01-31 DIAGNOSIS — S46.011A TRAUMATIC COMPLETE TEAR OF RIGHT ROTATOR CUFF, INITIAL ENCOUNTER: ICD-10-CM

## 2024-01-31 DIAGNOSIS — M19.011 ARTHRITIS OF RIGHT ACROMIOCLAVICULAR JOINT: ICD-10-CM

## 2024-01-31 PROCEDURE — 73221 MRI JOINT UPR EXTREM W/O DYE: CPT

## 2024-03-06 RX ORDER — FAMOTIDINE 40 MG/1
40 TABLET, FILM COATED ORAL NIGHTLY
COMMUNITY

## 2024-03-13 ENCOUNTER — ANESTHESIA (OUTPATIENT)
Facility: HOSPITAL | Age: 74
End: 2024-03-13
Payer: MEDICARE

## 2024-03-13 ENCOUNTER — ANESTHESIA EVENT (OUTPATIENT)
Facility: HOSPITAL | Age: 74
End: 2024-03-13
Payer: MEDICARE

## 2024-03-13 ENCOUNTER — HOSPITAL ENCOUNTER (OUTPATIENT)
Facility: HOSPITAL | Age: 74
Setting detail: OUTPATIENT SURGERY
Discharge: HOME | End: 2024-03-13
Attending: ORTHOPAEDIC SURGERY | Admitting: ORTHOPAEDIC SURGERY
Payer: MEDICARE

## 2024-03-13 VITALS
SYSTOLIC BLOOD PRESSURE: 131 MMHG | RESPIRATION RATE: 20 BRPM | OXYGEN SATURATION: 93 % | TEMPERATURE: 98 F | HEIGHT: 73 IN | BODY MASS INDEX: 31.7 KG/M2 | HEART RATE: 79 BPM | DIASTOLIC BLOOD PRESSURE: 84 MMHG | WEIGHT: 239.2 LBS

## 2024-03-13 DIAGNOSIS — S46.011D TRAUMATIC COMPLETE TEAR OF RIGHT ROTATOR CUFF, SUBSEQUENT ENCOUNTER: Primary | ICD-10-CM

## 2024-03-13 PROBLEM — M67.921 BICEPS TENDINOPATHY, RIGHT: Status: ACTIVE | Noted: 2024-03-13

## 2024-03-13 PROBLEM — M19.011 ARTHRITIS OF RIGHT ACROMIOCLAVICULAR JOINT: Status: ACTIVE | Noted: 2024-03-13

## 2024-03-13 PROBLEM — S46.011A TRAUMATIC COMPLETE TEAR OF RIGHT ROTATOR CUFF: Status: ACTIVE | Noted: 2024-03-13

## 2024-03-13 PROBLEM — M75.41 IMPINGEMENT SYNDROME OF RIGHT SHOULDER: Status: ACTIVE | Noted: 2024-03-13

## 2024-03-13 LAB
GLUCOSE BLD STRIP.AUTO-MCNC: 127 MG/DL (ref 65–117)
GLUCOSE BLD STRIP.AUTO-MCNC: 149 MG/DL (ref 65–117)
SERVICE CMNT-IMP: ABNORMAL
SERVICE CMNT-IMP: ABNORMAL

## 2024-03-13 PROCEDURE — 2500000003 HC RX 250 WO HCPCS: Performed by: ORTHOPAEDIC SURGERY

## 2024-03-13 PROCEDURE — 6360000002 HC RX W HCPCS: Performed by: ORTHOPAEDIC SURGERY

## 2024-03-13 PROCEDURE — 7100000001 HC PACU RECOVERY - ADDTL 15 MIN: Performed by: ORTHOPAEDIC SURGERY

## 2024-03-13 PROCEDURE — 7100000000 HC PACU RECOVERY - FIRST 15 MIN: Performed by: ORTHOPAEDIC SURGERY

## 2024-03-13 PROCEDURE — 2580000003 HC RX 258: Performed by: ORTHOPAEDIC SURGERY

## 2024-03-13 PROCEDURE — 2580000003 HC RX 258: Performed by: ANESTHESIOLOGY

## 2024-03-13 PROCEDURE — 3600000014 HC SURGERY LEVEL 4 ADDTL 15MIN: Performed by: ORTHOPAEDIC SURGERY

## 2024-03-13 PROCEDURE — 6370000000 HC RX 637 (ALT 250 FOR IP): Performed by: ANESTHESIOLOGY

## 2024-03-13 PROCEDURE — 2500000003 HC RX 250 WO HCPCS: Performed by: NURSE ANESTHETIST, CERTIFIED REGISTERED

## 2024-03-13 PROCEDURE — 6360000002 HC RX W HCPCS: Performed by: NURSE ANESTHETIST, CERTIFIED REGISTERED

## 2024-03-13 PROCEDURE — 6370000000 HC RX 637 (ALT 250 FOR IP): Performed by: ORTHOPAEDIC SURGERY

## 2024-03-13 PROCEDURE — 2580000003 HC RX 258: Performed by: NURSE ANESTHETIST, CERTIFIED REGISTERED

## 2024-03-13 PROCEDURE — 2720000010 HC SURG SUPPLY STERILE: Performed by: ORTHOPAEDIC SURGERY

## 2024-03-13 PROCEDURE — 2709999900 HC NON-CHARGEABLE SUPPLY: Performed by: ORTHOPAEDIC SURGERY

## 2024-03-13 PROCEDURE — 6360000002 HC RX W HCPCS: Performed by: ANESTHESIOLOGY

## 2024-03-13 PROCEDURE — 3700000001 HC ADD 15 MINUTES (ANESTHESIA): Performed by: ORTHOPAEDIC SURGERY

## 2024-03-13 PROCEDURE — 3700000000 HC ANESTHESIA ATTENDED CARE: Performed by: ORTHOPAEDIC SURGERY

## 2024-03-13 PROCEDURE — 64415 NJX AA&/STRD BRCH PLXS IMG: CPT | Performed by: ANESTHESIOLOGY

## 2024-03-13 PROCEDURE — C1713 ANCHOR/SCREW BN/BN,TIS/BN: HCPCS | Performed by: ORTHOPAEDIC SURGERY

## 2024-03-13 PROCEDURE — 82962 GLUCOSE BLOOD TEST: CPT

## 2024-03-13 PROCEDURE — 3600000004 HC SURGERY LEVEL 4 BASE: Performed by: ORTHOPAEDIC SURGERY

## 2024-03-13 DEVICE — ANCHOR SUT L19.1MM DIA4.75MM BIOCOMPOSITE FULL THRD: Type: IMPLANTABLE DEVICE | Site: SHOULDER | Status: FUNCTIONAL

## 2024-03-13 DEVICE — SYSTEM IMPL INCL 2 4.75MM BIOCOMPOSITE SWIVELOCK C ANCHR: Type: IMPLANTABLE DEVICE | Site: SHOULDER | Status: FUNCTIONAL

## 2024-03-13 RX ORDER — HYDRALAZINE HYDROCHLORIDE 20 MG/ML
10 INJECTION INTRAMUSCULAR; INTRAVENOUS
Status: DISCONTINUED | OUTPATIENT
Start: 2024-03-13 | End: 2024-03-13 | Stop reason: HOSPADM

## 2024-03-13 RX ORDER — ACETAMINOPHEN 325 MG/1
650 TABLET ORAL
Status: DISCONTINUED | OUTPATIENT
Start: 2024-03-13 | End: 2024-03-13 | Stop reason: HOSPADM

## 2024-03-13 RX ORDER — SODIUM CHLORIDE, SODIUM LACTATE, POTASSIUM CHLORIDE, CALCIUM CHLORIDE 600; 310; 30; 20 MG/100ML; MG/100ML; MG/100ML; MG/100ML
INJECTION, SOLUTION INTRAVENOUS ONCE
Status: COMPLETED | OUTPATIENT
Start: 2024-03-13 | End: 2024-03-13

## 2024-03-13 RX ORDER — SODIUM CHLORIDE 9 MG/ML
INJECTION, SOLUTION INTRAVENOUS PRN
Status: DISCONTINUED | OUTPATIENT
Start: 2024-03-13 | End: 2024-03-13 | Stop reason: HOSPADM

## 2024-03-13 RX ORDER — SODIUM CHLORIDE 0.9 % (FLUSH) 0.9 %
5-40 SYRINGE (ML) INJECTION EVERY 12 HOURS SCHEDULED
Status: DISCONTINUED | OUTPATIENT
Start: 2024-03-13 | End: 2024-03-13 | Stop reason: HOSPADM

## 2024-03-13 RX ORDER — ROPIVACAINE HYDROCHLORIDE 5 MG/ML
INJECTION, SOLUTION EPIDURAL; INFILTRATION; PERINEURAL
Status: COMPLETED | OUTPATIENT
Start: 2024-03-13 | End: 2024-03-13

## 2024-03-13 RX ORDER — FENTANYL CITRATE 50 UG/ML
INJECTION, SOLUTION INTRAMUSCULAR; INTRAVENOUS PRN
Status: DISCONTINUED | OUTPATIENT
Start: 2024-03-13 | End: 2024-03-13 | Stop reason: SDUPTHER

## 2024-03-13 RX ORDER — PROPOFOL 10 MG/ML
INJECTION, EMULSION INTRAVENOUS PRN
Status: DISCONTINUED | OUTPATIENT
Start: 2024-03-13 | End: 2024-03-13 | Stop reason: SDUPTHER

## 2024-03-13 RX ORDER — OXYCODONE HYDROCHLORIDE 5 MG/1
5 TABLET ORAL
Status: DISCONTINUED | OUTPATIENT
Start: 2024-03-13 | End: 2024-03-13 | Stop reason: HOSPADM

## 2024-03-13 RX ORDER — MIDAZOLAM HYDROCHLORIDE 2 MG/2ML
2 INJECTION, SOLUTION INTRAMUSCULAR; INTRAVENOUS
Status: DISCONTINUED | OUTPATIENT
Start: 2024-03-13 | End: 2024-03-13 | Stop reason: HOSPADM

## 2024-03-13 RX ORDER — DEXAMETHASONE SODIUM PHOSPHATE 4 MG/ML
4 INJECTION, SOLUTION INTRA-ARTICULAR; INTRALESIONAL; INTRAMUSCULAR; INTRAVENOUS; SOFT TISSUE
Status: DISCONTINUED | OUTPATIENT
Start: 2024-03-13 | End: 2024-03-13 | Stop reason: HOSPADM

## 2024-03-13 RX ORDER — SODIUM CHLORIDE 0.9 % (FLUSH) 0.9 %
5-40 SYRINGE (ML) INJECTION PRN
Status: DISCONTINUED | OUTPATIENT
Start: 2024-03-13 | End: 2024-03-13 | Stop reason: HOSPADM

## 2024-03-13 RX ORDER — OXYCODONE HYDROCHLORIDE 5 MG/1
5 TABLET ORAL EVERY 4 HOURS PRN
Qty: 28 TABLET | Refills: 0 | Status: SHIPPED | OUTPATIENT
Start: 2024-03-13 | End: 2024-03-20

## 2024-03-13 RX ORDER — GLYCOPYRROLATE 0.2 MG/ML
INJECTION INTRAMUSCULAR; INTRAVENOUS PRN
Status: DISCONTINUED | OUTPATIENT
Start: 2024-03-13 | End: 2024-03-13 | Stop reason: SDUPTHER

## 2024-03-13 RX ORDER — LIDOCAINE HYDROCHLORIDE 20 MG/ML
INJECTION, SOLUTION EPIDURAL; INFILTRATION; INTRACAUDAL; PERINEURAL PRN
Status: DISCONTINUED | OUTPATIENT
Start: 2024-03-13 | End: 2024-03-13 | Stop reason: SDUPTHER

## 2024-03-13 RX ORDER — ROCURONIUM BROMIDE 10 MG/ML
INJECTION, SOLUTION INTRAVENOUS PRN
Status: DISCONTINUED | OUTPATIENT
Start: 2024-03-13 | End: 2024-03-13 | Stop reason: SDUPTHER

## 2024-03-13 RX ORDER — HYDROMORPHONE HYDROCHLORIDE 1 MG/ML
0.25 INJECTION, SOLUTION INTRAMUSCULAR; INTRAVENOUS; SUBCUTANEOUS EVERY 5 MIN PRN
Status: DISCONTINUED | OUTPATIENT
Start: 2024-03-13 | End: 2024-03-13 | Stop reason: HOSPADM

## 2024-03-13 RX ORDER — DEXAMETHASONE SODIUM PHOSPHATE 4 MG/ML
INJECTION, SOLUTION INTRA-ARTICULAR; INTRALESIONAL; INTRAMUSCULAR; INTRAVENOUS; SOFT TISSUE PRN
Status: DISCONTINUED | OUTPATIENT
Start: 2024-03-13 | End: 2024-03-13 | Stop reason: SDUPTHER

## 2024-03-13 RX ORDER — ACETAMINOPHEN 500 MG
1000 TABLET ORAL ONCE
Status: COMPLETED | OUTPATIENT
Start: 2024-03-13 | End: 2024-03-13

## 2024-03-13 RX ORDER — SUCCINYLCHOLINE/SOD CL,ISO/PF 200MG/10ML
SYRINGE (ML) INTRAVENOUS PRN
Status: DISCONTINUED | OUTPATIENT
Start: 2024-03-13 | End: 2024-03-13 | Stop reason: SDUPTHER

## 2024-03-13 RX ORDER — NALOXONE HYDROCHLORIDE 0.4 MG/ML
INJECTION, SOLUTION INTRAMUSCULAR; INTRAVENOUS; SUBCUTANEOUS PRN
Status: DISCONTINUED | OUTPATIENT
Start: 2024-03-13 | End: 2024-03-13 | Stop reason: HOSPADM

## 2024-03-13 RX ORDER — FENTANYL CITRATE 50 UG/ML
100 INJECTION, SOLUTION INTRAMUSCULAR; INTRAVENOUS
Status: DISCONTINUED | OUTPATIENT
Start: 2024-03-13 | End: 2024-03-13 | Stop reason: HOSPADM

## 2024-03-13 RX ORDER — FENTANYL CITRATE 50 UG/ML
25 INJECTION, SOLUTION INTRAMUSCULAR; INTRAVENOUS EVERY 5 MIN PRN
Status: DISCONTINUED | OUTPATIENT
Start: 2024-03-13 | End: 2024-03-13 | Stop reason: HOSPADM

## 2024-03-13 RX ORDER — ONDANSETRON 2 MG/ML
INJECTION INTRAMUSCULAR; INTRAVENOUS PRN
Status: DISCONTINUED | OUTPATIENT
Start: 2024-03-13 | End: 2024-03-13 | Stop reason: SDUPTHER

## 2024-03-13 RX ORDER — SODIUM CHLORIDE, SODIUM LACTATE, POTASSIUM CHLORIDE, CALCIUM CHLORIDE 600; 310; 30; 20 MG/100ML; MG/100ML; MG/100ML; MG/100ML
INJECTION, SOLUTION INTRAVENOUS CONTINUOUS PRN
Status: DISCONTINUED | OUTPATIENT
Start: 2024-03-13 | End: 2024-03-13 | Stop reason: SDUPTHER

## 2024-03-13 RX ORDER — ONDANSETRON 2 MG/ML
4 INJECTION INTRAMUSCULAR; INTRAVENOUS ONCE
Status: DISCONTINUED | OUTPATIENT
Start: 2024-03-13 | End: 2024-03-13 | Stop reason: HOSPADM

## 2024-03-13 RX ORDER — SODIUM CHLORIDE, SODIUM LACTATE, POTASSIUM CHLORIDE, CALCIUM CHLORIDE 600; 310; 30; 20 MG/100ML; MG/100ML; MG/100ML; MG/100ML
INJECTION, SOLUTION INTRAVENOUS CONTINUOUS
Status: DISCONTINUED | OUTPATIENT
Start: 2024-03-13 | End: 2024-03-13 | Stop reason: HOSPADM

## 2024-03-13 RX ORDER — NEOSTIGMINE METHYLSULFATE 1 MG/ML
INJECTION, SOLUTION INTRAVENOUS PRN
Status: DISCONTINUED | OUTPATIENT
Start: 2024-03-13 | End: 2024-03-13 | Stop reason: SDUPTHER

## 2024-03-13 RX ADMIN — FENTANYL CITRATE 50 MCG: 50 INJECTION, SOLUTION INTRAMUSCULAR; INTRAVENOUS at 12:40

## 2024-03-13 RX ADMIN — ONDANSETRON 4 MG: 2 INJECTION INTRAMUSCULAR; INTRAVENOUS at 12:35

## 2024-03-13 RX ADMIN — PROPOFOL 20 MG: 10 INJECTION, EMULSION INTRAVENOUS at 12:40

## 2024-03-13 RX ADMIN — SODIUM CHLORIDE, POTASSIUM CHLORIDE, SODIUM LACTATE AND CALCIUM CHLORIDE: 600; 310; 30; 20 INJECTION, SOLUTION INTRAVENOUS at 10:56

## 2024-03-13 RX ADMIN — WATER 2000 MG: 1 INJECTION INTRAMUSCULAR; INTRAVENOUS; SUBCUTANEOUS at 11:10

## 2024-03-13 RX ADMIN — SODIUM CHLORIDE, POTASSIUM CHLORIDE, SODIUM LACTATE AND CALCIUM CHLORIDE: 600; 310; 30; 20 INJECTION, SOLUTION INTRAVENOUS at 09:35

## 2024-03-13 RX ADMIN — GLYCOPYRROLATE 0.6 MG: 0.2 INJECTION, SOLUTION INTRAMUSCULAR; INTRAVENOUS at 12:35

## 2024-03-13 RX ADMIN — PROPOFOL 140 MG: 10 INJECTION, EMULSION INTRAVENOUS at 11:02

## 2024-03-13 RX ADMIN — ROCURONIUM BROMIDE 10 MG: 10 INJECTION INTRAVENOUS at 11:02

## 2024-03-13 RX ADMIN — DEXAMETHASONE SODIUM PHOSPHATE 4 MG: 4 INJECTION INTRA-ARTICULAR; INTRALESIONAL; INTRAMUSCULAR; INTRAVENOUS; SOFT TISSUE at 11:10

## 2024-03-13 RX ADMIN — PHENYLEPHRINE HYDROCHLORIDE 25 MCG/MIN: 10 INJECTION INTRAVENOUS at 11:14

## 2024-03-13 RX ADMIN — ROPIVACAINE HYDROCHLORIDE 20 ML: 5 INJECTION, SOLUTION EPIDURAL; INFILTRATION; PERINEURAL at 10:28

## 2024-03-13 RX ADMIN — PROPOFOL 40 MG: 10 INJECTION, EMULSION INTRAVENOUS at 10:28

## 2024-03-13 RX ADMIN — ACETAMINOPHEN 1000 MG: 500 TABLET ORAL at 09:15

## 2024-03-13 RX ADMIN — LIDOCAINE HYDROCHLORIDE 100 MG: 20 INJECTION, SOLUTION EPIDURAL; INFILTRATION; INTRACAUDAL; PERINEURAL at 11:02

## 2024-03-13 RX ADMIN — ROCURONIUM BROMIDE 40 MG: 10 INJECTION INTRAVENOUS at 11:12

## 2024-03-13 RX ADMIN — FENTANYL CITRATE 50 MCG: 50 INJECTION, SOLUTION INTRAMUSCULAR; INTRAVENOUS at 12:45

## 2024-03-13 RX ADMIN — Medication 3 AMPULE: at 09:16

## 2024-03-13 RX ADMIN — Medication 3 MG: at 12:35

## 2024-03-13 RX ADMIN — Medication 120 MG: at 11:02

## 2024-03-13 ASSESSMENT — PAIN - FUNCTIONAL ASSESSMENT: PAIN_FUNCTIONAL_ASSESSMENT: 0-10

## 2024-03-13 NOTE — H&P
mild  acromioclavicular osteoarthrosis. There is a type II to III acromion though  without substantial lateral downsloping. Minimal subacromial spurring is shown  anteriorly. No substantial glenohumeral arthrosis is evident. There is no  substantial joint or bursal effusion. Muscles are normal in size and  attenuation. No soft tissue mass is shown. Artifacts related to AICD lead is  shown, extending into the right ventricle. Note is made of lower cervical  spondylosis with moderate to severe disc space narrowing at C5-6 and C6-7.    Impression  1. Mild AC joint osteoarthrosis. Type II-III acromion.  2. No substantial glenohumeral arthrosis.  3. No muscle atrophy or soft tissue mass demonstrated.  4. Substantial lower cervical spondylosis.  5. Further evaluation of the right shoulder or the cervical spine can be further  evaluated with dedicated MRI, depending on the type of AICD generator and  compatibility with MRI. If the device is not compatible, further evaluation of  the shoulder can be made with CT-arthrography.          Labs:   Recent Results (from the past 24 hour(s))   POCT Glucose    Collection Time: 03/13/24  9:28 AM   Result Value Ref Range    POC Glucose 127 (H) 65 - 117 mg/dL    Performed by: Eliana Howard          Impression:     Patient Active Problem List    Diagnosis Date Noted    Cardiomyopathy (HCC) 03/11/2012    Controlled type 2 diabetes mellitus with stage 3 chronic kidney disease, without long-term current use of insulin (HCC) 03/11/2012    Chronic right shoulder pain 04/11/2023    Sciatica of left side 03/30/2022    COVID-19 01/19/2022    Vitamin D deficiency 12/02/2021    Edema 07/21/2021    Alcohol screening 11/20/2019    Hearing impaired 03/21/2019    Stage 3 chronic kidney disease (HCC) 09/21/2017    Diverticulosis 09/21/2017    History of viral meningitis 09/21/2017    Cholelithiasis 09/21/2017    H/O: depression 09/21/2017    Class 1 obesity due to excess calories with serious

## 2024-03-13 NOTE — DISCHARGE INSTRUCTIONS
home with support stockings, you may remove them after 24 hours. Support stockings are used to help prevent blood clots in the legs following surgery.    TO PREVENT AN INFECTION      WASH YOUR HANDS    To prevent infection, good handwashing is the most important thing you or your caregiver can do.      Wash your hands with soap and water or use the hand  we gave you before you touch any wounds.    SHOWER    Use the antibacterial soap we gave you when you take a shower.     Shower with this soap until your wounds are healed.      To reach all areas of your body, you may need someone to help you.     Don’t forget to clean your belly button with every shower.     USE CLEAN SHEETS    Use freshly cleaned sheets on your bed after surgery.     To keep the surgery site clean, do not allow pets to sleep with you while your wound is still healing.     STOP SMOKING    Stop smoking, or at least cut back on smoking    Smoking slows your healing.      CONTROL YOUR BLOOD SUGAR    High blood sugars slow wound healing.    If you are diabetic, control your blood sugar levels before and after your surgery.    Please take time to review all of your Home Care Instructions and Medication Information sheets provided in your discharge packet. If you have any questions, please contact your surgeon's office. Thank you.    The discharge information has been reviewed with the patient and instruction recipient.  The patient and instruction recipient verbalized understanding.  Discharge medications reviewed with the patient and instruction recipient and appropriate educational materials and side effects teaching were provided.      Please provide this summary of care documentation to your next provider.

## 2024-03-13 NOTE — PERIOP NOTE
0852 - PT DENIES FEVER, COLD, SOB, COUGH, N/V, DIARRHEA.....  PRE-OP TCHING DONE - PT VERBALIZES UNDERSTANDING.  STRETCHER IN LOWEST POSITION, CB IN PLACE AND SR UP X2.    1038 -TIMEOUT DONE - DR. CASEY AT BEDSIDE TO PLACE RIGHT SHOULDER BLOCK.  PT MIRZA WELL.  VSS.    
Reviewed cardiac history/ICD/shoulder surgery with SARAH Carrion NP.  Pt has EF 20% from last echo.  Case will need moved to main OR.  Called and lm for Anna Marie to send orders to main PAT and also that case would need moved.  Report will be given at chart handoff.  
prescribed the day before surgery.  Hold all diabetic medications the day of surgery.    If you are scheduled to arrive for surgery after 8:00 AM, and your AM blood sugar is >200, please call Ambulatory Surgery.    I understand a pre-operative phone call will be made to verify my surgery time.  In the event that I am not available, I give permission for a message to be left on my answering service and/or with another person?      yes         ___________________      ___________________      ________________  (Signature of Patient)          (Witness)                   (Date and Time)

## 2024-03-13 NOTE — ANESTHESIA PROCEDURE NOTES
Peripheral Block    Patient location during procedure: procedure area  Reason for block: post-op pain management  Start time: 3/13/2024 10:28 AM  End time: 3/13/2024 10:34 AM  Staffing  Performed: anesthesiologist   Anesthesiologist: Carlos Lopez MD  Performed by: Carlos Lopez MD  Authorized by: Carlos Lopez MD    Preanesthetic Checklist  Completed: patient identified, IV checked, site marked, risks and benefits discussed, surgical/procedural consents, equipment checked, pre-op evaluation, timeout performed, anesthesia consent given, oxygen available, monitors applied/VS acknowledged, fire risk safety assessment completed and verbalized and blood product R/B/A discussed and consented  Peripheral Block   Patient position: supine  Prep: ChloraPrep  Provider prep: mask, sterile gloves and sterile gown  Patient monitoring: cardiac monitor, continuous pulse ox, frequent blood pressure checks, IV access, oxygen and responsive to questions  Block type: Brachial plexus  Interscalene  Laterality: right  Injection technique: single-shot  Guidance: ultrasound guided    Needle   Needle type: insulated echogenic nerve stimulator needle   Needle gauge: 22 G  Needle localization: anatomical landmarks and ultrasound guidance  Assessment   Injection assessment: negative aspiration for heme, no paresthesia on injection, local visualized surrounding nerve on ultrasound and no intravascular symptoms  Paresthesia pain: none  Slow fractionated injection: yes  Hemodynamics: stable  Outcomes: uncomplicated and patient tolerated procedure well    Medications Administered  ropivacaine (NAROPIN) injection 0.5% - Perineural   20 mL - 3/13/2024 10:28:00 AM

## 2024-03-13 NOTE — ANESTHESIA PRE PROCEDURE
Nursing notes reviewed   no history of anesthetic complications:   Airway: Mallampati: III  TM distance: >3 FB   Neck ROM: full  Mouth opening: > = 3 FB   Dental: normal exam   (+) caps      Pulmonary:Negative Pulmonary ROS and normal exam  breath sounds clear to auscultation  (+)     sleep apnea: on CPAP,                                  Cardiovascular:Negative CV ROS  Exercise tolerance: poor (<4 METS)  (+) hypertension: moderate, pacemaker: pacemaker, CAD: obstructive, hyperlipidemia      NYHA Classification: III    Rhythm: regular  Rate: normal                    Neuro/Psych:   Negative Neuro/Psych ROS  (+) neuromuscular disease:depression/anxiety             GI/Hepatic/Renal: Neg GI/Hepatic/Renal ROS  (+) hiatal hernia, GERD: poorly controlled          Endo/Other: Negative Endo/Other ROS   (+) DiabetesType II DM, hypothyroidism::..                 Abdominal: normal exam            Vascular: negative vascular ROS.         Other Findings:         Anesthesia Plan      MAC and regional     ASA 3     (Interscalene Block)  Induction: intravenous.    MIPS: Postoperative opioids intended and Prophylactic antiemetics administered.  Anesthetic plan and risks discussed with patient.    Use of blood products discussed with patient whom consented to blood products.    Plan discussed with CRNA and surgical team.    Attending anesthesiologist reviewed and agrees with Preprocedure content              Carlos Lopez MD   3/13/2024

## 2024-03-13 NOTE — OP NOTE
position the shoulders then prepped and draped in put in 10 lb of traction and 30 degrees of abduction and 20 degrees of forward flexion.  Time out was called and patient was identified by name, date, operative procedure and operative extremity.  After time out, anterior and posterior arthroscopic portals were established in the glenohumeral joint was viewed in its entirety.  The findings were as follows. Articular cartilage mild chondral changes. Labrum intact. Biceps noted to be subluxed medial with partial tear. Undersurface Rotator Cuff completely torn with retraction    We then left the glenohumeral space and entered the subacromial space.  The rotator cuff tear was visualized, mobilized, the edges were freshened, the underlying tuberosity was roughened.  The characteristics of this tear are full thickness and retracted.  This rotator cuff repair was repaired using a double row technique with Arthrex anchors, sutures placed using a scorpion through a lateral canula.  I encorporated the biceps tendon into the anterior anchor for a biceps tenodesis with a fiberlink suture and fiberwire suture.  This helps to act as a superior capsular reconstruction    Acromioplasty was performed using a cutting block technique from the posterior portal after performing a limited bursectomy using the arthroscopic shaver.    Distal clavicle resection was performed taking care to resect 1cm of distal clavicle while preserving the posterior superior capsuloligamentous structures.    Photographs were taken, the joint was copiously irrigated, portals were closed and a sterile dressing applied, patient taken to the recovery room in a sling in stable condition.        Chris Bowen DO     Electronically signed by Chris Bowen DO on 3/13/2024 at 12:52 PM

## 2024-03-13 NOTE — FLOWSHEET NOTE
03/13/24 1459   Discharge Checklist   Ride and Caregiver Arranged Yes   Ride Caregiver Provider spouse   Responsible party will drive the patient home Yes   Special Appliances/Equipment Sling;Ice pack/cuff (comment)   Mobility at Departure Ambulatory   Discharged with Documented Belongings Yes   Departure Mode Significant other   AVS Reviewed   AVS & discharge instructions reviewed with patient and/or representative? Yes   Reviewed instructions with Patient;Other (name and relationship in comment)  (spouse)   Level of Understanding Questions answered;Verbalized understanding

## 2024-03-14 NOTE — ANESTHESIA POSTPROCEDURE EVALUATION
Department of Anesthesiology  Postprocedure Note    Patient: Nghia Patel III  MRN: [id number]  YOB: 1950  Date of evaluation: 3/14/2024    Procedure Summary       Date: 03/13/24 Room / Location: \[facility]\"" MAIN OR M3 / [facility] MAIN OR    Anesthesia Start: 1056 Anesthesia Stop: 1259    Procedure: RIGHT SHOULDER ARTHROSCOPY WITH ROTATOR CUFF REPAIR, ACROMIOPLASTY, DISTAL CLAVICLE RESECTION, BICEPS TENODESIS (GEN/BLOCK) (Right: Shoulder) Diagnosis:       Traumatic complete tear of right rotator cuff, initial encounter      Impingement syndrome of right shoulder      Arthritis of right acromioclavicular joint      Biceps tendinopathy, right      (Traumatic complete tear of right rotator cuff, initial encounter [S46.011A])      (Impingement syndrome of right shoulder [M75.41])      (Arthritis of right acromioclavicular joint [M19.011])      (Biceps tendinopathy, right [M67.921])    Providers: Chris Bowen DO Responsible Provider: Carlos Lopez MD    Anesthesia Type: General, Regional ASA Status: 3            Anesthesia Type: General, Regional    Marilyn Phase I: Marilyn Score: 9    Marilyn Phase II:      Anesthesia Post Evaluation    Patient location during evaluation: PACU  Patient participation: complete - patient participated  Level of consciousness: sleepy but conscious and responsive to verbal stimuli  Pain score: 0  Airway patency: patent  Nausea & Vomiting: no vomiting and no nausea  Cardiovascular status: blood pressure returned to baseline and hemodynamically stable  Respiratory status: acceptable  Hydration status: stable  Multimodal analgesia pain management approach  Pain management: adequate    No notable events documented.

## 2024-03-18 RX ORDER — AMLODIPINE BESYLATE 5 MG/1
5 TABLET ORAL DAILY
Qty: 90 TABLET | Refills: 3 | Status: SHIPPED | OUTPATIENT
Start: 2024-03-18

## 2024-03-18 RX ORDER — CARVEDILOL 25 MG/1
25 TABLET ORAL 2 TIMES DAILY WITH MEALS
Qty: 180 TABLET | Refills: 3 | Status: SHIPPED | OUTPATIENT
Start: 2024-03-18

## 2024-03-18 RX ORDER — FAMOTIDINE 40 MG/1
40 TABLET, FILM COATED ORAL DAILY
Qty: 90 TABLET | Refills: 3 | Status: SHIPPED | OUTPATIENT
Start: 2024-03-18

## 2024-03-18 NOTE — TELEPHONE ENCOUNTER
PCP: Kyle Wilson MD    Last appt: 12/18/2023    Future Appointments   Date Time Provider Department Center   3/20/2024  9:00 AM Kyle Wilson MD [facility] BS AMB   3/22/2024  1:30 PM Chris Bowen DO [facility] BS AMB       Requested Prescriptions     Pending Prescriptions Disp Refills    amLODIPine (NORVASC) 5 MG tablet [Pharmacy Med Name: amLODIPine Besylate 5 MG Oral Tablet] 90 tablet 3     Sig: TAKE 1 TABLET BY MOUTH DAILY    carvedilol (COREG) 25 MG tablet [Pharmacy Med Name: Carvedilol 25 MG Oral Tablet] 180 tablet 3     Sig: TAKE 1 TABLET BY MOUTH TWICE  DAILY WITH MEALS    famotidine (PEPCID) 40 MG tablet [Pharmacy Med Name: Famotidine 40 MG Oral Tablet] 90 tablet 3     Sig: TAKE 1 TABLET BY MOUTH DAILY

## 2024-03-20 ENCOUNTER — OFFICE VISIT (OUTPATIENT)
Facility: CLINIC | Age: 74
End: 2024-03-20
Payer: MEDICARE

## 2024-03-20 VITALS
OXYGEN SATURATION: 97 % | HEIGHT: 73 IN | BODY MASS INDEX: 32.18 KG/M2 | DIASTOLIC BLOOD PRESSURE: 67 MMHG | RESPIRATION RATE: 18 BRPM | TEMPERATURE: 98 F | HEART RATE: 65 BPM | WEIGHT: 242.8 LBS | SYSTOLIC BLOOD PRESSURE: 114 MMHG

## 2024-03-20 DIAGNOSIS — I12.9 HYPERTENSION WITH RENAL DISEASE: Primary | ICD-10-CM

## 2024-03-20 DIAGNOSIS — K21.9 GASTROESOPHAGEAL REFLUX DISEASE WITHOUT ESOPHAGITIS: ICD-10-CM

## 2024-03-20 DIAGNOSIS — M15.9 PRIMARY OSTEOARTHRITIS INVOLVING MULTIPLE JOINTS: ICD-10-CM

## 2024-03-20 DIAGNOSIS — E78.2 MIXED HYPERLIPIDEMIA: ICD-10-CM

## 2024-03-20 DIAGNOSIS — N18.30 STAGE 3 CHRONIC KIDNEY DISEASE, UNSPECIFIED WHETHER STAGE 3A OR 3B CKD (HCC): ICD-10-CM

## 2024-03-20 DIAGNOSIS — N18.30 CONTROLLED TYPE 2 DIABETES MELLITUS WITH STAGE 3 CHRONIC KIDNEY DISEASE, WITHOUT LONG-TERM CURRENT USE OF INSULIN (HCC): ICD-10-CM

## 2024-03-20 DIAGNOSIS — I42.0 DILATED CARDIOMYOPATHY (HCC): ICD-10-CM

## 2024-03-20 DIAGNOSIS — E66.09 CLASS 1 OBESITY DUE TO EXCESS CALORIES WITH SERIOUS COMORBIDITY AND BODY MASS INDEX (BMI) OF 33.0 TO 33.9 IN ADULT: ICD-10-CM

## 2024-03-20 DIAGNOSIS — E11.22 CONTROLLED TYPE 2 DIABETES MELLITUS WITH STAGE 3 CHRONIC KIDNEY DISEASE, WITHOUT LONG-TERM CURRENT USE OF INSULIN (HCC): ICD-10-CM

## 2024-03-20 LAB
ALBUMIN SERPL-MCNC: 3.6 G/DL (ref 3.5–5)
ALBUMIN/GLOB SERPL: 1.2 (ref 1.1–2.2)
ALP SERPL-CCNC: 60 U/L (ref 45–117)
ALT SERPL-CCNC: 18 U/L (ref 12–78)
ANION GAP SERPL CALC-SCNC: 4 MMOL/L (ref 5–15)
AST SERPL-CCNC: 18 U/L (ref 15–37)
BILIRUB SERPL-MCNC: 0.5 MG/DL (ref 0.2–1)
BUN SERPL-MCNC: 13 MG/DL (ref 6–20)
BUN/CREAT SERPL: 11 (ref 12–20)
CALCIUM SERPL-MCNC: 9.3 MG/DL (ref 8.5–10.1)
CHLORIDE SERPL-SCNC: 107 MMOL/L (ref 97–108)
CHOLEST SERPL-MCNC: 109 MG/DL
CK SERPL-CCNC: 66 U/L (ref 39–308)
CO2 SERPL-SCNC: 29 MMOL/L (ref 21–32)
CREAT SERPL-MCNC: 1.14 MG/DL (ref 0.7–1.3)
EST. AVERAGE GLUCOSE BLD GHB EST-MCNC: 128 MG/DL
GLOBULIN SER CALC-MCNC: 3 G/DL (ref 2–4)
GLUCOSE SERPL-MCNC: 141 MG/DL (ref 65–100)
HBA1C MFR BLD: 6.1 % (ref 4–5.6)
HDLC SERPL-MCNC: 34 MG/DL
HDLC SERPL: 3.2 (ref 0–5)
LDLC SERPL CALC-MCNC: 53.8 MG/DL (ref 0–100)
POTASSIUM SERPL-SCNC: 3.9 MMOL/L (ref 3.5–5.1)
PROT SERPL-MCNC: 6.6 G/DL (ref 6.4–8.2)
SODIUM SERPL-SCNC: 140 MMOL/L (ref 136–145)
TRIGL SERPL-MCNC: 106 MG/DL
VLDLC SERPL CALC-MCNC: 21.2 MG/DL

## 2024-03-20 PROCEDURE — 3017F COLORECTAL CA SCREEN DOC REV: CPT | Performed by: INTERNAL MEDICINE

## 2024-03-20 PROCEDURE — G8484 FLU IMMUNIZE NO ADMIN: HCPCS | Performed by: INTERNAL MEDICINE

## 2024-03-20 PROCEDURE — G8417 CALC BMI ABV UP PARAM F/U: HCPCS | Performed by: INTERNAL MEDICINE

## 2024-03-20 PROCEDURE — 3046F HEMOGLOBIN A1C LEVEL >9.0%: CPT | Performed by: INTERNAL MEDICINE

## 2024-03-20 PROCEDURE — 99214 OFFICE O/P EST MOD 30 MIN: CPT | Performed by: INTERNAL MEDICINE

## 2024-03-20 PROCEDURE — 1123F ACP DISCUSS/DSCN MKR DOCD: CPT | Performed by: INTERNAL MEDICINE

## 2024-03-20 PROCEDURE — G8427 DOCREV CUR MEDS BY ELIG CLIN: HCPCS | Performed by: INTERNAL MEDICINE

## 2024-03-20 PROCEDURE — 1036F TOBACCO NON-USER: CPT | Performed by: INTERNAL MEDICINE

## 2024-03-20 PROCEDURE — 2022F DILAT RTA XM EVC RTNOPTHY: CPT | Performed by: INTERNAL MEDICINE

## 2024-03-20 NOTE — PROGRESS NOTES
Nghia Patel III is a 74 y.o. male     Chief Complaint   Patient presents with    3 Month Follow-Up    Other     1 wk post-op       /67 (Site: Left Upper Arm, Position: Sitting, Cuff Size: Large Adult)   Pulse 65   Temp 98 °F (36.7 °C) (Oral)   Resp 18   Ht 1.854 m (6' 1\")   Wt 110.1 kg (242 lb 12.8 oz)   SpO2 97%   BMI 32.03 kg/m²     Health Maintenance Due   Topic Date Due    Diabetic retinal exam  Never done    DTaP/Tdap/Td vaccine (1 - Tdap) Never done    Shingles vaccine (1 of 2) Never done    Respiratory Syncytial Virus (RSV) Pregnant or age 60 yrs+ (1 - 1-dose 60+ series) Never done    Pneumococcal 65+ years Vaccine (2 of 2 - PPSV23 or PCV20) 04/27/2017    Diabetic foot exam  01/19/2023    COVID-19 Vaccine (3 - 2023-24 season) 09/01/2023    Annual Wellness Visit (Medicare Advantage)  01/01/2024         \"Have you been to the ER, urgent care clinic since your last visit?  Hospitalized since your last visit?\"    Yes, on file.    “Have you seen or consulted any other health care providers outside of [facility] since your last visit?”    NO                     
appears age appropriate  EYES: sclera anicteric, PERRL, EOMI  ENMT:nares clear, moist mucous membranes, pharynx clear  NECK: supple. Thyroid normal, No JVD or bruits  RESPIRATORY: Chest: clear to ascultation and percussion, normal inspiratory effort  CARDIOVASCULAR: Heart: regular rate and rhythm no murmurs, rubs or gallops, PMI not displaced, No thrills, no peripheral edema  GASTROINTESTINAL: Abdomen: non distended, soft, non tender, bowel sounds normal  HEMATOLOGIC: no purpura, petechiae or bruising  LYMPHATIC: No lymph node enlargemant  MUSCULOSKELETAL: Extremities: no active synovitis, pulse 1+.  No diabetic foot changes noted.  INTEGUMENT: No unusual rashes or suspicious skin lesions noted. Nails appear normal.  PERIPHERAL VASCULAR: normal pulses femoral, PT and DP  NEUROLOGIC: non-focal exam, A & O X 3.  Normal distal sensation and proprioception all toes both feet.  PSYCHIATRIC:, appropriate affect     ASSESSMENT:   1. Hypertension with renal disease    2. Controlled type 2 diabetes mellitus with stage 3 chronic kidney disease, without long-term current use of insulin ([facility])    3. Mixed hyperlipidemia    4. Gastroesophageal reflux disease without esophagitis    5. Stage 3 chronic kidney disease, unspecified whether stage 3a or 3b CKD (HCC)    6. Primary osteoarthritis involving multiple joints    7. Dilated cardiomyopathy (HCC)    8. Class 1 obesity due to excess calories with serious comorbidity and body mass index (BMI) of 33.0 to 33.9 in adult      Impression  1 hypertension that is controlled so continue current therapy reviewed with him.  2.  Diabetes repeat status pending prior lab reviewed I will adjust if needed.  3 hyperlipidemia prior lab reviewed repeat status pending  4 GERD stable  5 CKD stage III repeat status pending  6 DJD stable with recent right shoulder repair  7 cardiomyopathy stable  8 obesity we did discuss diet, exercise weight reduction for overall health benefit.  Follow-up again 3

## 2024-05-02 RX ORDER — LEVOTHYROXINE SODIUM 0.05 MG/1
50 TABLET ORAL DAILY
Qty: 90 TABLET | Refills: 3 | Status: SHIPPED | OUTPATIENT
Start: 2024-05-02

## 2024-05-02 NOTE — TELEPHONE ENCOUNTER
Rx Refill:    Per patient [facility] is out of stock of Levothyroxine.    levothyroxine (SYNTHROID) 50 MCG tablet [[id number]]    Order Details  Dose: 50 mcg Route: Oral Frequency: DAILY   Dispense Quantity: 90 tablet Refills: 3    Note to Pharmacy:          Sig: TAKE 1 TABLET BY MOUTH  DAILY       Pharmacy:    [id number] [place] [id number] - P [phone] - F [phone] [[id number]]

## 2024-05-02 NOTE — TELEPHONE ENCOUNTER
PCP: Kyle Wilson MD    Last appt: 3/20/2024  Future Appointments   Date Time Provider Department Center   5/2/2024  3:20 PM Hudson Smith, PT [facility] BS AMB   5/6/2024  1:00 PM Hudson Smith, PT [facility] BS AMB   5/9/2024  1:20 PM Hudson Smith, PT [facility] BS AMB   5/14/2024 11:00 AM Hudson Smith, PT [facility] BS AMB   5/17/2024  1:00 PM Hudson Smith, PT [facility] BS AMB   5/20/2024  3:00 PM Hudson Smith, PT [facility] BS AMB   5/22/2024  9:30 AM Chris Bowen, DO [facility] BS AMB   5/22/2024 11:00 AM Hudson Smith, PT [facility] BS AMB   6/26/2024  9:10 AM Kyle Wilson MD [facility] BS AMB       Requested Prescriptions     Pending Prescriptions Disp Refills    levothyroxine (SYNTHROID) 50 MCG tablet 90 tablet 3     Sig: Take 1 tablet by mouth daily       Prior labs and Blood pressures:  BP Readings from Last 3 Encounters:   03/20/24 114/67   03/13/24 131/84   12/26/23 116/69     Lab Results   Component Value Date/Time     03/20/2024 10:13 AM    K 3.9 03/20/2024 10:13 AM     03/20/2024 10:13 AM    CO2 29 03/20/2024 10:13 AM    BUN 13 03/20/2024 10:13 AM    GFRAA >60 08/17/2022 02:11 PM     No results found for: \"HBA1C\", \"DVZ1IVXH\"  Lab Results   Component Value Date/Time    CHOL 109 03/20/2024 10:13 AM    HDL 34 03/20/2024 10:13 AM    LDL 53.8 03/20/2024 10:13 AM    VLDL 21.2 03/20/2024 10:13 AM    VLDL 23 03/25/2021 11:29 AM     No results found for: \"VITD3\", \"VD3RIA\"        Lab Results   Component Value Date/Time    TSH 1.19 12/07/2022 10:00 AM

## 2024-06-06 RX ORDER — ESOMEPRAZOLE MAGNESIUM 40 MG/1
CAPSULE, DELAYED RELEASE ORAL
Qty: 90 CAPSULE | Refills: 3 | Status: SHIPPED | OUTPATIENT
Start: 2024-06-06

## 2024-06-06 RX ORDER — BUMETANIDE 1 MG/1
TABLET ORAL
Qty: 90 TABLET | Refills: 3 | Status: SHIPPED | OUTPATIENT
Start: 2024-06-06

## 2024-06-06 RX ORDER — NIACIN 750 MG/1
TABLET, EXTENDED RELEASE ORAL
Qty: 180 TABLET | Refills: 3 | Status: SHIPPED | OUTPATIENT
Start: 2024-06-06

## 2024-06-06 NOTE — TELEPHONE ENCOUNTER
RX refill request from the patient/pharmacy. Patient last seen 03- with labs, and next appt. scheduled for 06-  Requested Prescriptions     Pending Prescriptions Disp Refills    niacin (NIASPAN) 750 MG extended release tablet [Pharmacy Med Name: NIACIN  750MG  TAB  EXTENDED RELEASE] 180 tablet 3     Sig: TAKE 2 TABLETS BY MOUTH AT  BEDTIME    bumetanide (BUMEX) 1 MG tablet [Pharmacy Med Name: Bumetanide 1 MG Oral Tablet] 90 tablet 3     Sig: TAKE 1 TABLET BY MOUTH IN THE  MORNING    esomeprazole (NEXIUM) 40 MG delayed release capsule [Pharmacy Med Name: Esomeprazole Magnesium 40 MG Oral Capsule Delayed Release] 90 capsule 3     Sig: TAKE 1 CAPSULE BY MOUTH DAILY    .

## 2024-06-26 ENCOUNTER — OFFICE VISIT (OUTPATIENT)
Facility: CLINIC | Age: 74
End: 2024-06-26
Payer: MEDICARE

## 2024-06-26 VITALS
HEART RATE: 65 BPM | RESPIRATION RATE: 16 BRPM | WEIGHT: 244 LBS | TEMPERATURE: 98.3 F | SYSTOLIC BLOOD PRESSURE: 120 MMHG | OXYGEN SATURATION: 94 % | HEIGHT: 73 IN | BODY MASS INDEX: 32.34 KG/M2 | DIASTOLIC BLOOD PRESSURE: 80 MMHG

## 2024-06-26 DIAGNOSIS — E11.22 CONTROLLED TYPE 2 DIABETES MELLITUS WITH STAGE 3 CHRONIC KIDNEY DISEASE, WITHOUT LONG-TERM CURRENT USE OF INSULIN (HCC): ICD-10-CM

## 2024-06-26 DIAGNOSIS — N18.30 CONTROLLED TYPE 2 DIABETES MELLITUS WITH STAGE 3 CHRONIC KIDNEY DISEASE, WITHOUT LONG-TERM CURRENT USE OF INSULIN (HCC): ICD-10-CM

## 2024-06-26 DIAGNOSIS — Z13.39 ALCOHOL SCREENING: ICD-10-CM

## 2024-06-26 DIAGNOSIS — Z00.00 MEDICARE ANNUAL WELLNESS VISIT, SUBSEQUENT: ICD-10-CM

## 2024-06-26 DIAGNOSIS — G47.33 OBSTRUCTIVE SLEEP APNEA SYNDROME: ICD-10-CM

## 2024-06-26 DIAGNOSIS — E66.09 CLASS 1 OBESITY DUE TO EXCESS CALORIES WITH SERIOUS COMORBIDITY AND BODY MASS INDEX (BMI) OF 33.0 TO 33.9 IN ADULT: ICD-10-CM

## 2024-06-26 DIAGNOSIS — K57.90 DIVERTICULOSIS: ICD-10-CM

## 2024-06-26 DIAGNOSIS — Z12.5 PROSTATE CANCER SCREENING: ICD-10-CM

## 2024-06-26 DIAGNOSIS — M15.9 PRIMARY OSTEOARTHRITIS INVOLVING MULTIPLE JOINTS: ICD-10-CM

## 2024-06-26 DIAGNOSIS — E78.2 MIXED HYPERLIPIDEMIA: ICD-10-CM

## 2024-06-26 DIAGNOSIS — E55.9 VITAMIN D DEFICIENCY: ICD-10-CM

## 2024-06-26 DIAGNOSIS — K21.9 GASTROESOPHAGEAL REFLUX DISEASE WITHOUT ESOPHAGITIS: ICD-10-CM

## 2024-06-26 DIAGNOSIS — N18.30 STAGE 3 CHRONIC KIDNEY DISEASE, UNSPECIFIED WHETHER STAGE 3A OR 3B CKD (HCC): ICD-10-CM

## 2024-06-26 DIAGNOSIS — E03.9 ACQUIRED HYPOTHYROIDISM: ICD-10-CM

## 2024-06-26 DIAGNOSIS — I42.0 DILATED CARDIOMYOPATHY (HCC): ICD-10-CM

## 2024-06-26 DIAGNOSIS — I12.9 HYPERTENSION WITH RENAL DISEASE: Primary | ICD-10-CM

## 2024-06-26 LAB
25(OH)D3 SERPL-MCNC: 25.1 NG/ML (ref 30–100)
ALBUMIN SERPL-MCNC: 3.9 G/DL (ref 3.5–5)
ALBUMIN/GLOB SERPL: 1.4 (ref 1.1–2.2)
ALP SERPL-CCNC: 66 U/L (ref 45–117)
ALT SERPL-CCNC: 21 U/L (ref 12–78)
ANION GAP SERPL CALC-SCNC: 7 MMOL/L (ref 5–15)
APPEARANCE UR: ABNORMAL
AST SERPL-CCNC: 22 U/L (ref 15–37)
BACTERIA URNS QL MICRO: ABNORMAL /HPF
BASOPHILS # BLD: 0.1 K/UL (ref 0–0.1)
BASOPHILS NFR BLD: 1 % (ref 0–1)
BILIRUB SERPL-MCNC: 0.5 MG/DL (ref 0.2–1)
BILIRUB UR QL CFM: NEGATIVE
BUN SERPL-MCNC: 15 MG/DL (ref 6–20)
BUN/CREAT SERPL: 13 (ref 12–20)
CALCIUM SERPL-MCNC: 8.7 MG/DL (ref 8.5–10.1)
CHLORIDE SERPL-SCNC: 110 MMOL/L (ref 97–108)
CHOLEST SERPL-MCNC: 117 MG/DL
CK SERPL-CCNC: 102 U/L (ref 39–308)
CO2 SERPL-SCNC: 26 MMOL/L (ref 21–32)
COLOR UR: ABNORMAL
CREAT SERPL-MCNC: 1.17 MG/DL (ref 0.7–1.3)
CREAT UR-MCNC: 646 MG/DL
DIFFERENTIAL METHOD BLD: ABNORMAL
EOSINOPHIL # BLD: 0.1 K/UL (ref 0–0.4)
EOSINOPHIL NFR BLD: 3 % (ref 0–7)
EPITH CASTS URNS QL MICRO: ABNORMAL /LPF
ERYTHROCYTE [DISTWIDTH] IN BLOOD BY AUTOMATED COUNT: 13.1 % (ref 11.5–14.5)
EST. AVERAGE GLUCOSE BLD GHB EST-MCNC: 126 MG/DL
GLOBULIN SER CALC-MCNC: 2.8 G/DL (ref 2–4)
GLUCOSE SERPL-MCNC: 124 MG/DL (ref 65–100)
GLUCOSE UR STRIP.AUTO-MCNC: 250 MG/DL
HBA1C MFR BLD: 6 % (ref 4–5.6)
HCT VFR BLD AUTO: 37.4 % (ref 36.6–50.3)
HDLC SERPL-MCNC: 36 MG/DL
HDLC SERPL: 3.3 (ref 0–5)
HGB BLD-MCNC: 12.1 G/DL (ref 12.1–17)
HGB UR QL STRIP: NEGATIVE
IMM GRANULOCYTES # BLD AUTO: 0 K/UL (ref 0–0.04)
IMM GRANULOCYTES NFR BLD AUTO: 0 % (ref 0–0.5)
KETONES UR QL STRIP.AUTO: ABNORMAL MG/DL
LDLC SERPL CALC-MCNC: 62 MG/DL (ref 0–100)
LEUKOCYTE ESTERASE UR QL STRIP.AUTO: NEGATIVE
LYMPHOCYTES # BLD: 1.3 K/UL (ref 0.8–3.5)
LYMPHOCYTES NFR BLD: 25 % (ref 12–49)
MCH RBC QN AUTO: 30.2 PG (ref 26–34)
MCHC RBC AUTO-ENTMCNC: 32.4 G/DL (ref 30–36.5)
MCV RBC AUTO: 93.3 FL (ref 80–99)
MICROALBUMIN UR-MCNC: 16.7 MG/DL
MICROALBUMIN/CREAT UR-RTO: 26 MG/G (ref 0–30)
MONOCYTES # BLD: 0.4 K/UL (ref 0–1)
MONOCYTES NFR BLD: 8 % (ref 5–13)
MUCOUS THREADS URNS QL MICRO: ABNORMAL /LPF
NEUTS SEG # BLD: 3.2 K/UL (ref 1.8–8)
NEUTS SEG NFR BLD: 63 % (ref 32–75)
NITRITE UR QL STRIP.AUTO: NEGATIVE
NRBC # BLD: 0 K/UL (ref 0–0.01)
NRBC BLD-RTO: 0 PER 100 WBC
PH UR STRIP: 6 (ref 5–8)
PLATELET # BLD AUTO: 163 K/UL (ref 150–400)
PMV BLD AUTO: 11 FL (ref 8.9–12.9)
POTASSIUM SERPL-SCNC: 3.6 MMOL/L (ref 3.5–5.1)
PROT SERPL-MCNC: 6.7 G/DL (ref 6.4–8.2)
PROT UR STRIP-MCNC: 30 MG/DL
PSA SERPL-MCNC: 3.1 NG/ML (ref 0.01–4)
RBC # BLD AUTO: 4.01 M/UL (ref 4.1–5.7)
RBC #/AREA URNS HPF: ABNORMAL /HPF (ref 0–5)
SODIUM SERPL-SCNC: 143 MMOL/L (ref 136–145)
SP GR UR REFRACTOMETRY: >1.03
T4 FREE SERPL-MCNC: 1 NG/DL (ref 0.8–1.5)
TRIGL SERPL-MCNC: 95 MG/DL
TSH SERPL DL<=0.05 MIU/L-ACNC: 1.16 UIU/ML (ref 0.36–3.74)
UROBILINOGEN UR QL STRIP.AUTO: 1 EU/DL (ref 0.2–1)
VLDLC SERPL CALC-MCNC: 19 MG/DL
WBC # BLD AUTO: 5.1 K/UL (ref 4.1–11.1)
WBC URNS QL MICRO: ABNORMAL /HPF (ref 0–4)

## 2024-06-26 PROCEDURE — G8417 CALC BMI ABV UP PARAM F/U: HCPCS | Performed by: INTERNAL MEDICINE

## 2024-06-26 PROCEDURE — 1123F ACP DISCUSS/DSCN MKR DOCD: CPT | Performed by: INTERNAL MEDICINE

## 2024-06-26 PROCEDURE — G0439 PPPS, SUBSEQ VISIT: HCPCS | Performed by: INTERNAL MEDICINE

## 2024-06-26 PROCEDURE — G8427 DOCREV CUR MEDS BY ELIG CLIN: HCPCS | Performed by: INTERNAL MEDICINE

## 2024-06-26 PROCEDURE — G0442 ANNUAL ALCOHOL SCREEN 15 MIN: HCPCS | Performed by: INTERNAL MEDICINE

## 2024-06-26 PROCEDURE — 93000 ELECTROCARDIOGRAM COMPLETE: CPT | Performed by: INTERNAL MEDICINE

## 2024-06-26 PROCEDURE — 2022F DILAT RTA XM EVC RTNOPTHY: CPT | Performed by: INTERNAL MEDICINE

## 2024-06-26 PROCEDURE — 99214 OFFICE O/P EST MOD 30 MIN: CPT | Performed by: INTERNAL MEDICINE

## 2024-06-26 PROCEDURE — 1036F TOBACCO NON-USER: CPT | Performed by: INTERNAL MEDICINE

## 2024-06-26 PROCEDURE — 3044F HG A1C LEVEL LT 7.0%: CPT | Performed by: INTERNAL MEDICINE

## 2024-06-26 PROCEDURE — 3017F COLORECTAL CA SCREEN DOC REV: CPT | Performed by: INTERNAL MEDICINE

## 2024-06-26 ASSESSMENT — PATIENT HEALTH QUESTIONNAIRE - PHQ9
SUM OF ALL RESPONSES TO PHQ QUESTIONS 1-9: 0
SUM OF ALL RESPONSES TO PHQ9 QUESTIONS 1 & 2: 0
SUM OF ALL RESPONSES TO PHQ QUESTIONS 1-9: 0
SUM OF ALL RESPONSES TO PHQ QUESTIONS 1-9: 0
1. LITTLE INTEREST OR PLEASURE IN DOING THINGS: NOT AT ALL
SUM OF ALL RESPONSES TO PHQ QUESTIONS 1-9: 0
2. FEELING DOWN, DEPRESSED OR HOPELESS: NOT AT ALL

## 2024-06-26 ASSESSMENT — LIFESTYLE VARIABLES
HOW MANY STANDARD DRINKS CONTAINING ALCOHOL DO YOU HAVE ON A TYPICAL DAY: PATIENT DOES NOT DRINK
HOW OFTEN DO YOU HAVE A DRINK CONTAINING ALCOHOL: NEVER

## 2024-06-26 NOTE — PATIENT INSTRUCTIONS
Preventive Plan for Nghia Patel III - 6/26/2024  Medicare offers a range of preventive health benefits. Some of the tests and screenings are paid in full while other may be subject to a deductible, co-insurance, and/or copay.    Some of these benefits include a comprehensive review of your medical history including lifestyle, illnesses that may run in your family, and various assessments and screenings as appropriate.    After reviewing your medical record and screening and assessments performed today your provider may have ordered immunizations, labs, imaging, and/or referrals for you.  A list of these orders (if applicable) as well as your Preventive Care list are included within your After Visit Summary for your review.    Other Preventive Recommendations:    A preventive eye exam performed by an eye specialist is recommended every 1-2 years to screen for glaucoma; cataracts, macular degeneration, and other eye disorders.  A preventive dental visit is recommended every 6 months.  Try to get at least 150 minutes of exercise per week or 10,000 steps per day on a pedometer .  Order or download the FREE \"Exercise & Physical Activity: Your Everyday Guide\" from [employer] [place] [employer]. Call 1-847.305.6968 or search [employer] [place] [employer] online.  You need 7489-0320 mg of calcium and 7933-8726 IU of vitamin D per day. It is possible to meet your calcium requirement with diet alone, but a vitamin D supplement is usually necessary to meet this goal.  When exposed to the sun, use a sunscreen that protects against both UVA and UVB radiation with an SPF of 30 or greater. Reapply every 2 to 3 hours or after sweating, drying off with a towel, or swimming.  Always wear a seat belt when traveling in a car. Always wear a helmet when riding a bicycle or motorcycle.

## 2024-06-26 NOTE — PROGRESS NOTES
Nghia Patle III is a 74 y.o. male     Chief Complaint   Patient presents with    Medicare AWV       /80 (Site: Left Upper Arm, Position: Sitting, Cuff Size: Large Adult)   Pulse 65   Temp 98.3 °F (36.8 °C) (Oral)   Resp 16   Ht 1.854 m (6' 1\")   Wt 110.7 kg (244 lb)   SpO2 94%   BMI 32.19 kg/m²     Health Maintenance Due   Topic Date Due    Diabetic retinal exam  Never done    DTaP/Tdap/Td vaccine (1 - Tdap) Never done    Shingles vaccine (1 of 2) Never done    Respiratory Syncytial Virus (RSV) Pregnant or age 60 yrs+ (1 - 1-dose 60+ series) Never done    Pneumococcal 65+ years Vaccine (2 of 2 - PPSV23 or PCV20) 04/27/2017    COVID-19 Vaccine (3 - 2023-24 season) 09/01/2023    Annual Wellness Visit (Medicare Advantage)  01/01/2024         \"Have you been to the ER, urgent care clinic since your last visit?  Hospitalized since your last visit?\"    NO    “Have you seen or consulted any other health care providers outside of [facility] since your last visit?”    NO

## 2024-06-26 NOTE — PROGRESS NOTES
Medicare Annual Wellness Visit    Nghia Patel III is here for Medicare AWV    Assessment & Plan   Hypertension with renal disease  -     Urinalysis with Microscopic; Future  -     TSH; Future  -     T4, Free; Future  -     Comprehensive Metabolic Panel; Future  -     CBC with Auto Differential; Future  Controlled type 2 diabetes mellitus with stage 3 chronic kidney disease, without long-term current use of insulin (HCC)  -     Hemoglobin A1C; Future  -     Microalbumin / Creatinine Urine Ratio; Future  Mixed hyperlipidemia  -     Lipid Panel; Future  -     CK; Future  Gastroesophageal reflux disease without esophagitis  Primary osteoarthritis involving multiple joints  Stage 3 chronic kidney disease, unspecified whether stage 3a or 3b CKD (HCC)  Dilated cardiomyopathy (HCC)  -     AMB POC EKG ROUTINE W/ 12 LEADS, SCREEN ()  Acquired hypothyroidism  Diverticulosis  Class 1 obesity due to excess calories with serious comorbidity and body mass index (BMI) of 33.0 to 33.9 in adult  Obstructive sleep apnea syndrome  Vitamin D deficiency  -     Vitamin D 25 Hydroxy; Future  Prostate cancer screening  -     PSA Screening; Future  Alcohol screening  -     [facility] ANNUAL ALCOHOL SCREEN 15 MIN  Medicare annual wellness visit, subsequent    ASSESSMENT:   1. Hypertension with renal disease    2. Controlled type 2 diabetes mellitus with stage 3 chronic kidney disease, without long-term current use of insulin (HCC)    3. Mixed hyperlipidemia    4. Gastroesophageal reflux disease without esophagitis    5. Primary osteoarthritis involving multiple joints    6. Stage 3 chronic kidney disease, unspecified whether stage 3a or 3b CKD (HCC)    7. Dilated cardiomyopathy (HCC)    8. Acquired hypothyroidism    9. Diverticulosis    10. Class 1 obesity due to excess calories with serious comorbidity and body mass index (BMI) of 33.0 to 33.9 in adult    11. Obstructive sleep apnea syndrome    12. Vitamin D deficiency    13. Prostate

## 2024-07-25 PROBLEM — Z00.00 MEDICARE ANNUAL WELLNESS VISIT, SUBSEQUENT: Status: RESOLVED | Noted: 2019-11-20 | Resolved: 2024-07-25

## 2024-07-25 PROBLEM — Z12.5 PROSTATE CANCER SCREENING: Status: RESOLVED | Noted: 2017-10-17 | Resolved: 2024-07-25

## 2024-09-03 RX ORDER — METFORMIN HCL 500 MG
TABLET, EXTENDED RELEASE 24 HR ORAL
Qty: 270 TABLET | Refills: 3 | Status: SHIPPED | OUTPATIENT
Start: 2024-09-03

## 2024-09-03 RX ORDER — ROSUVASTATIN CALCIUM 10 MG/1
10 TABLET, COATED ORAL DAILY
Qty: 90 TABLET | Refills: 3 | Status: SHIPPED | OUTPATIENT
Start: 2024-09-03

## 2024-09-03 RX ORDER — FENOFIBRATE 145 MG/1
145 TABLET, COATED ORAL DAILY
Qty: 90 TABLET | Refills: 3 | Status: SHIPPED | OUTPATIENT
Start: 2024-09-03

## 2024-09-03 NOTE — TELEPHONE ENCOUNTER
RX refill request from the patient/pharmacy. Patient last seen 06- with labs, and next appt. scheduled for 10-  Requested Prescriptions     Pending Prescriptions Disp Refills    rosuvastatin (CRESTOR) 10 MG tablet [Pharmacy Med Name: Rosuvastatin Calcium 10 MG Oral Tablet] 90 tablet 3     Sig: TAKE 1 TABLET BY MOUTH DAILY    fenofibrate (TRICOR) 145 MG tablet [Pharmacy Med Name: Fenofibrate 145 MG Oral Tablet] 90 tablet 3     Sig: TAKE 1 TABLET BY MOUTH DAILY    metFORMIN (GLUCOPHAGE-XR) 500 MG extended release tablet [Pharmacy Med Name: metFORMIN HCl  MG Oral Tablet Extended Release 24 Hour] 270 tablet 3     Sig: TAKE 1 TABLET BY MOUTH IN THE  MORNING AND 2 TABLETS BY MOUTH  AT DINNER    .

## 2024-10-15 SDOH — ECONOMIC STABILITY: FOOD INSECURITY: WITHIN THE PAST 12 MONTHS, YOU WORRIED THAT YOUR FOOD WOULD RUN OUT BEFORE YOU GOT MONEY TO BUY MORE.: NEVER TRUE

## 2024-10-15 SDOH — ECONOMIC STABILITY: FOOD INSECURITY: WITHIN THE PAST 12 MONTHS, THE FOOD YOU BOUGHT JUST DIDN'T LAST AND YOU DIDN'T HAVE MONEY TO GET MORE.: NEVER TRUE

## 2024-10-15 SDOH — ECONOMIC STABILITY: INCOME INSECURITY: HOW HARD IS IT FOR YOU TO PAY FOR THE VERY BASICS LIKE FOOD, HOUSING, MEDICAL CARE, AND HEATING?: NOT HARD AT ALL

## 2024-10-15 SDOH — ECONOMIC STABILITY: TRANSPORTATION INSECURITY
IN THE PAST 12 MONTHS, HAS LACK OF TRANSPORTATION KEPT YOU FROM MEETINGS, WORK, OR FROM GETTING THINGS NEEDED FOR DAILY LIVING?: NO

## 2024-10-16 ENCOUNTER — OFFICE VISIT (OUTPATIENT)
Facility: CLINIC | Age: 74
End: 2024-10-16

## 2024-10-16 VITALS
TEMPERATURE: 97.9 F | DIASTOLIC BLOOD PRESSURE: 68 MMHG | HEART RATE: 70 BPM | RESPIRATION RATE: 16 BRPM | OXYGEN SATURATION: 97 % | SYSTOLIC BLOOD PRESSURE: 124 MMHG | BODY MASS INDEX: 32.05 KG/M2 | WEIGHT: 242.9 LBS

## 2024-10-16 DIAGNOSIS — I12.9 HYPERTENSION WITH RENAL DISEASE: Primary | ICD-10-CM

## 2024-10-16 DIAGNOSIS — M79.671 HEEL PAIN, CHRONIC, RIGHT: ICD-10-CM

## 2024-10-16 DIAGNOSIS — Z23 NEEDS FLU SHOT: ICD-10-CM

## 2024-10-16 DIAGNOSIS — E66.09 CLASS 1 OBESITY DUE TO EXCESS CALORIES WITH SERIOUS COMORBIDITY AND BODY MASS INDEX (BMI) OF 33.0 TO 33.9 IN ADULT: ICD-10-CM

## 2024-10-16 DIAGNOSIS — K21.9 GASTROESOPHAGEAL REFLUX DISEASE WITHOUT ESOPHAGITIS: ICD-10-CM

## 2024-10-16 DIAGNOSIS — I42.0 DILATED CARDIOMYOPATHY (HCC): ICD-10-CM

## 2024-10-16 DIAGNOSIS — N18.30 CONTROLLED TYPE 2 DIABETES MELLITUS WITH STAGE 3 CHRONIC KIDNEY DISEASE, WITHOUT LONG-TERM CURRENT USE OF INSULIN (HCC): ICD-10-CM

## 2024-10-16 DIAGNOSIS — G89.29 HEEL PAIN, CHRONIC, RIGHT: ICD-10-CM

## 2024-10-16 DIAGNOSIS — E78.2 MIXED HYPERLIPIDEMIA: ICD-10-CM

## 2024-10-16 DIAGNOSIS — E66.811 CLASS 1 OBESITY DUE TO EXCESS CALORIES WITH SERIOUS COMORBIDITY AND BODY MASS INDEX (BMI) OF 33.0 TO 33.9 IN ADULT: ICD-10-CM

## 2024-10-16 DIAGNOSIS — M15.0 PRIMARY OSTEOARTHRITIS INVOLVING MULTIPLE JOINTS: ICD-10-CM

## 2024-10-16 DIAGNOSIS — E11.22 CONTROLLED TYPE 2 DIABETES MELLITUS WITH STAGE 3 CHRONIC KIDNEY DISEASE, WITHOUT LONG-TERM CURRENT USE OF INSULIN (HCC): ICD-10-CM

## 2024-10-16 DIAGNOSIS — N18.30 STAGE 3 CHRONIC KIDNEY DISEASE, UNSPECIFIED WHETHER STAGE 3A OR 3B CKD (HCC): ICD-10-CM

## 2024-10-16 LAB
ALBUMIN SERPL-MCNC: 4 G/DL (ref 3.5–5)
ALBUMIN/GLOB SERPL: 1.5 (ref 1.1–2.2)
ALP SERPL-CCNC: 64 U/L (ref 45–117)
ALT SERPL-CCNC: 21 U/L (ref 12–78)
ANION GAP SERPL CALC-SCNC: 4 MMOL/L (ref 2–12)
AST SERPL-CCNC: 18 U/L (ref 15–37)
BILIRUB SERPL-MCNC: 0.4 MG/DL (ref 0.2–1)
BUN SERPL-MCNC: 16 MG/DL (ref 6–20)
BUN/CREAT SERPL: 12 (ref 12–20)
CALCIUM SERPL-MCNC: 9 MG/DL (ref 8.5–10.1)
CHLORIDE SERPL-SCNC: 109 MMOL/L (ref 97–108)
CHOLEST SERPL-MCNC: 109 MG/DL
CK SERPL-CCNC: 111 U/L (ref 39–308)
CO2 SERPL-SCNC: 27 MMOL/L (ref 21–32)
CREAT SERPL-MCNC: 1.35 MG/DL (ref 0.7–1.3)
EST. AVERAGE GLUCOSE BLD GHB EST-MCNC: 123 MG/DL
GLOBULIN SER CALC-MCNC: 2.6 G/DL (ref 2–4)
GLUCOSE SERPL-MCNC: 127 MG/DL (ref 65–100)
HBA1C MFR BLD: 5.9 % (ref 4–5.6)
HDLC SERPL-MCNC: 38 MG/DL
HDLC SERPL: 2.9 (ref 0–5)
LDLC SERPL CALC-MCNC: 53 MG/DL (ref 0–100)
POTASSIUM SERPL-SCNC: 3.8 MMOL/L (ref 3.5–5.1)
PROT SERPL-MCNC: 6.6 G/DL (ref 6.4–8.2)
SODIUM SERPL-SCNC: 140 MMOL/L (ref 136–145)
TRIGL SERPL-MCNC: 90 MG/DL
VLDLC SERPL CALC-MCNC: 18 MG/DL

## 2024-10-16 NOTE — PROGRESS NOTES
After obtaining written consent and per orders of Dr. Wilson, injection of flu vaccine given by Ragini Almodovar RN. Patient tolerated procedure well. VIS was given to them. No reactions noted.   
Chief Complaint   Patient presents with    Follow-up    Cholesterol Problem    Chronic Kidney Disease    Diabetes     3 month followup    1. Have you been to the ER, urgent care clinic since your last visit?  Hospitalized since your last visit?no    2. Have you seen or consulted any other health care providers outside of the [facility] since your last visit?  Include any pap smears or colon screening. no    
calcaneal spur.  I did recommend stretching exercises as well as arch supports with a heel cushion to see if that is beneficial.  Flu shot given today.  Follow-up again 3 months or sooner if there is a problem.  I will call with lab results in interim.    PLAN:  1. Hypertension with renal disease  2. Controlled type 2 diabetes mellitus with stage 3 chronic kidney disease, without long-term current use of insulin (HCC)  -     Hemoglobin A1C; Future  3. Mixed hyperlipidemia  -     CK; Future  -     Lipid Panel; Future  -     Comprehensive Metabolic Panel; Future  4. Gastroesophageal reflux disease without esophagitis  5. Primary osteoarthritis involving multiple joints  6. Stage 3 chronic kidney disease, unspecified whether stage 3a or 3b CKD (HCC)  7. Dilated cardiomyopathy (HCC)  8. Class 1 obesity due to excess calories with serious comorbidity and body mass index (BMI) of 33.0 to 33.9 in adult  9. Needs flu shot  -     Influenza, FLUAD Trivalent, (age 65 y+), IM, Preservative Free, 0.5mL  10. Heel pain, chronic, right  -     XR CALCANEUS RIGHT (MIN 2 VIEWS); Future          ATTENTION:   This medical record was transcribed using an electronic medical records system.  Although proofread, it may and can contain electronic and spelling errors.  Other human spelling and other errors may be present.  Corrections may be executed at a later time.  Please feel free to contact us for any clarifications as needed.      No follow-up provider specified.    No results found for any visits on 10/16/24.    Kyle Wilson MD    The patient verbalized understanding of the problems and plans as explained.

## 2024-11-19 RX ORDER — HYDRALAZINE HYDROCHLORIDE 50 MG/1
50 TABLET, FILM COATED ORAL 3 TIMES DAILY
Qty: 21 TABLET | Refills: 0 | Status: SHIPPED | OUTPATIENT
Start: 2024-11-19

## 2024-11-19 RX ORDER — HYDRALAZINE HYDROCHLORIDE 50 MG/1
50 TABLET, FILM COATED ORAL 3 TIMES DAILY
Qty: 270 TABLET | Refills: 3 | Status: SHIPPED | OUTPATIENT
Start: 2024-11-19

## 2024-11-19 NOTE — TELEPHONE ENCOUNTER
Pharmacy: [facility]  Patient requesting a small refill sent to local pharmacy as he is about to run out.     hydrALAZINE (APRESOLINE) 50 MG tablet [[id number]]    Order Details  Dose, Route, Frequency: As Directed   Dispense Quantity: tablet Refills:     Note to Pharmacy: Please send a replace/new response with 90-Day Supply if appropriate to maximize member benefit. Requesting 1 year supply.         Sig: TAKE 1 TABLET BY MOUTH 3 TIMES DAILY

## 2024-11-19 NOTE — TELEPHONE ENCOUNTER
PCP: Kyle Wilson MD    Last appt: 10/16/2024  Future Appointments   Date Time Provider Department Center   11/20/2024 10:00 AM Hudson Smith PT [facility]   11/25/2024  2:40 PM Awa Warner PTA [facility]   1/22/2025  9:30 AM Kyle Wilson MD [facility]       Requested Prescriptions     Pending Prescriptions Disp Refills    hydrALAZINE (APRESOLINE) 50 MG tablet 270 tablet 3     Sig: Take 1 tablet by mouth 3 times daily       Prior labs and Blood pressures:  BP Readings from Last 3 Encounters:   10/16/24 124/68   06/26/24 120/80   03/20/24 114/67     Lab Results   Component Value Date/Time     10/16/2024 10:32 AM    K 3.8 10/16/2024 10:32 AM     10/16/2024 10:32 AM    CO2 27 10/16/2024 10:32 AM    BUN 16 10/16/2024 10:32 AM    GFRAA >60 08/17/2022 02:11 PM     No results found for: \"HBA1C\", \"[id number]\"  Lab Results   Component Value Date/Time    CHOL 109 10/16/2024 10:32 AM    HDL 38 10/16/2024 10:32 AM    LDL 53 10/16/2024 10:32 AM    LDL 53.8 03/20/2024 10:13 AM    VLDL 18 10/16/2024 10:32 AM    VLDL 23 03/25/2021 11:29 AM     No results found for: \"VITD3\"        Lab Results   Component Value Date/Time    TSH 1.16 06/26/2024 10:32 AM

## 2024-11-19 NOTE — TELEPHONE ENCOUNTER
Patient requesting a small refill sent to local pharmacy as he is about to run out.     PCP: Kyle Wilson MD    Last appt: 10/16/2024  Future Appointments   Date Time Provider Department Center   11/20/2024 10:00 AM Hudson Smith, PT [facility]   11/25/2024  2:40 PM Awa Warner PTA [facility]   1/22/2025  9:30 AM Kyle Wilson MD [facility]       Requested Prescriptions     Pending Prescriptions Disp Refills    hydrALAZINE (APRESOLINE) 50 MG tablet 21 tablet 0     Sig: Take 1 tablet by mouth 3 times daily       Prior labs and Blood pressures:  BP Readings from Last 3 Encounters:   10/16/24 124/68   06/26/24 120/80   03/20/24 114/67     Lab Results   Component Value Date/Time     10/16/2024 10:32 AM    K 3.8 10/16/2024 10:32 AM     10/16/2024 10:32 AM    CO2 27 10/16/2024 10:32 AM    BUN 16 10/16/2024 10:32 AM    GFRAA >60 08/17/2022 02:11 PM     No results found for: \"HBA1C\", \"[id number]\"  Lab Results   Component Value Date/Time    CHOL 109 10/16/2024 10:32 AM    HDL 38 10/16/2024 10:32 AM    LDL 53 10/16/2024 10:32 AM    LDL 53.8 03/20/2024 10:13 AM    VLDL 18 10/16/2024 10:32 AM    VLDL 23 03/25/2021 11:29 AM     No results found for: \"VITD3\"        Lab Results   Component Value Date/Time    TSH 1.16 06/26/2024 10:32 AM

## 2024-11-25 RX ORDER — HYDRALAZINE HYDROCHLORIDE 50 MG/1
50 TABLET, FILM COATED ORAL 3 TIMES DAILY
Qty: 90 TABLET | Refills: 5 | Status: SHIPPED | OUTPATIENT
Start: 2024-11-25

## 2024-11-25 NOTE — TELEPHONE ENCOUNTER
RX refill request from the patient/pharmacy. Patient last seen 10- with labs, and next appt. scheduled for 01-  Requested Prescriptions     Pending Prescriptions Disp Refills    hydrALAZINE (APRESOLINE) 50 MG tablet [Pharmacy Med Name: HYDRALAZINE  50MG TABLETS] 90 tablet 5     Sig: TAKE 1 TABLET BY MOUTH THREE TIMES DAILY    .

## 2025-01-21 NOTE — PROGRESS NOTES
Chief Complaint   Patient presents with    Hypertension     Pt comes in for a 3 month follow up.     Diabetes       SUBJECTIVE:    Nghia Patel III is a 74 y.o. male who returns in follow-up for his medical problems include hypertension, diabetes, hyperlipidemia, GERD, DJD, cardiomyopathy and other medical problems.  He did have a fall and ended up getting cortisone shot in his knee by [facility] and that seems to have worked for that.  He notes no chest pain, shortness of breath or cardiac respiratory complaints.  There are no current GI or  complaints.  He notes no headaches, dizziness or neurologic complaints.  Other than the left knee notes no orthopedic complaints and no other complaints complete review of systems.      Current Outpatient Medications   Medication Sig Dispense Refill    carvedilol (COREG) 25 MG tablet TAKE 1 TABLET BY MOUTH TWICE  DAILY WITH MEALS 180 tablet 3    amLODIPine (NORVASC) 5 MG tablet TAKE 1 TABLET BY MOUTH DAILY 90 tablet 3    famotidine (PEPCID) 40 MG tablet TAKE 1 TABLET BY MOUTH DAILY 90 tablet 3    hydrALAZINE (APRESOLINE) 50 MG tablet Take 1 tablet by mouth 3 times daily 270 tablet 3    rosuvastatin (CRESTOR) 10 MG tablet TAKE 1 TABLET BY MOUTH DAILY 90 tablet 3    fenofibrate (TRICOR) 145 MG tablet TAKE 1 TABLET BY MOUTH DAILY 90 tablet 3    metFORMIN (GLUCOPHAGE-XR) 500 MG extended release tablet TAKE 1 TABLET BY MOUTH IN THE  MORNING AND 2 TABLETS BY MOUTH  AT DINNER 270 tablet 3    niacin (NIASPAN) 750 MG extended release tablet TAKE 2 TABLETS BY MOUTH AT  BEDTIME 180 tablet 3    bumetanide (BUMEX) 1 MG tablet TAKE 1 TABLET BY MOUTH IN THE  MORNING 90 tablet 3    esomeprazole (NEXIUM) 40 MG delayed release capsule TAKE 1 CAPSULE BY MOUTH DAILY 90 capsule 3    levothyroxine (SYNTHROID) 50 MCG tablet Take 1 tablet by mouth daily 90 tablet 3    sacubitril-valsartan (ENTRESTO)  MG per tablet Take 1 tablet by mouth 2 times daily      hydrALAZINE (APRESOLINE) 50

## 2025-01-22 ENCOUNTER — OFFICE VISIT (OUTPATIENT)
Facility: CLINIC | Age: 75
End: 2025-01-22

## 2025-01-22 VITALS
SYSTOLIC BLOOD PRESSURE: 118 MMHG | HEART RATE: 70 BPM | DIASTOLIC BLOOD PRESSURE: 70 MMHG | WEIGHT: 241.8 LBS | TEMPERATURE: 97.3 F | BODY MASS INDEX: 32.05 KG/M2 | HEIGHT: 73 IN | OXYGEN SATURATION: 96 %

## 2025-01-22 DIAGNOSIS — I12.9 HYPERTENSION WITH RENAL DISEASE: Primary | ICD-10-CM

## 2025-01-22 DIAGNOSIS — N18.30 CONTROLLED TYPE 2 DIABETES MELLITUS WITH STAGE 3 CHRONIC KIDNEY DISEASE, WITHOUT LONG-TERM CURRENT USE OF INSULIN (HCC): ICD-10-CM

## 2025-01-22 DIAGNOSIS — M15.0 PRIMARY OSTEOARTHRITIS INVOLVING MULTIPLE JOINTS: ICD-10-CM

## 2025-01-22 DIAGNOSIS — I42.0 DILATED CARDIOMYOPATHY (HCC): ICD-10-CM

## 2025-01-22 DIAGNOSIS — E78.2 MIXED HYPERLIPIDEMIA: ICD-10-CM

## 2025-01-22 DIAGNOSIS — E66.811 CLASS 1 OBESITY DUE TO EXCESS CALORIES WITH SERIOUS COMORBIDITY AND BODY MASS INDEX (BMI) OF 33.0 TO 33.9 IN ADULT: ICD-10-CM

## 2025-01-22 DIAGNOSIS — E66.09 CLASS 1 OBESITY DUE TO EXCESS CALORIES WITH SERIOUS COMORBIDITY AND BODY MASS INDEX (BMI) OF 33.0 TO 33.9 IN ADULT: ICD-10-CM

## 2025-01-22 DIAGNOSIS — N18.30 STAGE 3 CHRONIC KIDNEY DISEASE, UNSPECIFIED WHETHER STAGE 3A OR 3B CKD (HCC): ICD-10-CM

## 2025-01-22 DIAGNOSIS — K21.9 GASTROESOPHAGEAL REFLUX DISEASE WITHOUT ESOPHAGITIS: ICD-10-CM

## 2025-01-22 DIAGNOSIS — E11.22 CONTROLLED TYPE 2 DIABETES MELLITUS WITH STAGE 3 CHRONIC KIDNEY DISEASE, WITHOUT LONG-TERM CURRENT USE OF INSULIN (HCC): ICD-10-CM

## 2025-01-22 RX ORDER — AMLODIPINE BESYLATE 5 MG/1
5 TABLET ORAL DAILY
Qty: 90 TABLET | Refills: 3 | Status: SHIPPED | OUTPATIENT
Start: 2025-01-22

## 2025-01-22 RX ORDER — FAMOTIDINE 40 MG/1
40 TABLET, FILM COATED ORAL DAILY
Qty: 90 TABLET | Refills: 3 | Status: SHIPPED | OUTPATIENT
Start: 2025-01-22

## 2025-01-22 RX ORDER — CARVEDILOL 25 MG/1
25 TABLET ORAL 2 TIMES DAILY WITH MEALS
Qty: 180 TABLET | Refills: 3 | Status: SHIPPED | OUTPATIENT
Start: 2025-01-22

## 2025-01-22 ASSESSMENT — PATIENT HEALTH QUESTIONNAIRE - PHQ9
2. FEELING DOWN, DEPRESSED OR HOPELESS: NOT AT ALL
SUM OF ALL RESPONSES TO PHQ QUESTIONS 1-9: 0
SUM OF ALL RESPONSES TO PHQ QUESTIONS 1-9: 0
SUM OF ALL RESPONSES TO PHQ9 QUESTIONS 1 & 2: 0
SUM OF ALL RESPONSES TO PHQ QUESTIONS 1-9: 0
SUM OF ALL RESPONSES TO PHQ QUESTIONS 1-9: 0
1. LITTLE INTEREST OR PLEASURE IN DOING THINGS: NOT AT ALL

## 2025-01-22 NOTE — TELEPHONE ENCOUNTER
PCP: Kyle Wilson MD    Last appt: 10/16/2024  Future Appointments   Date Time Provider Department Center   1/22/2025  9:30 AM Kyle Wilson MD [facility]   1/23/2025  4:20 PM Hudson Smith, PT [facility]       Requested Prescriptions     Pending Prescriptions Disp Refills    carvedilol (COREG) 25 MG tablet [Pharmacy Med Name: Carvedilol 25 MG Oral Tablet] 180 tablet 3     Sig: TAKE 1 TABLET BY MOUTH TWICE  DAILY WITH MEALS    amLODIPine (NORVASC) 5 MG tablet [Pharmacy Med Name: amLODIPine Besylate 5 MG Oral Tablet] 90 tablet 3     Sig: TAKE 1 TABLET BY MOUTH DAILY       Prior labs and Blood pressures:  BP Readings from Last 3 Encounters:   10/16/24 124/68   06/26/24 120/80   03/20/24 114/67     Lab Results   Component Value Date/Time     10/16/2024 10:32 AM    K 3.8 10/16/2024 10:32 AM     10/16/2024 10:32 AM    CO2 27 10/16/2024 10:32 AM    BUN 16 10/16/2024 10:32 AM    GFRAA >60 08/17/2022 02:11 PM     No results found for: \"HBA1C\", \"[id number]\"  Lab Results   Component Value Date/Time    CHOL 109 10/16/2024 10:32 AM    HDL 38 10/16/2024 10:32 AM    LDL 53 10/16/2024 10:32 AM    LDL 53.8 03/20/2024 10:13 AM    VLDL 18 10/16/2024 10:32 AM    VLDL 23 03/25/2021 11:29 AM     No results found for: \"VITD3\"        Lab Results   Component Value Date/Time    TSH 1.16 06/26/2024 10:32 AM

## 2025-01-22 NOTE — PROGRESS NOTES
\"Have you been to the ER, urgent care clinic since your last visit?  Hospitalized since your last visit?\"    NO    “Have you seen or consulted any other health care providers outside our system since your last visit?”    NO      “Have you had a colorectal cancer screening such as a colonoscopy/FIT/Cologuard?    NO    Date of last Colonoscopy: 8/5/2019  No cologuard on file  No FIT/FOBT on file   No flexible sigmoidoscopy on file     “Have you had a diabetic eye exam?”    NO     No diabetic eye exam on file

## 2025-01-22 NOTE — TELEPHONE ENCOUNTER
PCP: Kyle Wilson MD    Last appt: 10/16/2024  Future Appointments   Date Time Provider Department Center   1/22/2025  9:30 AM Kyle Wilson MD [facility]   1/23/2025  4:20 PM Hudson Smith, PT [facility]       Requested Prescriptions     Pending Prescriptions Disp Refills    famotidine (PEPCID) 40 MG tablet [Pharmacy Med Name: Famotidine 40 MG Oral Tablet] 90 tablet 3     Sig: TAKE 1 TABLET BY MOUTH DAILY       Prior labs and Blood pressures:  BP Readings from Last 3 Encounters:   10/16/24 124/68   06/26/24 120/80   03/20/24 114/67     Lab Results   Component Value Date/Time     10/16/2024 10:32 AM    K 3.8 10/16/2024 10:32 AM     10/16/2024 10:32 AM    CO2 27 10/16/2024 10:32 AM    BUN 16 10/16/2024 10:32 AM    GFRAA >60 08/17/2022 02:11 PM     No results found for: \"HBA1C\", \"[id number]\"  Lab Results   Component Value Date/Time    CHOL 109 10/16/2024 10:32 AM    HDL 38 10/16/2024 10:32 AM    LDL 53 10/16/2024 10:32 AM    LDL 53.8 03/20/2024 10:13 AM    VLDL 18 10/16/2024 10:32 AM    VLDL 23 03/25/2021 11:29 AM     No results found for: \"VITD3\"        Lab Results   Component Value Date/Time    TSH 1.16 06/26/2024 10:32 AM

## 2025-01-23 LAB
ALBUMIN SERPL-MCNC: 3.8 G/DL (ref 3.5–5)
ALBUMIN/GLOB SERPL: 1.3 (ref 1.1–2.2)
ALP SERPL-CCNC: 66 U/L (ref 45–117)
ALT SERPL-CCNC: 19 U/L (ref 12–78)
ANION GAP SERPL CALC-SCNC: 6 MMOL/L (ref 2–12)
AST SERPL-CCNC: 14 U/L (ref 15–37)
BILIRUB SERPL-MCNC: 0.6 MG/DL (ref 0.2–1)
BUN SERPL-MCNC: 17 MG/DL (ref 6–20)
BUN/CREAT SERPL: 14 (ref 12–20)
CALCIUM SERPL-MCNC: 9 MG/DL (ref 8.5–10.1)
CHLORIDE SERPL-SCNC: 108 MMOL/L (ref 97–108)
CHOLEST SERPL-MCNC: 118 MG/DL
CK SERPL-CCNC: 88 U/L (ref 39–308)
CO2 SERPL-SCNC: 26 MMOL/L (ref 21–32)
CREAT SERPL-MCNC: 1.19 MG/DL (ref 0.7–1.3)
EST. AVERAGE GLUCOSE BLD GHB EST-MCNC: 123 MG/DL
GLOBULIN SER CALC-MCNC: 2.9 G/DL (ref 2–4)
GLUCOSE SERPL-MCNC: 125 MG/DL (ref 65–100)
HBA1C MFR BLD: 5.9 % (ref 4–5.6)
HDLC SERPL-MCNC: 38 MG/DL
HDLC SERPL: 3.1 (ref 0–5)
LDLC SERPL CALC-MCNC: 61 MG/DL (ref 0–100)
POTASSIUM SERPL-SCNC: 3.4 MMOL/L (ref 3.5–5.1)
PROT SERPL-MCNC: 6.7 G/DL (ref 6.4–8.2)
SODIUM SERPL-SCNC: 140 MMOL/L (ref 136–145)
TRIGL SERPL-MCNC: 95 MG/DL
VLDLC SERPL CALC-MCNC: 19 MG/DL

## 2025-02-03 RX ORDER — POTASSIUM CHLORIDE 750 MG/1
10 TABLET, EXTENDED RELEASE ORAL DAILY
Qty: 90 TABLET | Refills: 1 | Status: SHIPPED | OUTPATIENT
Start: 2025-02-03

## 2025-02-03 NOTE — TELEPHONE ENCOUNTER
RX refill request from the patient/pharmacy. Patient last seen 01- with labs, and next appt. scheduled for 04-  Requested Prescriptions     Pending Prescriptions Disp Refills    potassium chloride (KLOR-CON M) 10 MEQ extended release tablet 90 tablet 1     Sig: Take 1 tablet by mouth daily    .

## 2025-04-17 RX ORDER — BUMETANIDE 1 MG/1
1 TABLET ORAL EVERY MORNING
Qty: 90 TABLET | Refills: 1 | Status: SHIPPED | OUTPATIENT
Start: 2025-04-17

## 2025-04-17 RX ORDER — ESOMEPRAZOLE MAGNESIUM 40 MG/1
40 CAPSULE, DELAYED RELEASE ORAL DAILY
Qty: 90 CAPSULE | Refills: 1 | Status: SHIPPED | OUTPATIENT
Start: 2025-04-17

## 2025-04-17 RX ORDER — NIACIN 750 MG/1
1500 TABLET, EXTENDED RELEASE ORAL NIGHTLY
Qty: 180 TABLET | Refills: 1 | Status: SHIPPED | OUTPATIENT
Start: 2025-04-17

## 2025-04-17 NOTE — TELEPHONE ENCOUNTER
PCP: Kyle Wilson MD    Last appt: 1/22/2025    Future Appointments   Date Time Provider Department Center   4/23/2025 10:00 AM Kyle Wilson MD [facility]       Requested Prescriptions     Pending Prescriptions Disp Refills    bumetanide (BUMEX) 1 MG tablet [Pharmacy Med Name: Bumetanide 1 MG Oral Tablet] 90 tablet 1     Sig: TAKE 1 TABLET BY MOUTH IN THE  MORNING    niacin (NIASPAN) 750 MG extended release tablet [Pharmacy Med Name: NIACIN  750MG  TAB  EXTENDED RELEASE] 180 tablet 1     Sig: TAKE 2 TABLETS BY MOUTH AT  BEDTIME    esomeprazole (NEXIUM) 40 MG delayed release capsule [Pharmacy Med Name: Esomeprazole Magnesium 40 MG Oral Capsule Delayed Release] 90 capsule 1     Sig: TAKE 1 CAPSULE BY MOUTH DAILY

## 2025-04-21 SDOH — ECONOMIC STABILITY: INCOME INSECURITY: IN THE LAST 12 MONTHS, WAS THERE A TIME WHEN YOU WERE NOT ABLE TO PAY THE MORTGAGE OR RENT ON TIME?: NO

## 2025-04-21 SDOH — ECONOMIC STABILITY: FOOD INSECURITY: WITHIN THE PAST 12 MONTHS, YOU WORRIED THAT YOUR FOOD WOULD RUN OUT BEFORE YOU GOT MONEY TO BUY MORE.: NEVER TRUE

## 2025-04-21 SDOH — ECONOMIC STABILITY: TRANSPORTATION INSECURITY
IN THE PAST 12 MONTHS, HAS THE LACK OF TRANSPORTATION KEPT YOU FROM MEDICAL APPOINTMENTS OR FROM GETTING MEDICATIONS?: NO

## 2025-04-21 SDOH — ECONOMIC STABILITY: FOOD INSECURITY: WITHIN THE PAST 12 MONTHS, THE FOOD YOU BOUGHT JUST DIDN'T LAST AND YOU DIDN'T HAVE MONEY TO GET MORE.: NEVER TRUE

## 2025-04-22 NOTE — PROGRESS NOTES
This is a Subsequent Medicare Annual Wellness Visit providing Personalized Prevention Plan Services (PPPS) (Performed 12 months after initial AWV and PPPS )    I have reviewed the patient's medical history in detail and updated the computerized patient record.  He presents today for his Medicare subsequent annual wellness examination and screening questionnaire.    He is also here in follow-up of his multiple medical problems including hypertension, cardiomyopathy, sleep apnea, DJD, diabetes, GERD, diverticulosis, hypothyroidism, obesity, vitamin D deficiency, CKD stage III, and other multiple medical problems.  He is taking his medications trying to follow his diet remains physically active.  He has now recovered from his rotator cuff surgery.  He notes no chest pain, palpitations, PND, orthopnea or other cardiac or respiratory complaints.  He notes no current GI or  complaints.  He did have some diarrhea when he for started potassium but he stopped it for a while there is gone back to and that seems to be okay.  He notes no headaches, dizziness or neurologic complaints.  There are no current active arthritic complaints although he does have his chronic unchanged joint aches and pains.  He has no other complaints on complete review of systems.    History     Past Medical History:   Diagnosis Date    Arthritis     fingers    CAD (coronary artery disease)     Cardiomyopathy     Cholelithiasis 9/21/2017    CKD (chronic kidney disease) 9/21/2017    Diabetes mellitus (HCC)     Diverticulosis 9/21/2017    DJD (degenerative joint disease) 9/21/2017    GERD (gastroesophageal reflux disease)     H/O: depression 9/21/2017    Hiatal hernia     Hypercholesteremia     Hypertension with renal disease 9/21/2017    Hypothyroidism     Kidney stones     passed on own    Obesity 9/21/2017    Sleep apnea 9/21/2017    pt has lost weight and per wife has not had witnessed apneas, rafat score 3 at PAT phone call assessment 3/6/2024

## 2025-04-23 ENCOUNTER — OFFICE VISIT (OUTPATIENT)
Facility: CLINIC | Age: 75
End: 2025-04-23
Payer: MEDICARE

## 2025-04-23 VITALS
WEIGHT: 241.3 LBS | OXYGEN SATURATION: 94 % | BODY MASS INDEX: 31.84 KG/M2 | SYSTOLIC BLOOD PRESSURE: 112 MMHG | TEMPERATURE: 97.4 F | HEART RATE: 67 BPM | DIASTOLIC BLOOD PRESSURE: 65 MMHG

## 2025-04-23 DIAGNOSIS — K21.9 GASTROESOPHAGEAL REFLUX DISEASE WITHOUT ESOPHAGITIS: ICD-10-CM

## 2025-04-23 DIAGNOSIS — Z00.00 MEDICARE ANNUAL WELLNESS VISIT, SUBSEQUENT: ICD-10-CM

## 2025-04-23 DIAGNOSIS — E78.2 MIXED HYPERLIPIDEMIA: ICD-10-CM

## 2025-04-23 DIAGNOSIS — E03.9 ACQUIRED HYPOTHYROIDISM: ICD-10-CM

## 2025-04-23 DIAGNOSIS — E66.811 CLASS 1 OBESITY DUE TO EXCESS CALORIES WITH SERIOUS COMORBIDITY AND BODY MASS INDEX (BMI) OF 33.0 TO 33.9 IN ADULT: ICD-10-CM

## 2025-04-23 DIAGNOSIS — N18.30 STAGE 3 CHRONIC KIDNEY DISEASE, UNSPECIFIED WHETHER STAGE 3A OR 3B CKD (HCC): ICD-10-CM

## 2025-04-23 DIAGNOSIS — E66.09 CLASS 1 OBESITY DUE TO EXCESS CALORIES WITH SERIOUS COMORBIDITY AND BODY MASS INDEX (BMI) OF 33.0 TO 33.9 IN ADULT: ICD-10-CM

## 2025-04-23 DIAGNOSIS — I42.0 DILATED CARDIOMYOPATHY (HCC): ICD-10-CM

## 2025-04-23 DIAGNOSIS — E11.22 CONTROLLED TYPE 2 DIABETES MELLITUS WITH STAGE 3 CHRONIC KIDNEY DISEASE, WITHOUT LONG-TERM CURRENT USE OF INSULIN (HCC): ICD-10-CM

## 2025-04-23 DIAGNOSIS — Z12.5 PROSTATE CANCER SCREENING: ICD-10-CM

## 2025-04-23 DIAGNOSIS — G47.33 OBSTRUCTIVE SLEEP APNEA SYNDROME: ICD-10-CM

## 2025-04-23 DIAGNOSIS — I12.9 HYPERTENSION WITH RENAL DISEASE: Primary | ICD-10-CM

## 2025-04-23 DIAGNOSIS — E55.9 VITAMIN D DEFICIENCY: ICD-10-CM

## 2025-04-23 DIAGNOSIS — M15.0 PRIMARY OSTEOARTHRITIS INVOLVING MULTIPLE JOINTS: ICD-10-CM

## 2025-04-23 DIAGNOSIS — Z13.39 ALCOHOL SCREENING: ICD-10-CM

## 2025-04-23 DIAGNOSIS — K57.90 DIVERTICULOSIS: ICD-10-CM

## 2025-04-23 DIAGNOSIS — N18.30 CONTROLLED TYPE 2 DIABETES MELLITUS WITH STAGE 3 CHRONIC KIDNEY DISEASE, WITHOUT LONG-TERM CURRENT USE OF INSULIN (HCC): ICD-10-CM

## 2025-04-23 PROCEDURE — 1126F AMNT PAIN NOTED NONE PRSNT: CPT | Performed by: INTERNAL MEDICINE

## 2025-04-23 PROCEDURE — 93000 ELECTROCARDIOGRAM COMPLETE: CPT | Performed by: INTERNAL MEDICINE

## 2025-04-23 PROCEDURE — 99214 OFFICE O/P EST MOD 30 MIN: CPT | Performed by: INTERNAL MEDICINE

## 2025-04-23 PROCEDURE — G0443 BRIEF ALCOHOL MISUSE COUNSEL: HCPCS | Performed by: INTERNAL MEDICINE

## 2025-04-23 PROCEDURE — G8417 CALC BMI ABV UP PARAM F/U: HCPCS | Performed by: INTERNAL MEDICINE

## 2025-04-23 PROCEDURE — 3017F COLORECTAL CA SCREEN DOC REV: CPT | Performed by: INTERNAL MEDICINE

## 2025-04-23 PROCEDURE — 1160F RVW MEDS BY RX/DR IN RCRD: CPT | Performed by: INTERNAL MEDICINE

## 2025-04-23 PROCEDURE — 1123F ACP DISCUSS/DSCN MKR DOCD: CPT | Performed by: INTERNAL MEDICINE

## 2025-04-23 PROCEDURE — G0439 PPPS, SUBSEQ VISIT: HCPCS | Performed by: INTERNAL MEDICINE

## 2025-04-23 PROCEDURE — 1159F MED LIST DOCD IN RCRD: CPT | Performed by: INTERNAL MEDICINE

## 2025-04-23 PROCEDURE — 2022F DILAT RTA XM EVC RTNOPTHY: CPT | Performed by: INTERNAL MEDICINE

## 2025-04-23 PROCEDURE — G8427 DOCREV CUR MEDS BY ELIG CLIN: HCPCS | Performed by: INTERNAL MEDICINE

## 2025-04-23 PROCEDURE — 1036F TOBACCO NON-USER: CPT | Performed by: INTERNAL MEDICINE

## 2025-04-23 PROCEDURE — 3044F HG A1C LEVEL LT 7.0%: CPT | Performed by: INTERNAL MEDICINE

## 2025-04-23 ASSESSMENT — PATIENT HEALTH QUESTIONNAIRE - PHQ9
SUM OF ALL RESPONSES TO PHQ QUESTIONS 1-9: 0
1. LITTLE INTEREST OR PLEASURE IN DOING THINGS: NOT AT ALL
2. FEELING DOWN, DEPRESSED OR HOPELESS: NOT AT ALL
SUM OF ALL RESPONSES TO PHQ QUESTIONS 1-9: 0

## 2025-04-24 LAB
25(OH)D3 SERPL-MCNC: 25.7 NG/ML (ref 30–100)
ALBUMIN SERPL-MCNC: 4.3 G/DL (ref 3.5–5)
ALBUMIN/GLOB SERPL: 1.5 (ref 1.1–2.2)
ALP SERPL-CCNC: 63 U/L (ref 45–117)
ALT SERPL-CCNC: 20 U/L (ref 12–78)
ANION GAP SERPL CALC-SCNC: 4 MMOL/L (ref 2–12)
APPEARANCE UR: ABNORMAL
AST SERPL-CCNC: 24 U/L (ref 15–37)
BACTERIA URNS QL MICRO: NEGATIVE /HPF
BASOPHILS # BLD: 0.04 K/UL (ref 0–0.1)
BASOPHILS NFR BLD: 0.8 % (ref 0–1)
BILIRUB SERPL-MCNC: 0.5 MG/DL (ref 0.2–1)
BILIRUB UR QL: NEGATIVE
BUN SERPL-MCNC: 19 MG/DL (ref 6–20)
BUN/CREAT SERPL: 14 (ref 12–20)
CALCIUM SERPL-MCNC: 9.3 MG/DL (ref 8.5–10.1)
CHLORIDE SERPL-SCNC: 109 MMOL/L (ref 97–108)
CHOLEST SERPL-MCNC: 115 MG/DL
CK SERPL-CCNC: 157 U/L (ref 39–308)
CO2 SERPL-SCNC: 27 MMOL/L (ref 21–32)
COLOR UR: ABNORMAL
CREAT SERPL-MCNC: 1.38 MG/DL (ref 0.7–1.3)
CREAT UR-MCNC: 343 MG/DL
DIFFERENTIAL METHOD BLD: ABNORMAL
EOSINOPHIL # BLD: 0.11 K/UL (ref 0–0.4)
EOSINOPHIL NFR BLD: 2.2 % (ref 0–7)
EPITH CASTS URNS QL MICRO: ABNORMAL /LPF
ERYTHROCYTE [DISTWIDTH] IN BLOOD BY AUTOMATED COUNT: 13 % (ref 11.5–14.5)
EST. AVERAGE GLUCOSE BLD GHB EST-MCNC: 123 MG/DL
GLOBULIN SER CALC-MCNC: 2.8 G/DL (ref 2–4)
GLUCOSE SERPL-MCNC: 109 MG/DL (ref 65–100)
GLUCOSE UR STRIP.AUTO-MCNC: 100 MG/DL
HBA1C MFR BLD: 5.9 % (ref 4–5.6)
HCT VFR BLD AUTO: 37.1 % (ref 36.6–50.3)
HDLC SERPL-MCNC: 38 MG/DL
HDLC SERPL: 3 (ref 0–5)
HGB BLD-MCNC: 12.3 G/DL (ref 12.1–17)
HGB UR QL STRIP: NEGATIVE
HYALINE CASTS URNS QL MICRO: ABNORMAL /LPF (ref 0–5)
IMM GRANULOCYTES # BLD AUTO: 0.01 K/UL (ref 0–0.04)
IMM GRANULOCYTES NFR BLD AUTO: 0.2 % (ref 0–0.5)
KETONES UR QL STRIP.AUTO: ABNORMAL MG/DL
LDLC SERPL CALC-MCNC: 58.6 MG/DL (ref 0–100)
LEUKOCYTE ESTERASE UR QL STRIP.AUTO: NEGATIVE
LYMPHOCYTES # BLD: 1.37 K/UL (ref 0.8–3.5)
LYMPHOCYTES NFR BLD: 27.5 % (ref 12–49)
MCH RBC QN AUTO: 30.4 PG (ref 26–34)
MCHC RBC AUTO-ENTMCNC: 33.2 G/DL (ref 30–36.5)
MCV RBC AUTO: 91.6 FL (ref 80–99)
MICROALBUMIN UR-MCNC: 4.99 MG/DL
MICROALBUMIN/CREAT UR-RTO: 15 MG/G (ref 0–30)
MONOCYTES # BLD: 0.39 K/UL (ref 0–1)
MONOCYTES NFR BLD: 7.8 % (ref 5–13)
NEUTS SEG # BLD: 3.07 K/UL (ref 1.8–8)
NEUTS SEG NFR BLD: 61.5 % (ref 32–75)
NITRITE UR QL STRIP.AUTO: NEGATIVE
NRBC # BLD: 0 K/UL (ref 0–0.01)
NRBC BLD-RTO: 0 PER 100 WBC
PH UR STRIP: 5.5 (ref 5–8)
PLATELET # BLD AUTO: 189 K/UL (ref 150–400)
PMV BLD AUTO: 11.2 FL (ref 8.9–12.9)
POTASSIUM SERPL-SCNC: 4.1 MMOL/L (ref 3.5–5.1)
PROT SERPL-MCNC: 7.1 G/DL (ref 6.4–8.2)
PROT UR STRIP-MCNC: ABNORMAL MG/DL
PSA SERPL-MCNC: 3.1 NG/ML (ref 0.01–4)
RBC # BLD AUTO: 4.05 M/UL (ref 4.1–5.7)
RBC #/AREA URNS HPF: ABNORMAL /HPF (ref 0–5)
SODIUM SERPL-SCNC: 140 MMOL/L (ref 136–145)
SP GR UR REFRACTOMETRY: 1.03 (ref 1–1.03)
T4 FREE SERPL-MCNC: 1.1 NG/DL (ref 0.8–1.5)
TRIGL SERPL-MCNC: 92 MG/DL
TSH SERPL DL<=0.05 MIU/L-ACNC: 1.15 UIU/ML (ref 0.36–3.74)
UROBILINOGEN UR QL STRIP.AUTO: 1 EU/DL (ref 0.2–1)
VLDLC SERPL CALC-MCNC: 18.4 MG/DL
WBC # BLD AUTO: 5 K/UL (ref 4.1–11.1)
WBC URNS QL MICRO: ABNORMAL /HPF (ref 0–4)

## 2025-04-26 ENCOUNTER — RESULTS FOLLOW-UP (OUTPATIENT)
Facility: CLINIC | Age: 75
End: 2025-04-26

## 2025-04-28 RX ORDER — LEVOTHYROXINE SODIUM 50 UG/1
50 TABLET ORAL DAILY
Qty: 90 TABLET | Refills: 1 | Status: SHIPPED | OUTPATIENT
Start: 2025-04-28

## 2025-04-28 NOTE — TELEPHONE ENCOUNTER
PCP: Kyle Wilson MD    Last appt: 4/23/2025    Future Appointments   Date Time Provider Department Center   7/30/2025 10:00 AM Kyle Wilson MD [facility]       Requested Prescriptions     Pending Prescriptions Disp Refills    levothyroxine (SYNTHROID) 50 MCG tablet [Pharmacy Med Name: LEVOTHYROXINE 0.05MG (50MCG) TAB] 90 tablet 1     Sig: TAKE 1 TABLET BY MOUTH DAILY

## 2025-05-05 NOTE — TELEPHONE ENCOUNTER
Call patient with abnormal results. Labs OK except Vit D level low so start Vit D 1000 U QD.  Letter generated to patient regarding.

## 2025-05-22 PROBLEM — Z12.5 PROSTATE CANCER SCREENING: Status: RESOLVED | Noted: 2017-10-17 | Resolved: 2025-05-22

## 2025-05-22 PROBLEM — Z00.00 MEDICARE ANNUAL WELLNESS VISIT, SUBSEQUENT: Status: RESOLVED | Noted: 2019-11-20 | Resolved: 2025-05-22

## 2025-07-11 RX ORDER — METFORMIN HYDROCHLORIDE 500 MG/1
TABLET, EXTENDED RELEASE ORAL
Qty: 270 TABLET | Refills: 3 | Status: SHIPPED | OUTPATIENT
Start: 2025-07-11

## 2025-07-11 RX ORDER — FENOFIBRATE 145 MG/1
145 TABLET, FILM COATED ORAL DAILY
Qty: 90 TABLET | Refills: 3 | Status: SHIPPED | OUTPATIENT
Start: 2025-07-11

## 2025-07-11 RX ORDER — ROSUVASTATIN CALCIUM 10 MG/1
10 TABLET, COATED ORAL DAILY
Qty: 90 TABLET | Refills: 3 | Status: SHIPPED | OUTPATIENT
Start: 2025-07-11

## 2025-07-29 NOTE — PROGRESS NOTES
Chief Complaint   Patient presents with    Hypertension with renal disease     Pt  comes in today for a 3 month follow up.     Diabetes       SUBJECTIVE:    Nghia Patel III is a 75 y.o. male who returns in follow-up for his medical problems include hypertension, diabetes, hyperlipidemia, ASCVD, cardiomyopathy and other multiple medical problems.  He is noting a little bit of diarrhea which he has attributed to the heat although [place] more concerned is probably related to the metformin in the heat so I did ask him to decrease his metformin to 1 tablet twice a day rather than 500 in the morning and thousand the afternoon.  He notes no chest pain, shortness of breath or cardiac respiratory complaints.  Other than diarrhea there are no GI complaints and he has no  complaints.  He notes no headaches, dizziness or neurologic complaints.  There are no current active arthritic complaints and no other complaints on complete review of systems.      Current Outpatient Medications   Medication Sig Dispense Refill    fenofibrate (TRICOR) 145 MG tablet TAKE 1 TABLET BY MOUTH DAILY 90 tablet 3    rosuvastatin (CRESTOR) 10 MG tablet TAKE 1 TABLET BY MOUTH DAILY 90 tablet 3    metFORMIN (GLUCOPHAGE-XR) 500 MG extended release tablet TAKE 1 TABLET BY MOUTH IN THE  MORNING AND 2 TABLETS BY MOUTH  AT DINNER 270 tablet 3    levothyroxine (SYNTHROID) 50 MCG tablet TAKE 1 TABLET BY MOUTH DAILY 90 tablet 1    bumetanide (BUMEX) 1 MG tablet TAKE 1 TABLET BY MOUTH IN THE  MORNING 90 tablet 1    niacin (NIASPAN) 750 MG extended release tablet TAKE 2 TABLETS BY MOUTH AT  BEDTIME 180 tablet 1    esomeprazole (NEXIUM) 40 MG delayed release capsule TAKE 1 CAPSULE BY MOUTH DAILY 90 capsule 1    potassium chloride (KLOR-CON M) 10 MEQ extended release tablet Take 1 tablet by mouth daily 90 tablet 1    carvedilol (COREG) 25 MG tablet TAKE 1 TABLET BY MOUTH TWICE  DAILY WITH MEALS 180 tablet 3    amLODIPine (NORVASC) 5 MG tablet TAKE 1

## 2025-07-30 ENCOUNTER — OFFICE VISIT (OUTPATIENT)
Facility: CLINIC | Age: 75
End: 2025-07-30
Payer: MEDICARE

## 2025-07-30 VITALS
TEMPERATURE: 97.7 F | BODY MASS INDEX: 31.37 KG/M2 | DIASTOLIC BLOOD PRESSURE: 60 MMHG | WEIGHT: 237.8 LBS | OXYGEN SATURATION: 95 % | SYSTOLIC BLOOD PRESSURE: 136 MMHG | HEART RATE: 66 BPM

## 2025-07-30 DIAGNOSIS — N18.30 CONTROLLED TYPE 2 DIABETES MELLITUS WITH STAGE 3 CHRONIC KIDNEY DISEASE, WITHOUT LONG-TERM CURRENT USE OF INSULIN (HCC): ICD-10-CM

## 2025-07-30 DIAGNOSIS — K21.9 GASTROESOPHAGEAL REFLUX DISEASE WITHOUT ESOPHAGITIS: ICD-10-CM

## 2025-07-30 DIAGNOSIS — E11.22 CONTROLLED TYPE 2 DIABETES MELLITUS WITH STAGE 3 CHRONIC KIDNEY DISEASE, WITHOUT LONG-TERM CURRENT USE OF INSULIN (HCC): ICD-10-CM

## 2025-07-30 DIAGNOSIS — E66.811 CLASS 1 OBESITY DUE TO EXCESS CALORIES WITH SERIOUS COMORBIDITY AND BODY MASS INDEX (BMI) OF 33.0 TO 33.9 IN ADULT: ICD-10-CM

## 2025-07-30 DIAGNOSIS — E66.09 CLASS 1 OBESITY DUE TO EXCESS CALORIES WITH SERIOUS COMORBIDITY AND BODY MASS INDEX (BMI) OF 33.0 TO 33.9 IN ADULT: ICD-10-CM

## 2025-07-30 DIAGNOSIS — N18.30 STAGE 3 CHRONIC KIDNEY DISEASE, UNSPECIFIED WHETHER STAGE 3A OR 3B CKD (HCC): ICD-10-CM

## 2025-07-30 DIAGNOSIS — M15.0 PRIMARY OSTEOARTHRITIS INVOLVING MULTIPLE JOINTS: ICD-10-CM

## 2025-07-30 DIAGNOSIS — I42.0 DILATED CARDIOMYOPATHY (HCC): ICD-10-CM

## 2025-07-30 DIAGNOSIS — I12.9 HYPERTENSION WITH RENAL DISEASE: Primary | ICD-10-CM

## 2025-07-30 DIAGNOSIS — E78.2 MIXED HYPERLIPIDEMIA: ICD-10-CM

## 2025-07-30 LAB
ALBUMIN SERPL-MCNC: 4 G/DL (ref 3.5–5.2)
ALBUMIN/GLOB SERPL: 1.6 (ref 1.1–2.2)
ALP SERPL-CCNC: 56 U/L (ref 40–129)
ALT SERPL-CCNC: 16 U/L (ref 10–35)
ANION GAP SERPL CALC-SCNC: 10 MMOL/L (ref 2–14)
AST SERPL-CCNC: 21 U/L (ref 10–50)
BILIRUB SERPL-MCNC: 0.4 MG/DL (ref 0–1.2)
BUN SERPL-MCNC: 21 MG/DL (ref 8–23)
BUN/CREAT SERPL: 16 (ref 12–20)
CALCIUM SERPL-MCNC: 9.6 MG/DL (ref 8.8–10.2)
CHLORIDE SERPL-SCNC: 108 MMOL/L (ref 98–107)
CHOLEST SERPL-MCNC: 112 MG/DL (ref 0–200)
CK SERPL-CCNC: 98 U/L (ref 39–308)
CO2 SERPL-SCNC: 24 MMOL/L (ref 20–29)
CREAT SERPL-MCNC: 1.35 MG/DL (ref 0.7–1.2)
EST. AVERAGE GLUCOSE BLD GHB EST-MCNC: 125 MG/DL
GLOBULIN SER CALC-MCNC: 2.5 G/DL (ref 2–4)
GLUCOSE SERPL-MCNC: 128 MG/DL (ref 65–100)
HBA1C MFR BLD: 6 % (ref 4–5.6)
HDLC SERPL-MCNC: 35 MG/DL (ref 40–60)
HDLC SERPL: 3.2
LDLC SERPL CALC-MCNC: 60 MG/DL
POTASSIUM SERPL-SCNC: 4 MMOL/L (ref 3.5–5.1)
PROT SERPL-MCNC: 6.5 G/DL (ref 6.4–8.3)
SODIUM SERPL-SCNC: 143 MMOL/L (ref 136–145)
TRIGL SERPL-MCNC: 87 MG/DL (ref 0–150)
VLDLC SERPL CALC-MCNC: 17 MG/DL

## 2025-07-30 PROCEDURE — 99214 OFFICE O/P EST MOD 30 MIN: CPT | Performed by: INTERNAL MEDICINE

## 2025-07-30 PROCEDURE — 3017F COLORECTAL CA SCREEN DOC REV: CPT | Performed by: INTERNAL MEDICINE

## 2025-07-30 PROCEDURE — 3044F HG A1C LEVEL LT 7.0%: CPT | Performed by: INTERNAL MEDICINE

## 2025-07-30 PROCEDURE — 1126F AMNT PAIN NOTED NONE PRSNT: CPT | Performed by: INTERNAL MEDICINE

## 2025-07-30 PROCEDURE — G8427 DOCREV CUR MEDS BY ELIG CLIN: HCPCS | Performed by: INTERNAL MEDICINE

## 2025-07-30 PROCEDURE — G8417 CALC BMI ABV UP PARAM F/U: HCPCS | Performed by: INTERNAL MEDICINE

## 2025-07-30 PROCEDURE — 1036F TOBACCO NON-USER: CPT | Performed by: INTERNAL MEDICINE

## 2025-07-30 PROCEDURE — 2022F DILAT RTA XM EVC RTNOPTHY: CPT | Performed by: INTERNAL MEDICINE

## 2025-07-30 PROCEDURE — 1160F RVW MEDS BY RX/DR IN RCRD: CPT | Performed by: INTERNAL MEDICINE

## 2025-07-30 PROCEDURE — 1123F ACP DISCUSS/DSCN MKR DOCD: CPT | Performed by: INTERNAL MEDICINE

## 2025-07-30 PROCEDURE — 1159F MED LIST DOCD IN RCRD: CPT | Performed by: INTERNAL MEDICINE

## 2025-07-30 NOTE — PROGRESS NOTES
Have you been to the ER, urgent care clinic since your last visit?  Hospitalized since your last visit?   NO    Have you seen or consulted any other health care providers outside our system since your last visit?   NO    “Have you had a colorectal cancer screening such as a colonoscopy/FIT/Cologuard?    NO    Date of last Colonoscopy: 8/5/2019  No cologuard on file  No FIT/FOBT on file   No flexible sigmoidoscopy on file     “Have you had a diabetic eye exam?”    NO     No diabetic eye exam on file

## 2025-08-04 RX ORDER — POTASSIUM CHLORIDE 750 MG/1
10 TABLET, EXTENDED RELEASE ORAL DAILY
Qty: 90 TABLET | Refills: 1 | Status: SHIPPED | OUTPATIENT
Start: 2025-08-04

## (undated) DEVICE — Device

## (undated) DEVICE — 3M™ MEDIPORE™ H SOFT CLOTH SURGICAL TAPE, 2863, 3 IN X 10 YD, 12/CASE: Brand: 3M™ MEDIPORE™

## (undated) DEVICE — CANNULA ARTHSCP L7CM ID8.25MM TRNSLUC THRD FLX W/ NO SQUIRT

## (undated) DEVICE — TRAY,IRRIGATION,PISTON SYRINGE,60ML,STRL: Brand: MEDLINE

## (undated) DEVICE — DYONICS 4.0 MM ELITE                                    ACROMIOBLASTER STRAIGHT DISPOSABLE                                    BURRS, SAGE GREEN, 10000 MAXIMUM                                    RPM, PACKAGED 6 PER BOX, STERILE

## (undated) DEVICE — SHOULDER ARTHROSCOPY-MRMCASU: Brand: MEDLINE INDUSTRIES, INC.

## (undated) DEVICE — SOLUTION IRRIG 3000ML LAC RINGERS ARTHROMTC PLAS CONT

## (undated) DEVICE — DRESSING,GAUZE,XEROFORM,CURAD,1"X8",ST: Brand: CURAD

## (undated) DEVICE — ADHESIVE SKIN CLOSURE 4X22 CM PREMIERPRO EXOFINFUSION DISP

## (undated) DEVICE — MEDI-TRACE CADENCE ADULT, DEFIBRILLATION ELECTRODE -RTS  (10 PR/PK) - PHYSIO-CONTROL: Brand: MEDI-TRACE CADENCE

## (undated) DEVICE — SHOULDER SUSPENSION KIT 6 PER BOX

## (undated) DEVICE — DYONICS 25 INFLOW TUBE SET, 3 PER BOX

## (undated) DEVICE — NEEDLE SCORPION HD MEGA LOADER

## (undated) DEVICE — PACEMAKER PACK: Brand: MEDLINE INDUSTRIES, INC.

## (undated) DEVICE — PLASMABLADE PS200-040 4.0: Brand: PLASMABLADE™

## (undated) DEVICE — CLEAR-TRAC THREADED CANNULA WITH                                    OBTURATOR 5 MM X 76 MM, LATEX FREE,                                    BOX OF 10: Brand: CLEAR-TRAC

## (undated) DEVICE — GLOVE ORANGE PI 8   MSG9080

## (undated) DEVICE — SUTURE PERMAHAND SZ 0 L30IN NONABSORBABLE BLK L26MM SH 1/2 K834H

## (undated) DEVICE — SUTURE VCRL SZ 4-0 L18IN ABSRB UD L19MM PS-2 3/8 CIR PRIM J496H

## (undated) DEVICE — ELECTRODE PT RET AD L9FT HI MOIST COND ADH HYDRGEL CORDED

## (undated) DEVICE — SUTURE VCRL 2-0 L27IN ABSRB UD PS-2 L19MM 1/2 CIR J428H

## (undated) DEVICE — SUPER TURBOVAC 90 INTEGRATED CABLE WAND ICW: Brand: COBLATION

## (undated) DEVICE — SYRINGE MEDICAL 3ML CLEAR PLASTIC STANDARD NON CONTROL LUERLOCK TIP DISPOSABLE

## (undated) DEVICE — KIT ACCS INTRO 4FR L10CM NDL 21GA L7CM GWIRE L40CM

## (undated) DEVICE — GUIDE WIRE WITH HYDROPHILIC COATING: Brand: ACUITY WHISPER VIEW™

## (undated) DEVICE — SYSTEM INTRO 7FR L13CM NDL 18GA GWIRE L45CM DIA0.038IN STD

## (undated) DEVICE — HYPODERMIC SAFETY NEEDLE: Brand: MAGELLAN

## (undated) DEVICE — 4.5 MM INCISOR STRAIGHT BLADES,                                    POWER/EP-1, LIME GREEN, PACKAGED 6                                    PER BOX, STERILE

## (undated) DEVICE — 3M™ IOBAN™ 2 ANTIMICROBIAL INCISE DRAPE 6650EZ: Brand: IOBAN™ 2

## (undated) DEVICE — GLOVE ORTHO 7 1/2   MSG9475

## (undated) DEVICE — 4.0 MM ELITE ACROMIONIZER STRAIGHT                                    DISPOSABLE BURRS, MAUVE, 10000                                    MAXIMUM RPM, PACKAGED 6 PER BOX, STERILE

## (undated) DEVICE — CATHETER PRESSURE WEDGE BLLN 6FRX110CM

## (undated) DEVICE — RADIFOCUS GLIDEWIRE: Brand: GLIDEWIRE

## (undated) DEVICE — SYSTEM INTRO 9.5FR L13CM STD WHT CAP HEMSTAT SPLITTABLE

## (undated) DEVICE — SUTURE ETHLN SZ 3-0 L18IN NONABSORBABLE BLK PS-2 L19MM 3/8 1669H